# Patient Record
Sex: MALE | Race: WHITE | NOT HISPANIC OR LATINO | Employment: OTHER | URBAN - METROPOLITAN AREA
[De-identification: names, ages, dates, MRNs, and addresses within clinical notes are randomized per-mention and may not be internally consistent; named-entity substitution may affect disease eponyms.]

---

## 2017-07-05 ENCOUNTER — HOSPITAL ENCOUNTER (OUTPATIENT)
Dept: LAB | Facility: MEDICAL CENTER | Age: 72
End: 2017-07-05
Attending: PHYSICIAN ASSISTANT
Payer: MEDICARE

## 2017-07-05 ENCOUNTER — OFFICE VISIT (OUTPATIENT)
Dept: URGENT CARE | Facility: PHYSICIAN GROUP | Age: 72
End: 2017-07-05
Payer: MEDICARE

## 2017-07-05 VITALS
OXYGEN SATURATION: 97 % | DIASTOLIC BLOOD PRESSURE: 72 MMHG | RESPIRATION RATE: 16 BRPM | SYSTOLIC BLOOD PRESSURE: 124 MMHG | HEART RATE: 64 BPM | WEIGHT: 123 LBS | TEMPERATURE: 99.7 F

## 2017-07-05 DIAGNOSIS — R63.4 WEIGHT LOSS, NON-INTENTIONAL: ICD-10-CM

## 2017-07-05 DIAGNOSIS — R10.13 EPIGASTRIC PAIN: ICD-10-CM

## 2017-07-05 LAB
ALBUMIN SERPL BCP-MCNC: 4 G/DL (ref 3.2–4.9)
ALBUMIN/GLOB SERPL: 1.3 G/DL
ALP SERPL-CCNC: 54 U/L (ref 30–99)
ALT SERPL-CCNC: 19 U/L (ref 2–50)
ANION GAP SERPL CALC-SCNC: 7 MMOL/L (ref 0–11.9)
AST SERPL-CCNC: 23 U/L (ref 12–45)
BASOPHILS # BLD AUTO: 0.5 % (ref 0–1.8)
BASOPHILS # BLD: 0.03 K/UL (ref 0–0.12)
BILIRUB SERPL-MCNC: 1 MG/DL (ref 0.1–1.5)
BUN SERPL-MCNC: 16 MG/DL (ref 8–22)
CALCIUM SERPL-MCNC: 10 MG/DL (ref 8.5–10.5)
CHLORIDE SERPL-SCNC: 104 MMOL/L (ref 96–112)
CO2 SERPL-SCNC: 28 MMOL/L (ref 20–33)
CREAT SERPL-MCNC: 0.97 MG/DL (ref 0.5–1.4)
EOSINOPHIL # BLD AUTO: 0.06 K/UL (ref 0–0.51)
EOSINOPHIL NFR BLD: 1 % (ref 0–6.9)
ERYTHROCYTE [DISTWIDTH] IN BLOOD BY AUTOMATED COUNT: 46.2 FL (ref 35.9–50)
EST. AVERAGE GLUCOSE BLD GHB EST-MCNC: 105 MG/DL
FOLATE SERPL-MCNC: >23.7 NG/ML
GFR SERPL CREATININE-BSD FRML MDRD: >60 ML/MIN/1.73 M 2
GLOBULIN SER CALC-MCNC: 3.2 G/DL (ref 1.9–3.5)
GLUCOSE SERPL-MCNC: 93 MG/DL (ref 65–99)
HBA1C MFR BLD: 5.3 % (ref 0–5.6)
HCT VFR BLD AUTO: 46.5 % (ref 42–52)
HGB BLD-MCNC: 15.5 G/DL (ref 14–18)
IMM GRANULOCYTES # BLD AUTO: 0.01 K/UL (ref 0–0.11)
IMM GRANULOCYTES NFR BLD AUTO: 0.2 % (ref 0–0.9)
LYMPHOCYTES # BLD AUTO: 1.06 K/UL (ref 1–4.8)
LYMPHOCYTES NFR BLD: 18.4 % (ref 22–41)
MCH RBC QN AUTO: 31.1 PG (ref 27–33)
MCHC RBC AUTO-ENTMCNC: 33.3 G/DL (ref 33.7–35.3)
MCV RBC AUTO: 93.2 FL (ref 81.4–97.8)
MONOCYTES # BLD AUTO: 0.58 K/UL (ref 0–0.85)
MONOCYTES NFR BLD AUTO: 10.1 % (ref 0–13.4)
NEUTROPHILS # BLD AUTO: 4.03 K/UL (ref 1.82–7.42)
NEUTROPHILS NFR BLD: 69.8 % (ref 44–72)
NRBC # BLD AUTO: 0 K/UL
NRBC BLD AUTO-RTO: 0 /100 WBC
PLATELET # BLD AUTO: 238 K/UL (ref 164–446)
PMV BLD AUTO: 11.8 FL (ref 9–12.9)
POTASSIUM SERPL-SCNC: 4.5 MMOL/L (ref 3.6–5.5)
PROT SERPL-MCNC: 7.2 G/DL (ref 6–8.2)
RBC # BLD AUTO: 4.99 M/UL (ref 4.7–6.1)
SODIUM SERPL-SCNC: 139 MMOL/L (ref 135–145)
TSH SERPL DL<=0.005 MIU/L-ACNC: 1.63 UIU/ML (ref 0.3–3.7)
VIT B12 SERPL-MCNC: >1500 PG/ML (ref 211–911)
WBC # BLD AUTO: 5.8 K/UL (ref 4.8–10.8)

## 2017-07-05 PROCEDURE — 82607 VITAMIN B-12: CPT

## 2017-07-05 PROCEDURE — 85025 COMPLETE CBC W/AUTO DIFF WBC: CPT

## 2017-07-05 PROCEDURE — 82746 ASSAY OF FOLIC ACID SERUM: CPT

## 2017-07-05 PROCEDURE — 83036 HEMOGLOBIN GLYCOSYLATED A1C: CPT | Mod: GA

## 2017-07-05 PROCEDURE — 99204 OFFICE O/P NEW MOD 45 MIN: CPT | Performed by: PHYSICIAN ASSISTANT

## 2017-07-05 PROCEDURE — 36415 COLL VENOUS BLD VENIPUNCTURE: CPT

## 2017-07-05 PROCEDURE — 80053 COMPREHEN METABOLIC PANEL: CPT

## 2017-07-05 PROCEDURE — 84443 ASSAY THYROID STIM HORMONE: CPT

## 2017-07-05 ASSESSMENT — ENCOUNTER SYMPTOMS
CHILLS: 0
NAUSEA: 0
HEARTBURN: 0
FATIGUE: 1
VOMITING: 0
WEIGHT LOSS: 0
CONSTIPATION: 0
COUGH: 0
BLOOD IN STOOL: 0
DIAPHORESIS: 0
FEVER: 0
DIARRHEA: 0
DIZZINESS: 0
ABDOMINAL PAIN: 1
SHORTNESS OF BREATH: 0
PALPITATIONS: 0
WEAKNESS: 1

## 2017-07-05 NOTE — MR AVS SNAPSHOT
Moe Nickerson   2017 12:00 PM   Office Visit   MRN: [id number]    Department:  [facility]   Dept Phone:  [phone]    Description:  Male : 1945   Provider:  Lan Vega PA-C           Reason for Visit     Other pt thinks he has diabetes, as he is hungry even after eating      Allergies as of 2017     Allergen Noted Reactions    Morphine 2017   Anxiety    halucinations      You were diagnosed with     Weight loss, non-intentional   [492681]       Epigastric pain   [306426]         Vital Signs     Blood Pressure Pulse Temperature Respirations Weight Oxygen Saturation    124/72 mmHg 64 37.6 °C (99.7 °F) 16 55.792 kg (123 lb) 97%    Smoking Status                   Never Assessed           Basic Information     Date Of Birth Sex Race Ethnicity Preferred Language    1945 Male White Non- English      Your appointments     Aug 15, 2017  2:20 PM   New Patient with WANDY Steve   [facility])    [address] [place] [id number]   [phone]           Please bring Photo ID, Insurance Cards, All Medication Bottles and copies of any legal documents (such as Living Will, Power of ) If speaking a language besides English please bring an adult . Please arrive 30 minutes prior for check in and registration. You will be receiving a confirmation call a few days before your appointment from our automated call confirmation system.              Health Maintenance     Patient has no pending health maintenance at this time      Current Immunizations     No immunizations on file.      Below and/or attached are the medications your provider expects you to take. Review all of your home medications and newly ordered medications with your provider and/or pharmacist. Follow medication instructions as directed by your provider and/or pharmacist. Please keep your medication list with you and share with your  provider. Update the information when medications are discontinued, doses are changed, or new medications (including over-the-counter products) are added; and carry medication information at all times in the event of emergency situations     Allergies:  MORPHINE - Anxiety               Medications  Valid as of: July 05, 2017 -  3:57 PM    Generic Name Brand Name Tablet Size Instructions for use    Cholecalciferol   Take  by mouth.        Clopidogrel Bisulfate   Take  by mouth.        Donepezil HCl   Take  by mouth.        LORazepam   Take  by mouth.        Multiple Vitamin (Tab) THERAGRAN  Take 1 Tab by mouth every day.        Pravastatin Sodium   Take  by mouth.        .                 Medicines prescribed today were sent to:     [id number] - [place], [place] - [address]    [address] [place] [address]    Phone: [phone] Fax: [phone]    Open 24 Hours?: No      Medication refill instructions:       If your prescription bottle indicates you have medication refills left, it is not necessary to call your provider’s office. Please contact your pharmacy and they will refill your medication.    If your prescription bottle indicates you do not have any refills left, you may request refills at any time through one of the following ways: The online [employer] system (except Urgent Care), by calling your provider’s office, or by asking your pharmacy to contact your provider’s office with a refill request. Medication refills are processed only during regular business hours and may not be available until the next business day. Your provider may request additional information or to have a follow-up visit with you prior to refilling your medication.   *Please Note: Medication refills are assigned a new Rx number when refilled electronically. Your pharmacy may indicate that no refills were authorized even though a new prescription for the same medication is available at the pharmacy. Please request the  medicine by name with the pharmacy before contacting your provider for a refill.        Your To Do List     Future Labs/Procedures Complete By Expires    CBC WITH DIFFERENTIAL  As directed 7/5/2018    COMP METABOLIC PANEL  As directed 7/5/2018    HEMOGLOBIN A1C  As directed 7/5/2018         MyChart Status: Patient Declined

## 2017-07-05 NOTE — PROGRESS NOTES
Subjective:      Moe Nickerson is a 72 y.o. male who presents with Other            Other  This is a chronic problem. Episode onset: 10 months ago. The problem occurs constantly. The problem has been unchanged. Associated symptoms include abdominal pain, fatigue, urinary symptoms and weakness. Pertinent negatives include no chest pain, chills, coughing, diaphoresis, fever, nausea or vomiting. The symptoms are aggravated by eating. Treatments tried: omeprazole. The treatment provided no relief.       Review of Systems   Constitutional: Positive for malaise/fatigue and fatigue. Negative for fever, chills, weight loss and diaphoresis.   Respiratory: Negative for cough and shortness of breath.    Cardiovascular: Negative for chest pain and palpitations.   Gastrointestinal: Positive for abdominal pain. Negative for heartburn, nausea, vomiting, diarrhea, constipation, blood in stool and melena.        Frequent bowel movements.  Large and firm.   Neurological: Positive for weakness. Negative for dizziness.     All other systems reviewed and are negative.  PMH:  has no past medical history on file.  MEDS:   Current outpatient prescriptions:   •  Clopidogrel Bisulfate (PLAVIX PO), Take  by mouth., Disp: , Rfl:   •  Donepezil HCl (ARICEPT PO), Take  by mouth., Disp: , Rfl:   •  Pravastatin Sodium (PRAVACHOL PO), Take  by mouth., Disp: , Rfl:   •  LORazepam (ATIVAN PO), Take  by mouth., Disp: , Rfl:   •  multivitamin (THERAGRAN) Tab, Take 1 Tab by mouth every day., Disp: , Rfl:   •  Cholecalciferol (VITAMIN D-3 PO), Take  by mouth., Disp: , Rfl:   ALLERGIES:   Allergies   Allergen Reactions   • Morphine Anxiety     halucinations     SURGHX: History reviewed. No pertinent past surgical history.  SOCHX:    FH: Family history was reviewed, no pertinent findings to report  Medications, Allergies, and current problem list reviewed today in Epic       Objective:     /72 mmHg  Pulse 64  Temp(Src) 37.6 °C (99.7 °F)  Resp 16   Wt 55.792 kg (123 lb)  SpO2 97%     Physical Exam   Constitutional: He is oriented to person, place, and time. Vital signs are normal. He appears well-developed and well-nourished.  Non-toxic appearance. He does not have a sickly appearance. He does not appear ill. No distress.   Neck: Normal range of motion. Neck supple.   Cardiovascular: Normal rate and regular rhythm.    Pulmonary/Chest: Effort normal and breath sounds normal.   Abdominal: Soft. Bowel sounds are normal. He exhibits distension and pulsatile liver. He exhibits no shifting dullness, no abdominal bruit, no pulsatile midline mass and no mass. There is tenderness in the epigastric area. There is no rigidity, no rebound, no guarding, no CVA tenderness, no tenderness at McBurney's point and negative Bernstein's sign. No hernia. Hernia confirmed negative in the ventral area.   Neurological: He is alert and oriented to person, place, and time.   Skin: Skin is warm and dry.   Psychiatric: He has a normal mood and affect. His behavior is normal. Judgment and thought content normal.   Vitals reviewed.              Assessment/Plan:   Patient is a 72-year-old male who presents with weight loss and complaint of hunger after eating. PMH of stroke unknown date.  No family present with patient.  Patient states that this has been ongoing for 9 months. He recently moved here from [place] and has no PCP. Prior to the symptoms he had a colonoscopy and endoscopy where polyps were removed.  Patient cannot state reason for endoscopy. No cancers were found at this time. Patient weighed approximately 160 pounds 9 months ago and has fluctuated between 110 and 120 pounds today. He also has an additional complaint of frequent urination, bowel movements and dribbling. He denies blood or black stools. Vital signs normal. Patient is coherent but appears to be confused on some questions with his health history. Patient appears cachectic looking. Abdomen: Abdomen is semirigid in  the right upper and right lower quadrant, tenderness in epigastric area, nondistended. Normal bowel sounds.  No masses, or hernias. No rebound or guarding. Lungs clear to auscultation heart sounds are normal. Patient has some weakness in the lower extremity. But he appears to ambulate fine.  Blood work is warranted for his symptoms and we will refer him to a PCP today.    1. Weight loss, non-intentional    - COMP METABOLIC PANEL; Future  - HEMOGLOBIN A1C; Future  - VIT B12,  FOLIC ACID  - TSH  - CBC WITH DIFFERENTIAL; Future  - H.PYLORI STOOL ANTIGEN    2. Epigastric pain  - COMP METABOLIC PANEL; Future  - HEMOGLOBIN A1C; Future  - VIT B12,  FOLIC ACID  - TSH  - CBC WITH DIFFERENTIAL; Future  - H.PYLORI STOOL ANTIGEN    Differential diagnosis, natural history, supportive care, and indications for immediate follow-up discussed at length.   Follow-up with primary care provider within 4-5 days, emergency room precautions discussed.  Patient and/or family appears understanding of information.

## 2017-07-07 ENCOUNTER — HOSPITAL ENCOUNTER (OUTPATIENT)
Facility: MEDICAL CENTER | Age: 72
End: 2017-07-07
Attending: PHYSICIAN ASSISTANT
Payer: MEDICARE

## 2017-07-07 LAB — H PYLORI AG STL QL IA: NOT DETECTED

## 2017-07-07 PROCEDURE — 87338 HPYLORI STOOL AG IA: CPT

## 2017-07-28 ENCOUNTER — HOSPITAL ENCOUNTER (OUTPATIENT)
Facility: MEDICAL CENTER | Age: 72
End: 2017-07-31
Attending: EMERGENCY MEDICINE | Admitting: HOSPITALIST
Payer: MEDICARE

## 2017-07-28 ENCOUNTER — APPOINTMENT (OUTPATIENT)
Dept: RADIOLOGY | Facility: MEDICAL CENTER | Age: 72
End: 2017-07-28
Attending: EMERGENCY MEDICINE
Payer: MEDICARE

## 2017-07-28 ENCOUNTER — RESOLUTE PROFESSIONAL BILLING HOSPITAL PROF FEE (OUTPATIENT)
Dept: HOSPITALIST | Facility: MEDICAL CENTER | Age: 72
End: 2017-07-28
Payer: MEDICARE

## 2017-07-28 DIAGNOSIS — Z95.5 HISTORY OF CORONARY ARTERY STENT PLACEMENT: ICD-10-CM

## 2017-07-28 DIAGNOSIS — F41.9 ANXIETY: ICD-10-CM

## 2017-07-28 DIAGNOSIS — R07.9 CHEST PAIN, UNSPECIFIED TYPE: ICD-10-CM

## 2017-07-28 DIAGNOSIS — K62.5 RECTAL BLEEDING: ICD-10-CM

## 2017-07-28 PROBLEM — Z66 DNR (DO NOT RESUSCITATE): Status: ACTIVE | Noted: 2017-07-28

## 2017-07-28 LAB
ALBUMIN SERPL BCP-MCNC: 4.2 G/DL (ref 3.2–4.9)
ALBUMIN/GLOB SERPL: 1.4 G/DL
ALP SERPL-CCNC: 59 U/L (ref 30–99)
ALT SERPL-CCNC: 22 U/L (ref 2–50)
ANION GAP SERPL CALC-SCNC: 13 MMOL/L (ref 0–11.9)
APTT PPP: 26.7 SEC (ref 24.7–36)
AST SERPL-CCNC: 28 U/L (ref 12–45)
BASOPHILS # BLD AUTO: 0.3 % (ref 0–1.8)
BASOPHILS # BLD: 0.03 K/UL (ref 0–0.12)
BILIRUB SERPL-MCNC: 1.2 MG/DL (ref 0.1–1.5)
BNP SERPL-MCNC: 168 PG/ML (ref 0–100)
BUN SERPL-MCNC: 19 MG/DL (ref 8–22)
CALCIUM SERPL-MCNC: 10.3 MG/DL (ref 8.5–10.5)
CHLORIDE SERPL-SCNC: 104 MMOL/L (ref 96–112)
CO2 SERPL-SCNC: 22 MMOL/L (ref 20–33)
CREAT SERPL-MCNC: 1.01 MG/DL (ref 0.5–1.4)
EKG IMPRESSION: NORMAL
EOSINOPHIL # BLD AUTO: 0.01 K/UL (ref 0–0.51)
EOSINOPHIL NFR BLD: 0.1 % (ref 0–6.9)
ERYTHROCYTE [DISTWIDTH] IN BLOOD BY AUTOMATED COUNT: 41.8 FL (ref 35.9–50)
GFR SERPL CREATININE-BSD FRML MDRD: >60 ML/MIN/1.73 M 2
GLOBULIN SER CALC-MCNC: 3 G/DL (ref 1.9–3.5)
GLUCOSE SERPL-MCNC: 108 MG/DL (ref 65–99)
HCT VFR BLD AUTO: 46.7 % (ref 42–52)
HGB BLD-MCNC: 16.4 G/DL (ref 14–18)
IMM GRANULOCYTES # BLD AUTO: 0.06 K/UL (ref 0–0.11)
IMM GRANULOCYTES NFR BLD AUTO: 0.5 % (ref 0–0.9)
INR PPP: 1.13 (ref 0.87–1.13)
LIPASE SERPL-CCNC: 34 U/L (ref 11–82)
LYMPHOCYTES # BLD AUTO: 0.79 K/UL (ref 1–4.8)
LYMPHOCYTES NFR BLD: 6.9 % (ref 22–41)
MCH RBC QN AUTO: 32 PG (ref 27–33)
MCHC RBC AUTO-ENTMCNC: 35.1 G/DL (ref 33.7–35.3)
MCV RBC AUTO: 91 FL (ref 81.4–97.8)
MONOCYTES # BLD AUTO: 0.95 K/UL (ref 0–0.85)
MONOCYTES NFR BLD AUTO: 8.3 % (ref 0–13.4)
NEUTROPHILS # BLD AUTO: 9.54 K/UL (ref 1.82–7.42)
NEUTROPHILS NFR BLD: 83.9 % (ref 44–72)
NRBC # BLD AUTO: 0 K/UL
NRBC BLD AUTO-RTO: 0 /100 WBC
PLATELET # BLD AUTO: 242 K/UL (ref 164–446)
PMV BLD AUTO: 11.4 FL (ref 9–12.9)
POTASSIUM SERPL-SCNC: 4 MMOL/L (ref 3.6–5.5)
PROT SERPL-MCNC: 7.2 G/DL (ref 6–8.2)
PROTHROMBIN TIME: 14.9 SEC (ref 12–14.6)
RBC # BLD AUTO: 5.13 M/UL (ref 4.7–6.1)
SODIUM SERPL-SCNC: 139 MMOL/L (ref 135–145)
TROPONIN I SERPL-MCNC: 0.02 NG/ML (ref 0–0.04)
TROPONIN I SERPL-MCNC: <0.01 NG/ML (ref 0–0.04)
WBC # BLD AUTO: 11.4 K/UL (ref 4.8–10.8)

## 2017-07-28 PROCEDURE — 93010 ELECTROCARDIOGRAM REPORT: CPT | Performed by: INTERNAL MEDICINE

## 2017-07-28 PROCEDURE — 700111 HCHG RX REV CODE 636 W/ 250 OVERRIDE (IP): Performed by: HOSPITALIST

## 2017-07-28 PROCEDURE — 36415 COLL VENOUS BLD VENIPUNCTURE: CPT

## 2017-07-28 PROCEDURE — 99285 EMERGENCY DEPT VISIT HI MDM: CPT

## 2017-07-28 PROCEDURE — 99220 PR INITIAL OBSERVATION CARE,LEVL III: CPT | Performed by: HOSPITALIST

## 2017-07-28 PROCEDURE — G0378 HOSPITAL OBSERVATION PER HR: HCPCS

## 2017-07-28 PROCEDURE — 80053 COMPREHEN METABOLIC PANEL: CPT

## 2017-07-28 PROCEDURE — 93005 ELECTROCARDIOGRAM TRACING: CPT | Performed by: EMERGENCY MEDICINE

## 2017-07-28 PROCEDURE — 85730 THROMBOPLASTIN TIME PARTIAL: CPT

## 2017-07-28 PROCEDURE — 700102 HCHG RX REV CODE 250 W/ 637 OVERRIDE(OP): Performed by: NURSE PRACTITIONER

## 2017-07-28 PROCEDURE — 83690 ASSAY OF LIPASE: CPT

## 2017-07-28 PROCEDURE — 83880 ASSAY OF NATRIURETIC PEPTIDE: CPT

## 2017-07-28 PROCEDURE — 85025 COMPLETE CBC W/AUTO DIFF WBC: CPT

## 2017-07-28 PROCEDURE — 96374 THER/PROPH/DIAG INJ IV PUSH: CPT

## 2017-07-28 PROCEDURE — 93005 ELECTROCARDIOGRAM TRACING: CPT | Performed by: HOSPITALIST

## 2017-07-28 PROCEDURE — 71010 DX-CHEST-PORTABLE (1 VIEW): CPT

## 2017-07-28 PROCEDURE — A9270 NON-COVERED ITEM OR SERVICE: HCPCS | Performed by: NURSE PRACTITIONER

## 2017-07-28 PROCEDURE — 85610 PROTHROMBIN TIME: CPT

## 2017-07-28 PROCEDURE — 84484 ASSAY OF TROPONIN QUANT: CPT

## 2017-07-28 RX ORDER — FLUTICASONE PROPIONATE 50 MCG
1 SPRAY, SUSPENSION (ML) NASAL DAILY
COMMUNITY
End: 2019-06-04 | Stop reason: SDUPTHER

## 2017-07-28 RX ORDER — ZOLPIDEM TARTRATE 5 MG/1
5 TABLET ORAL NIGHTLY PRN
COMMUNITY
End: 2017-10-05

## 2017-07-28 RX ORDER — SPIRONOLACTONE 25 MG/1
25 TABLET ORAL 2 TIMES DAILY
COMMUNITY
End: 2017-08-17 | Stop reason: SDUPTHER

## 2017-07-28 RX ORDER — PRAVASTATIN SODIUM 20 MG
20 TABLET ORAL DAILY
COMMUNITY
End: 2017-08-17 | Stop reason: SDUPTHER

## 2017-07-28 RX ORDER — LOSARTAN POTASSIUM 50 MG/1
25 TABLET ORAL DAILY
COMMUNITY
End: 2017-08-17 | Stop reason: SDUPTHER

## 2017-07-28 RX ORDER — BISACODYL 10 MG
10 SUPPOSITORY, RECTAL RECTAL
Status: DISCONTINUED | OUTPATIENT
Start: 2017-07-28 | End: 2017-07-31 | Stop reason: HOSPADM

## 2017-07-28 RX ORDER — FLUOXETINE HYDROCHLORIDE 20 MG/1
20 CAPSULE ORAL DAILY
Status: ON HOLD | COMMUNITY
End: 2017-07-31

## 2017-07-28 RX ORDER — ALPRAZOLAM 0.25 MG/1
0.25 TABLET ORAL NIGHTLY PRN
Status: DISCONTINUED | OUTPATIENT
Start: 2017-07-28 | End: 2017-07-30

## 2017-07-28 RX ORDER — NITROGLYCERIN 0.4 MG/1
0.4 TABLET SUBLINGUAL
COMMUNITY
End: 2019-09-17 | Stop reason: SDUPTHER

## 2017-07-28 RX ORDER — MOMETASONE FUROATE 1 MG/G
1 CREAM TOPICAL DAILY
COMMUNITY
End: 2017-10-05

## 2017-07-28 RX ORDER — OMEPRAZOLE 20 MG/1
20 CAPSULE, DELAYED RELEASE ORAL 2 TIMES DAILY
COMMUNITY
End: 2019-06-04

## 2017-07-28 RX ORDER — POLYETHYLENE GLYCOL 3350 17 G/17G
1 POWDER, FOR SOLUTION ORAL
Status: DISCONTINUED | OUTPATIENT
Start: 2017-07-28 | End: 2017-07-31 | Stop reason: HOSPADM

## 2017-07-28 RX ORDER — CLOPIDOGREL BISULFATE 75 MG/1
75 TABLET ORAL DAILY
Status: ON HOLD | COMMUNITY
End: 2017-07-31

## 2017-07-28 RX ORDER — ONDANSETRON 4 MG/1
4 TABLET, ORALLY DISINTEGRATING ORAL EVERY 4 HOURS PRN
Status: DISCONTINUED | OUTPATIENT
Start: 2017-07-28 | End: 2017-07-31 | Stop reason: HOSPADM

## 2017-07-28 RX ORDER — LORAZEPAM 2 MG/1
2 TABLET ORAL EVERY 8 HOURS PRN
Status: ON HOLD | COMMUNITY
End: 2017-07-31

## 2017-07-28 RX ORDER — AMOXICILLIN 250 MG
2 CAPSULE ORAL 2 TIMES DAILY
Status: DISCONTINUED | OUTPATIENT
Start: 2017-07-28 | End: 2017-07-31 | Stop reason: HOSPADM

## 2017-07-28 RX ORDER — DONEPEZIL HYDROCHLORIDE 5 MG/1
5 TABLET, FILM COATED ORAL DAILY
COMMUNITY
End: 2017-08-17 | Stop reason: SDUPTHER

## 2017-07-28 RX ORDER — ONDANSETRON 2 MG/ML
4 INJECTION INTRAMUSCULAR; INTRAVENOUS EVERY 4 HOURS PRN
Status: DISCONTINUED | OUTPATIENT
Start: 2017-07-28 | End: 2017-07-31 | Stop reason: HOSPADM

## 2017-07-28 RX ADMIN — ONDANSETRON 4 MG: 2 INJECTION INTRAMUSCULAR; INTRAVENOUS at 23:14

## 2017-07-28 RX ADMIN — ALPRAZOLAM 0.25 MG: 0.25 TABLET ORAL at 23:10

## 2017-07-28 ASSESSMENT — PATIENT HEALTH QUESTIONNAIRE - PHQ9
SUM OF ALL RESPONSES TO PHQ9 QUESTIONS 1 AND 2: 0
2. FEELING DOWN, DEPRESSED, IRRITABLE, OR HOPELESS: NOT AT ALL
SUM OF ALL RESPONSES TO PHQ QUESTIONS 1-9: 0
1. LITTLE INTEREST OR PLEASURE IN DOING THINGS: NOT AT ALL

## 2017-07-28 ASSESSMENT — ENCOUNTER SYMPTOMS
ABDOMINAL PAIN: 0
HEMOPTYSIS: 0
CHILLS: 0
NAUSEA: 0
PALPITATIONS: 0
VOMITING: 0
TREMORS: 1
WEIGHT LOSS: 1
FEVER: 0

## 2017-07-28 ASSESSMENT — LIFESTYLE VARIABLES
EVER_SMOKED: NEVER
DO YOU DRINK ALCOHOL: NO

## 2017-07-28 ASSESSMENT — PAIN SCALES - GENERAL
PAINLEVEL_OUTOF10: 0
PAINLEVEL_OUTOF10: 2

## 2017-07-28 NOTE — IP AVS SNAPSHOT
" Home Care Instructions                                                                                                                  Name:Moe Nickerson  Medical Record Number:[id number]  CSN: [id number]    YOB: 1945   Age: 72 y.o.  Sex: male  HT:1.778 m (5' 10\") WT: 52.7 kg (116 lb 2.9 oz)          Admit Date: 7/28/2017     Discharge Date:   Today's Date: 7/31/2017  Attending Doctor:  Amy Guzman D.O.                  Allergies:  Morphine            Discharge Instructions       Discharge Instructions    Discharged to home by car with relative. Discharged via walking, hospital escort: Yes.  Special equipment needed: Not Applicable    Be sure to schedule a follow-up appointment with your primary care doctor or any specialists as instructed.     Discharge Plan:   Influenza Vaccine Indication: Indicated: Not available from distributor/    I understand that a diet low in cholesterol, fat, and sodium is recommended for good health. Unless I have been given specific instructions below for another diet, I accept this instruction as my diet prescription.   Other diet: Cardiac Diet    Rectal Bleeding  Rectal bleeding is when blood passes out of the anus. It is usually a sign that something is wrong. It may not be serious, but it should always be evaluated. Rectal bleeding may present as bright red blood or extremely dark stools. The color may range from dark red or maroon to black (like tar). It is important that the cause of rectal bleeding be identified so treatment can be started and the problem corrected.  CAUSES   · Hemorrhoids. These are enlarged (dilated) blood vessels or veins in the anal or rectal area.  · Fistulas. These are abnormal, burrowing channels that usually run from inside the rectum to the skin around the anus. They can bleed.  · Anal fissures. This is a tear in the tissue of the anus. Bleeding occurs with bowel movements.  · Diverticulosis. This is a condition in which pockets or " sacs project from the bowel wall. Occasionally, the sacs can bleed.  · Diverticulitis. This is an infection involving diverticulosis of the colon.  · Proctitis and colitis. These are conditions in which the rectum, colon, or both, can become inflamed and pitted (ulcerated).  · Polyps and cancer. Polyps are non-cancerous (benign) growths in the colon that may bleed. Certain types of polyps turn into cancer.  · Protrusion of the rectum. Part of the rectum can project from the anus and bleed.  · Certain medicines.  · Intestinal infections.  · Blood vessel abnormalities.  HOME CARE INSTRUCTIONS  · Eat a high-fiber diet to keep your stool soft.  · Limit activity.  · Drink enough fluids to keep your urine clear or pale yellow.  · Warm baths may be useful to soothe rectal pain.  · Follow up with your caregiver as directed.  SEEK IMMEDIATE MEDICAL CARE IF:  · You develop increased bleeding.  · You have black or dark red stools.  · You vomit blood or material that looks like coffee grounds.  · You have abdominal pain or tenderness.  · You have a fever.  · You feel weak, nauseous, or you faint.  · You have severe rectal pain or you are unable to have a bowel movement.  MAKE SURE YOU:  · Understand these instructions.  · Will watch your condition.  · Will get help right away if you are not doing well or get worse.  Document Released: 06/09/2003 Document Revised: 03/11/2013 Document Reviewed: 06/03/2012  [employer]® Patient Information ©2014 [employer].    Chest Pain, Nonspecific  It is often hard to give a specific diagnosis for the cause of chest pain. There is always a chance that your pain could be related to something serious, like a heart attack or a blood clot in the lungs. You need to follow up with your caregiver for further evaluation. More lab tests or other studies such as X-rays, electrocardiography, stress testing, or cardiac imaging may be needed to find the cause of your pain.  Most of the time, nonspecific  chest pain improves within 2 to 3 days with rest and mild pain medicine. For the next few days, avoid physical exertion or activities that bring on pain. Do not smoke. Avoid drinking alcohol. Call your caregiver for routine follow-up as advised.   SEEK IMMEDIATE MEDICAL CARE IF:  · You develop increased chest pain or pain that radiates to the arm, neck, jaw, back, or abdomen.   · You develop shortness of breath, increased coughing, or you start coughing up blood.   · You have severe back or abdominal pain, nausea, or vomiting.   · You develop severe weakness, fainting, fever, or chills.   Document Released: 12/18/2006 Document Revised: 03/11/2013 Document Reviewed: 06/06/2008  [employer] [facility]® Patient Information ©2013 [employer].    · Is patient discharged on Warfarin / Coumadin?   No     · Is patient Post Blood Transfusion?  No    Depression / Suicide Risk    As you are discharged from this [facility], it is important to learn how to keep safe from harming yourself.    Recognize the warning signs:  · Abrupt changes in personality, positive or negative- including increase in energy   · Giving away possessions  · Change in eating patterns- significant weight changes-  positive or negative  · Change in sleeping patterns- unable to sleep or sleeping all the time   · Unwillingness or inability to communicate  · Depression  · Unusual sadness, discouragement and loneliness  · Talk of wanting to die  · Neglect of personal appearance   · Rebelliousness- reckless behavior  · Withdrawal from people/activities they love  · Confusion- inability to concentrate     If you or a loved one observes any of these behaviors or has concerns about self-harm, here's what you can do:  · Talk about it- your feelings and reasons for harming yourself  · Remove any means that you might use to hurt yourself (examples: pills, rope, extension cords, firearm)  · Get professional help from the community (Mental Health, Substance Abuse,  psychological counseling)  · Do not be alone:Call your Safe Contact- someone whom you trust who will be there for you.  · Call your local CRISIS HOTLINE 473-2200 or 666-589-9052  · Call your local Children's Mobile Crisis Response Team [place] (256) 851-1650 or www.[employer]ix[employer]  · Call the toll free National Suicide Prevention Hotlines   · National Suicide Prevention Lifeline 610-502-LDKA (7724)  · [employer] Hope Line Network 800-SUICIDE (085-2581)        Your appointments     Aug 02, 2017 11:20 AM   CARE MANAGER TELEPHONE VISIT with CARE MANAGER   [facility]    [address]  [place] [address] [phone]           ***IMPORTANT**** This is a phone visit only. Do not come into the office. The Care Manager will call you at the scheduled time for Care Manager Telephone Visit.            Aug 03, 2017 11:20 AM   Established Patient with WANDY Jensen   [facility]    [address]  [place] [address] [phone]           You will be receiving a confirmation call a few days before your appointment from our automated call confirmation system.            Aug 15, 2017  2:20 PM   New Patient with WANDY Steve   [facility])    [address] [place] [id number] [phone]           Please bring Photo ID, Insurance Cards, All Medication Bottles and copies of any legal documents (such as Living Will, Power of ) If speaking a language besides English please bring an adult . Please arrive 30 minutes prior for check in and registration. You will be receiving a confirmation call a few days before your appointment from our automated call confirmation system.              Follow-up Information     1. Follow up with [facility]. Schedule an appointment as soon as possible for a visit in 1 week.    Why:  For a follow up appointment. Thank you.     Contact information    [place] [id number]  [phone]           Discharge Medication Instructions:    Below are the medications your physician expects you to take upon discharge:    Review all your home medications and newly ordered medications with your doctor and/or pharmacist. Follow medication instructions as directed by your doctor and/or pharmacist.    Please keep your medication list with you and share with your physician.               Medication List      CONTINUE taking these medications        Instructions    Morning Afternoon Evening Bedtime    donepezil 5 MG Tabs   Last time this was given:  5 mg on 7/31/2017  7:58 AM   Commonly known as:  ARICEPT   Next Dose Due:  8/1/2017        Take 5 mg by mouth every day.   Dose:  5 mg                        fluoxetine 20 MG Caps   Last time this was given:  20 mg on 7/31/2017  7:58 AM   Commonly known as:  PROZAC   Next Dose Due:  8/1/2017        Take 1 Cap by mouth every day.   Dose:  20 mg                        fluticasone 50 MCG/ACT nasal spray   Commonly known as:  FLONASE   Next Dose Due:  8/1/2017        Spray 1 Spray in nose every day.   Dose:  1 Spray                        lorazepam 2 MG tablet   Last time this was given:  1 mg on 7/31/2017  8:03 AM   Commonly known as:  ATIVAN   Next Dose Due:  TAKE AS NEEDED.        Take 1 Tab by mouth every 8 hours as needed for Anxiety.   Dose:  2 mg                        losartan 50 MG Tabs   Commonly known as:  COZAAR   Next Dose Due:  8/1/2017          Take 25 mg by mouth every day.   Dose:  25 mg                        mometasone 0.1 % Crea   Commonly known as:  ELOCON        Apply 1 g to affected area(s) every day.   Dose:  1 g                        multivitamin Tabs   Next Dose Due:  8/1/2017        Take 1 Tab by mouth every day.   Dose:  1 Tab                        nitroglycerin 0.4 MG Subl   Commonly known as:  NITROSTAT        Place 0.4 mg under tongue every 5 minutes as needed for Chest Pain.   Dose:  0.4  mg                        omeprazole 20 MG delayed-release capsule   Commonly known as:  PRILOSEC   Next Dose Due:  8/1/2017        Take 20 mg by mouth 2 times a day.   Dose:  20 mg                        pravastatin 20 MG Tabs   Commonly known as:  PRAVACHOL   Next Dose Due:  7/31/2017 in PM        Take 20 mg by mouth every day.   Dose:  20 mg                        spironolactone 25 MG Tabs   Commonly known as:  ALDACTONE   Next Dose Due:  8/1/2017 in AM        Take 25 mg by mouth 2 times a day.   Dose:  25 mg                        vitamin D 1000 UNIT Tabs   Commonly known as:  cholecalciferol   Next Dose Due:  8/1/17        Take 1,000 Units by mouth every day.   Dose:  1000 Units                        zolpidem 5 MG Tabs   Commonly known as:  AMBIEN        Take 5 mg by mouth at bedtime as needed for Sleep.   Dose:  5 mg                          STOP taking these medications     clopidogrel 75 MG Tabs   Commonly known as:  PLAVIX                    Where to Get Your Medications      Information about where to get these medications is not yet available     ! Ask your nurse or doctor about these medications    - fluoxetine 20 MG Caps  - lorazepam 2 MG tablet            Orders for after discharge     REFERRAL TO HOME HEALTH    Complete by:  As directed    Home health will create and establish a plan of care             Instructions           Diet / Nutrition:    Follow any diet instructions given to you by your doctor or the dietician, including how much salt (sodium) you are allowed each day.    If you are overweight, talk to your doctor about a weight reduction plan.    Activity:    Remain physically active following your doctor's instructions about exercise and activity.    Rest often.     Any time you become even a little tired or short of breath, SIT DOWN and rest.    Worsening Symptoms:    Report any of the following signs and symptoms to the doctor's office immediately:    *Pain of jaw, arm, or neck  *Chest  pain not relieved by medication                               *Dizziness or loss of consciousness  *Difficulty breathing even when at rest   *More tired than usual                                       *Bleeding drainage or swelling of surgical site  *Swelling of feet, ankles, legs or stomach                 *Fever (>100ºF)  *Pink or blood tinged sputum  *Weight gain (3lbs/day or 5lbs /week)           *Shock from internal defibrillator (if applicable)  *Palpitations or irregular heartbeats                *Cool and/or numb extremities    Stroke Awareness    Common Risk Factors for Stroke include:    Age  Atrial Fibrillation  Carotid Artery Stenosis  Diabetes Mellitus  Excessive alcohol consumption  High blood pressure  Overweight   Physical inactivity  Smoking    Warning signs and symptoms of a stroke include:    *Sudden numbness or weakness of the face, arm or leg (especially on one side of the body).  *Sudden confusion, trouble speaking or understanding.  *Sudden trouble seeing in one or both eyes.  *Sudden trouble walking, dizziness, loss of balance or coordination.Sudden severe headache with no known cause.    It is very important to get treatment quickly when a stroke occurs. If you experience any of the above warning signs, call 911 immediately.                   Disclaimer         Quit Smoking / Tobacco Use:    I understand the use of any tobacco products increases my chance of suffering from future heart disease or stroke and could cause other illnesses which may shorten my life. Quitting the use of tobacco products is the single most important thing I can do to improve my health. For further information on smoking / tobacco cessation call a Toll Free Quit Line at 1-515.273.3928 (*National Cancer [place]) or 1-906.437.5580 (American Lung Association) or you can access the web based program at www.lungusa.org.    [place] Tobacco Users Help Line:  (599) 240-5678       Toll Free: 1-118.466.8674  Quit Tobacco  Program [facility] (073)480-4618    Crisis Hotline:    [place] Crisis Hotline:  7-745-IABWHBW or 1-306.180.5606    [place] Crisis Hotline:    1-617.728.2737 or 053-718-2573    Discharge Survey:   Thank you for choosing [facility]. We hope we did everything we could to make your hospital stay a pleasant one. You may be receiving a phone survey and we would appreciate your time and participation in answering the questions. Your input is very valuable to us in our efforts to improve our service to our patients and their families.        My signature on this form indicates that:    1. I have reviewed and understand the above information.  2. My questions regarding this information have been answered to my satisfaction.  3. I have formulated a plan with my discharge nurse to obtain my prescribed medications for home.                  Disclaimer         __________________________________                     __________       ________                       Patient Signature                                                 Date                    Time

## 2017-07-28 NOTE — LETTER
[facility] ([facility]) - [phone]  EHR eReferral Notification Requirements    To be sent by secure email to [email or web address] or   by fax to [phone]       FIELDS ARE REQUIRED TO BE COMPLETED     Patient Name:  Moe Nickerson  MRN:  [id number]   Account Number: [id number]                YOB: 1945    Date Roomed in ED:    7/28/2017   3:21 PM  Date First Observation Order Placed: 7/28/2017   6:29 PM  Date First Inpatient Order Placed:        Date of Previous Admission (Needed For Readmission Reviews Only):     Discharge Date and Time (if applicable): No discharge date for patient encounter.     PLEASE CHECK OFF TYPE OF REVIEW & CURRENT ADMISSION STATUS FROM LISTS BELOW  Type of Review:  Admission review    Dates to be Reviewed: 7/28-7/29-7/30        Current Admission Status:  Observation-Outpatient    / Contact Number for EHR outcome/recommendation call:    Shanelle Caldwell [phone]     Attending Physician/ Contact Number (if not the same as in electronic record):  Dr. Amy Guzman [phone]     Comments:  Please review per the [place] 2 [place] rule.  Thank you      Additional Information being Emailed or Faxed:  no       Fax Handwritten Supporting Documents to EHR at [phone]      [address] [address]  [phone]  [email or web address]    Updated December 17, 2014

## 2017-07-28 NOTE — ED NOTES
Chief Complaint   Patient presents with   • Chest Pain     Pt bib ems. Pt was outside doing yardwork, became hot, had chest pain (right lower chest), called 911. Pt has hx of this happening (heat stroke). pt given 324 asa and 1 nitro tab with relief. Hx of TIA with residual neuro deficits (aphasia) that's normal for him. Hx of MI. Pt arrives alert/oriented x4. Pink/warm/wet-from dumping water on himself.

## 2017-07-28 NOTE — IP AVS SNAPSHOT
" <p align=\"LEFT\"><IMG SRC=\"//[email or web address]$/[email or web address]\" alt=\"Image\" WIDTH=\"50%\" HEIGHT=\"200\" BORDER=\"\"></p>                   Name:Moe Nickerson  Medical Record Number:[id number]  CSN: [id number]    YOB: 1945   Age: 72 y.o.  Sex: male  HT:1.778 m (5' 10\") WT: 52.7 kg (116 lb 2.9 oz)          Admit Date: 7/28/2017     Discharge Date:   Today's Date: 7/31/2017  Attending Doctor:  Amy Guzman D.O.                  Allergies:  Morphine          Your appointments     Aug 02, 2017 11:20 AM   CARE MANAGER TELEPHONE VISIT with CARE MANAGER   [address] [address] [phone]           ***IMPORTANT**** This is a phone visit only. Do not come into the office. The Care Manager will call you at the scheduled time for Care Manager Telephone Visit.            Aug 03, 2017 11:20 AM   Established Patient with WANDY Jensen   [address] [id number] [phone]           You will be receiving a confirmation call a few days before your appointment from our automated call confirmation system.            Aug 15, 2017  2:20 PM   New Patient with WANDY Steve   [facility]    [address] [facility] [id number] [phone]           Please bring Photo ID, Insurance Cards, All Medication Bottles and copies of any legal documents (such as Living Will, Power of ) If speaking a language besides English please bring an adult . Please arrive 30 minutes prior for check in and registration. You will be receiving a confirmation call a few days before your appointment from our automated call confirmation system.              Follow-up Information     1. Follow up with [facility]. Schedule an appointment as soon as possible for a visit in 1 week.    Why:  For a follow up appointment. Thank you.    Contact " information    [place] [address]  [phone]           Medication List      Take these Medications        Instructions    donepezil 5 MG Tabs   Commonly known as:  ARICEPT    Take 5 mg by mouth every day.   Dose:  5 mg       fluoxetine 20 MG Caps   Commonly known as:  PROZAC    Take 1 Cap by mouth every day.   Dose:  20 mg       fluticasone 50 MCG/ACT nasal spray   Commonly known as:  FLONASE    Spray 1 Spray in nose every day.   Dose:  1 Spray       lorazepam 2 MG tablet   Commonly known as:  ATIVAN    Take 1 Tab by mouth every 8 hours as needed for Anxiety.   Dose:  2 mg       losartan 50 MG Tabs   Commonly known as:  COZAAR    Take 25 mg by mouth every day.   Dose:  25 mg       mometasone 0.1 % Crea   Commonly known as:  ELOCON    Apply 1 g to affected area(s) every day.   Dose:  1 g       multivitamin Tabs    Take 1 Tab by mouth every day.   Dose:  1 Tab       nitroglycerin 0.4 MG Subl   Commonly known as:  NITROSTAT    Place 0.4 mg under tongue every 5 minutes as needed for Chest Pain.   Dose:  0.4 mg       omeprazole 20 MG delayed-release capsule   Commonly known as:  PRILOSEC    Take 20 mg by mouth 2 times a day.   Dose:  20 mg       pravastatin 20 MG Tabs   Commonly known as:  PRAVACHOL    Take 20 mg by mouth every day.   Dose:  20 mg       spironolactone 25 MG Tabs   Commonly known as:  ALDACTONE    Take 25 mg by mouth 2 times a day.   Dose:  25 mg       vitamin D 1000 UNIT Tabs   Commonly known as:  cholecalciferol    Take 1,000 Units by mouth every day.   Dose:  1000 Units       zolpidem 5 MG Tabs   Commonly known as:  AMBIEN    Take 5 mg by mouth at bedtime as needed for Sleep.   Dose:  5 mg

## 2017-07-28 NOTE — IP AVS SNAPSHOT
7/31/2017    Moe Nickerson  [address]  [place] [address]    Dear Moe:    [facility] wants to ensure your discharge home is safe and you or your loved ones have had all of your questions answered regarding your care after you leave the hospital.    Below is a list of resources and contact information should you have any questions regarding your hospital stay, follow-up instructions, or active medical symptoms.    Questions or Concerns Regarding… Contact   Medical Questions Related to Your Discharge  (7 days a week, 8am-5pm) Contact a Nurse Care Coordinator   [phone]   Medical Questions Not Related to Your Discharge  (24 hours a day / 7 days a week)  Contact the Nurse Health Line   [phone]    Medications or Discharge Instructions Refer to your discharge packet   or contact your [facility] Primary Care Provider   [phone]   Follow-up Appointment(s) Schedule your appointment via [employer]   or contact Scheduling [phone]   Billing Review your statement via [employer]  or contact Billing [phone]   Medical Records Review your records via [employer]   or contact Medical Records [phone]     You may receive a telephone call within two days of discharge. This call is to make certain you understand your discharge instructions and have the opportunity to have any questions answered. You can also easily access your medical information, test results and upcoming appointments via the [employer] free online health management tool. You can learn more and sign up at [email or web address]. For assistance setting up your [employer] account, please call [phone].    Once again, we want to ensure your discharge home is safe and that you have a clear understanding of any next steps in your care. If you have any questions or concerns, please do not hesitate to contact us, we are here for you. Thank you for choosing [facility] for your healthcare needs.    Sincerely,    Your [facility] Healthcare Team

## 2017-07-28 NOTE — ED PROVIDER NOTES
ED Provider Note    Scribed for Levy Waters M.D. by Neelam Mayes. 7/28/2017, 3:39 PM.    Primary care provider: Pcp Pt States None  Means of arrival: Carla  History obtained from: Patient  History limited by: None    CHIEF COMPLAINT  Chief Complaint   Patient presents with   • Chest Pain     Pt bib ems. Pt was outside doing yardwork, became hot, had chest pain (right lower chest), called 911. Pt has hx of this happening (heat stroke). pt given 324 asa and 1 nitro tab with relief. Hx of TIA with residual neuro deficits (aphasia) that's normal for him. Hx of MI. Pt arrives alert/oriented x4. Pink/warm/wet-from dumping water on himself.       HPI  Moe Nickerson is a 72 y.o. male with a history of heart attack, heatstroke, and TIA who presents to the Emergency Department via ambulance for shortness of breath and chest pain onset 1230. The patient states that he was outside doing yard work around 0800 and then went inside for a few hours when he became hot and experienced chest pain on the lower right side. He states that he progressively became unable to breathe secondary to the hot weather. In transport, he was given 324mg of Aspirin and 1 tab of Nitro with relief. He endorses some dizziness with the episode that has since resolved. Currently, the pain in his chest is slight and radiates through his right chest but is different from his previous heart attacks.He notes a history of lower GI bleeds that his primary doctor has been treating with Plavix every other day. The patient denies nausea, vomiting, fever, or hemoptysis.    REVIEW OF SYSTEMS  Review of Systems   Constitutional: Positive for weight loss. Negative for fever and chills.   Respiratory: Negative for hemoptysis.    Cardiovascular: Positive for chest pain. Negative for palpitations.   Gastrointestinal: Negative for nausea, vomiting and abdominal pain.   Neurological: Positive for tremors.   All other systems reviewed and are negative.  C    PAST  "MEDICAL HISTORY   has a past medical history of Stroke (cerebrum) (CMS-HCC); Hypertension; Heart attack (CMS-HCC); and Anxiety.   Heart attack - 8 years ago  History of colonoscopy and endoscopy with related bleeding, per patient: no history of active cancer  Unintentional weight loss    SURGICAL HISTORY  4 stent placements following heart attack  Hernia repair    SOCIAL HISTORY  Social History   Substance Use Topics   • Smoking status: Never Smoker    • Smokeless tobacco: None   • Alcohol Use: No      History   Drug Use No   Lives on son's property in [place]    FAMILY HISTORY  History reviewed. No pertinent family history.    CURRENT MEDICATIONS  Home Medications     Reviewed by Sofie Peterson R.N. (Registered Nurse) on 07/28/17 at 2159  Med List Status: Complete    Medication Last Dose Status    clopidogrel (PLAVIX) 75 MG Tab 7/27/2017 Active    donepezil (ARICEPT) 5 MG Tab 7/27/2017 Active    fluoxetine (PROZAC) 20 MG Cap 7/27/2017 Active    fluticasone (FLONASE) 50 MCG/ACT nasal spray 7/27/2017 Active    lorazepam (ATIVAN) 2 MG tablet 7/27/2017 Active    losartan (COZAAR) 50 MG Tab 7/27/2017 Active    mometasone (ELOCON) 0.1 % Cream unknown Active    multivitamin (THERAGRAN) Tab 7/27/2017 Active    nitroglycerin (NITROSTAT) 0.4 MG SL Tab unknown Active    omeprazole (PRILOSEC) 20 MG delayed-release capsule 7/27/2017 Active    pravastatin (PRAVACHOL) 20 MG Tab 7/27/2017 Active    spironolactone (ALDACTONE) 25 MG Tab 7/27/2017 Active    vitamin D (CHOLECALCIFEROL) 1000 UNIT Tab 7/27/2017 Active    zolpidem (AMBIEN) 5 MG Tab unknown Active              ALLERGIES  Allergies   Allergen Reactions   • Morphine Anxiety and Unspecified     Hallucinations       PHYSICAL EXAM  VITAL SIGNS: /78 mmHg  Pulse 55  Temp(Src) 37.3 °C (99.1 °F)  Resp 18  Ht 1.778 m (5' 10\")  Wt 56.7 kg (125 lb)  BMI 17.94 kg/m2  SpO2 100%    Constitutional: Elderly appearance, but otherwise Well developed, Well nourished, No acute " distress, Non-toxic appearance.   HENT: Normocephalic, Atraumatic, Bilateral external ears normal, oropharynx moist, No oral exudates, Nose normal. Mild tremors within mouth.   Eyes: Pupils are equal round and react to light, extraocular motions are intact, conjunctiva is normal, there are no signs of exudate.   Neck: Supple, no meningeal signs.  Lymphatic: No lymphadenopathy noted.   Cardiovascular: Regular rate and rhythm without murmurs gallops or rubs.   Thorax & Lungs: No respiratory distress. Breathing comfortably. Lungs are clear to auscultation bilaterally, there are no wheezes no rales. Chest wall is nontender. Some chest discomfort in epigastric area.   Abdomen: Soft, nontender, nondistended. Bowel sounds are present.   Skin: Warm, Dry, No erythema,   Back: No tenderness, No CVA tenderness.  Musculoskeletal: Good range of motion in all major joints. No tenderness to palpation or major deformities noted. Intact distal pulses, no clubbing, no cyanosis, no edema,   Neurologic: Alert & oriented x 4, Moving all extremities. No gross abnormalities.  Cranial nerves II-XII grossly intact, moving all 4 extremities, 5/5 strength in all 4 extremities, cerebellar functions intact, no other focal abnormalities. Tremulous atypical from baseline. Slowness of production of speech secondary to aphasia.   Psychiatric: Affect anxious, Judgment normal, Mood normal.     LABS  Results for orders placed or performed during the hospital encounter of 07/28/17   CBC with Differential   Result Value Ref Range    WBC 11.4 (H) 4.8 - 10.8 K/uL    RBC 5.13 4.70 - 6.10 M/uL    Hemoglobin 16.4 14.0 - 18.0 g/dL    Hematocrit 46.7 42.0 - 52.0 %    MCV 91.0 81.4 - 97.8 fL    MCH 32.0 27.0 - 33.0 pg    MCHC 35.1 33.7 - 35.3 g/dL    RDW 41.8 35.9 - 50.0 fL    Platelet Count 242 164 - 446 K/uL    MPV 11.4 9.0 - 12.9 fL    Neutrophils-Polys 83.90 (H) 44.00 - 72.00 %    Lymphocytes 6.90 (L) 22.00 - 41.00 %    Monocytes 8.30 0.00 - 13.40 %     Eosinophils 0.10 0.00 - 6.90 %    Basophils 0.30 0.00 - 1.80 %    Immature Granulocytes 0.50 0.00 - 0.90 %    Nucleated RBC 0.00 /100 WBC    Neutrophils (Absolute) 9.54 (H) 1.82 - 7.42 K/uL    Lymphs (Absolute) 0.79 (L) 1.00 - 4.80 K/uL    Monos (Absolute) 0.95 (H) 0.00 - 0.85 K/uL    Eos (Absolute) 0.01 0.00 - 0.51 K/uL    Baso (Absolute) 0.03 0.00 - 0.12 K/uL    Immature Granulocytes (abs) 0.06 0.00 - 0.11 K/uL    NRBC (Absolute) 0.00 K/uL   Complete Metabolic Panel (CMP)   Result Value Ref Range    Sodium 139 135 - 145 mmol/L    Potassium 4.0 3.6 - 5.5 mmol/L    Chloride 104 96 - 112 mmol/L    Co2 22 20 - 33 mmol/L    Anion Gap 13.0 (H) 0.0 - 11.9    Glucose 108 (H) 65 - 99 mg/dL    Bun 19 8 - 22 mg/dL    Creatinine 1.01 0.50 - 1.40 mg/dL    Calcium 10.3 8.5 - 10.5 mg/dL    AST(SGOT) 28 12 - 45 U/L    ALT(SGPT) 22 2 - 50 U/L    Alkaline Phosphatase 59 30 - 99 U/L    Total Bilirubin 1.2 0.1 - 1.5 mg/dL    Albumin 4.2 3.2 - 4.9 g/dL    Total Protein 7.2 6.0 - 8.2 g/dL    Globulin 3.0 1.9 - 3.5 g/dL    A-G Ratio 1.4 g/dL   Btype Natriuretic Peptide (BNP)   Result Value Ref Range    B Natriuretic Peptide 168 (H) 0 - 100 pg/mL   Prothrombin Time (PT/INR)   Result Value Ref Range    PT 14.9 (H) 12.0 - 14.6 sec    INR 1.13 0.87 - 1.13   APTT   Result Value Ref Range    APTT 26.7 24.7 - 36.0 sec   Lipase   Result Value Ref Range    Lipase 34 11 - 82 U/L   Troponin STAT   Result Value Ref Range    Troponin I <0.01 0.00 - 0.04 ng/mL   ESTIMATED GFR   Result Value Ref Range    GFR If African American >60 >60 mL/min/1.73 m 2    GFR If Non African American >60 >60 mL/min/1.73 m 2   TROPONIN   Result Value Ref Range    Troponin I 0.02 0.00 - 0.04 ng/mL   EKG (ER)   Result Value Ref Range    Report       [facility] Emergency Dept[facility]    Test Date:  2017-07-28  Pt Name:    DARBY RODRIGUEZ                Department: ER  MRN:        [id number]                      Room:       [facility]  Gender:     M                             Technician: [id number]  :        1945                   Requested By:DARYL SANDERS  Order #:    [id number]                    Reading MD: DARYL SANDERS MD    Measurements  Intervals                                Axis  Rate:       60                           P:          83  HI:         176                          QRS:        -49  QRSD:       90                           T:          116  QT:         420  QTc:        420    Interpretive Statements  SINUS RHYTHM  BIATRIAL ABNORMALITIES  LEFT ANTERIOR FASCICULAR BLOCK  LVH WITH SECONDARY REPOLARIZATION ABNORMALITY  No previous ECG available for comparison  othwerise nonspecific ekg no acute findings  Electronically Signed On 2017 16:09:21 PDT by DARYL SANDERS MD     EKG in four (4) hours   Result Value Ref Range    Report       [facility]    Test Date:  2017  Pt Name:    DARBY RODRIGUEZ                Department: 171  MRN:        [id number]                      Room:       T709  Gender:     M                            Technician: EAB  :        1945                   Requested By:SMITH MULLINS  Order #:    [id number]                    Reading MD:    Measurements  Intervals                                Axis  Rate:       61                           P:          77  HI:         180                          QRS:        -59  QRSD:       90                           T:          130  QT:         436  QTc:        440    Interpretive Statements  SINUS RHYTHM  LEFT ANTERIOR FASCICULAR BLOCK  ABNORMAL T, CONSIDER ISCHEMIA, LATERAL LEADS  Compared to ECG 2017 15:24:27  T-wave abnormality now present  Possible ischemia now present  Atrial abnormality no longer present  Left ventricular hypertrophy no longer present  Early repolarization no longer present       All labs reviewed by me.    EKG  Interpreted by me  See above.     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        The  radiologist's interpretation of all radiological studies have been reviewed by me.    COURSE & MEDICAL DECISION MAKING  Pertinent Labs & Imaging studies reviewed. (See chart for details)    3:39 PM - Patient seen and examined at bedside. Ordered Chest X-ray, CBC, CMP, BNP, PT, APTT, Lipase, Troponin, EKG to evaluate his symptoms. The differential diagnoses include but are not limited to: Acute coronary syndrome vs GERD, Anxiety, Pneumothorax.      5:27 PM I was informed by nursing staff that the patient had a bowel movement with bright red blood. She reports that the toilet was entirely pink red and the patient stated that he had a history of bowel complications, but never with this amount of blood.     5:31 PM Recheck: Patient re-evaluated at beside. Discussed patient's condition and treatment plan including admission. Patient's lab and radiology results discussed. The patient understood and is in agreement.     5:56 PM - I discussed the patient's case and the above findings with Dr. Rucker (hospitalist) who will see and admit the patient. Care is transferred at this time.    Decision Making:  Patient presents for evaluation. Clinically, the patient otherwise appears well. Biggest concern is that of acute coronary syndrome. EKG is nonspecific. Laboratory studies are as above. With a significant past medical history due for the patient should be admitted the hospital for further risk stratification. While he was here. The patient did have an episode of bright red blood per stool. Upon bedside evaluation. There is no signs of overt hemorrhage bleeding. She recommended for observation. Apparently, the patient having long-term history of intermittent bright red blood stools and has been seen by GI. At this point, we will admit the patient. Further evaluation and care. Patient is stable.    DISPOSITION:  Patient will be admitted to  in guarded condition.    FINAL IMPRESSION  1. Chest pain, unspecified type    2.  Rectal bleeding       I independently evaluated the patient and repeated the important components of the history and physical. I discussed the management with the resident. I have reviewed and agree with the pertinent clinical information above including history, exam, study findings, and recommendations.      Neelam TAVERAS (Kojo), am scribing for, and in the presence of, Levy Waters M.D..    Electronically signed by: Neelam Mayes (Kojo), 7/28/2017    ILevy M.D. personally performed the services described in this documentation, as scribed by Neelam Mayes in my presence, and it is both accurate and complete.    The note accurately reflects work and decisions made by me.  Levy Waters  7/28/2017  11:10 PM

## 2017-07-28 NOTE — IP AVS SNAPSHOT
Inish[employer] Access Code: Activation code not generated  Current [employer]ish[employer] Status: Patient Declined    Your email address is not on file at [facility].  Email Addresses are required for you to sign up for [employer], please contact [phone] to verify your personal information and to provide your email address prior to attempting to register for [employer].    Moe Nickerson  [address]  [place] [address]    [employer] Groupt  A secure, online tool to manage your health information     [facility]’s InishTech® is a secure, online tool that connects you to your personalized health information from the privacy of your home -- day or night - making it very easy for you to manage your healthcare. Once the activation process is completed, you can even access your medical information using the [employer]ish[employer] jacob, which is available for free in the Apple Jacob store or Google Play store.     To learn more about InishTech, visit [email or web address]    There are two levels of access available (as shown below):   My Chart Features  [facility] Primary Care Doctor [facility]  Specialists [facility]  Urgent  Care Non-[facility] Primary Care Doctor   Email your healthcare team securely and privately 24/7 X X X    Manage appointments: schedule your next appointment; view details of past/upcoming appointments X      Request prescription refills. X      View recent personal medical records, including lab and immunizations X X X X   View health record, including health history, allergies, medications X X X X   Read reports about your outpatient visits, procedures, consult and ER notes X X X X   See your discharge summary, which is a recap of your hospital and/or ER visit that includes your diagnosis, lab results, and care plan X X  X     How to register for [employer]:  Once your e-mail address has been verified, follow the following steps to sign up for [employer] Gr[employer].     1. Go to  [email or web address]  2. Click on the Sign Up Now box, which takes you to the New  Member Sign Up page. You will need to provide the following information:  a. Enter your [employer] Access Code exactly as it appears at the top of this page. (You will not need to use this code after you’ve completed the sign-up process. If you do not sign up before the expiration date, you must request a new code.)   b. Enter your date of birth.   c. Enter your home email address.   d. Click Submit, and follow the next screen’s instructions.  3. Create a byUst ID. This will be your [employer] login ID and cannot be changed, so think of one that is secure and easy to remember.  4. Create a [employer] password. You can change your password at any time.  5. Enter your Password Reset Question and Answer. This can be used at a later time if you forget your password.   6. Enter your e-mail address. This allows you to receive e-mail notifications when new information is available in [employer].  7. Click Sign Up. You can now view your health information.    For assistance activating your [employer] account, call [phone]

## 2017-07-29 PROBLEM — H91.90 HEARING LOSS: Status: ACTIVE | Noted: 2017-07-29

## 2017-07-29 LAB
ALBUMIN SERPL BCP-MCNC: 3.6 G/DL (ref 3.2–4.9)
BASOPHILS # BLD AUTO: 0.4 % (ref 0–1.8)
BASOPHILS # BLD: 0.04 K/UL (ref 0–0.12)
BUN SERPL-MCNC: 21 MG/DL (ref 8–22)
CALCIUM SERPL-MCNC: 9.5 MG/DL (ref 8.5–10.5)
CHLORIDE SERPL-SCNC: 107 MMOL/L (ref 96–112)
CHOLEST SERPL-MCNC: 150 MG/DL (ref 100–199)
CO2 SERPL-SCNC: 25 MMOL/L (ref 20–33)
CREAT SERPL-MCNC: 0.91 MG/DL (ref 0.5–1.4)
EKG IMPRESSION: NORMAL
EOSINOPHIL # BLD AUTO: 0.02 K/UL (ref 0–0.51)
EOSINOPHIL NFR BLD: 0.2 % (ref 0–6.9)
ERYTHROCYTE [DISTWIDTH] IN BLOOD BY AUTOMATED COUNT: 44.3 FL (ref 35.9–50)
GFR SERPL CREATININE-BSD FRML MDRD: >60 ML/MIN/1.73 M 2
GLUCOSE SERPL-MCNC: 96 MG/DL (ref 65–99)
HCT VFR BLD AUTO: 45.9 % (ref 42–52)
HDLC SERPL-MCNC: 56 MG/DL
HGB BLD-MCNC: 15.4 G/DL (ref 14–18)
IMM GRANULOCYTES # BLD AUTO: 0.04 K/UL (ref 0–0.11)
IMM GRANULOCYTES NFR BLD AUTO: 0.4 % (ref 0–0.9)
LDLC SERPL CALC-MCNC: 84 MG/DL
LYMPHOCYTES # BLD AUTO: 1.2 K/UL (ref 1–4.8)
LYMPHOCYTES NFR BLD: 11.5 % (ref 22–41)
MCH RBC QN AUTO: 31.6 PG (ref 27–33)
MCHC RBC AUTO-ENTMCNC: 33.6 G/DL (ref 33.7–35.3)
MCV RBC AUTO: 94.3 FL (ref 81.4–97.8)
MONOCYTES # BLD AUTO: 1.32 K/UL (ref 0–0.85)
MONOCYTES NFR BLD AUTO: 12.7 % (ref 0–13.4)
NEUTROPHILS # BLD AUTO: 7.79 K/UL (ref 1.82–7.42)
NEUTROPHILS NFR BLD: 74.8 % (ref 44–72)
NRBC # BLD AUTO: 0 K/UL
NRBC BLD AUTO-RTO: 0 /100 WBC
PHOSPHATE SERPL-MCNC: 3.3 MG/DL (ref 2.5–4.5)
PLATELET # BLD AUTO: 232 K/UL (ref 164–446)
PMV BLD AUTO: 11.9 FL (ref 9–12.9)
POTASSIUM SERPL-SCNC: 4.1 MMOL/L (ref 3.6–5.5)
RBC # BLD AUTO: 4.87 M/UL (ref 4.7–6.1)
SODIUM SERPL-SCNC: 139 MMOL/L (ref 135–145)
TRIGL SERPL-MCNC: 51 MG/DL (ref 0–149)
TROPONIN I SERPL-MCNC: 0.02 NG/ML (ref 0–0.04)
WBC # BLD AUTO: 10.4 K/UL (ref 4.8–10.8)

## 2017-07-29 PROCEDURE — A9270 NON-COVERED ITEM OR SERVICE: HCPCS | Performed by: INTERNAL MEDICINE

## 2017-07-29 PROCEDURE — 96375 TX/PRO/DX INJ NEW DRUG ADDON: CPT

## 2017-07-29 PROCEDURE — G0378 HOSPITAL OBSERVATION PER HR: HCPCS

## 2017-07-29 PROCEDURE — 36415 COLL VENOUS BLD VENIPUNCTURE: CPT

## 2017-07-29 PROCEDURE — 700111 HCHG RX REV CODE 636 W/ 250 OVERRIDE (IP): Performed by: NURSE PRACTITIONER

## 2017-07-29 PROCEDURE — 700102 HCHG RX REV CODE 250 W/ 637 OVERRIDE(OP): Performed by: INTERNAL MEDICINE

## 2017-07-29 PROCEDURE — 99226 PR SUBSEQUENT OBSERVATION CARE,LEVEL III: CPT | Performed by: INTERNAL MEDICINE

## 2017-07-29 PROCEDURE — 85025 COMPLETE CBC W/AUTO DIFF WBC: CPT

## 2017-07-29 PROCEDURE — 84484 ASSAY OF TROPONIN QUANT: CPT

## 2017-07-29 PROCEDURE — 80061 LIPID PANEL: CPT

## 2017-07-29 PROCEDURE — 80069 RENAL FUNCTION PANEL: CPT

## 2017-07-29 RX ORDER — ACETAMINOPHEN 325 MG/1
650 TABLET ORAL EVERY 4 HOURS PRN
Status: DISCONTINUED | OUTPATIENT
Start: 2017-07-29 | End: 2017-07-31 | Stop reason: HOSPADM

## 2017-07-29 RX ORDER — LORAZEPAM 2 MG/ML
0.5 INJECTION INTRAMUSCULAR ONCE
Status: COMPLETED | OUTPATIENT
Start: 2017-07-29 | End: 2017-07-29

## 2017-07-29 RX ADMIN — ACETAMINOPHEN 650 MG: 325 TABLET, FILM COATED ORAL at 09:45

## 2017-07-29 RX ADMIN — LORAZEPAM 0.5 MG: 2 INJECTION INTRAMUSCULAR; INTRAVENOUS at 21:06

## 2017-07-29 ASSESSMENT — ENCOUNTER SYMPTOMS
BLOOD IN STOOL: 0
DOUBLE VISION: 0
DIARRHEA: 1
CHILLS: 0
NAUSEA: 0
VOMITING: 0
WEAKNESS: 1
PALPITATIONS: 0
NAUSEA: 1
DEPRESSION: 0
MEMORY LOSS: 0
TREMORS: 1
BACK PAIN: 0
SPEECH CHANGE: 0
FLANK PAIN: 0
COUGH: 0
MYALGIAS: 1
FOCAL WEAKNESS: 0
DIARRHEA: 0
DIZZINESS: 0
CONSTIPATION: 0
HEADACHES: 0
FEVER: 0
SPUTUM PRODUCTION: 1
WHEEZING: 0
COUGH: 1
NERVOUS/ANXIOUS: 0
DIZZINESS: 1
CHILLS: 1
ABDOMINAL PAIN: 0
BLURRED VISION: 0
SHORTNESS OF BREATH: 0
HEADACHES: 1
SENSORY CHANGE: 0

## 2017-07-29 ASSESSMENT — PAIN SCALES - GENERAL
PAINLEVEL_OUTOF10: 0
PAINLEVEL_OUTOF10: 6
PAINLEVEL_OUTOF10: 0
PAINLEVEL_OUTOF10: 5
PAINLEVEL_OUTOF10: 6
PAINLEVEL_OUTOF10: 0
PAINLEVEL_OUTOF10: 6

## 2017-07-29 NOTE — PROGRESS NOTES
Report received at the bed side. No family at bedside. Pain 6/10 will medicate. o signs of distress or SOB. Call light and belongings within reach. Bed alarm in place. Bed in lowest position.

## 2017-07-29 NOTE — ED NOTES
Pt resting in [facility]. NAD noted. VSS. Will call report. Awaiting transport.

## 2017-07-29 NOTE — PROGRESS NOTES
Pt received this shift from ED - pt alert and oriented - skin warm and dry - tele monitor intact - ekg done, provider         JOLENE Bello aware of ekg results - pt in bed with call bell in reach - no distress noted

## 2017-07-29 NOTE — PROGRESS NOTES
Pt with a 6 beat run of VT on monitor - pt without complaints - no distress noted - JOLENE Bello NP notified of VT and troponin results - no new orders received - will continue to monitor

## 2017-07-29 NOTE — ED NOTES
Med rec complete per pt and a call to [facility] pharmacy on [address]  Allergies reviewed  Noo ABX in last month  Pt did not know the names of all of the medications he takes  Obtained information from pharmacy, reviewed with pt

## 2017-07-29 NOTE — PROGRESS NOTES
Pt resting in bed, even visible chest rise, no apparent signs of distress, bed alarm on, call light in place, bed in lowest locked position.

## 2017-07-29 NOTE — ED NOTES
"Pt ambulatory to bathroom with this RN. STeady gait. PT has bloody bowel movement. (toilet bowl was pink/red). Pt states \"problems with his bowels for the last year, but never red or bloody\". Dr. Amaral made aware.  "

## 2017-07-29 NOTE — ED NOTES
Report called to Janet WINTERS. Pt transported to floor via rHardwick on monitor in stable condition.

## 2017-07-29 NOTE — ED NOTES
Assumed care of pt, bedside report received from Peggy WINTERS. Pt quietly resting on gurney. Repositioned for comfort. NAD noted. VSS. Awaiting rm to be cleaned and transport. Pt aware of POC.

## 2017-07-29 NOTE — ASSESSMENT & PLAN NOTE
- Previously had four stents placed  - Was placed on Plavix every other day   - Patient does not want any additional stents  - unable to tolerate plavix or ASA d/t recent GI bleed  - to f/u with cardiology prn, as outpatient, consult prn    Arrhythmia- resolved

## 2017-07-29 NOTE — PROGRESS NOTES
[facility] Hospitalist Progress Note    Date of Service: 2017    Chief Complaint  72 y.o. male admitted 2017 with history of stroke coronary artery disease status post stenting and recent GI bleed presents with chest pain and bloody stool.    Interval Problem Update  Denies chest pain shortness of breath feels nearly back to normal  Generalized weakness  Negative nausea  No further melena, rectal bleeding    Consultants/Specialty  None    Disposition  To home in 1-2 days with clinical improvement        Review of Systems   Constitutional: Negative for fever and chills.   HENT: Positive for hearing loss. Negative for congestion.    Eyes: Negative for blurred vision and double vision.   Respiratory: Negative for cough and shortness of breath.    Cardiovascular: Negative for chest pain, palpitations and leg swelling.   Gastrointestinal: Negative for nausea, vomiting, abdominal pain, diarrhea, constipation, blood in stool and melena.   Genitourinary: Negative for dysuria and flank pain.   Musculoskeletal: Positive for myalgias. Negative for back pain and joint pain.   Neurological: Positive for tremors and weakness. Negative for dizziness, sensory change, speech change, focal weakness and headaches.   Psychiatric/Behavioral: Negative for depression and memory loss. The patient is not nervous/anxious.       Physical Exam  Laboratory/Imaging   Hemodynamics  Temp (24hrs), Av.8 °C (98.2 °F), Min:36.3 °C (97.4 °F), Max:37.3 °C (99.1 °F)   Temperature: 36.4 °C (97.6 °F)  Pulse  Av.3  Min: 51  Max: 68 Heart Rate (Monitored): (!) 52  Blood Pressure : 123/68 mmHg, NIBP: 149/68 mmHg      Respiratory      Respiration: 20, Pulse Oximetry: 96 %        RUL Breath Sounds: Clear, RML Breath Sounds: Clear, RLL Breath Sounds: Clear;Diminished, IDRIS Breath Sounds: Clear, LLL Breath Sounds: Clear;Diminished    Fluids    Intake/Output Summary (Last 24 hours) at 17 1309  Last data filed at 17 0900   Gross per 24  hour   Intake    240 ml   Output      0 ml   Net    240 ml       Nutrition  Orders Placed This Encounter   Procedures   • Diet Order     Standing Status: Standing      Number of Occurrences: 1      Standing Expiration Date:      Order Specific Question:  Diet:     Answer:  Regular [1]     Physical Exam   Constitutional: He is oriented to person, place, and time. He appears well-nourished. No distress.   Thin, elderly   HENT:   Head: Normocephalic and atraumatic.   Nose: Nose normal.   Eyes: EOM are normal. Pupils are equal, round, and reactive to light. Right eye exhibits no discharge. Left eye exhibits no discharge.   Neck: Normal range of motion. Neck supple. No JVD present. No thyromegaly present.   Cardiovascular: Normal rate and intact distal pulses.    No murmur heard.  Pulmonary/Chest: Breath sounds normal. No respiratory distress. He has no wheezes.   Abdominal: Soft. Bowel sounds are normal. He exhibits no distension. There is no tenderness.   Musculoskeletal: He exhibits no edema or tenderness.   Neurological: He is alert and oriented to person, place, and time. No cranial nerve deficit. He exhibits normal muscle tone. Coordination normal.   Skin: Skin is warm and dry. No rash noted. He is not diaphoretic. No erythema. There is pallor.   Psychiatric: He has a normal mood and affect. His behavior is normal. Judgment and thought content normal.   Nursing note and vitals reviewed.      Recent Labs      07/28/17   1537  07/29/17   0303   WBC  11.4*  10.4   RBC  5.13  4.87   HEMOGLOBIN  16.4  15.4   HEMATOCRIT  46.7  45.9   MCV  91.0  94.3   MCH  32.0  31.6   MCHC  35.1  33.6*   RDW  41.8  44.3   PLATELETCT  242  232   MPV  11.4  11.9     Recent Labs      07/28/17   1537  07/29/17   0303   SODIUM  139  139   POTASSIUM  4.0  4.1   CHLORIDE  104  107   CO2  22  25   GLUCOSE  108*  96   BUN  19  21   CREATININE  1.01  0.91   CALCIUM  10.3  9.5     Recent Labs      07/28/17   1537   APTT  26.7   INR  1.13      Recent Labs      07/28/17   1537   BNPBTYPENAT  168*     Recent Labs      07/29/17   0303   TRIGLYCERIDE  51   HDL  56   LDL  84          Assessment/Plan     * Chest pain, rule out acute myocardial infarction (present on admission)  Assessment & Plan  - Says he previously had another stress test at [facility] around a year ago  - Follows with [facility] cardiology  - Patient does not want a stress test or any other stents placed  - trops neg  - CP freee    Rectal bleeding (present on admission)  Assessment & Plan  - Previous EGD and colonoscopy reportedly back in September which nicked his bowel and constant GI bleeding but that has since resolved  -   Now resolved, h/h stable    - Possibly due to hemorrhoids, will continue to monitor for now    DNR (do not resuscitate) (present on admission)  Assessment & Plan  - DNR per patient with son present    History of coronary artery stent placement (present on admission)  Assessment & Plan  - Previously had four stents placed  - Was placed on Plavix every other day   - Patient does not want any additional stents  - unable to tolerate plavix or ASA d/t recent GI bleed  - to f/u with cardiology prn, as outpatient, consult prn    Arrhythmia, - cont cardiac monitoring    Hearing loss  Assessment & Plan  B/l      Labs reviewed, Medications reviewed and Radiology images reviewed          DVT prophylaxis - mechanical: SCDs  Ulcer prophylaxis: Not indicated    Assessed for rehab: Patient was assess for and/or received rehabilitation services during this hospitalization

## 2017-07-29 NOTE — PROGRESS NOTES
2 rn skin assessment with Omid rn - buttuck and right heel red, skin blanchable in both areas - no open areas noted

## 2017-07-29 NOTE — CARE PLAN
Problem: Safety  Goal: Will remain free from injury  Pt mobility assessed at the beginning of the shift. Fall precautions in place, non-slip socks on, bed in lowest, locked position, and call light is within reach. Pt educated to call for assistance, and verbalizes understanding.

## 2017-07-29 NOTE — ASSESSMENT & PLAN NOTE
- Says he previously had another stress test at [facility] around a year ago  - Follows with [facility] cardiology  - Patient does not want a stress test or any other stents placed  - carey neg  - CP free

## 2017-07-29 NOTE — H&P
Hospital Medicine History and Physical      Date of Service  7/28/2017    Chief Complaint  Chief Complaint   Patient presents with   • Chest Pain     Pt bib ems. Pt was outside doing yardwork, became hot, had chest pain (right lower chest), called 911. Pt has hx of this happening (heat stroke). pt given 324 asa and 1 nitro tab with relief. Hx of TIA with residual neuro deficits (aphasia) that's normal for him. Hx of MI. Pt arrives alert/oriented x4. Pink/warm/wet-from dumping water on himself.       History of Presenting Illness  Krishan is a 72 y.o. male w/h/o stroke, hypertension, heart attack status post stents who presents with bright red blood per rectum. Patient has a history of CAD status post stents in the past. After this he was placed on antiplatelet therapy. However then he developed GI bleeding in the past. He had a EGD and colonoscopy about a year ago around September he says. At that time he said they nicked his bowel and caused some additional GI bleeding. He was then changed to taking aspirin only every other day on even days.  He was doing fine on this regimen but then he started having some chest pain again reviewed his ago. This he started taking aspirin for the past three days. Today he took four aspirins. He then started having some blood in his stool.    Primary Care Physician  Pcp Pt States None    Outpatient [facility]    Code Status  DNR per patient with son present    Review of Systems  Review of Systems   Constitutional: Positive for chills. Negative for fever.   Respiratory: Positive for cough and sputum production. Negative for shortness of breath and wheezing.    Cardiovascular: Negative for chest pain.   Gastrointestinal: Positive for nausea and diarrhea. Negative for vomiting and constipation.   Genitourinary:        Urine leakage   Neurological: Positive for dizziness, tremors and headaches (earlier).     Please see HPI, all other systems were reviewed and are negative  (AMA/CMS criteria)     Past Medical History  Past Medical History   Diagnosis Date   • Stroke (cerebrum) (CMS-HCC)    • Hypertension    • Heart attack (CMS-HCC)    • Anxiety        Surgical History  History reviewed. No pertinent past surgical history.  Cardiac stent, hernia, cyst removal  Medications  No current facility-administered medications on file prior to encounter.     No current outpatient prescriptions on file prior to encounter.     Family History  History reviewed. No pertinent family history.  Mother had stroke and diabetes  Father lived until he was 100    Social History  Social History   Substance Use Topics   • Smoking status: Never Smoker    • Smokeless tobacco: None   • Alcohol Use: No       Allergies  Allergies   Allergen Reactions   • Morphine Anxiety and Unspecified     Hallucinations        Physical Exam  Laboratory   Hemodynamics  Temp (24hrs), Av.2 °C (98.9 °F), Min:37.1 °C (98.7 °F), Max:37.3 °C (99.1 °F)   Temperature: 37.1 °C (98.7 °F)  Pulse  Av.2  Min: 52  Max: 68 Heart Rate (Monitored): (!) 52  Blood Pressure : 155/87 mmHg, NIBP: 149/68 mmHg      Respiratory      Respiration: 19, Pulse Oximetry: 97 %             Physical Exam   Constitutional: He appears well-developed.   HENT:   Head: Normocephalic.   Eyes: Conjunctivae are normal.   Cardiovascular: Normal rate.    Pulmonary/Chest: No respiratory distress. He has no wheezes. He has no rales.   Abdominal: Soft. He exhibits no distension. There is tenderness (suprapubic).   Musculoskeletal: He exhibits no edema.   Skin: Skin is warm. No erythema.   Psychiatric: He has a normal mood and affect.       Recent Labs      17   1537   WBC  11.4*   RBC  5.13   HEMOGLOBIN  16.4   HEMATOCRIT  46.7   MCV  91.0   MCH  32.0   MCHC  35.1   RDW  41.8   PLATELETCT  242   MPV  11.4     Recent Labs      17   1537   SODIUM  139   POTASSIUM  4.0   CHLORIDE  104   CO2  22   GLUCOSE  108*   BUN  19   CREATININE  1.01   CALCIUM  10.3      Recent Labs      07/28/17   1537   ALTSGPT  22   ASTSGOT  28   ALKPHOSPHAT  59   TBILIRUBIN  1.2   LIPASE  34   GLUCOSE  108*     Recent Labs      07/28/17   1537   APTT  26.7   INR  1.13     Recent Labs      07/28/17   1537   BNPBTYPENAT  168*         Lab Results   Component Value Date    TROPONINI 0.02 07/28/2017       Imaging  DX-CHEST-PORTABLE (1 VIEW)   Final Result      COPD without acute cardiopulmonary.        EKG  per my independant read:  QTc: 420, HR: 60, Normal Sinus Rhythm, biphasic T waves     Assessment/Plan     I anticipate this patient is appropriate for observation status at this time.    Rectal bleeding (present on admission)  Assessment & Plan  - Previous EGD and colonoscopy reportedly back in September which nicked his bowel and constant GI bleeding but that has since resolved  - Patient does not want to be placed on a clear liquid diet for now  - Patient has not decided whether he would want a blood transfusion or not  - Possibly due to hemorrhoids, will continue to monitor for now    Chest pain, rule out acute myocardial infarction (present on admission)  Assessment & Plan  - Says he previously had another stress test at [facility][1] around a year ago  - Follows with [facility][1] cardiology  - Patient does not want a stress test or any other stents placed  - We'll trend trops a monitor on telemetry for now    DNR (do not resuscitate) (present on admission)  Assessment & Plan  - DNR per patient with son present    History of coronary artery stent placement (present on admission)  Assessment & Plan  - Previously had four stents placed  - Was placed on Plavix every other day   - Patient does not want any additional stents        Prophylaxis:  SCDs

## 2017-07-29 NOTE — ASSESSMENT & PLAN NOTE
- Previous EGD and colonoscopy reportedly back in September which nicked his bowel and constant GI bleeding but that has since resolved  - DHA?  Recurrence, minimal, h/h stable, f/u am bmp  - needs outpatient GI f/u  F/u fobt

## 2017-07-30 PROBLEM — F32.A ANXIETY AND DEPRESSION: Status: ACTIVE | Noted: 2017-07-30

## 2017-07-30 PROBLEM — F41.9 ANXIETY: Status: ACTIVE | Noted: 2017-07-30

## 2017-07-30 PROBLEM — F41.9 ANXIETY AND DEPRESSION: Status: ACTIVE | Noted: 2017-07-30

## 2017-07-30 PROCEDURE — G0378 HOSPITAL OBSERVATION PER HR: HCPCS

## 2017-07-30 PROCEDURE — A9270 NON-COVERED ITEM OR SERVICE: HCPCS | Performed by: HOSPITALIST

## 2017-07-30 PROCEDURE — 700102 HCHG RX REV CODE 250 W/ 637 OVERRIDE(OP): Performed by: INTERNAL MEDICINE

## 2017-07-30 PROCEDURE — 99226 PR SUBSEQUENT OBSERVATION CARE,LEVEL III: CPT | Performed by: INTERNAL MEDICINE

## 2017-07-30 PROCEDURE — A9270 NON-COVERED ITEM OR SERVICE: HCPCS | Performed by: INTERNAL MEDICINE

## 2017-07-30 PROCEDURE — 700102 HCHG RX REV CODE 250 W/ 637 OVERRIDE(OP): Performed by: HOSPITALIST

## 2017-07-30 RX ORDER — DONEPEZIL HYDROCHLORIDE 5 MG/1
5 TABLET, FILM COATED ORAL DAILY
Status: DISCONTINUED | OUTPATIENT
Start: 2017-07-30 | End: 2017-07-31 | Stop reason: HOSPADM

## 2017-07-30 RX ORDER — LORAZEPAM 1 MG/1
1 TABLET ORAL EVERY 8 HOURS PRN
Status: DISCONTINUED | OUTPATIENT
Start: 2017-07-30 | End: 2017-07-31 | Stop reason: HOSPADM

## 2017-07-30 RX ORDER — LORAZEPAM 1 MG/1
2 TABLET ORAL EVERY 8 HOURS PRN
Status: DISCONTINUED | OUTPATIENT
Start: 2017-07-30 | End: 2017-07-30

## 2017-07-30 RX ORDER — FLUOXETINE HYDROCHLORIDE 20 MG/1
20 CAPSULE ORAL DAILY
Status: DISCONTINUED | OUTPATIENT
Start: 2017-07-30 | End: 2017-07-31 | Stop reason: HOSPADM

## 2017-07-30 RX ORDER — LORAZEPAM 1 MG/1
2 TABLET ORAL EVERY 8 HOURS PRN
Status: DISCONTINUED | OUTPATIENT
Start: 2017-07-30 | End: 2017-07-31 | Stop reason: HOSPADM

## 2017-07-30 RX ADMIN — DONEPEZIL HYDROCHLORIDE 5 MG: 5 TABLET, FILM COATED ORAL at 18:06

## 2017-07-30 RX ADMIN — FLUOXETINE HYDROCHLORIDE 20 MG: 20 CAPSULE ORAL at 18:06

## 2017-07-30 RX ADMIN — LORAZEPAM 1 MG: 1 TABLET ORAL at 18:06

## 2017-07-30 ASSESSMENT — ENCOUNTER SYMPTOMS
NAUSEA: 0
FLANK PAIN: 0
SENSORY CHANGE: 0
TREMORS: 1
WEAKNESS: 1
ABDOMINAL PAIN: 0
BLOOD IN STOOL: 0
FOCAL WEAKNESS: 0
MEMORY LOSS: 0
COUGH: 0
PALPITATIONS: 0
SPEECH CHANGE: 0
HEADACHES: 0
NERVOUS/ANXIOUS: 0
SHORTNESS OF BREATH: 0
MYALGIAS: 1
DIAPHORESIS: 0

## 2017-07-30 ASSESSMENT — PAIN SCALES - GENERAL
PAINLEVEL_OUTOF10: 0
PAINLEVEL_OUTOF10: 0

## 2017-07-30 NOTE — DISCHARGE PLANNING
Medical Social Work    This  received report from Hospitalist RN [place].  Pt's son is concerned because he is not taking care of himself like he should be.  Pt would likely benefit from Home Health, however it is unclear whether Home Health would service that area.  Pt also does not have a PCP.  This  placed a call to the Schedulers for a PCP appointment in the next week.  The Schedulers are out of the office today and will call pt's son tomorrow to schedule an appointment.  This  staffed case with TRUNG Salinas who is unsure if Home Health will go to pt's home.  This  relayed information to ER Hospitalist.

## 2017-07-30 NOTE — PROGRESS NOTES
"Pt complaining of anxiety and not being able to sleep.  Offered PRN xanax that is in pt's MAR, but pt refused, stating it has an effect like \"coffee\" on him and makes him more agitated and awake.  Paging hospitalist for order.   "

## 2017-07-30 NOTE — CARE PLAN
Problem: Pain Management  Goal: Pain level will decrease to patient’s comfort goal  Outcome: PROGRESSING AS EXPECTED  Pt denies any presence of pain at this time.

## 2017-07-30 NOTE — PROGRESS NOTES
Given bedside report. Assumed care. Pt sitting up in bed, A/Ox4. SB on the monitor, RA. Call light within reach. Bed, in lowest, locked position. BA in place.

## 2017-07-30 NOTE — CARE PLAN
Problem: Safety  Goal: Will remain free from injury  Intervention: Provide assistance with mobility  Pt mobility assessed at beginning of shift, pt is standby assist, steady.  Fall precautions in place, non-slip socks on, bed in lowest, locked position, bed alarm on and call light is within reach.  Pt educated to call for assistance and verbalizes understanding.       Problem: Psychosocial Needs:  Goal: Level of anxiety will decrease  Intervention: Identify and develop with patient strategies to cope with anxiety triggers  Pt anxious and appears fearful, pt stated he is having trouble sleeping. Medicated pt per MAR and provided non-pharm support and comfort: repositioned, one-on-one discussion, back rub.

## 2017-07-30 NOTE — PROGRESS NOTES
Bedside report completed. Assumed pt care. Pt A&O 4, resting in bed comfortably with no signs of labored breathing on RA. Pt tele monitor in place, cardiac rhythm being monitored. Pt call light within reach, bed in low position, upper bed rails up, non skid socks in place. No complaints of pain or discomfort at this time. Patient was updated on the plan of care for the night. All fall precautions in place. Will continue to monitor.

## 2017-07-31 ENCOUNTER — HOME HEALTH ADMISSION (OUTPATIENT)
Dept: HOME HEALTH SERVICES | Facility: HOME HEALTHCARE | Age: 72
End: 2017-07-31
Payer: MEDICARE

## 2017-07-31 ENCOUNTER — PATIENT OUTREACH (OUTPATIENT)
Dept: HEALTH INFORMATION MANAGEMENT | Facility: OTHER | Age: 72
End: 2017-07-31

## 2017-07-31 VITALS
WEIGHT: 116.18 LBS | RESPIRATION RATE: 20 BRPM | SYSTOLIC BLOOD PRESSURE: 133 MMHG | HEIGHT: 70 IN | BODY MASS INDEX: 16.63 KG/M2 | TEMPERATURE: 97.4 F | DIASTOLIC BLOOD PRESSURE: 68 MMHG | HEART RATE: 58 BPM | OXYGEN SATURATION: 98 %

## 2017-07-31 LAB
BASOPHILS # BLD AUTO: 0.3 % (ref 0–1.8)
BASOPHILS # BLD: 0.02 K/UL (ref 0–0.12)
EOSINOPHIL # BLD AUTO: 0.16 K/UL (ref 0–0.51)
EOSINOPHIL NFR BLD: 2.1 % (ref 0–6.9)
ERYTHROCYTE [DISTWIDTH] IN BLOOD BY AUTOMATED COUNT: 43.9 FL (ref 35.9–50)
HCT VFR BLD AUTO: 42.1 % (ref 42–52)
HGB BLD-MCNC: 14.2 G/DL (ref 14–18)
IMM GRANULOCYTES # BLD AUTO: 0.01 K/UL (ref 0–0.11)
IMM GRANULOCYTES NFR BLD AUTO: 0.1 % (ref 0–0.9)
LYMPHOCYTES # BLD AUTO: 1.39 K/UL (ref 1–4.8)
LYMPHOCYTES NFR BLD: 18.7 % (ref 22–41)
MCH RBC QN AUTO: 31.6 PG (ref 27–33)
MCHC RBC AUTO-ENTMCNC: 33.7 G/DL (ref 33.7–35.3)
MCV RBC AUTO: 93.6 FL (ref 81.4–97.8)
MONOCYTES # BLD AUTO: 0.84 K/UL (ref 0–0.85)
MONOCYTES NFR BLD AUTO: 11.3 % (ref 0–13.4)
NEUTROPHILS # BLD AUTO: 5.03 K/UL (ref 1.82–7.42)
NEUTROPHILS NFR BLD: 67.5 % (ref 44–72)
NRBC # BLD AUTO: 0 K/UL
NRBC BLD AUTO-RTO: 0 /100 WBC
PLATELET # BLD AUTO: 198 K/UL (ref 164–446)
PMV BLD AUTO: 11.5 FL (ref 9–12.9)
RBC # BLD AUTO: 4.5 M/UL (ref 4.7–6.1)
WBC # BLD AUTO: 7.5 K/UL (ref 4.8–10.8)

## 2017-07-31 PROCEDURE — G8989 SELF CARE D/C STATUS: HCPCS | Mod: CI

## 2017-07-31 PROCEDURE — 36415 COLL VENOUS BLD VENIPUNCTURE: CPT

## 2017-07-31 PROCEDURE — 85025 COMPLETE CBC W/AUTO DIFF WBC: CPT

## 2017-07-31 PROCEDURE — A9270 NON-COVERED ITEM OR SERVICE: HCPCS | Performed by: HOSPITALIST

## 2017-07-31 PROCEDURE — 97165 OT EVAL LOW COMPLEX 30 MIN: CPT

## 2017-07-31 PROCEDURE — 700102 HCHG RX REV CODE 250 W/ 637 OVERRIDE(OP): Performed by: INTERNAL MEDICINE

## 2017-07-31 PROCEDURE — A9270 NON-COVERED ITEM OR SERVICE: HCPCS | Performed by: INTERNAL MEDICINE

## 2017-07-31 PROCEDURE — 99217 PR OBSERVATION CARE DISCHARGE: CPT | Performed by: INTERNAL MEDICINE

## 2017-07-31 PROCEDURE — G0378 HOSPITAL OBSERVATION PER HR: HCPCS

## 2017-07-31 PROCEDURE — G8988 SELF CARE GOAL STATUS: HCPCS | Mod: CI

## 2017-07-31 PROCEDURE — 700102 HCHG RX REV CODE 250 W/ 637 OVERRIDE(OP): Performed by: HOSPITALIST

## 2017-07-31 PROCEDURE — G8987 SELF CARE CURRENT STATUS: HCPCS | Mod: CI

## 2017-07-31 RX ORDER — LORAZEPAM 2 MG/1
2 TABLET ORAL EVERY 8 HOURS PRN
Qty: 30 TAB | Refills: 0 | Status: SHIPPED | OUTPATIENT
Start: 2017-07-31 | End: 2017-08-17 | Stop reason: SDUPTHER

## 2017-07-31 RX ORDER — FLUOXETINE HYDROCHLORIDE 20 MG/1
20 CAPSULE ORAL DAILY
Qty: 30 CAP | Refills: 1 | Status: SHIPPED | OUTPATIENT
Start: 2017-07-31 | End: 2019-06-04

## 2017-07-31 RX ADMIN — DONEPEZIL HYDROCHLORIDE 5 MG: 5 TABLET, FILM COATED ORAL at 07:58

## 2017-07-31 RX ADMIN — FLUOXETINE HYDROCHLORIDE 20 MG: 20 CAPSULE ORAL at 07:58

## 2017-07-31 RX ADMIN — LORAZEPAM 1 MG: 1 TABLET ORAL at 08:03

## 2017-07-31 RX ADMIN — LORAZEPAM 2 MG: 1 TABLET ORAL at 18:25

## 2017-07-31 RX ADMIN — STANDARDIZED SENNA CONCENTRATE AND DOCUSATE SODIUM 2 TABLET: 8.6; 5 TABLET, FILM COATED ORAL at 09:00

## 2017-07-31 ASSESSMENT — COGNITIVE AND FUNCTIONAL STATUS - GENERAL
HELP NEEDED FOR BATHING: A LITTLE
SUGGESTED CMS G CODE MODIFIER DAILY ACTIVITY: CI
DAILY ACTIVITIY SCORE: 23

## 2017-07-31 ASSESSMENT — PAIN SCALES - GENERAL
PAINLEVEL_OUTOF10: 0
PAINLEVEL_OUTOF10: 0

## 2017-07-31 ASSESSMENT — ACTIVITIES OF DAILY LIVING (ADL): TOILETING: INDEPENDENT

## 2017-07-31 NOTE — CARE PLAN
Problem: Safety  Goal: Will remain free from injury  Intervention: Provide assistance with mobility  Pt mobility assessed at beginning of shift, pt is standby assist, fairly steady.  Fall precautions in place, non-slip socks on, bed in lowest, locked position and call light is within reach.  Pt educated to call for assistance and verbalizes understanding.       Problem: Psychosocial Needs:  Goal: Level of anxiety will decrease  Intervention: Identify and develop with patient strategies to cope with anxiety triggers  Pt anxious, medicating per MAR PRN and providing non-pharm support: active listening, one-on-one discussion, distraction, and rest.

## 2017-07-31 NOTE — DISCHARGE PLANNING
Received choice form from CHRISTOPH Moon for HH. Referral sent to [facility] at 1550.

## 2017-07-31 NOTE — PROGRESS NOTES
Bedside report completed. Assumed pt care. Pt A&O 4, resting in bed comfortably with no signs of labored breathing on RA. Pt tele monitor in place, cardiac rhythm being monitored. Pt call light within reach, bed in low position, upper bed rails up, non skid socks in place. Pt denies any pain or other distress at this time. Patient was updated on the plan of care for the night. All fall precautions in place. Will continue to monitor.

## 2017-07-31 NOTE — DISCHARGE PLANNING
Medical Social Work    Attempted to meet with pt several times. Pt in restroom. SW will attempt to meet with pt shortly for HH choice.

## 2017-07-31 NOTE — PROGRESS NOTES
Called son Troy and left message that pt is ready to discharge and follow up with PCP appointment and home health.

## 2017-07-31 NOTE — THERAPY
"Occupational Therapy Evaluation completed.   Functional Status: Pt is supervised supine to sit and back to bed, supervised mob without AD to/from bathroom, supervised toileting, supervised standing grooming x 10 minutes at sink, supervised to don socks. Pt is at baseline level of function with no acute OT needs, no needs for AE.  Pt could benefit from life alert for times of day when he is alone.   Plan of Care: 1 x only eval. No acute OT needs.  Discharge Recommendations:  Equipment: No Equipment Needed. Post-acute therapy Currently anticipate no further skilled therapy needs once patient is discharged from the inpatient setting.    See \"Rehab Therapy-Acute\" Patient Summary Report for complete documentation.    "

## 2017-07-31 NOTE — DIETARY
"Nutrition Services: BMI - Pt seen for BMI <19 (=16.67), ht=5'10\", wt=52.7 kg. Pt is a 71 yo male with dx of rectal bleeding, chest pain, anxiety. PMHx includes stroke, HTN, heart attack, anxiety. Pt appeared thin but well nourished. Pt is currently on a Regular diet. Previous documented meals have been <50% consumed. Upon visit, pt stated his appetite is improving. Pt had consumed at least 50% of his breakfast tray upon visit. Pt did not wish to have supplements at this time. Encouraged pt to consume at least 50% of meal trays, pt verbalized understanding.     Pertinent Labs: Reviewed; WNL  Pertinent Medications: Aricept, Prozac, Pericolace  Fluids: None noted in MAR  GI: Last BM 7/29  Skin: Intact    PLAN/ RECOMMEND - Encourage PO intake. Nutrition Rep to see patient daily for meal preferences. Please document PO intake as percentage of meals consumed. RD to monitor for PO consistency.       "

## 2017-07-31 NOTE — PROGRESS NOTES
Received bedside report. Assumed care. Went over POC for the day with patient. Pt sitting up in bed. A/Ox4. Denies any presence of pain or other concerns. Awaiting PT/OT evaluations. Call light within reach. Bed in lowest, locked position.

## 2017-07-31 NOTE — DISCHARGE PLANNING
Received voicemail from [facility] PCP. Left message that PCP is ANA LUISA Steve with [facility].

## 2017-07-31 NOTE — DISCHARGE INSTRUCTIONS
Discharge Instructions    Discharged to home by car with relative. Discharged via walking, hospital escort: Yes.  Special equipment needed: Not Applicable    Be sure to schedule a follow-up appointment with your primary care doctor or any specialists as instructed.     Discharge Plan:   Influenza Vaccine Indication: Indicated: Not available from distributor/    I understand that a diet low in cholesterol, fat, and sodium is recommended for good health. Unless I have been given specific instructions below for another diet, I accept this instruction as my diet prescription.   Other diet: Cardiac Diet    Rectal Bleeding  Rectal bleeding is when blood passes out of the anus. It is usually a sign that something is wrong. It may not be serious, but it should always be evaluated. Rectal bleeding may present as bright red blood or extremely dark stools. The color may range from dark red or maroon to black (like tar). It is important that the cause of rectal bleeding be identified so treatment can be started and the problem corrected.  CAUSES   · Hemorrhoids. These are enlarged (dilated) blood vessels or veins in the anal or rectal area.  · Fistulas. These are abnormal, burrowing channels that usually run from inside the rectum to the skin around the anus. They can bleed.  · Anal fissures. This is a tear in the tissue of the anus. Bleeding occurs with bowel movements.  · Diverticulosis. This is a condition in which pockets or sacs project from the bowel wall. Occasionally, the sacs can bleed.  · Diverticulitis. This is an infection involving diverticulosis of the colon.  · Proctitis and colitis. These are conditions in which the rectum, colon, or both, can become inflamed and pitted (ulcerated).  · Polyps and cancer. Polyps are non-cancerous (benign) growths in the colon that may bleed. Certain types of polyps turn into cancer.  · Protrusion of the rectum. Part of the rectum can project from the anus and  bleed.  · Certain medicines.  · Intestinal infections.  · Blood vessel abnormalities.  HOME CARE INSTRUCTIONS  · Eat a high-fiber diet to keep your stool soft.  · Limit activity.  · Drink enough fluids to keep your urine clear or pale yellow.  · Warm baths may be useful to soothe rectal pain.  · Follow up with your caregiver as directed.  SEEK IMMEDIATE MEDICAL CARE IF:  · You develop increased bleeding.  · You have black or dark red stools.  · You vomit blood or material that looks like coffee grounds.  · You have abdominal pain or tenderness.  · You have a fever.  · You feel weak, nauseous, or you faint.  · You have severe rectal pain or you are unable to have a bowel movement.  MAKE SURE YOU:  · Understand these instructions.  · Will watch your condition.  · Will get help right away if you are not doing well or get worse.  Document Released: 06/09/2003 Document Revised: 03/11/2013 Document Reviewed: 06/03/2012  in2apps® Patient Information ©2014 [employer].    Chest Pain, Nonspecific  It is often hard to give a specific diagnosis for the cause of chest pain. There is always a chance that your pain could be related to something serious, like a heart attack or a blood clot in the lungs. You need to follow up with your caregiver for further evaluation. More lab tests or other studies such as X-rays, electrocardiography, stress testing, or cardiac imaging may be needed to find the cause of your pain.  Most of the time, nonspecific chest pain improves within 2 to 3 days with rest and mild pain medicine. For the next few days, avoid physical exertion or activities that bring on pain. Do not smoke. Avoid drinking alcohol. Call your caregiver for routine follow-up as advised.   SEEK IMMEDIATE MEDICAL CARE IF:  · You develop increased chest pain or pain that radiates to the arm, neck, jaw, back, or abdomen.   · You develop shortness of breath, increased coughing, or you start coughing up blood.   · You have severe  back or abdominal pain, nausea, or vomiting.   · You develop severe weakness, fainting, fever, or chills.   Document Released: 12/18/2006 Document Revised: 03/11/2013 Document Reviewed: 06/06/2008  ExitCare® Patient Information ©2013 [employer].    · Is patient discharged on Warfarin / Coumadin?   No     · Is patient Post Blood Transfusion?  No    Depression / Suicide Risk    As you are discharged from this [facility] facility, it is important to learn how to keep safe from harming yourself.    Recognize the warning signs:  · Abrupt changes in personality, positive or negative- including increase in energy   · Giving away possessions  · Change in eating patterns- significant weight changes-  positive or negative  · Change in sleeping patterns- unable to sleep or sleeping all the time   · Unwillingness or inability to communicate  · Depression  · Unusual sadness, discouragement and loneliness  · Talk of wanting to die  · Neglect of personal appearance   · Rebelliousness- reckless behavior  · Withdrawal from people/activities they love  · Confusion- inability to concentrate     If you or a loved one observes any of these behaviors or has concerns about self-harm, here's what you can do:  · Talk about it- your feelings and reasons for harming yourself  · Remove any means that you might use to hurt yourself (examples: pills, rope, extension cords, firearm)  · Get professional help from the community (Mental Health, Substance Abuse, psychological counseling)  · Do not be alone:Call your Safe Contact- someone whom you trust who will be there for you.  · Call your local CRISIS HOTLINE 990-3020 or 244-194-1180  · Call your local Children's Mobile Crisis Response Team [place] (373) 799-4606 or www.Otonomy  · Call the toll free National Suicide Prevention Hotlines   · National Suicide Prevention Lifeline 540-627-AGJV (5579)  · National Hope Line Network 800-SUICIDE (132-6688)

## 2017-07-31 NOTE — CARE PLAN
Problem: Bowel/Gastric:  Goal: Normal bowel function is maintained or improved  Outcome: PROGRESSING AS EXPECTED  Pt has not had BM since 7/29, pt taking senna, + BS in all four quad.

## 2017-07-31 NOTE — FACE TO FACE
Face to Face Supporting Documentation - Home Health    The encounter with this patient was in whole or in part the primary reason for home health admission.    Date of encounter:   Patient:                    MRN:                       YOB: 2019  Moe Nickerson  [id number]  1945     Home health to see patient for:  Skilled Nursing care for assessment, interventions & education, Physical Therapy evaluation and treatment and Occupational therapy evaluation and treatment    Skilled need for:  Recent Deterioration of Health Status debility, CAD    Skilled nursing interventions to include:  Comment: pt/ot    Homebound status evidenced by:  Need the aid of supportive devices such as crutches, canes, wheelchairs or walkers or Needs the assistance of another person in order to leave the home. Leaving home requires a considerable and taxing effort. There is a normal inability to leave the home.    Community Physician to provide follow up care: Pcp Pt States None     Optional Interventions? No      I certify the face to face encounter for this home health care referral meets the CMS requirements and the encounter/clinical assessment with the patient was, in whole, or in part, for the medical condition(s) listed above, which is the primary reason for home health care. Based on my clinical findings: the service(s) are medically necessary, support the need for home health care, and the homebound criteria are met.  I certify that this patient has had a face to face encounter by myself.  Amy Guzman D.O. - NPI: [id number]

## 2017-07-31 NOTE — PROGRESS NOTES
[facility]ist Progress Note    Date of Service: 2017    Chief Complaint  72 y.o. male admitted 2017 with history of stroke coronary artery disease status post stenting and recent GI bleed presents with chest pain and bloody stool.    Interval Problem Update  Very anxious  Unsteady gait per RN  Lives alone  Denies chest pain   unsafe discharge to home alone    Consultants/Specialty  None    Disposition  To home in 1-2 days with clinical improvement  Monitor for further bleed  ? Home health vs placement  Pt/ot  Transfer to [facility]        Review of Systems   Constitutional: Negative for malaise/fatigue and diaphoresis.   HENT: Positive for hearing loss.    Respiratory: Negative for cough and shortness of breath.    Cardiovascular: Negative for chest pain, palpitations and leg swelling.   Gastrointestinal: Negative for nausea, abdominal pain, blood in stool and melena.   Genitourinary: Negative for dysuria and flank pain.   Musculoskeletal: Positive for myalgias. Negative for joint pain.   Neurological: Positive for tremors and weakness. Negative for sensory change, speech change, focal weakness and headaches.   Psychiatric/Behavioral: Negative for memory loss. The patient is not nervous/anxious.       Physical Exam  Laboratory/Imaging   Hemodynamics  Temp (24hrs), Av.9 °C (98.5 °F), Min:36.7 °C (98 °F), Max:37.3 °C (99.2 °F)   Temperature: 37.2 °C (98.9 °F)  Pulse  Av.5  Min: 48  Max: 68   Blood Pressure : 138/78 mmHg      Respiratory      Respiration: 17, Pulse Oximetry: 96 %        RUL Breath Sounds: Clear, RML Breath Sounds: Clear, RLL Breath Sounds: Diminished, IDRIS Breath Sounds: Clear, LLL Breath Sounds: Diminished    Fluids    Intake/Output Summary (Last 24 hours) at 17 4280  Last data filed at 17 0725   Gross per 24 hour   Intake    360 ml   Output    450 ml   Net    -90 ml       Nutrition  Orders Placed This Encounter   Procedures   • Diet Order     Standing Status: Standing       Number of Occurrences: 1      Standing Expiration Date:      Order Specific Question:  Diet:     Answer:  Regular [1]     Physical Exam   Constitutional: He is oriented to person, place, and time. He appears well-nourished. No distress.   Thin, elderly   HENT:   Head: Normocephalic and atraumatic.   Nose: Nose normal.   Eyes: EOM are normal. Pupils are equal, round, and reactive to light.   Neck: Normal range of motion. Neck supple. No thyromegaly present.   Cardiovascular: Normal rate and intact distal pulses.    Pulmonary/Chest: Effort normal and breath sounds normal. No respiratory distress. He has no wheezes.   Abdominal: Soft. Bowel sounds are normal. He exhibits no distension.   Musculoskeletal: He exhibits no edema or tenderness.   Neurological: He is alert and oriented to person, place, and time. No cranial nerve deficit.   Skin: Skin is warm and dry. There is pallor.   Psychiatric: He has a normal mood and affect. His behavior is normal. Judgment and thought content normal.   Nursing note and vitals reviewed.      Recent Labs      07/28/17   1537  07/29/17   0303   WBC  11.4*  10.4   RBC  5.13  4.87   HEMOGLOBIN  16.4  15.4   HEMATOCRIT  46.7  45.9   MCV  91.0  94.3   MCH  32.0  31.6   MCHC  35.1  33.6*   RDW  41.8  44.3   PLATELETCT  242  232   MPV  11.4  11.9     Recent Labs      07/28/17   1537  07/29/17   0303   SODIUM  139  139   POTASSIUM  4.0  4.1   CHLORIDE  104  107   CO2  22  25   GLUCOSE  108*  96   BUN  19  21   CREATININE  1.01  0.91   CALCIUM  10.3  9.5     Recent Labs      07/28/17   1537   APTT  26.7   INR  1.13     Recent Labs      07/28/17   1537   BNPBTYPENAT  168*     Recent Labs      07/29/17   0303   TRIGLYCERIDE  51   HDL  56   LDL  84          Assessment/Plan     * Chest pain, rule out acute myocardial infarction (present on admission)  Assessment & Plan  - Says he previously had another stress test at [facility] around a year ago  - Follows with [facility]  cardiology  - Patient does not want a stress test or any other stents placed  - trops neg  - CP free    Rectal bleeding (present on admission)  Assessment & Plan  - Previous EGD and colonoscopy reportedly back in September which nicked his bowel and constant GI bleeding but that has since resolved  - DHA?  Recurrence, minimal, h/h stable, f/u am bmp  - needs outpatient GI f/u  F/u fobt      DNR (do not resuscitate) (present on admission)  Assessment & Plan  - DNR per patient with son present    History of coronary artery stent placement (present on admission)  Assessment & Plan  - Previously had four stents placed  - Was placed on Plavix every other day   - Patient does not want any additional stents  - unable to tolerate plavix or ASA d/t recent GI bleed  - to f/u with cardiology prn, as outpatient, consult prn    Arrhythmia- resolved    Hearing loss  Assessment & Plan  B/l      Anxiety and depression  Assessment & Plan  Resume Prozac  Ativan when necessary      Labs reviewed, Medications reviewed and Radiology images reviewed          DVT prophylaxis - mechanical: SCDs  Ulcer prophylaxis: Not indicated    Assessed for rehab: Patient was assess for and/or received rehabilitation services during this hospitalization

## 2017-07-31 NOTE — PROGRESS NOTES
Pt refusing bed alarm despite education.  Educated pt on safety precautions and importance of calling for assistance before attempting to ambulate on his own.  Pt agrees to do so. Pt is a standby assist, fairly steady.

## 2017-07-31 NOTE — DISCHARGE SUMMARY
CHIEF COMPLAINT ON ADMISSION  Chief Complaint   Patient presents with   • Chest Pain     Pt bib ems. Pt was outside doing yardwork, became hot, had chest pain (right lower chest), called 911. Pt has hx of this happening (heat stroke). pt given 324 asa and 1 nitro tab with relief. Hx of TIA with residual neuro deficits (aphasia) that's normal for him. Hx of MI. Pt arrives alert/oriented x4. Pink/warm/wet-from dumping water on himself.       CODE STATUS  DNR    HPI & HOSPITAL COURSE  This is a 72 y.o. male here with history of stroke, coronary artery disease status post stenting and recent GI bleed presents with chest pain and bloody stool.     Patient was admitted to rule out ACS. Patient did have troponins monitored which remained negative patient did not reveal any signs of significant arrhythmia during this admission. Patient does not want a added any additional stress testing he follows up with [facility][1] and will follow-up as an outpatient. Patient denies any further chest pain at this time.    Patient reports some bloody stool discharge.  He did have a colonoscopy and endoscopy in September in which she reported some associated complication including black bowel movement nicking which causes some additional bleeding and it is presumed that [facility] at the time patient's H&H has remained stable. Patient did have another episode of bloody discharge with bowel movement however H&H remained stable patient does not appear to have any acute bleed upon discharge we have discussed the above findings with patient who states that he would like to follow up with a different GI group we have placed a referral for patient to follow up with GI consultants for further evaluation regarding his rectal bleed. Next number at this time patient has returned to baseline physical function he does appear to get anxious rather easily he is interested in living in a group home or assisted  living our social workers have provided information to patient and son regarding further assisted living possibilities. Given his improvement of symptoms in symptoms with minimal to no assist by our staff he will be discharged to home with follow-up with possible home health.      Patient is strongly advised to follow up with GI in the next 2-3 weeks given his history of occasional rectal bleeding. Patient does carry a history of coronary artery disease however Plavix and aspirin has been stopped due to above history of rectal bleeding and GI vessel nicking. Patient will benefit with from cardiology follow-up as an outpatient.    Therefore, he is discharged in fair and stable condition with close outpatient follow-up.    SPECIFIC OUTPATIENT FOLLOW-UP  Primary care provider in one week  GI in 2-3 weeks with [facility] consultants and number has been provided  Cardiology in 2-3 weeks.    DISCHARGE PROBLEM LIST  Principal Problem:    Chest pain, rule out acute myocardial infarction POA: Yes  Active Problems:    Rectal bleeding POA: Yes    DNR (do not resuscitate) POA: Yes    History of coronary artery stent placement POA: Yes    Hearing loss POA: Unknown    Anxiety and depression POA: Unknown    Anxiety POA: Unknown  Resolved Problems:    * No resolved hospital problems. *      FOLLOW UP  Future Appointments  Date Time Provider Department Center   8/2/2017 11:20 AM CARE MANAGER [facility]   8/3/2017 11:20 AM WANDY Jensen [facility]   8/15/2017 2:20 PM WANDY Steve [place]     [facility]  [place] [id number]  [phone]    Schedule an appointment as soon as possible for a visit in 1 week  For a follow up appointment. Thank you.      MEDICATIONS ON DISCHARGE   Moe Nickerson   Home Medication Instructions LAMONT:[id number]    Printed on:07/31/17 0676   Medication Information                      donepezil (ARICEPT) 5 MG Tab  Take 5 mg by mouth every day.             fluoxetine  (PROZAC) 20 MG Cap  Take 1 Cap by mouth every day.             fluticasone (FLONASE) 50 MCG/ACT nasal spray  Spray 1 Spray in nose every day.             lorazepam (ATIVAN) 2 MG tablet  Take 1 Tab by mouth every 8 hours as needed for Anxiety.             losartan (COZAAR) 50 MG Tab  Take 25 mg by mouth every day.             mometasone (ELOCON) 0.1 % Cream  Apply 1 g to affected area(s) every day.             multivitamin (THERAGRAN) Tab  Take 1 Tab by mouth every day.             nitroglycerin (NITROSTAT) 0.4 MG SL Tab  Place 0.4 mg under tongue every 5 minutes as needed for Chest Pain.             omeprazole (PRILOSEC) 20 MG delayed-release capsule  Take 20 mg by mouth 2 times a day.             pravastatin (PRAVACHOL) 20 MG Tab  Take 20 mg by mouth every day.             spironolactone (ALDACTONE) 25 MG Tab  Take 25 mg by mouth 2 times a day.             vitamin D (CHOLECALCIFEROL) 1000 UNIT Tab  Take 1,000 Units by mouth every day.             zolpidem (AMBIEN) 5 MG Tab  Take 5 mg by mouth at bedtime as needed for Sleep.                 DIET  Orders Placed This Encounter   Procedures   • Diet Order     Standing Status: Standing      Number of Occurrences: 1      Standing Expiration Date:      Order Specific Question:  Diet:     Answer:  Regular [1]       ACTIVITY  As tolerated.  Weight bearing as tolerated      CONSULTATIONS  Cardiology in 2 weeks  GI with GI consultant in 2-3 weeks    PROCEDURES  None    LABORATORY  Lab Results   Component Value Date/Time    SODIUM 139 07/29/2017 03:03 AM    POTASSIUM 4.1 07/29/2017 03:03 AM    CHLORIDE 107 07/29/2017 03:03 AM    CO2 25 07/29/2017 03:03 AM    GLUCOSE 96 07/29/2017 03:03 AM    BUN 21 07/29/2017 03:03 AM    CREATININE 0.91 07/29/2017 03:03 AM        Lab Results   Component Value Date/Time    WBC 7.5 07/31/2017 02:01 AM    HEMOGLOBIN 14.2 07/31/2017 02:01 AM    HEMATOCRIT 42.1 07/31/2017 02:01 AM    PLATELET COUNT 198 07/31/2017 02:01 AM        Total time of the  discharge process exceeds 45 minutes

## 2017-07-31 NOTE — DISCHARGE PLANNING
Medical Social Work    Pt requested I call his son for HH choice. Son is agreeable to [facility]. Unsure if they are able to drive to pt's house, as it is [place] mins outside of [place]. SW also provided son with packet of group homes and assisted living facilities.

## 2017-08-01 NOTE — PROGRESS NOTES
Went over discharge instructions, new prescriptions, and follow up appointments with pt and son at the bedside. Both confirmed understanding and denied any further concerns. IV removed, tip intact. Prescriptions and d/c instructions given to patient. Pt escorted by myself down to son's vehicle.

## 2017-08-01 NOTE — DISCHARGE PLANNING
Follow up with [facility] patient won't be establishing with PCP until 8/15/17. Notified CHRISTOPH Moon via voicemail.

## 2017-08-02 ENCOUNTER — PATIENT OUTREACH (OUTPATIENT)
Dept: HEALTH INFORMATION MANAGEMENT | Facility: OTHER | Age: 72
End: 2017-08-02

## 2017-08-17 ENCOUNTER — OFFICE VISIT (OUTPATIENT)
Dept: MEDICAL GROUP | Facility: MEDICAL CENTER | Age: 72
End: 2017-08-17
Payer: MEDICARE

## 2017-08-17 VITALS
HEIGHT: 67 IN | TEMPERATURE: 98.7 F | BODY MASS INDEX: 19.62 KG/M2 | WEIGHT: 125 LBS | RESPIRATION RATE: 14 BRPM | OXYGEN SATURATION: 96 % | SYSTOLIC BLOOD PRESSURE: 134 MMHG | DIASTOLIC BLOOD PRESSURE: 72 MMHG | HEART RATE: 66 BPM

## 2017-08-17 DIAGNOSIS — Z86.73 HISTORY OF STROKE: ICD-10-CM

## 2017-08-17 DIAGNOSIS — J42 CHRONIC BRONCHITIS, UNSPECIFIED CHRONIC BRONCHITIS TYPE (HCC): ICD-10-CM

## 2017-08-17 DIAGNOSIS — Z09 HOSPITAL DISCHARGE FOLLOW-UP: ICD-10-CM

## 2017-08-17 DIAGNOSIS — F32.A ANXIETY AND DEPRESSION: ICD-10-CM

## 2017-08-17 DIAGNOSIS — E78.00 PURE HYPERCHOLESTEROLEMIA: ICD-10-CM

## 2017-08-17 DIAGNOSIS — F41.9 ANXIETY AND DEPRESSION: ICD-10-CM

## 2017-08-17 DIAGNOSIS — R63.4 WEIGHT LOSS: ICD-10-CM

## 2017-08-17 DIAGNOSIS — K58.9 IRRITABLE BOWEL SYNDROME, UNSPECIFIED TYPE: ICD-10-CM

## 2017-08-17 DIAGNOSIS — K62.5 RECTAL BLEEDING: ICD-10-CM

## 2017-08-17 DIAGNOSIS — Z76.89 ENCOUNTER TO ESTABLISH CARE: ICD-10-CM

## 2017-08-17 DIAGNOSIS — R47.01 APHASIA: ICD-10-CM

## 2017-08-17 DIAGNOSIS — I10 ESSENTIAL HYPERTENSION: ICD-10-CM

## 2017-08-17 PROBLEM — R07.9 CHEST PAIN, RULE OUT ACUTE MYOCARDIAL INFARCTION: Status: RESOLVED | Noted: 2017-07-28 | Resolved: 2017-08-17

## 2017-08-17 PROBLEM — J44.9 COPD (CHRONIC OBSTRUCTIVE PULMONARY DISEASE) (HCC): Status: ACTIVE | Noted: 2017-08-17

## 2017-08-17 PROCEDURE — 99215 OFFICE O/P EST HI 40 MIN: CPT | Performed by: PHYSICIAN ASSISTANT

## 2017-08-17 RX ORDER — DONEPEZIL HYDROCHLORIDE 5 MG/1
5 TABLET, FILM COATED ORAL DAILY
Qty: 90 TAB | Refills: 1 | Status: SHIPPED | OUTPATIENT
Start: 2017-08-17 | End: 2018-01-16 | Stop reason: SDUPTHER

## 2017-08-17 RX ORDER — DICYCLOMINE HYDROCHLORIDE 10 MG/1
10 CAPSULE ORAL
Qty: 90 CAP | Refills: 1 | Status: SHIPPED | OUTPATIENT
Start: 2017-08-17 | End: 2017-10-05

## 2017-08-17 RX ORDER — LOSARTAN POTASSIUM 50 MG/1
50 TABLET ORAL DAILY
Qty: 90 TAB | Refills: 1 | Status: SHIPPED | OUTPATIENT
Start: 2017-08-17 | End: 2019-06-04

## 2017-08-17 RX ORDER — SPIRONOLACTONE 25 MG/1
12.5 TABLET ORAL 2 TIMES DAILY
Qty: 90 TAB | Refills: 1 | Status: SHIPPED | OUTPATIENT
Start: 2017-08-17 | End: 2018-01-16 | Stop reason: SDUPTHER

## 2017-08-17 RX ORDER — PRAVASTATIN SODIUM 20 MG
20 TABLET ORAL DAILY
Qty: 90 TAB | Refills: 1 | Status: SHIPPED | OUTPATIENT
Start: 2017-08-17 | End: 2018-01-16 | Stop reason: SDUPTHER

## 2017-08-17 RX ORDER — LORAZEPAM 2 MG/1
2 TABLET ORAL EVERY 8 HOURS PRN
Qty: 90 TAB | Refills: 0 | Status: SHIPPED | OUTPATIENT
Start: 2017-08-17 | End: 2019-06-04

## 2017-08-17 ASSESSMENT — PATIENT HEALTH QUESTIONNAIRE - PHQ9
5. POOR APPETITE OR OVEREATING: 0 - NOT AT ALL
SUM OF ALL RESPONSES TO PHQ QUESTIONS 1-9: 7
CLINICAL INTERPRETATION OF PHQ2 SCORE: 1

## 2017-08-17 NOTE — ASSESSMENT & PLAN NOTE
He has a history of anxiety. Also depression. His PCP placed him on Ativan 2 mg every 6 hours. He states he may take it every 8 hours. Denies any significant issues such as homicidal or suicidal ideations.

## 2017-08-17 NOTE — ASSESSMENT & PLAN NOTE
This is a 72-year-old male comes in today to establish care. He had a upper and lower endoscopy last year in September. He is not sure where. According to records they nicked him on the esophagus as well as in the colon. Afterwards he developed loose stool. Associated abdominal cramping. Symptoms occur right after food 3 times a day. Assessment going on for almost a year. He was seen at our hospital the other day for bright red blood per rectum. It was thought that may be secondary to hemorrhoids. He states that he believes is because he takes a baby aspirin. He denies any current rectal bleeding. His last colonoscopy was unremarkable for any polyps. Upper endoscopy was unremarkable.

## 2017-08-17 NOTE — Clinical Note
[facility]  José Robert PA-C  [address]  [place] [address]  Fax: [phone]   Authorization for Release/Disclosure of   Protected Health Information   Name: DARBY NICKERSON : 1945 SSN: [id number]   Address: [address] [place] [address] [address]  [place] [address] Phone:    [phone] (home)    I authorize the entity listed below to release/disclose the PHI below to:   [facility]/José Robert PA-C and José Robert PA-C   Provider or Entity Name:  [facility]   Address   City, State, [facility], [place]   Phone:      Fax:        Reason for request: continuity of care   Information to be released:    [ X ] LAST COLONOSCOPY,  including any PATH REPORT and follow-up  [ X ] LAST FIT/COLOGUARD RESULT [  ] LAST DEXA  [  ] LAST MAMMOGRAM  [  ] LAST PAP  [  ] LAST LABS [  ] RETINA EXAM REPORT  [  ] IMMUNIZATION RECORDS  [  ] Release all info      [  ] Check here and initial the line next to each item to release ALL health information INCLUDING  _____ Care and treatment for drug and / or alcohol abuse  _____ HIV testing, infection status, or AIDS  _____ Genetic Testing    DATES OF SERVICE OR TIME PERIOD TO BE DISCLOSED: _____________  I understand and acknowledge that:  * This Authorization may be revoked at any time by you in writing, except if your health information has already been used or disclosed.  * Your health information that will be used or disclosed as a result of you signing this authorization could be re-disclosed by the recipient. If this occurs, your re-disclosed health information may no longer be protected by State or Federal laws.  * You may refuse to sign this Authorization. Your refusal will not affect your ability to obtain treatment.  * This Authorization becomes effective upon signing and will  on (date) __________.      If no date is indicated, this Authorization will  one (1) year from the signature date.    Name: Darby Nickerson    Signature:   Date:     2017            PLEASE FAX REQUESTED RECORDS BACK TO: [phone]

## 2017-08-17 NOTE — PROGRESS NOTES
Subjective:   Darby Nickerson is a 72 y.o. male here today for establish care, hospital follow-up and for several chronic medical conditions.    Irritable bowel disease  This is a 72-year-old male comes in today to establish care. He had a upper and lower endoscopy last year in September. He is not sure where. According to records they nicked him on the esophagus as well as in the colon. Afterwards he developed loose stool. Associated abdominal cramping. Symptoms occur right after food 3 times a day. Assessment going on for almost a year. He was seen at our hospital the other day for bright red blood per rectum. It was thought that may be secondary to hemorrhoids. He states that he believes is because he takes a baby aspirin. He denies any current rectal bleeding. His last colonoscopy was unremarkable for any polyps. Upper endoscopy was unremarkable.    Rectal bleeding  As mentioned above he had some bright red blood but that has resolved.    COPD (chronic obstructive pulmonary disease) (CMS-HCC)  Per chest x-ray he was found to have COPD.    Anxiety and depression  He has a history of anxiety. Also depression. His PCP placed him on Ativan 2 mg every 6 hours. He states he may take it every 8 hours. Denies any significant issues such as homicidal or suicidal ideations.    Essential hypertension  Has a history of hypertension. Is currently on losartan and take spironolactone. Is not on any potassium supplement. Recent CMP showed the following:    Results for DARBY NICKERSON (MRN [id number]) as of 8/17/2017 13:51   Ref. Range 7/29/2017 03:03   Sodium Latest Ref Range: 135-145 mmol/L 139   Potassium Latest Ref Range: 3.6-5.5 mmol/L 4.1   Chloride Latest Ref Range:  mmol/L 107   Co2 Latest Ref Range: 20-33 mmol/L 25   Glucose Latest Ref Range: 65-99 mg/dL 96   Bun Latest Ref Range: 8-22 mg/dL 21   Creatinine Latest Ref Range: 0.50-1.40 mg/dL 0.91   GFR If  Latest Ref Range: >60 mL/min/1.73 m 2 >60   GFR  If Non  Latest Ref Range: >60 mL/min/1.73 m 2 >60   Calcium Latest Ref Range: 8.5-10.5 mg/dL 9.5   Albumin Latest Ref Range: 3.2-4.9 g/dL 3.6   Phosphorus Latest Ref Range: 2.5-4.5 mg/dL 3.3       Pure hypercholesterolemia  He takes pravastatin daily. Recent labs were normal.    Results for DARBY RODRIGUEZ (MRN [id number]) as of 8/17/2017 13:51   Ref. Range 7/29/2017 03:03   Cholesterol,Tot Latest Ref Range: 100-199 mg/dL 150   Triglycerides Latest Ref Range: 0-149 mg/dL 51   HDL Latest Ref Range: >=40 mg/dL 56   LDL Latest Ref Range: <100 mg/dL 84       History of stroke  4 years ago he had a stroke. His neurologist placed him on Aricept. He currently suffers from aphasia.    Weight loss  4 years ago he weighed about 145. In high school he weighed 135. He is concerned about his losing weight. In July he weighed 123 and today 125. Weight is stable.         Current medicines (including changes today)  Current Outpatient Prescriptions   Medication Sig Dispense Refill   • dicyclomine (BENTYL) 10 MG Cap Take 1 Cap by mouth 3 times a day. 90 Cap 1   • losartan (COZAAR) 50 MG Tab Take 1 Tab by mouth every day. 90 Tab 1   • spironolactone (ALDACTONE) 25 MG Tab Take 0.5 Tabs by mouth 2 times a day. 90 Tab 1   • lorazepam (ATIVAN) 2 MG tablet Take 1 Tab by mouth every 8 hours as needed for Anxiety. 90 Tab 0   • pravastatin (PRAVACHOL) 20 MG Tab Take 1 Tab by mouth every day. 90 Tab 1   • donepezil (ARICEPT) 5 MG Tab Take 1 Tab by mouth every day. 90 Tab 1   • fluoxetine (PROZAC) 20 MG Cap Take 1 Cap by mouth every day. 30 Cap 1   • fluticasone (FLONASE) 50 MCG/ACT nasal spray Spray 1 Spray in nose every day.     • omeprazole (PRILOSEC) 20 MG delayed-release capsule Take 20 mg by mouth 2 times a day.     • multivitamin (THERAGRAN) Tab Take 1 Tab by mouth every day.     • vitamin D (CHOLECALCIFEROL) 1000 UNIT Tab Take 1,000 Units by mouth every day.     • nitroglycerin (NITROSTAT) 0.4 MG SL Tab Place 0.4 mg under  "tongue every 5 minutes as needed for Chest Pain.     • mometasone (ELOCON) 0.1 % Cream Apply 1 g to affected area(s) every day.     • zolpidem (AMBIEN) 5 MG Tab Take 5 mg by mouth at bedtime as needed for Sleep.       No current facility-administered medications for this visit.     He  has a past medical history of Stroke (cerebrum) (CMS-HCC); Hypertension; Heart attack (CMS-[facility]); and Anxiety.    ROS   No chest pain, no shortness of breath, no abdominal pain and all other systems were reviewed and are negative.       Objective:     Blood pressure 134/72, pulse 66, temperature 37.1 °C (98.7 °F), resp. rate 14, height 1.702 m (5' 7\"), weight 56.7 kg (125 lb), SpO2 96 %. Body mass index is 19.57 kg/(m^2).   Physical Exam:  Constitutional: Alert, no distress.  Skin: Warm, dry, good turgor, no rashes in visible areas.  Eye: Equal, round and reactive, conjunctiva clear, lids normal.  ENMT: Lips without lesions, good dentition, oropharynx clear.  Neck: Trachea midline, no masses.   Lymph: No cervical or supraclavicular lymphadenopathy  Respiratory: Unlabored respiratory effort, lungs clear to auscultation, no wheezes, no ronchi.  Cardiovascular: Normal S1, S2, no murmur, no edema.  Abdomen: Soft, non-tender, no masses.  Psych: Alert and oriented x3, normal affect and mood.        Assessment and Plan:   The following treatment plan was discussed    1. Irritable bowel syndrome, unspecified type  Chronic condition. Status post endoscopies. Provided lowest dose of Bentyl 10 mg 3 times a day. Advised to try an over-the-counter fiber supplement such as Metamucil. Referred back to GI. Advised him that if he is referred to the same GI office that did his colonoscopy and upper endoscopy back in September 2016 that he may contact me and have that changed.  - REFERRAL TO GASTROENTEROLOGY  - dicyclomine (BENTYL) 10 MG Cap; Take 1 Cap by mouth 3 times a day.  Dispense: 90 Cap; Refill: 1    2. Rectal bleeding  Acute, new onset " condition. Resolved. No further rectal bleeding.  - REFERRAL TO GASTROENTEROLOGY    3. Aphasia  Chronic condition status post stroke. Stable.    4. Hospital discharge follow-up  Stable.    5. Encounter to establish care    6. Anxiety and depression  Chronic condition. Continue fluoxetine as directed. Provided prescription for Ativan to use as needed. Discussed with concerns taking that amount daily. Referred to psychiatry for further evaluation.  - lorazepam (ATIVAN) 2 MG tablet; Take 1 Tab by mouth every 8 hours as needed for Anxiety.  Dispense: 90 Tab; Refill: 0  - REFERRAL TO PSYCHIATRY    7. Pure hypercholesterolemia  Chronic condition. Controlled. Provided pravastatin 20 mg daily.  - pravastatin (PRAVACHOL) 20 MG Tab; Take 1 Tab by mouth every day.  Dispense: 90 Tab; Refill: 1    8. Essential hypertension  Chronic condition. Blood pressure controlled today. Renewed Cozaar and Aldactone as directed. CMP recently was within normal limits.  - losartan (COZAAR) 50 MG Tab; Take 1 Tab by mouth every day.  Dispense: 90 Tab; Refill: 1  - spironolactone (ALDACTONE) 25 MG Tab; Take 0.5 Tabs by mouth 2 times a day.  Dispense: 90 Tab; Refill: 1    9. History of stroke  Stable. Renewed Aricept 5 mg daily.  - donepezil (ARICEPT) 5 MG Tab; Take 1 Tab by mouth every day.  Dispense: 90 Tab; Refill: 1    10. Weight loss  Stable. Referred to nutrition.  - REFERRAL TO NUTRITION SERVICES    11. Chronic bronchitis, unspecified chronic bronchitis type (CMS-HCC)  Per chest x-ray. Patient asymptomatic. We'll monitor and follow-up.    Total 40 minutes face-to-face time spent with patient, with greater than 50% of the total time discussing patient's issues and symptoms as listed above in assessment and plan, as well as managing coordination of care for future evaluation and treatment.      Followup: No Follow-up on file.    Please note that this dictation was created using voice recognition software. I have made every reasonable attempt  to correct obvious errors, but I expect that there are errors of grammar and possibly content that I did not discover before finalizing the note.

## 2017-08-17 NOTE — ASSESSMENT & PLAN NOTE
4 years ago he had a stroke. His neurologist placed him on Aricept. He currently suffers from aphasia.

## 2017-08-17 NOTE — ASSESSMENT & PLAN NOTE
He takes pravastatin daily. Recent labs were normal.    Results for DARBY RODRIGUEZ (MRN [id number]) as of 8/17/2017 13:51   Ref. Range 7/29/2017 03:03   Cholesterol,Tot Latest Ref Range: 100-199 mg/dL 150   Triglycerides Latest Ref Range: 0-149 mg/dL 51   HDL Latest Ref Range: >=40 mg/dL 56   LDL Latest Ref Range: <100 mg/dL 84

## 2017-08-17 NOTE — ASSESSMENT & PLAN NOTE
4 years ago he weighed about 145. In high school he weighed 135. He is concerned about his losing weight. In July he weighed 123 and today 125. Weight is stable.

## 2017-08-17 NOTE — ASSESSMENT & PLAN NOTE
Has a history of hypertension. Is currently on losartan and take spironolactone. Is not on any potassium supplement. Recent CMP showed the following:    Results for DARBY RODRIGUEZ (MRN [id number]) as of 8/17/2017 13:51   Ref. Range 7/29/2017 03:03   Sodium Latest Ref Range: 135-145 mmol/L 139   Potassium Latest Ref Range: 3.6-5.5 mmol/L 4.1   Chloride Latest Ref Range:  mmol/L 107   Co2 Latest Ref Range: 20-33 mmol/L 25   Glucose Latest Ref Range: 65-99 mg/dL 96   Bun Latest Ref Range: 8-22 mg/dL 21   Creatinine Latest Ref Range: 0.50-1.40 mg/dL 0.91   GFR If  Latest Ref Range: >60 mL/min/1.73 m 2 >60   GFR If Non  Latest Ref Range: >60 mL/min/1.73 m 2 >60   Calcium Latest Ref Range: 8.5-10.5 mg/dL 9.5   Albumin Latest Ref Range: 3.2-4.9 g/dL 3.6   Phosphorus Latest Ref Range: 2.5-4.5 mg/dL 3.3

## 2017-10-05 ENCOUNTER — OFFICE VISIT (OUTPATIENT)
Dept: MEDICAL GROUP | Facility: MEDICAL CENTER | Age: 72
End: 2017-10-05
Payer: MEDICARE

## 2017-10-05 VITALS
OXYGEN SATURATION: 97 % | SYSTOLIC BLOOD PRESSURE: 116 MMHG | DIASTOLIC BLOOD PRESSURE: 70 MMHG | HEART RATE: 62 BPM | RESPIRATION RATE: 14 BRPM | BODY MASS INDEX: 20.06 KG/M2 | TEMPERATURE: 98 F | HEIGHT: 67 IN | WEIGHT: 127.8 LBS

## 2017-10-05 DIAGNOSIS — B37.2 CANDIDAL INTERTRIGO: ICD-10-CM

## 2017-10-05 DIAGNOSIS — G62.9 NEUROPATHY: ICD-10-CM

## 2017-10-05 DIAGNOSIS — K58.9 IRRITABLE BOWEL SYNDROME, UNSPECIFIED TYPE: ICD-10-CM

## 2017-10-05 DIAGNOSIS — E78.00 PURE HYPERCHOLESTEROLEMIA: ICD-10-CM

## 2017-10-05 PROCEDURE — 99214 OFFICE O/P EST MOD 30 MIN: CPT | Performed by: PHYSICIAN ASSISTANT

## 2017-10-05 RX ORDER — CLOTRIMAZOLE 1 %
CREAM (GRAM) TOPICAL
Qty: 1 TUBE | Refills: 0 | Status: SHIPPED | OUTPATIENT
Start: 2017-10-05 | End: 2019-06-04

## 2017-10-05 RX ORDER — TRIAMCINOLONE ACETONIDE 1 MG/G
1 CREAM TOPICAL 2 TIMES DAILY
Qty: 1 TUBE | Refills: 1 | Status: SHIPPED | OUTPATIENT
Start: 2017-10-05 | End: 2019-06-04

## 2017-10-05 RX ORDER — GABAPENTIN 100 MG/1
100 CAPSULE ORAL 3 TIMES DAILY
Qty: 90 CAP | Refills: 1 | Status: SHIPPED | OUTPATIENT
Start: 2017-10-05 | End: 2019-06-04 | Stop reason: SDUPTHER

## 2017-10-05 NOTE — ASSESSMENT & PLAN NOTE
This is a 72-year-old male who returns today to have labs reviewed. Recently had labs performed by his GI doctor. He also is requesting his last results about his cholesterol. That was done in July. Cholesterol levels were well controlled. He is currently taking pravastatin 20 mg daily. Labs performed by his GI doctor were within normal limits. He has appointment to see him in one month.

## 2017-10-05 NOTE — ASSESSMENT & PLAN NOTE
Also complains of a 6 month history of numbness of his fingers and toes. Symptoms are all day. Denies any alleviating factors. He believes symptoms began when he was going through rehabilitation after his mini stroke. He is concerned about diabetes but his glucose level was 85 recently. Complains of associated pain with the numbness.

## 2017-10-05 NOTE — ASSESSMENT & PLAN NOTE
He does complain of a itchy scrotum for greater than 2 months. Was seen he states had an ED and provided nystatin cream that was not effective. He was also given a steroid cream in July. That was at [facility]. Symptoms get worse at nighttime. He complains of a watery discharge.

## 2017-10-05 NOTE — ASSESSMENT & PLAN NOTE
As mentioned above he saw his GI doctor who ordered labs that were unremarkable. Also told him to discontinue for now Bentyl.

## 2017-10-05 NOTE — PROGRESS NOTES
Subjective:   Moe Nickerson is a 72 y.o. male here today for lab follow-up plus approximate 6 month history of numbness and pain of his distal extremities.    Pure hypercholesterolemia  This is a 72-year-old male who returns today to have labs reviewed. Recently had labs performed by his GI doctor. He also is requesting his last results about his cholesterol. That was done in July. Cholesterol levels were well controlled. He is currently taking pravastatin 20 mg daily. Labs performed by his GI doctor were within normal limits. He has appointment to see him in one month.    Neuropathy (CMS-[facility])  Also complains of a 6 month history of numbness of his fingers and toes. Symptoms are all day. Denies any alleviating factors. He believes symptoms began when he was going through rehabilitation after his mini stroke. He is concerned about diabetes but his glucose level was 85 recently. Complains of associated pain with the numbness.    Irritable bowel disease  As mentioned above he saw his GI doctor who ordered labs that were unremarkable. Also told him to discontinue for now Bentyl.    Candidal intertrigo  He does complain of a itchy scrotum for greater than 2 months. Was seen he states had an ED and provided nystatin cream that was not effective. He [facility] also [facility] Symptoms get worse at nighttime. He complains of a watery discharge.       Current medicines (including changes today)  Current Outpatient Prescriptions   Medication Sig Dispense Refill   • gabapentin (NEURONTIN) 100 MG Cap Take 1 Cap by mouth 3 times a day. Take once daily in evening then after 5 days take BID for 5 days then TID. 90 Cap 1   • clotrimazole (LOTRIMIN) 1 % Cream Apply twice daily x 14 days. 1 Tube 0   • triamcinolone acetonide (KENALOG) 0.1 % Cream Apply 1 Application to affected area(s) 2 times a day. 1 Tube 1   • losartan (COZAAR) 50 MG Tab Take 1 Tab by mouth every day. 90 Tab 1   • spironolactone  "(ALDACTONE) 25 MG Tab Take 0.5 Tabs by mouth 2 times a day. 90 Tab 1   • lorazepam (ATIVAN) 2 MG tablet Take 1 Tab by mouth every 8 hours as needed for Anxiety. 90 Tab 0   • pravastatin (PRAVACHOL) 20 MG Tab Take 1 Tab by mouth every day. 90 Tab 1   • donepezil (ARICEPT) 5 MG Tab Take 1 Tab by mouth every day. 90 Tab 1   • fluoxetine (PROZAC) 20 MG Cap Take 1 Cap by mouth every day. 30 Cap 1   • fluticasone (FLONASE) 50 MCG/ACT nasal spray Spray 1 Spray in nose every day.     • omeprazole (PRILOSEC) 20 MG delayed-release capsule Take 20 mg by mouth 2 times a day.     • multivitamin (THERAGRAN) Tab Take 1 Tab by mouth every day.     • vitamin D (CHOLECALCIFEROL) 1000 UNIT Tab Take 1,000 Units by mouth every day.     • nitroglycerin (NITROSTAT) 0.4 MG SL Tab Place 0.4 mg under tongue every 5 minutes as needed for Chest Pain.       No current facility-administered medications for this visit.      He  has a past medical history of Anxiety; Heart attack; Hypertension; and Stroke (cerebrum) (CMS-HCC).    ROS   No chest pain, no shortness of breath, no abdominal pain and all other systems were reviewed and are negative.       Objective:     Blood pressure 116/70, pulse 62, temperature 36.7 °C (98 °F), resp. rate 14, height 1.702 m (5' 7\"), weight 58 kg (127 lb 12.8 oz), SpO2 97 %. Body mass index is 20.02 kg/m².   Physical Exam:  Constitutional: Alert, no distress.  Skin: Warm, dry, good turgor, no rashes in visible areas.  Eye: Equal, round and reactive, conjunctiva clear, lids normal.  ENMT: Lips without lesions, good dentition, oropharynx clear.  Neck: Trachea midline, no masses.   Lymph: No cervical or supraclavicular lymphadenopathy  Respiratory: Unlabored respiratory effort, lungs clear to auscultation, no wheezes, no ronchi.  Cardiovascular: Normal S1, S2, no murmur, no edema.  Abdomen: Soft, non-tender, no masses.  Psych: Alert and oriented x3, normal affect and mood.        Assessment and Plan:   The following " treatment plan was discussed    1. Pure hypercholesterolemia  Chronic condition. Controlled. Continue pravastatin as directed.    2. Neuropathy (CMS-HCC)  New-onset condition. Six-month history. He doesn't appear to have diabetes. Not sure symptoms are secondary to his previous stroke. Refer to neurology for further evaluation. We'll start Neurontin 100 mg 3 times a day. May titrate up to 3 times a day. Discussed with significant side effect profile. Follow-up in one month to reevaluate.  - REFERRAL TO NEUROLOGY  - gabapentin (NEURONTIN) 100 MG Cap; Take 1 Cap by mouth 3 times a day. Take once daily in evening then after 5 days take BID for 5 days then TID.  Dispense: 90 Cap; Refill: 1    3. Irritable bowel syndrome, unspecified type  Chronic condition. May continue to follow-up with the GI doctor. Although we reviewed his labs that she had doctor ordered advised to follow-up with his GI doctor to be informed of the results as well. Discontinued Bentyl for now.    4. Candidal intertrigo  New-onset condition of the scrotum. Previous nystatin use didn't change condition. Therefore provided Chlortrimazole twice daily for 14 days along with Kenalog 0.1% cream applied to 7 days twice daily. Follow-up in one month. Have referred to dermatology in case I'm unable to change the condition.  - clotrimazole (LOTRIMIN) 1 % Cream; Apply twice daily x 14 days.  Dispense: 1 Tube; Refill: 0  - triamcinolone acetonide (KENALOG) 0.1 % Cream; Apply 1 Application to affected area(s) 2 times a day.  Dispense: 1 Tube; Refill: 1  - REFERRAL TO DERMATOLOGY      Followup: Return in about 4 weeks (around 11/2/2017).    Please note that this dictation was created using voice recognition software. I have made every reasonable attempt to correct obvious errors, but I expect that there are errors of grammar and possibly content that I did not discover before finalizing the note.

## 2018-01-16 DIAGNOSIS — E78.00 PURE HYPERCHOLESTEROLEMIA: ICD-10-CM

## 2018-01-16 DIAGNOSIS — Z86.73 HISTORY OF STROKE: ICD-10-CM

## 2018-01-16 DIAGNOSIS — I10 ESSENTIAL HYPERTENSION: ICD-10-CM

## 2018-01-16 RX ORDER — DONEPEZIL HYDROCHLORIDE 5 MG/1
TABLET, FILM COATED ORAL
Qty: 90 TAB | Refills: 0 | Status: SHIPPED | OUTPATIENT
Start: 2018-01-16 | End: 2019-07-09

## 2018-01-16 RX ORDER — SPIRONOLACTONE 25 MG/1
TABLET ORAL
Qty: 90 TAB | Refills: 0 | Status: SHIPPED | OUTPATIENT
Start: 2018-01-16 | End: 2019-06-04

## 2018-01-16 RX ORDER — PRAVASTATIN SODIUM 20 MG
TABLET ORAL
Qty: 90 TAB | Refills: 0 | Status: SHIPPED | OUTPATIENT
Start: 2018-01-16 | End: 2019-07-09

## 2018-01-25 ENCOUNTER — OFFICE VISIT (OUTPATIENT)
Dept: DERMATOLOGY | Facility: IMAGING CENTER | Age: 73
End: 2018-01-25
Payer: COMMERCIAL

## 2018-01-25 VITALS — TEMPERATURE: 97.7 F | HEIGHT: 67 IN | BODY MASS INDEX: 18.99 KG/M2 | WEIGHT: 121 LBS

## 2018-01-25 DIAGNOSIS — L21.9 SEBORRHEIC DERMATITIS: ICD-10-CM

## 2018-01-25 DIAGNOSIS — L91.8 ACROCHORDON: ICD-10-CM

## 2018-01-25 DIAGNOSIS — L30.4 INTERTRIGO: ICD-10-CM

## 2018-01-25 DIAGNOSIS — R20.9 DISTURBANCE OF SKIN SENSATION: ICD-10-CM

## 2018-01-25 PROCEDURE — 11200 RMVL SKIN TAGS UP TO&INC 15: CPT | Performed by: DERMATOLOGY

## 2018-01-25 PROCEDURE — 99203 OFFICE O/P NEW LOW 30 MIN: CPT | Mod: 25 | Performed by: DERMATOLOGY

## 2018-01-25 NOTE — PROGRESS NOTES
Dermatology New Patient Visit    Chief Complaint   Patient presents with   • Rash       Subjective:     HPI:   Moe Nickerson is a 72 y.o. male presenting for    Dry scalp started 1 year ago  Red, itchy  Also itchy in the beard area  Moved in with his son at that time, now uses well water  Washing with any shampoo he has at home  Previously tried ketoconazole and ciclopirox shampoo previously, unsure if they worked    Rash on the scrotum/in groin  Has been active on and off for 25+ years  Area gets red, with white film, very itchy  Today, seems to be under control  Not currently treating, and has not for 3-4 weeks  Prior treatments: ketoconazole cream, mometasone cream, triamcinolone cream, nystatin cream, betamethasone cream, does not know what works -- everything seemed to work for a few weeks, then stops    Irritated spots around the neck  Appeared 5-10 years ago  Some spots have grown in size  Several are itchy, some cause pain with shirt collars        Past Medical History:   Diagnosis Date   • Anxiety    • Heart attack    • Hypertension    • Stroke (cerebrum) (CMS-[facility])        Current Outpatient Prescriptions on File Prior to Visit   Medication Sig Dispense Refill   • spironolactone (ALDACTONE) 25 MG Tab TAKE ONE-HALF TABLET BY MOUTH TWICE DAILY 90 Tab 0   • pravastatin (PRAVACHOL) 20 MG Tab TAKE ONE TABLET BY MOUTH EVERY DAY 90 Tab 0   • donepezil (ARICEPT) 5 MG Tab TAKE ONE TABLET BY MOUTH EVERY DAY 90 Tab 0   • gabapentin (NEURONTIN) 100 MG Cap Take 1 Cap by mouth 3 times a day. Take once daily in evening then after 5 days take BID for 5 days then TID. 90 Cap 1   • clotrimazole (LOTRIMIN) 1 % Cream Apply twice daily x 14 days. 1 Tube 0   • triamcinolone acetonide (KENALOG) 0.1 % Cream Apply 1 Application to affected area(s) 2 times a day. 1 Tube 1   • losartan (COZAAR) 50 MG Tab Take 1 Tab by mouth every day. 90 Tab 1   • lorazepam (ATIVAN) 2 MG tablet Take 1 Tab by mouth every 8 hours as needed for Anxiety.  "90 Tab 0   • fluoxetine (PROZAC) 20 MG Cap Take 1 Cap by mouth every day. 30 Cap 1   • nitroglycerin (NITROSTAT) 0.4 MG SL Tab Place 0.4 mg under tongue every 5 minutes as needed for Chest Pain.     • fluticasone (FLONASE) 50 MCG/ACT nasal spray Spray 1 Spray in nose every day.     • omeprazole (PRILOSEC) 20 MG delayed-release capsule Take 20 mg by mouth 2 times a day.     • multivitamin (THERAGRAN) Tab Take 1 Tab by mouth every day.     • vitamin D (CHOLECALCIFEROL) 1000 UNIT Tab Take 1,000 Units by mouth every day.       No current facility-administered medications on file prior to visit.        Allergies   Allergen Reactions   • Morphine Anxiety and Unspecified     Hallucinations       No family history on file.    Social History     Social History   • Marital status:      Spouse name: N/A   • Number of children: N/A   • Years of education: N/A     Occupational History   • Not on file.     Social History Main Topics   • Smoking status: Never Smoker   • Smokeless tobacco: Never Used   • Alcohol use No   • Drug use: No   • Sexual activity: No     Other Topics Concern   • Not on file     Social History Narrative   • No narrative on file       Review of Systems   Constitutional: Negative for chills and fever.   Skin: Positive for itching and rash.        Objective:     A focused cutaneous exam was completed including: scalp, hair, ears, face, eyelids, conjunctiva, lips, neck, left and right upper extremities (including hands/digits and fingernails), left and right lower extremities, external genitalia with the following pertinent findings listed below. Remaining above-listed examined areas within normal limits / negative for rashes or lesions.    Temperature 36.5 °C (97.7 °F), height 1.702 m (5' 7\"), weight 54.9 kg (121 lb).    Physical Exam   Constitutional: He is oriented to person, place, and time and well-developed, well-nourished, and in no distress.   HENT:   Head: Normocephalic and atraumatic. "       Right Ear: External ear normal.   Left Ear: External ear normal.   Nose: Nose normal.   Eyes: Conjunctivae are normal.   Neck: Normal range of motion.       Pulmonary/Chest: Effort normal.   Neurological: He is alert and oriented to person, place, and time.   Skin: Skin is warm and dry.        Psychiatric: Mood and affect normal.   Vitals reviewed.    DATA: none applicable to review    Assessment and Plan:     1. Intertrigo, likely h/o candida infections, no evidence of active flare today  - educated patient about diagnosis, management options, and expectations of treatment  - instructed to use zinc oxide 20% ointment nightly for barrier protection  - zeasorb powder to aa daily  - loprox 0.77% cream to the affected area twice daily when active, flaring    2. Seborrheic dermatitis  - educated patient about diagnosis, management options, and expectations of treatment  - instructed to start hydrocortisone 1% cream twice daily to the affected area on the face until improved  - instructed to start clobetasol 0.05% gel to the scalp nightly to BID when active  - side effects of topical steroids discussed, including skin thinning, appearance of stretch marks (striae), easy bruising, telangiectasias, and possible increased hair growth   - instructed to start head & shoulders shampoo (or other pyrithione zinc vs selenium sulfide containing shampoo) 2-3 times/week; to leave on the scalp for at least 5-10 minutes prior to rinsing.  - coconut oil for hair/beard when feeling dry    3. Acrochordons, +Disturbance of skin sensation  CRYOTHERAPY:  Discussed risks and benefits of cryotherapy. Patient verbally agreed. 2 applications of cryotherapy were applied to 8 lesions on the neck. Patient tolerated procedure well. Aftercare instructions given.    Followup: Return in about 3 months (around 4/25/2018).    Myah Unger M.D.

## 2018-01-26 RX ORDER — CLOBETASOL PROPIONATE 0.05 MG/G
1 GEL TOPICAL
Qty: 45 G | Refills: 3 | Status: SHIPPED | OUTPATIENT
Start: 2018-01-26 | End: 2019-06-04

## 2018-01-26 ASSESSMENT — ENCOUNTER SYMPTOMS
FEVER: 0
CHILLS: 0

## 2019-06-04 ENCOUNTER — OFFICE VISIT (OUTPATIENT)
Dept: MEDICAL GROUP | Facility: MEDICAL CENTER | Age: 74
End: 2019-06-04
Payer: MEDICARE

## 2019-06-04 VITALS
BODY MASS INDEX: 19.65 KG/M2 | WEIGHT: 125.22 LBS | SYSTOLIC BLOOD PRESSURE: 148 MMHG | DIASTOLIC BLOOD PRESSURE: 74 MMHG | RESPIRATION RATE: 16 BRPM | HEIGHT: 67 IN | OXYGEN SATURATION: 96 % | TEMPERATURE: 99.1 F | HEART RATE: 68 BPM

## 2019-06-04 DIAGNOSIS — Z86.73 HISTORY OF STROKE: ICD-10-CM

## 2019-06-04 DIAGNOSIS — F41.9 ANXIETY AND DEPRESSION: ICD-10-CM

## 2019-06-04 DIAGNOSIS — L21.9 SEBORRHEIC DERMATITIS OF SCALP: ICD-10-CM

## 2019-06-04 DIAGNOSIS — J31.0 CHRONIC RHINITIS: ICD-10-CM

## 2019-06-04 DIAGNOSIS — G62.9 NEUROPATHY: ICD-10-CM

## 2019-06-04 DIAGNOSIS — B37.2 CANDIDAL INTERTRIGO: ICD-10-CM

## 2019-06-04 DIAGNOSIS — F32.A ANXIETY AND DEPRESSION: ICD-10-CM

## 2019-06-04 DIAGNOSIS — I10 ESSENTIAL HYPERTENSION: ICD-10-CM

## 2019-06-04 PROBLEM — R47.01 APHASIA: Status: RESOLVED | Noted: 2017-08-17 | Resolved: 2019-06-04

## 2019-06-04 PROBLEM — K62.5 RECTAL BLEEDING: Status: RESOLVED | Noted: 2017-07-28 | Resolved: 2019-06-04

## 2019-06-04 PROCEDURE — 99214 OFFICE O/P EST MOD 30 MIN: CPT | Performed by: PHYSICIAN ASSISTANT

## 2019-06-04 RX ORDER — LISINOPRIL 10 MG/1
10 TABLET ORAL DAILY
Qty: 30 TAB | Refills: 5 | Status: SHIPPED | OUTPATIENT
Start: 2019-06-04 | End: 2019-06-04 | Stop reason: SDUPTHER

## 2019-06-04 RX ORDER — CICLOPIROX 1 G/100ML
SHAMPOO TOPICAL
Qty: 150 ML | Refills: 1 | Status: SHIPPED | OUTPATIENT
Start: 2019-06-04 | End: 2022-03-08

## 2019-06-04 RX ORDER — LISINOPRIL 10 MG/1
10 TABLET ORAL DAILY
Qty: 30 TAB | Refills: 5 | Status: SHIPPED | OUTPATIENT
Start: 2019-06-04 | End: 2019-07-09 | Stop reason: SDUPTHER

## 2019-06-04 RX ORDER — FLUTICASONE PROPIONATE 50 MCG
1 SPRAY, SUSPENSION (ML) NASAL DAILY
Qty: 16 G | Refills: 5 | Status: SHIPPED | OUTPATIENT
Start: 2019-06-04 | End: 2022-03-08

## 2019-06-04 RX ORDER — LORAZEPAM 1 MG/1
1 TABLET ORAL EVERY 8 HOURS PRN
Qty: 90 TAB | Refills: 0 | Status: SHIPPED | OUTPATIENT
Start: 2019-06-04 | End: 2019-07-18 | Stop reason: SDUPTHER

## 2019-06-04 RX ORDER — GABAPENTIN 100 MG/1
100 CAPSULE ORAL 3 TIMES DAILY
Qty: 90 CAP | Refills: 1 | Status: SHIPPED | OUTPATIENT
Start: 2019-06-04 | End: 2022-03-08

## 2019-06-04 ASSESSMENT — PATIENT HEALTH QUESTIONNAIRE - PHQ9
5. POOR APPETITE OR OVEREATING: 0 - NOT AT ALL
SUM OF ALL RESPONSES TO PHQ QUESTIONS 1-9: 10
CLINICAL INTERPRETATION OF PHQ2 SCORE: 4

## 2019-06-04 NOTE — ASSESSMENT & PLAN NOTE
Chronic history of nasal congestion and drainage and difficulty breathing through his nose.  Usually uses a nasal steroid spray which is effective.  Requesting a refill today.

## 2019-06-04 NOTE — PROGRESS NOTES
Subjective:   Moe Nickerson is a 74 y.o. male here today for itching of his scalp and groin area which is chronic, chronic history of anxiety, chronic rhinitis, neuropathy and hypertension.  Also status post stroke.    Seborrheic dermatitis of scalp  This is a 74-year-old male who is here today to reestablish care.  Had not seen him in almost 2 years.  He was living in [place].  Seen by an internist in [place].  Complains of continued itching around his beard and his scalp.  Dermatology diagnosed him with seborrheic dermatitis.  He was given some creams but they were not effective.  Also complains of continued jock itch.  He was diagnosed with Candida intertrigo.  Again more creams without any improvement.    Anxiety and depression  Chronic history.  Currently on lorazepam which he takes currently at 1 mg 3 times a day.  He has been getting prescriptions through a provider in [place].  Now is living in [place] and would like to reestablish with me.    Chronic rhinitis  Chronic history of nasal congestion and drainage and difficulty breathing through his nose.  Usually uses a nasal steroid spray which is effective.  Requesting a refill today.    Neuropathy (CMS-HCC) (HCC)  Chronic history of neuropathy.  Planes of numbness for now approximately 3 years in his distal extremities.  Symptoms began after a TIA.  I started him on Neurontin.  He continues to take it.  It is effective.  Currently on 300 mg 3 times daily.  Requesting a refill.    Essential hypertension  Chronic condition.  Recently seen at [facility] after a stroke.  We do not have any medical records.  He was placed on Cozaar.  States the medication upsets his stomach.  He states during his hospitalization he had some imaging as well as a colonoscopy.  He has not been on any other medications in the past to treat his blood pressure.       Current medicines (including changes today)  Current Outpatient Prescriptions   Medication Sig Dispense Refill   •  "ciclopirox (LOPROX) 0.77 % cream Twice daily for 2-3 weeks when active (groin) 1 Tube 0   • Ciclopirox 1 % Shampoo Apply to scalp and beard twice weekly.  Leave on for about 3 minutes then rinse. 150 mL 1   • lisinopril (PRINIVIL) 10 MG Tab Take 1 Tab by mouth every day. 30 Tab 5   • gabapentin (NEURONTIN) 100 MG Cap Take 1 Cap by mouth 3 times a day. Take once daily in evening then after 5 days take BID for 5 days then TID. 90 Cap 1   • LORazepam (ATIVAN) 1 MG Tab Take 1 Tab by mouth every 8 hours as needed for Anxiety for up to 30 days. 90 Tab 0   • fluticasone (FLONASE) 50 MCG/ACT nasal spray Spray 1 Spray in nose every day. 16 g 5   • donepezil (ARICEPT) 5 MG Tab TAKE ONE TABLET BY MOUTH EVERY DAY 90 Tab 0   • nitroglycerin (NITROSTAT) 0.4 MG SL Tab Place 0.4 mg under tongue every 5 minutes as needed for Chest Pain.     • multivitamin (THERAGRAN) Tab Take 1 Tab by mouth every day.     • vitamin D (CHOLECALCIFEROL) 1000 UNIT Tab Take 1,000 Units by mouth every day.     • pravastatin (PRAVACHOL) 20 MG Tab TAKE ONE TABLET BY MOUTH EVERY DAY (Patient not taking: Reported on 6/4/2019) 90 Tab 0     No current facility-administered medications for this visit.      He  has a past medical history of Anxiety; Heart attack (HCC); Hypertension; and Stroke (cerebrum) (HCC).    Social History and Family History were reviewed and updated.    ROS   No chest pain, no shortness of breath, no abdominal pain and all other systems were reviewed and are negative.       Objective:     /74 (BP Location: Left arm, Patient Position: Sitting, BP Cuff Size: Adult)   Pulse 68   Temp 37.3 °C (99.1 °F) (Temporal)   Resp 16   Ht 1.702 m (5' 7\")   Wt 56.8 kg (125 lb 3.5 oz)   SpO2 96%  Body mass index is 19.61 kg/m².   Physical Exam:  Constitutional: Alert, no distress.  Skin: Warm, dry, good turgor, no rashes in visible areas.  Eye: Equal, round and reactive, conjunctiva clear, lids normal.  ENMT: Lips without lesions, good " dentition, oropharynx clear.  Neck: Trachea midline, no masses.   Lymph: No cervical or supraclavicular lymphadenopathy  Respiratory: Unlabored respiratory effort, lungs clear to auscultation, no wheezes, no ronchi.  Cardiovascular: Normal S1, S2, no murmur, no edema.  Abdomen: Soft, non-tender, no masses.  Psych: Alert and oriented x3, normal affect and mood.        Assessment and Plan:   The following treatment plan was discussed    1. Seborrheic dermatitis of scalp  Chronic condition.  Advised him I see no lesions on his face.  Appears that possibly there is some psychosis presentation to his itching and feeling that he has symptoms.  Did provide him a prescription for shampoo ciclopirox which he may take as directed.  Refer him back to dermatology for evaluation.  - Ciclopirox 1 % Shampoo; Apply to scalp and beard twice weekly.  Leave on for about 3 minutes then rinse.  Dispense: 150 mL; Refill: 1  - REFERRAL TO DERMATOLOGY    2. Candidal intertrigo  Chronic condition.  Provided another prescription for Loprox.  Refer to dermatology.  - ciclopirox (LOPROX) 0.77 % cream; Twice daily for 2-3 weeks when active (groin)  Dispense: 1 Tube; Refill: 0  - REFERRAL TO DERMATOLOGY    3. History of stroke  Chronic condition with recent exacerbation.  Will obtain previous medical records from [facility].    4. Essential hypertension  Chronic condition.  Will provide him with lisinopril 10 mg.  Discussed possible side effects.  Discontinue losartan.  Will obtain medical records from the hospital.  - lisinopril (PRINIVIL) 10 MG Tab; Take 1 Tab by mouth every day.  Dispense: 30 Tab; Refill: 5    5. Neuropathy (HCC)  Chronic condition.  Stable.  Prescribed gabapentin as directed.  - gabapentin (NEURONTIN) 100 MG Cap; Take 1 Cap by mouth 3 times a day. Take once daily in evening then after 5 days take BID for 5 days then TID.  Dispense: 90 Cap; Refill: 1    6. Anxiety and depression  Chronic condition.    reviewed.  Provided him 1 month supply of Ativan.  Advised him to follow-up in 1 month.  At that point we will discuss likely referral to see psychiatry.  - LORazepam (ATIVAN) 1 MG Tab; Take 1 Tab by mouth every 8 hours as needed for Anxiety for up to 30 days.  Dispense: 90 Tab; Refill: 0    7. Chronic rhinitis  Chronic condition.  Stable.  Prescribed Flonase as directed.  - fluticasone (FLONASE) 50 MCG/ACT nasal spray; Spray 1 Spray in nose every day.  Dispense: 16 g; Refill: 5      Followup: Return in about 4 weeks (around 7/2/2019).    Please note that this dictation was created using voice recognition software. I have made every reasonable attempt to correct obvious errors, but I expect that there are errors of grammar and possibly content that I did not discover before finalizing the note.

## 2019-06-04 NOTE — ASSESSMENT & PLAN NOTE
This is a 74-year-old male who is here today to reestablish care.  Had not seen him in almost 2 years.  He was living in [place].  Seen by an internist in [place].  Complains of continued itching around his beard and his scalp.  Dermatology diagnosed him with seborrheic dermatitis.  He was given some creams but they were not effective.  Also complains of continued jock itch.  He was diagnosed with Candida intertrigo.  Again more creams without any improvement.

## 2019-06-04 NOTE — ASSESSMENT & PLAN NOTE
Chronic history.  Currently on lorazepam which he takes currently at 1 mg 3 times a day.  He has been getting prescriptions through a provider in [place].  Now is living in [place] and would like to reestablish with me.

## 2019-06-04 NOTE — ASSESSMENT & PLAN NOTE
Chronic condition.  Recently seen at [facility] after a stroke.  We do not have any medical records.  He was placed on Cozaar.  States the medication upsets his stomach.  He states during his hospitalization he had some imaging as well as a colonoscopy.  He has not been on any other medications in the past to treat his blood pressure.

## 2019-06-04 NOTE — LETTER
[facility]  José Robert P.A.-C.  [address]  [place] [address]  Fax: [phone]   Authorization for Release/Disclosure of   Protected Health Information   Name: DARBY NICKERSON : 1945 SSN: [id number]   Address: [address] [place], [place] [address]  [place] [address] Phone:    [phone] (home)    I authorize the entity listed below to release/disclose the PHI below to:   [facility]/José Robert P.A.-C. and José Robert P.A.-C.   Provider or Entity Name:     Address   City, State, Zip   Phone:      Fax:     Reason for request: continuity of care   Information to be released:    [  ] LAST COLONOSCOPY,  including any PATH REPORT and follow-up  [  ] LAST FIT/COLOGUARD RESULT [  ] LAST DEXA  [  ] LAST MAMMOGRAM  [  ] LAST PAP  [  ] LAST LABS [  ] RETINA EXAM REPORT  [  ] IMMUNIZATION RECORDS  [  ] Release all info      [  ] Check here and initial the line next to each item to release ALL health information INCLUDING  _____ Care and treatment for drug and / or alcohol abuse  _____ HIV testing, infection status, or AIDS  _____ Genetic Testing    DATES OF SERVICE OR TIME PERIOD TO BE DISCLOSED: _____________  I understand and acknowledge that:  * This Authorization may be revoked at any time by you in writing, except if your health information has already been used or disclosed.  * Your health information that will be used or disclosed as a result of you signing this authorization could be re-disclosed by the recipient. If this occurs, your re-disclosed health information may no longer be protected by State or Federal laws.  * You may refuse to sign this Authorization. Your refusal will not affect your ability to obtain treatment.  * This Authorization becomes effective upon signing and will  on (date) __________.      If no date is indicated, this Authorization will  one (1) year from the signature date.    Name: Darby Nickerson    Signature:   Date:     2019       PLEASE FAX  REQUESTED RECORDS BACK TO: [phone]

## 2019-07-09 ENCOUNTER — OFFICE VISIT (OUTPATIENT)
Dept: MEDICAL GROUP | Facility: MEDICAL CENTER | Age: 74
End: 2019-07-09
Payer: MEDICARE

## 2019-07-09 VITALS
SYSTOLIC BLOOD PRESSURE: 128 MMHG | BODY MASS INDEX: 19.58 KG/M2 | OXYGEN SATURATION: 97 % | HEART RATE: 66 BPM | WEIGHT: 124.78 LBS | DIASTOLIC BLOOD PRESSURE: 70 MMHG | RESPIRATION RATE: 14 BRPM | HEIGHT: 67 IN | TEMPERATURE: 98.2 F

## 2019-07-09 DIAGNOSIS — I10 ESSENTIAL HYPERTENSION: ICD-10-CM

## 2019-07-09 DIAGNOSIS — L21.9 SEBORRHEIC DERMATITIS OF SCALP: ICD-10-CM

## 2019-07-09 DIAGNOSIS — E78.00 PURE HYPERCHOLESTEROLEMIA: ICD-10-CM

## 2019-07-09 DIAGNOSIS — R63.4 WEIGHT LOSS: ICD-10-CM

## 2019-07-09 DIAGNOSIS — B37.2 CANDIDAL INTERTRIGO: ICD-10-CM

## 2019-07-09 DIAGNOSIS — L98.9 SKIN LESIONS, GENERALIZED: ICD-10-CM

## 2019-07-09 DIAGNOSIS — Z86.73 HISTORY OF STROKE: ICD-10-CM

## 2019-07-09 DIAGNOSIS — L91.8 SKIN TAG: ICD-10-CM

## 2019-07-09 PROBLEM — R73.09 ELEVATED GLUCOSE: Status: ACTIVE | Noted: 2019-07-09

## 2019-07-09 PROCEDURE — 99214 OFFICE O/P EST MOD 30 MIN: CPT | Performed by: PHYSICIAN ASSISTANT

## 2019-07-09 RX ORDER — LISINOPRIL 20 MG/1
20 TABLET ORAL DAILY
Qty: 30 TAB | Refills: 3 | Status: SHIPPED | OUTPATIENT
Start: 2019-07-09 | End: 2022-03-08

## 2019-07-09 RX ORDER — CLOBETASOL PROPIONATE 0.5 MG/G
1 CREAM TOPICAL 2 TIMES DAILY
Qty: 1 TUBE | Refills: 1 | Status: SHIPPED | OUTPATIENT
Start: 2019-07-09 | End: 2022-03-08

## 2019-07-09 RX ORDER — SIMVASTATIN 10 MG
10 TABLET ORAL EVERY EVENING
Qty: 30 TAB | Refills: 2 | Status: SHIPPED | OUTPATIENT
Start: 2019-07-09 | End: 2019-09-17 | Stop reason: SDUPTHER

## 2019-07-09 NOTE — ASSESSMENT & PLAN NOTE
Chronic condition.  States amlodipine causes lower extremity swelling.  Is also on lisinopril 10 mg.  Wants to stop the amlodipine.

## 2019-07-09 NOTE — PROGRESS NOTES
Subjective:   Moe Nickerson is a 74 y.o. male here today for skin conditions such as possible Candida intertrigo of his scrotal sac.  Also to discuss weight loss, hypertension and hypercholesterolemia.    Candidal intertrigo  This is a 74-year-old male who is here today to discuss a few items.  Still has concerns with a swollen scrotal sac.  States that nighttime the skin will get really thick.  He has been seen by dermatology in the past.  Steroids usually were very effective but lose their efficacy over time.  He tried to make an appointment to follow-up with dermatology but was concerned about the cost of removing some skin tags.  Today brought in all of the creams that he is used in the past.  States that usually will bathe in the morning.  Will apply a cream and then at the evening he will develop serious itching and flaking.  Creams have tended not to work now.    Skin tag  Complains of some generalized skin tags around.  They are concerning to him.  He would like them removed.    Skin lesions, generalized  Complains as well as some generalized lesions that started his pimples on his body.  They do dissipate over time.  He applies lotion daily.    Weight loss  Weight is stable today.  No further weight loss.    Essential hypertension  Chronic condition.  States amlodipine causes lower extremity swelling.  Is also on lisinopril 10 mg.  Wants to stop the amlodipine.    Pure hypercholesterolemia  Chronic condition.  I provided him pravastatin back in January.  He was given atorvastatin during his hospital visit for his stroke.  That was in April.  He is though requesting a new cholesterol-lowering medication.  In the past was on simvastatin.       Current medicines (including changes today)  Current Outpatient Prescriptions   Medication Sig Dispense Refill   • clobetasol (TEMOVATE) 0.05 % Cream Apply 1 Application to affected area(s) 2 times a day. 1 Tube 1   • simvastatin (ZOCOR) 10 MG Tab Take 1 Tab by mouth  "every evening. 30 Tab 2   • lisinopril (PRINIVIL) 20 MG Tab Take 1 Tab by mouth every day. 30 Tab 3   • ciclopirox (LOPROX) 0.77 % cream Twice daily for 2-3 weeks when active (groin) 1 Tube 0   • Ciclopirox 1 % Shampoo Apply to scalp and beard twice weekly.  Leave on for about 3 minutes then rinse. 150 mL 1   • gabapentin (NEURONTIN) 100 MG Cap Take 1 Cap by mouth 3 times a day. Take once daily in evening then after 5 days take BID for 5 days then TID. 90 Cap 1   • fluticasone (FLONASE) 50 MCG/ACT nasal spray Spray 1 Spray in nose every day. 16 g 5   • nitroglycerin (NITROSTAT) 0.4 MG SL Tab Place 0.4 mg under tongue every 5 minutes as needed for Chest Pain.     • multivitamin (THERAGRAN) Tab Take 1 Tab by mouth every day.     • vitamin D (CHOLECALCIFEROL) 1000 UNIT Tab Take 1,000 Units by mouth every day.       No current facility-administered medications for this visit.      He  has a past medical history of Anxiety; Heart attack (HCC); Hypertension; and Stroke (cerebrum) ([facility]).    Social History and Family History were reviewed and updated.    ROS   No chest pain, no shortness of breath, no abdominal pain and all other systems were reviewed and are negative.       Objective:     /70 (BP Location: Left arm, Patient Position: Sitting, BP Cuff Size: Adult)   Pulse 66   Temp 36.8 °C (98.2 °F) (Temporal)   Resp 14   Ht 1.702 m (5' 7\")   Wt 56.6 kg (124 lb 12.5 oz)   SpO2 97%  Body mass index is 19.54 kg/m².   Physical Exam:  Constitutional: Alert, no distress.  Skin: Warm, dry, good turgor, no rashes in visible areas.  Scrotal sac does appear slightly erythematous.  Eye: Equal, round and reactive, conjunctiva clear, lids normal.  ENMT: Lips without lesions, good dentition, oropharynx clear.  Neck: Trachea midline, no masses.   Lymph: No cervical or supraclavicular lymphadenopathy  Respiratory: Unlabored respiratory effort, lungs clear to auscultation, no wheezes, no ronchi.  Cardiovascular: Normal S1, S2, " no murmur, no edema.  Psych: Alert and oriented x3, normal affect and mood.        Assessment and Plan:   The following treatment plan was discussed    1. Candidal intertrigo  Chronic condition.  Does not appear to be like intertrigo.  Scrotal sac is affected though.  Provided clobetasol use as directed.  Also referred to dermatology for evaluation.  - REFERRAL TO DERMATOLOGY  - clobetasol (TEMOVATE) 0.05 % Cream; Apply 1 Application to affected area(s) 2 times a day.  Dispense: 1 Tube; Refill: 1    2. Seborrheic dermatitis of scalp  Chronic condition.  Stable.  Refer to dermatology for evaluation and treatment.  - REFERRAL TO DERMATOLOGY    3. Skin tag  New condition noted.  Refer to dermatology for evaluation and treatment.  - REFERRAL TO DERMATOLOGY    4. Skin lesions, generalized  Chronic condition.  Benign lesions noted on his arms.  Advised to use over-the-counter cream and not lotion.  - REFERRAL TO DERMATOLOGY    5. History of stroke  Stable.  Prescribed Zocor as directed.  - simvastatin (ZOCOR) 10 MG Tab; Take 1 Tab by mouth every evening.  Dispense: 30 Tab; Refill: 2  - Lipid Profile; Future    6. Essential hypertension  Chronic condition.  Controlled.  Prescribed lisinopril 20 mg daily.  Discontinued amlodipine given the edema.  - lisinopril (PRINIVIL) 20 MG Tab; Take 1 Tab by mouth every day.  Dispense: 30 Tab; Refill: 3    7. Pure hypercholesterolemia  Chronic condition.  Status unknown.  Started on the drug with simvastatin.  Discontinued other medications.  Threw out of couple bottles.  Went over medications list.  - simvastatin (ZOCOR) 10 MG Tab; Take 1 Tab by mouth every evening.  Dispense: 30 Tab; Refill: 2    8. Weight loss  Chronic condition.  Stable.  No further weight loss.  We will continue to monitor.      Followup: Return in about 6 weeks (around 8/20/2019), or if symptoms worsen or fail to improve.    Please note that this dictation was created using voice recognition software. I have made  every reasonable attempt to correct obvious errors, but I expect that there are errors of grammar and possibly content that I did not discover before finalizing the note.

## 2019-07-09 NOTE — ASSESSMENT & PLAN NOTE
This is a 74-year-old male who is here today to discuss a few items.  Still has concerns with a swollen scrotal sac.  States that nighttime the skin will get really thick.  He has been seen by dermatology in the past.  Steroids usually were very effective but lose their efficacy over time.  He tried to make an appointment to follow-up with dermatology but was concerned about the cost of removing some skin tags.  Today brought in all of the creams that he is used in the past.  States that usually will bathe in the morning.  Will apply a cream and then at the evening he will develop serious itching and flaking.  Creams have tended not to work now.

## 2019-07-09 NOTE — ASSESSMENT & PLAN NOTE
Chronic condition.  I provided him pravastatin back in January.  He was given atorvastatin during his hospital visit for his stroke.  That was in April.  He is though requesting a new cholesterol-lowering medication.  In the past was on simvastatin.

## 2019-07-09 NOTE — ASSESSMENT & PLAN NOTE
Complains as well as some generalized lesions that started his pimples on his body.  They do dissipate over time.  He applies lotion daily.

## 2019-07-09 NOTE — ASSESSMENT & PLAN NOTE
Complains of some generalized skin tags around.  They are concerning to him.  He would like them removed.

## 2019-07-18 DIAGNOSIS — F41.9 ANXIETY AND DEPRESSION: ICD-10-CM

## 2019-07-18 DIAGNOSIS — F32.A ANXIETY AND DEPRESSION: ICD-10-CM

## 2019-07-18 RX ORDER — LORAZEPAM 1 MG/1
1 TABLET ORAL EVERY 8 HOURS PRN
Qty: 90 TAB | Refills: 0 | Status: SHIPPED
Start: 2019-07-18 | End: 2019-09-17 | Stop reason: SDUPTHER

## 2019-08-13 ENCOUNTER — TELEPHONE (OUTPATIENT)
Dept: MEDICAL GROUP | Facility: MEDICAL CENTER | Age: 74
End: 2019-08-13

## 2019-08-13 ENCOUNTER — HOSPITAL ENCOUNTER (OUTPATIENT)
Dept: LAB | Facility: MEDICAL CENTER | Age: 74
End: 2019-08-13
Attending: PHYSICIAN ASSISTANT
Payer: MEDICARE

## 2019-08-13 ENCOUNTER — OFFICE VISIT (OUTPATIENT)
Dept: URGENT CARE | Facility: CLINIC | Age: 74
End: 2019-08-13
Payer: MEDICARE

## 2019-08-13 VITALS
HEART RATE: 64 BPM | HEIGHT: 67 IN | TEMPERATURE: 98.3 F | OXYGEN SATURATION: 99 % | SYSTOLIC BLOOD PRESSURE: 132 MMHG | DIASTOLIC BLOOD PRESSURE: 84 MMHG | BODY MASS INDEX: 21.19 KG/M2 | RESPIRATION RATE: 16 BRPM | WEIGHT: 135 LBS

## 2019-08-13 DIAGNOSIS — Z86.73 HISTORY OF STROKE: ICD-10-CM

## 2019-08-13 DIAGNOSIS — K59.09 OTHER CONSTIPATION: ICD-10-CM

## 2019-08-13 DIAGNOSIS — R21 RASH ON SCROTUM: ICD-10-CM

## 2019-08-13 LAB
CHOLEST SERPL-MCNC: 199 MG/DL (ref 100–199)
HDLC SERPL-MCNC: 63 MG/DL
LDLC SERPL CALC-MCNC: 116 MG/DL
TRIGL SERPL-MCNC: 99 MG/DL (ref 0–149)

## 2019-08-13 PROCEDURE — 80061 LIPID PANEL: CPT | Mod: GA

## 2019-08-13 PROCEDURE — 36415 COLL VENOUS BLD VENIPUNCTURE: CPT | Mod: GA

## 2019-08-13 PROCEDURE — 99214 OFFICE O/P EST MOD 30 MIN: CPT | Performed by: NURSE PRACTITIONER

## 2019-08-13 RX ORDER — CEPHALEXIN 500 MG/1
500 CAPSULE ORAL 2 TIMES DAILY
Qty: 14 CAP | Refills: 0 | Status: SHIPPED | OUTPATIENT
Start: 2019-08-13 | End: 2019-09-17

## 2019-08-13 ASSESSMENT — ENCOUNTER SYMPTOMS
FEVER: 0
MYALGIAS: 0
ABDOMINAL PAIN: 1
VOMITING: 0
NAUSEA: 0
CONSTIPATION: 1

## 2019-08-13 NOTE — PROGRESS NOTES
Subjective:      John Nickerson is a 74 y.o. male who presents with Rash (x6 months, rash on scrotum has gotten worse, swelling, painful , itchiness)            HPI New. 74 year old male with rash ot scrotum for 6 months. The rash is red, painful and itchy as well as flaky. Denies fever, chills or myalgia. He has used clobetasol for this is with no improvement in symptoms. Has similar issues with skin on head as well. He also reports that he has had no bowel movement for a week and has associated abdominal pain. No nausea or vomiting. History of some IBS in history.  Morphine and Amlodipine  Current Outpatient Medications on File Prior to Visit   Medication Sig Dispense Refill   • LORazepam (ATIVAN) 1 MG Tab Take 1 Tab by mouth every 8 hours as needed for Anxiety for up to 30 days. 90 Tab 0   • simvastatin (ZOCOR) 10 MG Tab Take 1 Tab by mouth every evening. 30 Tab 2   • lisinopril (PRINIVIL) 20 MG Tab Take 1 Tab by mouth every day. 30 Tab 3   • gabapentin (NEURONTIN) 100 MG Cap Take 1 Cap by mouth 3 times a day. Take once daily in evening then after 5 days take BID for 5 days then TID. 90 Cap 1   • multivitamin (THERAGRAN) Tab Take 1 Tab by mouth every day.     • clobetasol (TEMOVATE) 0.05 % Cream Apply 1 Application to affected area(s) 2 times a day. 1 Tube 1   • ciclopirox (LOPROX) 0.77 % cream Twice daily for 2-3 weeks when active (groin) 1 Tube 0   • Ciclopirox 1 % Shampoo Apply to scalp and beard twice weekly.  Leave on for about 3 minutes then rinse. 150 mL 1   • fluticasone (FLONASE) 50 MCG/ACT nasal spray Spray 1 Spray in nose every day. 16 g 5   • nitroglycerin (NITROSTAT) 0.4 MG SL Tab Place 0.4 mg under tongue every 5 minutes as needed for Chest Pain.     • vitamin D (CHOLECALCIFEROL) 1000 UNIT Tab Take 1,000 Units by mouth every day.       No current facility-administered medications on file prior to visit.      Social History     Socioeconomic History   • Marital status:      Spouse name:  "Not on file   • Number of children: Not on file   • Years of education: Not on file   • Highest education level: Not on file   Occupational History   • Not on file   Social Needs   • Financial resource strain: Not on file   • Food insecurity:     Worry: Not on file     Inability: Not on file   • Transportation needs:     Medical: Not on file     Non-medical: Not on file   Tobacco Use   • Smoking status: Never Smoker   • Smokeless tobacco: Never Used   Substance and Sexual Activity   • Alcohol use: No   • Drug use: No   • Sexual activity: Never   Lifestyle   • Physical activity:     Days per week: Not on file     Minutes per session: Not on file   • Stress: Not on file   Relationships   • Social connections:     Talks on phone: Not on file     Gets together: Not on file     Attends [employer] service: Not on file     Active member of club or organization: Not on file     Attends meetings of clubs or organizations: Not on file     Relationship status: Not on file   • Intimate partner violence:     Fear of current or ex partner: Not on file     Emotionally abused: Not on file     Physically abused: Not on file     Forced sexual activity: Not on file   Other Topics Concern   • Not on file   Social History Narrative   • Not on file     Breast Cancer-related family history is not on file.      Review of Systems   Constitutional: Negative for fever.   Gastrointestinal: Positive for abdominal pain and constipation. Negative for nausea and vomiting.   Genitourinary: Negative.    Musculoskeletal: Negative for joint pain and myalgias.   Skin: Positive for itching and rash.          Objective:     /84   Pulse 64   Temp 36.8 °C (98.3 °F) (Temporal)   Resp 16   Ht 1.702 m (5' 7\")   Wt 61.2 kg (135 lb)   SpO2 99%   BMI 21.14 kg/m²      Physical Exam   Constitutional: He appears well-developed and well-nourished.   Abdominal: Soft. Bowel sounds are normal. He exhibits no distension. There is no tenderness. There is no " guarding.   Neurological: He is alert.   Skin: Skin is warm. Rash noted. There is erythema.   Red rash to scrotal area with excoriation and flaking of skin.   Nursing note and vitals reviewed.              Assessment/Plan:     1. Rash on scrotum  nystatin/triamcinolone (MYCOLOG) 945598-5.1 UNIT/GM-% Cream    cephALEXin (KEFLEX) 500 MG Cap   2. Other constipation       Recommend OTC laxative for his constipation.  Abdominal exam is within normal limits, good bowel sounds and soft, no distension.  Trial of mycolog +keflex for his rash. Ideally if not improved, he needs follow up with derm.

## 2019-08-14 NOTE — TELEPHONE ENCOUNTER
Phone Number Called: [phone] (home)       Call outcome: left message for patient to call back regarding message below    Message: Cholesterol slightly worse than 2 years ago.  Is he taking the cholesterol medication currently?  If so José may have to increase it. Call back to let us know at [phone].    Yoan Stern, Med Ass't

## 2019-08-20 ENCOUNTER — HOSPITAL ENCOUNTER (OUTPATIENT)
Facility: MEDICAL CENTER | Age: 74
End: 2019-08-20
Attending: PHYSICIAN ASSISTANT
Payer: MEDICARE

## 2019-08-20 ENCOUNTER — OFFICE VISIT (OUTPATIENT)
Dept: MEDICAL GROUP | Facility: MEDICAL CENTER | Age: 74
End: 2019-08-20
Payer: MEDICARE

## 2019-08-20 VITALS
TEMPERATURE: 98.6 F | OXYGEN SATURATION: 97 % | SYSTOLIC BLOOD PRESSURE: 144 MMHG | RESPIRATION RATE: 16 BRPM | BODY MASS INDEX: 19.86 KG/M2 | DIASTOLIC BLOOD PRESSURE: 78 MMHG | HEIGHT: 67 IN | HEART RATE: 62 BPM | WEIGHT: 126.54 LBS

## 2019-08-20 DIAGNOSIS — Z12.5 SCREENING PSA (PROSTATE SPECIFIC ANTIGEN): ICD-10-CM

## 2019-08-20 DIAGNOSIS — F41.9 ANXIETY AND DEPRESSION: ICD-10-CM

## 2019-08-20 DIAGNOSIS — F32.A ANXIETY AND DEPRESSION: ICD-10-CM

## 2019-08-20 DIAGNOSIS — B37.2 CANDIDAL INTERTRIGO: ICD-10-CM

## 2019-08-20 DIAGNOSIS — K59.00 CONSTIPATION, UNSPECIFIED CONSTIPATION TYPE: ICD-10-CM

## 2019-08-20 DIAGNOSIS — L98.9 SKIN LESIONS, GENERALIZED: ICD-10-CM

## 2019-08-20 DIAGNOSIS — L21.9 SEBORRHEIC DERMATITIS OF SCALP: ICD-10-CM

## 2019-08-20 PROCEDURE — 99214 OFFICE O/P EST MOD 30 MIN: CPT | Performed by: PHYSICIAN ASSISTANT

## 2019-08-20 PROCEDURE — 82274 ASSAY TEST FOR BLOOD FECAL: CPT

## 2019-08-20 NOTE — ASSESSMENT & PLAN NOTE
Complains of being constipated recently.  Has a history of IBS.  Denies any rectal bleeding.  He does not want to be on laxative which she is taking over-the-counter.

## 2019-08-20 NOTE — ASSESSMENT & PLAN NOTE
Chronic condition.  Recently seen by urgent care.  Provided a nystatin triamcinolone cream which made his symptoms worse.  Given Keflex but has not started the medication.  Wanted to make sure that the medication was appropriate.  States during his visit at the urgent care he had a flare of his Candida intertrigo.  Previously I referred him to dermatology but he will not be driven down to [place].  The past he was seen at [facility] dermatology.

## 2019-08-20 NOTE — PROGRESS NOTES
Subjective:   Moe Nickerson is a 74 y.o. male here today for RTC form completion with a history of anxiety and COPD, recent onset of new constipation concern and a chronic history of Candida intertrigo.    Anxiety and depression  This is a 74-year-old male who is here today to get RTC form completed.  He has a chronic history of anxiety.  It is debilitating.  He has anxiety in crowds as well as unfamiliar settings.  He is currently on Lorazepam 1 mg 3 times a day.  Denies of taking the medication today.  Last prescription was provided over a month ago.  Denies any homicidal or suicidal ideations.    Constipation  Complains of being constipated recently.  Has a history of IBS.  Denies any rectal bleeding.  He does not want to be on laxative which she is taking over-the-counter.    Candidal intertrigo  Chronic condition.  Recently seen by urgent care.  Provided a nystatin triamcinolone cream which made his symptoms worse.  Given Keflex but has not started the medication.  Wanted to make sure that the medication was appropriate.  States during his visit at the urgent care he had a flare of his Candida intertrigo.  Previously I referred him to dermatology but he will not be driven down to [place].  The past he was seen at [facility].       Current medicines (including changes today)  Current Outpatient Medications   Medication Sig Dispense Refill   • cephALEXin (KEFLEX) 500 MG Cap Take 1 Cap by mouth 2 times a day. 14 Cap 0   • clobetasol (TEMOVATE) 0.05 % Cream Apply 1 Application to affected area(s) 2 times a day. 1 Tube 1   • simvastatin (ZOCOR) 10 MG Tab Take 1 Tab by mouth every evening. 30 Tab 2   • lisinopril (PRINIVIL) 20 MG Tab Take 1 Tab by mouth every day. 30 Tab 3   • ciclopirox (LOPROX) 0.77 % cream Twice daily for 2-3 weeks when active (groin) 1 Tube 0   • Ciclopirox 1 % Shampoo Apply to scalp and beard twice weekly.  Leave on for about 3 minutes then rinse. 150 mL 1   • gabapentin (NEURONTIN)  "100 MG Cap Take 1 Cap by mouth 3 times a day. Take once daily in evening then after 5 days take BID for 5 days then TID. 90 Cap 1   • fluticasone (FLONASE) 50 MCG/ACT nasal spray Spray 1 Spray in nose every day. 16 g 5   • nitroglycerin (NITROSTAT) 0.4 MG SL Tab Place 0.4 mg under tongue every 5 minutes as needed for Chest Pain.     • multivitamin (THERAGRAN) Tab Take 1 Tab by mouth every day.     • vitamin D (CHOLECALCIFEROL) 1000 UNIT Tab Take 1,000 Units by mouth every day.       No current facility-administered medications for this visit.      He  has a past medical history of Anxiety, Heart attack (HCC), Hypertension, and Stroke (cerebrum) (HCC).    Social History and Family History were reviewed and updated.    ROS   No chest pain, no shortness of breath, no abdominal pain and all other systems were reviewed and are negative.       Objective:     /78 (BP Location: Left arm, Patient Position: Sitting, BP Cuff Size: Adult)   Pulse 62   Temp 37 °C (98.6 °F) (Temporal)   Resp 16   Ht 1.702 m (5' 7\")   Wt 57.4 kg (126 lb 8.7 oz)   SpO2 97%  Body mass index is 19.82 kg/m².   Physical Exam:  Constitutional: Alert, no distress.  Skin: Warm, dry, good turgor, no rashes in visible areas.  Eye: Equal, round and reactive, conjunctiva clear, lids normal.  ENMT: Lips without lesions, good dentition, oropharynx clear.  Neck: Trachea midline, no masses.   Lymph: No cervical or supraclavicular lymphadenopathy  Respiratory: Unlabored respiratory effort, lungs clear to auscultation, no wheezes, no ronchi.  Cardiovascular: Normal S1, S2, no murmur, no edema.  Psych: Alert and oriented x3, normal affect and mood.        Assessment and Plan:   The following treatment plan was discussed    1. Anxiety and depression  Chronic condition.  Today his anxiety appears stable.  No medication provided today.  Advised to follow-up in 1 month for evaluation.  RTC form was completed.  Good candidate for help for transportation " purposes.    2. Candidal intertrigo  Chronic condition with recent exacerbation.  Advised to start Keflex.  I am out of answers to treat his symptoms.  Referred again to dermatology with a group in [place].  - REFERRAL TO DERMATOLOGY    3. Constipation, unspecified constipation type  Acute, new onset condition.  Ordered FIT testing.  Discussed importance to rule out colon cancer.  Does have a history of IBS.  Could be exacerbation of IBS.  - OCCULT BLOOD FECES IMMUNOASSAY; Future    4. Screening PSA (prostate specific antigen)  Ordered PSA screening.  - PROSTATE SPECIFIC AG SCREENING; Future    Patient was seen for 25 minutes face to face of which > 50% of appointment time was spent on counseling and coordination of care regarding the above.      Followup: Return in about 4 weeks (around 9/17/2019), or if symptoms worsen or fail to improve.    Please note that this dictation was created using voice recognition software. I have made every reasonable attempt to correct obvious errors, but I expect that there are errors of grammar and possibly content that I did not discover before finalizing the note.

## 2019-08-20 NOTE — ASSESSMENT & PLAN NOTE
This is a 74-year-old male who is here today to get RTC form completed.  He has a chronic history of anxiety.  It is debilitating.  He has anxiety in crowds as well as unfamiliar settings.  He is currently on Lorazepam 1 mg 3 times a day.  Denies of taking the medication today.  Last prescription was provided over a month ago.  Denies any homicidal or suicidal ideations.

## 2019-08-21 ENCOUNTER — HOSPITAL ENCOUNTER (OUTPATIENT)
Dept: LAB | Facility: MEDICAL CENTER | Age: 74
End: 2019-08-21
Attending: PHYSICIAN ASSISTANT
Payer: MEDICARE

## 2019-08-21 DIAGNOSIS — Z12.5 SCREENING PSA (PROSTATE SPECIFIC ANTIGEN): ICD-10-CM

## 2019-08-21 LAB — PSA SERPL-MCNC: 0.86 NG/ML (ref 0–4)

## 2019-08-21 PROCEDURE — 84153 ASSAY OF PSA TOTAL: CPT | Mod: GA

## 2019-08-21 PROCEDURE — 36415 COLL VENOUS BLD VENIPUNCTURE: CPT | Mod: GA

## 2019-08-22 ENCOUNTER — TELEPHONE (OUTPATIENT)
Dept: MEDICAL GROUP | Facility: MEDICAL CENTER | Age: 74
End: 2019-08-22

## 2019-08-22 NOTE — TELEPHONE ENCOUNTER
Phone Number Called: [phone] (home)       Call outcome: left message for patient to call back regarding message below    Message: Left Message That PSA normal was Normal.      Yoan Stern, Med Ass't

## 2019-08-26 ENCOUNTER — TELEPHONE (OUTPATIENT)
Dept: MEDICAL GROUP | Facility: MEDICAL CENTER | Age: 74
End: 2019-08-26

## 2019-08-26 DIAGNOSIS — K59.00 CONSTIPATION, UNSPECIFIED CONSTIPATION TYPE: ICD-10-CM

## 2019-08-26 LAB — HEMOCCULT STL QL IA: NEGATIVE

## 2019-08-26 NOTE — TELEPHONE ENCOUNTER
Phone Number Called: [phone] (home)       Call outcome: left message for patient to call back regarding message below    Message: FIT testing was negative.  No blood in his stool.     Yoan Stern, Med Ass't

## 2019-09-17 ENCOUNTER — OFFICE VISIT (OUTPATIENT)
Dept: MEDICAL GROUP | Facility: MEDICAL CENTER | Age: 74
End: 2019-09-17
Payer: MEDICARE

## 2019-09-17 VITALS
HEART RATE: 58 BPM | HEIGHT: 67 IN | RESPIRATION RATE: 16 BRPM | WEIGHT: 126.76 LBS | DIASTOLIC BLOOD PRESSURE: 76 MMHG | OXYGEN SATURATION: 96 % | SYSTOLIC BLOOD PRESSURE: 140 MMHG | TEMPERATURE: 98.6 F | BODY MASS INDEX: 19.9 KG/M2

## 2019-09-17 DIAGNOSIS — I10 ESSENTIAL HYPERTENSION: ICD-10-CM

## 2019-09-17 DIAGNOSIS — H04.203 EYE TEARING, BILATERAL: ICD-10-CM

## 2019-09-17 DIAGNOSIS — H53.9 VISION CHANGES: ICD-10-CM

## 2019-09-17 DIAGNOSIS — E78.00 PURE HYPERCHOLESTEROLEMIA: ICD-10-CM

## 2019-09-17 DIAGNOSIS — Z86.73 HISTORY OF STROKE: ICD-10-CM

## 2019-09-17 DIAGNOSIS — R60.0 LOWER EXTREMITY EDEMA: ICD-10-CM

## 2019-09-17 DIAGNOSIS — R63.4 WEIGHT LOSS: ICD-10-CM

## 2019-09-17 DIAGNOSIS — F32.A ANXIETY AND DEPRESSION: ICD-10-CM

## 2019-09-17 DIAGNOSIS — F41.9 ANXIETY AND DEPRESSION: ICD-10-CM

## 2019-09-17 DIAGNOSIS — K59.00 CONSTIPATION, UNSPECIFIED CONSTIPATION TYPE: ICD-10-CM

## 2019-09-17 DIAGNOSIS — R07.9 CHEST PAIN, UNSPECIFIED TYPE: ICD-10-CM

## 2019-09-17 PROCEDURE — 99214 OFFICE O/P EST MOD 30 MIN: CPT | Performed by: PHYSICIAN ASSISTANT

## 2019-09-17 RX ORDER — SIMVASTATIN 10 MG
10 TABLET ORAL EVERY EVENING
Qty: 90 TAB | Refills: 1 | Status: SHIPPED | OUTPATIENT
Start: 2019-09-17 | End: 2022-04-29 | Stop reason: CLARIF

## 2019-09-17 RX ORDER — LORAZEPAM 0.5 MG/1
0.5 TABLET ORAL EVERY 8 HOURS PRN
Qty: 90 TAB | Refills: 0 | Status: SHIPPED | OUTPATIENT
Start: 2019-09-17 | End: 2019-10-15 | Stop reason: SDUPTHER

## 2019-09-17 RX ORDER — NITROGLYCERIN 0.4 MG/1
0.4 TABLET SUBLINGUAL PRN
Qty: 25 TAB | Refills: 1 | Status: SHIPPED | OUTPATIENT
Start: 2019-09-17 | End: 2020-06-30

## 2019-09-17 NOTE — PROGRESS NOTES
Subjective:   Moe Nickerson is a 74 y.o. male here today for weight loss, anxiety, constipation, vision changes with hearing, lower extremity edema, hypercholesterolemia, hypertension and intermittent chest pain.    Weight loss  This is a 74-year-old male who is here today to discuss several of his medical complaints.  Last visit he had lost 10 pounds in a span of a couple months.  I brought him in today to check his weight.  Weight is stable.  No weight loss.  He is feeling good today.    Anxiety and depression  Chronic history of anxiety.  Has anxiety daily.  Several triggers including crowds.  Is been on Lorazepam for many years.  Currently on 1 mg up to 3 times daily.  States that medication is helpful.  He is willing though to cut back on the medication.    Constipation  Chronic condition of constipation.  His GI doctor provided him to medications recently.  One is for H. pylori.  The other one is cholestyramine.  He denies ever having diarrhea.    Vision changes  States his vision is changing.  Couple months ago he received new lenses but now he cannot see very well.  Denies any eye pain.  Also has both of his eyes are tearing and red.  That is also a new symptom.    Lower extremity edema  Complains of left lower extremity swelling.  Sometimes on the right side.  Denies any shortness of breath chest pain.  Does wake up at nighttime usually around 3 because of difficulty breathing.  He states he believes this is because of his carpet and other allergens at his facility.    Pure hypercholesterolemia  Chronic condition.  Currently on Zocor 10 mg daily.  Recent cholesterol profile showed his LDL above 100.    Essential hypertension  Chronic condition.  Currently on lisinopril 20 mg.  No refill needed.    Chest pain  Historically took Nitrostat for chest pain.  Has a history of a stroke.  No MI.  Requesting a refill today of nitroglycerin.       Current medicines (including changes today)  Current Outpatient  "Medications   Medication Sig Dispense Refill   • LORazepam (ATIVAN) 0.5 MG Tab Take 1 Tab by mouth every 8 hours as needed for Anxiety for up to 30 days. 90 Tab 0   • nitroglycerin (NITROSTAT) 0.4 MG SL Tab Place 1 Tab under tongue as needed for Chest Pain. 25 Tab 1   • simvastatin (ZOCOR) 10 MG Tab Take 1 Tab by mouth every evening. 90 Tab 1   • clobetasol (TEMOVATE) 0.05 % Cream Apply 1 Application to affected area(s) 2 times a day. 1 Tube 1   • lisinopril (PRINIVIL) 20 MG Tab Take 1 Tab by mouth every day. 30 Tab 3   • ciclopirox (LOPROX) 0.77 % cream Twice daily for 2-3 weeks when active (groin) 1 Tube 0   • Ciclopirox 1 % Shampoo Apply to scalp and beard twice weekly.  Leave on for about 3 minutes then rinse. 150 mL 1   • gabapentin (NEURONTIN) 100 MG Cap Take 1 Cap by mouth 3 times a day. Take once daily in evening then after 5 days take BID for 5 days then TID. 90 Cap 1   • fluticasone (FLONASE) 50 MCG/ACT nasal spray Spray 1 Spray in nose every day. 16 g 5   • multivitamin (THERAGRAN) Tab Take 1 Tab by mouth every day.     • vitamin D (CHOLECALCIFEROL) 1000 UNIT Tab Take 1,000 Units by mouth every day.       No current facility-administered medications for this visit.      He  has a past medical history of Anxiety, Heart attack (HCC), Hypertension, and Stroke (cerebrum) ([facility]).    Social History and Family History were reviewed and updated.    ROS   No chest pain, no shortness of breath, no abdominal pain and all other systems were reviewed and are negative.       Objective:     /76 (BP Location: Left arm, Patient Position: Sitting, BP Cuff Size: Adult)   Pulse (!) 58   Temp 37 °C (98.6 °F) (Temporal)   Resp 16   Ht 1.702 m (5' 7\")   Wt 57.5 kg (126 lb 12.2 oz)   SpO2 96%  Body mass index is 19.85 kg/m².   Physical Exam:  Constitutional: Alert, no distress.  Skin: Warm, dry, good turgor, no rashes in visible areas.  Eye: Equal, round and reactive, conjunctiva clear, lids normal.  ENMT: Lips " without lesions, good dentition, oropharynx clear.  Neck: Trachea midline, no masses.   Lymph: No cervical or supraclavicular lymphadenopathy  Respiratory: Unlabored respiratory effort, lungs clear to auscultation, no wheezes, no ronchi.  Cardiovascular: Normal S1, S2, no murmur, minimal edema bilaterally.  Psych: Alert and oriented x3, normal affect and mood.        Assessment and Plan:   The following treatment plan was discussed    1. Weight loss  Acute condition.  Resolved.  Weight is stable today but will evaluate in 1 month.    2. Vision changes  Acute, new onset condition.  Refer to ophthalmology for evaluation.    - REFERRAL TO OPHTHALMOLOGY    3. Eye tearing, bilateral  Acute, new onset condition.  Unknown etiology.  Referred to ophthalmology.  - REFERRAL TO OPHTHALMOLOGY    4. Anxiety and depression  Chronic condition.   reviewed.  Renewed lorazepam 0.5 mg 1 tablet up to 3 times a day.  Prescribed 90 tablets.  Suggested medication should be effective.  Would like to see him wean off of the medication.  - LORazepam (ATIVAN) 0.5 MG Tab; Take 1 Tab by mouth every 8 hours as needed for Anxiety for up to 30 days.  Dispense: 90 Tab; Refill: 0    5. Chest pain, unspecified type  Chronic condition.  No recent flare.  Renewed nitroglycerin as directed.  Current medication does not  to the end of the year.  - nitroglycerin (NITROSTAT) 0.4 MG SL Tab; Place 1 Tab under tongue as needed for Chest Pain.  Dispense: 25 Tab; Refill: 1    6. Pure hypercholesterolemia  Chronic condition.  Stable.  Continue Zocor.  Renewed medication.  - simvastatin (ZOCOR) 10 MG Tab; Take 1 Tab by mouth every evening.  Dispense: 90 Tab; Refill: 1    7. History of stroke  Status post stroke.  Renewed Zocor as directed.  - simvastatin (ZOCOR) 10 MG Tab; Take 1 Tab by mouth every evening.  Dispense: 90 Tab; Refill: 1    8. Lower extremity edema  Acute, new onset condition.  Minimal edema noted.  Ordered CMP.  Potentially given his  shortness of breath at night may order echocardiogram in the future.  Follow-up in 1 month.  - Comp Metabolic Panel; Future    9. Constipation, unspecified constipation type  Chronic condition.  Advised cholestyramine is for diarrhea not constipation.  Do not take medication.  Follow with GI as as needed.    10. Essential hypertension  Chronic condition.  Stable.  Continue lisinopril.  Ordered CMP.  - Comp Metabolic Panel; Future      Followup: Return in about 3 months (around 12/17/2019).    Please note that this dictation was created using voice recognition software. I have made every reasonable attempt to correct obvious errors, but I expect that there are errors of grammar and possibly content that I did not discover before finalizing the note.

## 2019-09-17 NOTE — ASSESSMENT & PLAN NOTE
Historically took Nitrostat for chest pain.  Has a history of a stroke.  No MI.  Requesting a refill today of nitroglycerin.

## 2019-09-17 NOTE — ASSESSMENT & PLAN NOTE
Chronic condition of constipation.  His GI doctor provided him to medications recently.  One is for H. pylori.  The other one is cholestyramine.  He denies ever having diarrhea.

## 2019-09-17 NOTE — ASSESSMENT & PLAN NOTE
States his vision is changing.  Couple months ago he received new lenses but now he cannot see very well.  Denies any eye pain.  Also has both of his eyes are tearing and red.  That is also a new symptom.

## 2019-09-17 NOTE — ASSESSMENT & PLAN NOTE
Chronic condition.  Currently on Zocor 10 mg daily.  Recent cholesterol profile showed his LDL above 100.

## 2019-09-17 NOTE — ASSESSMENT & PLAN NOTE
Chronic history of anxiety.  Has anxiety daily.  Several triggers including crowds.  Is been on Lorazepam for many years.  Currently on 1 mg up to 3 times daily.  States that medication is helpful.  He is willing though to cut back on the medication.

## 2019-09-17 NOTE — ASSESSMENT & PLAN NOTE
Complains of left lower extremity swelling.  Sometimes on the right side.  Denies any shortness of breath chest pain.  Does wake up at nighttime usually around 3 because of difficulty breathing.  He states he believes this is because of his carpet and other allergens at his facility.

## 2019-09-17 NOTE — ASSESSMENT & PLAN NOTE
This is a 74-year-old male who is here today to discuss several of his medical complaints.  Last visit he had lost 10 pounds in a span of a couple months.  I brought him in today to check his weight.  Weight is stable.  No weight loss.  He is feeling good today.

## 2019-09-24 ENCOUNTER — APPOINTMENT (RX ONLY)
Dept: URBAN - METROPOLITAN AREA CLINIC 4 | Facility: CLINIC | Age: 74
Setting detail: DERMATOLOGY
End: 2019-09-24

## 2019-09-24 DIAGNOSIS — L72.8 OTHER FOLLICULAR CYSTS OF THE SKIN AND SUBCUTANEOUS TISSUE: ICD-10-CM

## 2019-09-24 DIAGNOSIS — D18.0 HEMANGIOMA: ICD-10-CM

## 2019-09-24 DIAGNOSIS — L30.9 DERMATITIS, UNSPECIFIED: ICD-10-CM

## 2019-09-24 DIAGNOSIS — L57.8 OTHER SKIN CHANGES DUE TO CHRONIC EXPOSURE TO NONIONIZING RADIATION: ICD-10-CM

## 2019-09-24 DIAGNOSIS — L82.1 OTHER SEBORRHEIC KERATOSIS: ICD-10-CM

## 2019-09-24 DIAGNOSIS — D22 MELANOCYTIC NEVI: ICD-10-CM

## 2019-09-24 DIAGNOSIS — L81.4 OTHER MELANIN HYPERPIGMENTATION: ICD-10-CM

## 2019-09-24 PROBLEM — D22.5 MELANOCYTIC NEVI OF TRUNK: Status: ACTIVE | Noted: 2019-09-24

## 2019-09-24 PROBLEM — D18.01 HEMANGIOMA OF SKIN AND SUBCUTANEOUS TISSUE: Status: ACTIVE | Noted: 2019-09-24

## 2019-09-24 PROCEDURE — ? COUNSELING

## 2019-09-24 PROCEDURE — ? ADDITIONAL NOTES

## 2019-09-24 PROCEDURE — 99203 OFFICE O/P NEW LOW 30 MIN: CPT

## 2019-09-24 ASSESSMENT — LOCATION DETAILED DESCRIPTION DERM
LOCATION DETAILED: RIGHT PROXIMAL DORSAL FOREARM
LOCATION DETAILED: LEFT MEDIAL SUPERIOR CHEST
LOCATION DETAILED: LEFT SUPERIOR MEDIAL UPPER BACK
LOCATION DETAILED: SUPRAPUBIC SKIN
LOCATION DETAILED: RIGHT INFERIOR CENTRAL MALAR CHEEK
LOCATION DETAILED: LEFT RADIAL DORSAL HAND
LOCATION DETAILED: RIGHT SUPERIOR MEDIAL UPPER BACK
LOCATION DETAILED: RIGHT RADIAL DORSAL HAND
LOCATION DETAILED: RIGHT ANTERIOR SCROTUM
LOCATION DETAILED: LEFT SUPERIOR UPPER BACK
LOCATION DETAILED: LEFT ANTERIOR SCROTUM
LOCATION DETAILED: LEFT DISTAL DORSAL FOREARM
LOCATION DETAILED: RIGHT MID-UPPER BACK
LOCATION DETAILED: LEFT INFERIOR CENTRAL MALAR CHEEK

## 2019-09-24 ASSESSMENT — LOCATION ZONE DERM
LOCATION ZONE: FACE
LOCATION ZONE: TRUNK
LOCATION ZONE: HAND
LOCATION ZONE: ARM
LOCATION ZONE: GENITALIA

## 2019-09-24 ASSESSMENT — LOCATION SIMPLE DESCRIPTION DERM
LOCATION SIMPLE: RIGHT FOREARM
LOCATION SIMPLE: LEFT UPPER BACK
LOCATION SIMPLE: CHEST
LOCATION SIMPLE: LEFT CHEEK
LOCATION SIMPLE: LEFT HAND
LOCATION SIMPLE: RIGHT UPPER BACK
LOCATION SIMPLE: GROIN
LOCATION SIMPLE: RIGHT CHEEK
LOCATION SIMPLE: LEFT FOREARM
LOCATION SIMPLE: SCROTUM
LOCATION SIMPLE: RIGHT HAND

## 2019-09-24 NOTE — PROCEDURE: ADDITIONAL NOTES
Detail Level: Simple
Additional Notes: Patient was instructed to take Benadryl at night \\nUse Nystatin and triamcinolone for scrotum. \\nUse clobetasol for arms

## 2019-09-27 LAB
ALBUMIN SERPL-MCNC: 4.5 G/DL (ref 3.5–4.8)
ALBUMIN/GLOB SERPL: 2.8 {RATIO} (ref 1.2–2.2)
ALP SERPL-CCNC: 67 IU/L (ref 39–117)
ALT SERPL-CCNC: 22 IU/L (ref 0–44)
AST SERPL-CCNC: 25 IU/L (ref 0–40)
BILIRUB SERPL-MCNC: 0.9 MG/DL (ref 0–1.2)
BUN SERPL-MCNC: 17 MG/DL (ref 8–27)
BUN/CREAT SERPL: 15 (ref 10–24)
CALCIUM SERPL-MCNC: 10 MG/DL (ref 8.6–10.2)
CHLORIDE SERPL-SCNC: 105 MMOL/L (ref 96–106)
CO2 SERPL-SCNC: 23 MMOL/L (ref 20–29)
CREAT SERPL-MCNC: 1.1 MG/DL (ref 0.76–1.27)
GLOBULIN SER CALC-MCNC: 1.6 G/DL (ref 1.5–4.5)
GLUCOSE SERPL-MCNC: 92 MG/DL (ref 65–99)
POTASSIUM SERPL-SCNC: 4.7 MMOL/L (ref 3.5–5.2)
PROT SERPL-MCNC: 6.1 G/DL (ref 6–8.5)
SODIUM SERPL-SCNC: 144 MMOL/L (ref 134–144)

## 2019-10-15 ENCOUNTER — OFFICE VISIT (OUTPATIENT)
Dept: MEDICAL GROUP | Facility: MEDICAL CENTER | Age: 74
End: 2019-10-15
Payer: MEDICARE

## 2019-10-15 VITALS
HEIGHT: 67 IN | HEART RATE: 63 BPM | DIASTOLIC BLOOD PRESSURE: 86 MMHG | SYSTOLIC BLOOD PRESSURE: 144 MMHG | WEIGHT: 122.58 LBS | RESPIRATION RATE: 16 BRPM | BODY MASS INDEX: 19.24 KG/M2 | OXYGEN SATURATION: 97 % | TEMPERATURE: 97.8 F

## 2019-10-15 DIAGNOSIS — F32.A ANXIETY AND DEPRESSION: ICD-10-CM

## 2019-10-15 DIAGNOSIS — R63.4 WEIGHT LOSS: ICD-10-CM

## 2019-10-15 DIAGNOSIS — H53.9 VISION CHANGES: ICD-10-CM

## 2019-10-15 DIAGNOSIS — R13.12 OROPHARYNGEAL DYSPHAGIA: ICD-10-CM

## 2019-10-15 DIAGNOSIS — K58.1 IRRITABLE BOWEL SYNDROME WITH CONSTIPATION: ICD-10-CM

## 2019-10-15 DIAGNOSIS — R26.89 DECREASED MOBILITY: ICD-10-CM

## 2019-10-15 DIAGNOSIS — F41.9 ANXIETY AND DEPRESSION: ICD-10-CM

## 2019-10-15 PROBLEM — R07.9 CHEST PAIN: Status: RESOLVED | Noted: 2019-09-17 | Resolved: 2019-10-15

## 2019-10-15 PROCEDURE — 99214 OFFICE O/P EST MOD 30 MIN: CPT | Performed by: PHYSICIAN ASSISTANT

## 2019-10-15 RX ORDER — LORAZEPAM 0.5 MG/1
0.5 TABLET ORAL EVERY 8 HOURS PRN
Qty: 90 TAB | Refills: 0 | Status: SHIPPED | OUTPATIENT
Start: 2019-10-15 | End: 2019-11-14

## 2019-10-15 NOTE — ASSESSMENT & PLAN NOTE
Complains of difficulty swallowing his pills.  Denies any difficulty swallowing food or liquids.  States in the past he has been seen by speech.

## 2019-10-15 NOTE — PROGRESS NOTES
Subjective:   Moe Nickerson is a 74 y.o. male here today for anxiety and depression, weight loss, difficulty swallowing, possible IBS with constipation and visual changes.    Anxiety and depression  This is a 74-year-old male comes in today to discuss a few concerns.  Last visit I decreased his lorazepam 0.5 mg.  He was at 1 mg.  For years he was on 2 mg.  Today though he appears visually frustrated.  He is in need of a new refill of his medication.  He also would like to see me every 2 weeks now.    Weight loss  In 1 month he has lost 4 more pounds.  He was stable for a few months before losing the 4 pounds.    Oropharyngeal dysphagia  Complains of difficulty swallowing his pills.  Denies any difficulty swallowing food or liquids.  States in the past he has been seen by speech.    Irritable bowel disease  Chronic condition of constipation.  Also abdominal discomfort.  Has been seen by 2 GI doctors.  One at [facility] and the other at [facility].  We do not have any records of the visit at [facility].  It appears that [facility]A physician did a colonoscopy but then wanted to repeat it and do a barium swallow.  Patient states he had a barium swallow.  Is also had several upper endoscopies without any findings.    Vision changes  Last month he mention bilateral vision changes.  Complains of continued blurred vision.  I referred him to ophthalmology but he does not have an appointment until next month.  That was his first complaint today.    Decreased mobility  He also complains of generalized weakness and loss of muscle mass.  He is not sure if the reasoning.  Unfortunately he does not exercise routinely.       Current medicines (including changes today)  Current Outpatient Medications   Medication Sig Dispense Refill   • LORazepam (ATIVAN) 0.5 MG Tab Take 1 Tab by mouth every 8 hours as needed for Anxiety for up to 30 days. 90 Tab 0   • nitroglycerin (NITROSTAT) 0.4 MG SL Tab Place 1  "Tab under tongue as needed for Chest Pain. 25 Tab 1   • simvastatin (ZOCOR) 10 MG Tab Take 1 Tab by mouth every evening. 90 Tab 1   • clobetasol (TEMOVATE) 0.05 % Cream Apply 1 Application to affected area(s) 2 times a day. 1 Tube 1   • lisinopril (PRINIVIL) 20 MG Tab Take 1 Tab by mouth every day. 30 Tab 3   • ciclopirox (LOPROX) 0.77 % cream Twice daily for 2-3 weeks when active (groin) 1 Tube 0   • Ciclopirox 1 % Shampoo Apply to scalp and beard twice weekly.  Leave on for about 3 minutes then rinse. 150 mL 1   • gabapentin (NEURONTIN) 100 MG Cap Take 1 Cap by mouth 3 times a day. Take once daily in evening then after 5 days take BID for 5 days then TID. 90 Cap 1   • fluticasone (FLONASE) 50 MCG/ACT nasal spray Spray 1 Spray in nose every day. 16 g 5   • multivitamin (THERAGRAN) Tab Take 1 Tab by mouth every day.     • vitamin D (CHOLECALCIFEROL) 1000 UNIT Tab Take 1,000 Units by mouth every day.       No current facility-administered medications for this visit.      He  has a past medical history of Anxiety, Heart attack (HCC), Hypertension, and Stroke (cerebrum) (HCC).    Social History and Family History were reviewed and updated.    ROS   No chest pain, no shortness of breath and all other systems were reviewed and are negative.       Objective:     /86 (BP Location: Left arm, Patient Position: Sitting, BP Cuff Size: Adult)   Pulse 63   Temp 36.6 °C (97.8 °F) (Temporal)   Resp 16   Ht 1.702 m (5' 7\")   Wt 55.6 kg (122 lb 9.2 oz)   SpO2 97%  Body mass index is 19.2 kg/m².   Physical Exam:  Constitutional: Alert, no distress.  Skin: Warm, dry, good turgor, no rashes in visible areas.  Eye: Equal, round and reactive, conjunctiva clear, lids normal.  ENMT: Lips without lesions, good dentition, oropharynx clear.  Neck: Trachea midline, no masses.   Lymph: No cervical or supraclavicular lymphadenopathy  Respiratory: Unlabored respiratory effort, lungs appear clear, no wheezes.  Cardiovascular: Regular " rate and rhythm.  Psych: Alert and oriented x3, normal affect and mood.        Assessment and Plan:   The following treatment plan was discussed    1. Anxiety and depression  Chronic condition.  Stable.  Renewed Ativan as directed.   reviewed.  Follow-up in 2 weeks for reevaluation.  Anxiety appears to be mostly tied into his medical conditions.  - LORazepam (ATIVAN) 0.5 MG Tab; Take 1 Tab by mouth every 8 hours as needed for Anxiety for up to 30 days.  Dispense: 90 Tab; Refill: 0    2. Vision changes  Acute, new onset condition.  Unknown etiology.  Follow-up with ophthalmology next month.  Reviewed recent medical records performed at an [facility] that showed no findings of CT and MRI scans of the brain.    3. Irritable bowel syndrome with constipation  Chronic condition.  Unknown etiology.  Offered referral back to [facility] but he is reluctant to follow-up with the same physician.  Did review his medical records from [facility].    4. Weight loss  Chronic condition.  Has lost 4 pounds since his last visit.  Weight was stable.  Referred to PT for strength training.  - REFERRAL TO PHYSICAL THERAPY Reason for Therapy: Eval/Treat/Report    5. Oropharyngeal dysphagia  Chronic condition.  Symptoms not troublesome with foods or liquids.  Just tablets.  Advised to make sure that the tablet is consuming significant amount of fluids.  We will continue to evaluate.    6. Decreased mobility  New condition noted.  Referred to PT for evaluation and treatment.  - REFERRAL TO PHYSICAL THERAPY Reason for Therapy: Eval/Treat/Report      Followup: Return in about 2 weeks (around 10/29/2019), or if symptoms worsen or fail to improve.    Please note that this dictation was created using voice recognition software. I have made every reasonable attempt to correct obvious errors, but I expect that there are errors of grammar and possibly content that I did not discover before finalizing the note.

## 2019-10-15 NOTE — ASSESSMENT & PLAN NOTE
Last month he mention bilateral vision changes.  Complains of continued blurred vision.  I referred him to ophthalmology but he does not have an appointment until next month.  That was his first complaint today.

## 2019-10-15 NOTE — ASSESSMENT & PLAN NOTE
This is a 74-year-old male comes in today to discuss a few concerns.  Last visit I decreased his lorazepam 0.5 mg.  He was at 1 mg.  For years he was on 2 mg.  Today though he appears visually frustrated.  He is in need of a new refill of his medication.  He also would like to see me every 2 weeks now.

## 2019-10-15 NOTE — ASSESSMENT & PLAN NOTE
He also complains of generalized weakness and loss of muscle mass.  He is not sure if the reasoning.  Unfortunately he does not exercise routinely.

## 2019-10-15 NOTE — ASSESSMENT & PLAN NOTE
Chronic condition of constipation.  Also abdominal discomfort.  Has been seen by 2 GI doctors.  One at [facility] and the other at [facility].  We do not have any records of the visit at [facility].  It appears that DHEA physician did a colonoscopy but then wanted to repeat it and do a barium swallow.  Patient states he had a barium swallow.  Is also had several upper endoscopies without any findings.

## 2019-10-29 ENCOUNTER — APPOINTMENT (OUTPATIENT)
Dept: MEDICAL GROUP | Facility: MEDICAL CENTER | Age: 74
End: 2019-10-29
Payer: MEDICARE

## 2019-10-29 ENCOUNTER — APPOINTMENT (RX ONLY)
Dept: URBAN - METROPOLITAN AREA CLINIC 4 | Facility: CLINIC | Age: 74
Setting detail: DERMATOLOGY
End: 2019-10-29

## 2019-10-29 DIAGNOSIS — L57.8 OTHER SKIN CHANGES DUE TO CHRONIC EXPOSURE TO NONIONIZING RADIATION: ICD-10-CM

## 2019-10-29 DIAGNOSIS — L30.9 DERMATITIS, UNSPECIFIED: ICD-10-CM

## 2019-10-29 PROCEDURE — ? INTRAMUSCULAR KENALOG

## 2019-10-29 PROCEDURE — 99212 OFFICE O/P EST SF 10 MIN: CPT | Mod: 25

## 2019-10-29 PROCEDURE — ? ADDITIONAL NOTES

## 2019-10-29 PROCEDURE — ? COUNSELING

## 2019-10-29 PROCEDURE — 96372 THER/PROPH/DIAG INJ SC/IM: CPT

## 2019-10-29 PROCEDURE — ? PRESCRIPTION

## 2019-10-29 RX ORDER — TRIAMCINOLONE ACETONIDE 1 MG/G
OINTMENT TOPICAL
Qty: 1 | Refills: 3 | Status: ERX | COMMUNITY
Start: 2019-10-29

## 2019-10-29 RX ADMIN — TRIAMCINOLONE ACETONIDE 1: 1 OINTMENT TOPICAL at 00:00

## 2019-10-29 ASSESSMENT — LOCATION DETAILED DESCRIPTION DERM
LOCATION DETAILED: RIGHT BUTTOCK
LOCATION DETAILED: LEFT CHIN
LOCATION DETAILED: RIGHT CENTRAL MALAR CHEEK
LOCATION DETAILED: LEFT FOREHEAD
LOCATION DETAILED: LEFT MEDIAL MALAR CHEEK
LOCATION DETAILED: RIGHT ANTERIOR SCROTUM
LOCATION DETAILED: LEFT ANTERIOR SCROTUM

## 2019-10-29 ASSESSMENT — LOCATION ZONE DERM
LOCATION ZONE: GENITALIA
LOCATION ZONE: FACE
LOCATION ZONE: TRUNK

## 2019-10-29 ASSESSMENT — LOCATION SIMPLE DESCRIPTION DERM
LOCATION SIMPLE: CHIN
LOCATION SIMPLE: SCROTUM
LOCATION SIMPLE: RIGHT CHEEK
LOCATION SIMPLE: RIGHT BUTTOCK
LOCATION SIMPLE: LEFT FOREHEAD
LOCATION SIMPLE: LEFT CHEEK

## 2019-10-29 NOTE — PROCEDURE: ADDITIONAL NOTES
Additional Notes: Discuss with patient patch testing with Dr. Oseguera \\nPatient to follow up in January.
Detail Level: Simple

## 2019-10-29 NOTE — PROCEDURE: INTRAMUSCULAR KENALOG
Concentration (Mg/Ml) Of Additional Medication: 2.5
Administered By (Optional): Lorna
Consent: The risks of atrophy were reviewed with the patient.
Lot # (Optional): [id number]
Concentration (Mg/Ml): 40.0
Total Volume (Ccs): 1.5
Kenalog Preparation: kenalog
Add Option For Additional Mediation: No
Detail Level: Detailed
Expiration Date (Optional): March 2021

## 2019-11-14 ENCOUNTER — HOSPITAL ENCOUNTER (INPATIENT)
Dept: HOSPITAL 8 - ED | Age: 74
LOS: 5 days | Discharge: HOME HEALTH | DRG: 69 | End: 2019-11-19
Attending: HOSPITALIST | Admitting: INTERNAL MEDICINE
Payer: MEDICARE

## 2019-11-14 VITALS — HEIGHT: 69 IN | WEIGHT: 117.51 LBS | BODY MASS INDEX: 17.4 KG/M2

## 2019-11-14 VITALS — SYSTOLIC BLOOD PRESSURE: 172 MMHG | DIASTOLIC BLOOD PRESSURE: 85 MMHG

## 2019-11-14 VITALS — DIASTOLIC BLOOD PRESSURE: 72 MMHG | SYSTOLIC BLOOD PRESSURE: 147 MMHG

## 2019-11-14 VITALS — SYSTOLIC BLOOD PRESSURE: 131 MMHG | DIASTOLIC BLOOD PRESSURE: 82 MMHG

## 2019-11-14 VITALS — DIASTOLIC BLOOD PRESSURE: 74 MMHG | SYSTOLIC BLOOD PRESSURE: 145 MMHG

## 2019-11-14 VITALS — SYSTOLIC BLOOD PRESSURE: 155 MMHG | DIASTOLIC BLOOD PRESSURE: 80 MMHG

## 2019-11-14 VITALS — DIASTOLIC BLOOD PRESSURE: 75 MMHG | SYSTOLIC BLOOD PRESSURE: 148 MMHG

## 2019-11-14 VITALS — DIASTOLIC BLOOD PRESSURE: 70 MMHG | SYSTOLIC BLOOD PRESSURE: 152 MMHG

## 2019-11-14 VITALS — SYSTOLIC BLOOD PRESSURE: 150 MMHG | DIASTOLIC BLOOD PRESSURE: 66 MMHG

## 2019-11-14 VITALS — SYSTOLIC BLOOD PRESSURE: 114 MMHG | DIASTOLIC BLOOD PRESSURE: 55 MMHG

## 2019-11-14 VITALS — DIASTOLIC BLOOD PRESSURE: 81 MMHG | SYSTOLIC BLOOD PRESSURE: 167 MMHG

## 2019-11-14 DIAGNOSIS — E86.0: ICD-10-CM

## 2019-11-14 DIAGNOSIS — I25.5: ICD-10-CM

## 2019-11-14 DIAGNOSIS — I51.3: ICD-10-CM

## 2019-11-14 DIAGNOSIS — Z79.02: ICD-10-CM

## 2019-11-14 DIAGNOSIS — K59.00: ICD-10-CM

## 2019-11-14 DIAGNOSIS — I50.23: ICD-10-CM

## 2019-11-14 DIAGNOSIS — G45.9: Primary | ICD-10-CM

## 2019-11-14 DIAGNOSIS — F41.9: ICD-10-CM

## 2019-11-14 DIAGNOSIS — Z79.899: ICD-10-CM

## 2019-11-14 DIAGNOSIS — Z95.5: ICD-10-CM

## 2019-11-14 DIAGNOSIS — I25.10: ICD-10-CM

## 2019-11-14 DIAGNOSIS — I95.9: ICD-10-CM

## 2019-11-14 DIAGNOSIS — Z88.5: ICD-10-CM

## 2019-11-14 DIAGNOSIS — D75.89: ICD-10-CM

## 2019-11-14 DIAGNOSIS — I11.0: ICD-10-CM

## 2019-11-14 DIAGNOSIS — Z53.20: ICD-10-CM

## 2019-11-14 LAB
ALBUMIN SERPL-MCNC: 3.8 G/DL (ref 3.4–5)
ANION GAP SERPL CALC-SCNC: 7 MMOL/L (ref 5–15)
BASOPHILS # BLD AUTO: 0.01 X10^3/UL (ref 0–0.1)
BASOPHILS NFR BLD AUTO: 0 % (ref 0–1)
CALCIUM SERPL-MCNC: 9 MG/DL (ref 8.5–10.1)
CHLORIDE SERPL-SCNC: 111 MMOL/L (ref 98–107)
CREAT SERPL-MCNC: 1.12 MG/DL (ref 0.7–1.3)
CULTURE INDICATED?: NO
EOSINOPHIL # BLD AUTO: 0.12 X10^3/UL (ref 0–0.4)
EOSINOPHIL NFR BLD AUTO: 2 % (ref 1–7)
ERYTHROCYTE [DISTWIDTH] IN BLOOD BY AUTOMATED COUNT: 13.6 % (ref 9.4–14.8)
LYMPHOCYTES # BLD AUTO: 1.26 X10^3/UL (ref 1–3.4)
LYMPHOCYTES NFR BLD AUTO: 20 % (ref 22–44)
MCH RBC QN AUTO: 33.3 PG (ref 27.5–34.5)
MCHC RBC AUTO-ENTMCNC: 33.1 G/DL (ref 33.2–36.2)
MCV RBC AUTO: 100.6 FL (ref 81–97)
MD: NO
MICROSCOPIC: (no result)
MONOCYTES # BLD AUTO: 0.64 X10^3/UL (ref 0.2–0.8)
MONOCYTES NFR BLD AUTO: 10 % (ref 2–9)
NEUTROPHILS # BLD AUTO: 4.27 X10^3/UL (ref 1.8–6.8)
NEUTROPHILS NFR BLD AUTO: 68 % (ref 42–75)
PLATELET # BLD AUTO: 190 X10^3/UL (ref 130–400)
PMV BLD AUTO: 10 FL (ref 7.4–10.4)
RBC # BLD AUTO: 4.81 X10^6/UL (ref 4.38–5.82)
TROPONIN I SERPL-MCNC: < 0.015 NG/ML (ref 0–0.04)

## 2019-11-14 PROCEDURE — 82962 GLUCOSE BLOOD TEST: CPT

## 2019-11-14 PROCEDURE — 93306 TTE W/DOPPLER COMPLETE: CPT

## 2019-11-14 PROCEDURE — 93005 ELECTROCARDIOGRAM TRACING: CPT

## 2019-11-14 PROCEDURE — 85025 COMPLETE CBC W/AUTO DIFF WBC: CPT

## 2019-11-14 PROCEDURE — 96372 THER/PROPH/DIAG INJ SC/IM: CPT

## 2019-11-14 PROCEDURE — 84100 ASSAY OF PHOSPHORUS: CPT

## 2019-11-14 PROCEDURE — 80061 LIPID PANEL: CPT

## 2019-11-14 PROCEDURE — 93880 EXTRACRANIAL BILAT STUDY: CPT

## 2019-11-14 PROCEDURE — 80048 BASIC METABOLIC PNL TOTAL CA: CPT

## 2019-11-14 PROCEDURE — 82607 VITAMIN B-12: CPT

## 2019-11-14 PROCEDURE — 84443 ASSAY THYROID STIM HORMONE: CPT

## 2019-11-14 PROCEDURE — 70450 CT HEAD/BRAIN W/O DYE: CPT

## 2019-11-14 PROCEDURE — 80053 COMPREHEN METABOLIC PANEL: CPT

## 2019-11-14 PROCEDURE — 82040 ASSAY OF SERUM ALBUMIN: CPT

## 2019-11-14 PROCEDURE — 83735 ASSAY OF MAGNESIUM: CPT

## 2019-11-14 PROCEDURE — 84484 ASSAY OF TROPONIN QUANT: CPT

## 2019-11-14 PROCEDURE — 74018 RADEX ABDOMEN 1 VIEW: CPT

## 2019-11-14 PROCEDURE — 81003 URINALYSIS AUTO W/O SCOPE: CPT

## 2019-11-14 PROCEDURE — 36415 COLL VENOUS BLD VENIPUNCTURE: CPT

## 2019-11-14 PROCEDURE — 70551 MRI BRAIN STEM W/O DYE: CPT

## 2019-11-14 PROCEDURE — 85520 HEPARIN ASSAY: CPT

## 2019-11-14 RX ADMIN — ATORVASTATIN CALCIUM SCH MG: 40 TABLET, FILM COATED ORAL at 20:36

## 2019-11-14 RX ADMIN — ASPIRIN 81 MG SCH MG: 81 TABLET ORAL at 12:40

## 2019-11-14 RX ADMIN — ENOXAPARIN SODIUM SCH MG: 40 INJECTION SUBCUTANEOUS at 12:40

## 2019-11-14 RX ADMIN — LACTULOSE SCH GM: 10 SOLUTION ORAL at 20:36

## 2019-11-14 RX ADMIN — SODIUM CHLORIDE SCH MLS/HR: 0.9 INJECTION, SOLUTION INTRAVENOUS at 12:41

## 2019-11-14 RX ADMIN — ENOXAPARIN SODIUM SCH MG: 40 INJECTION SUBCUTANEOUS at 11:30

## 2019-11-14 NOTE — NUR
PATIENT BROUGHT BACK FROM TRIAGE WITH CHIEF COMPLAINT OF- WORRIED HE HAD A 
STROKE SEVERAL DAYS AGO- REPORTS DIZZINESS, DIFFICULTY FINDING WORDS, SLURRED 
SPEECH, WEAKNESS. DROVE SELF HERE TODAY. DENIES THESE SYMPTOMS NOW. STATES HE 
HAS HX OF STROKE. ALSO WORRIED HE HAS A BACKED UP COLON. PATIENT AMBULATED FROM 
[facility]

## 2019-11-15 VITALS — SYSTOLIC BLOOD PRESSURE: 137 MMHG | DIASTOLIC BLOOD PRESSURE: 73 MMHG

## 2019-11-15 VITALS — SYSTOLIC BLOOD PRESSURE: 181 MMHG | DIASTOLIC BLOOD PRESSURE: 86 MMHG

## 2019-11-15 VITALS — DIASTOLIC BLOOD PRESSURE: 93 MMHG | SYSTOLIC BLOOD PRESSURE: 161 MMHG

## 2019-11-15 VITALS — SYSTOLIC BLOOD PRESSURE: 163 MMHG | DIASTOLIC BLOOD PRESSURE: 84 MMHG

## 2019-11-15 VITALS — DIASTOLIC BLOOD PRESSURE: 77 MMHG | SYSTOLIC BLOOD PRESSURE: 152 MMHG

## 2019-11-15 VITALS — SYSTOLIC BLOOD PRESSURE: 143 MMHG | DIASTOLIC BLOOD PRESSURE: 65 MMHG

## 2019-11-15 LAB
ALBUMIN SERPL-MCNC: 3.3 G/DL (ref 3.4–5)
ALP SERPL-CCNC: 52 U/L (ref 45–117)
ALT SERPL-CCNC: 24 U/L (ref 12–78)
ANION GAP SERPL CALC-SCNC: 8 MMOL/L (ref 5–15)
BASOPHILS # BLD AUTO: 0.02 X10^3/UL (ref 0–0.1)
BASOPHILS NFR BLD AUTO: 0 % (ref 0–1)
BILIRUB SERPL-MCNC: 1.5 MG/DL (ref 0.2–1)
CALCIUM SERPL-MCNC: 8.7 MG/DL (ref 8.5–10.1)
CHLORIDE SERPL-SCNC: 111 MMOL/L (ref 98–107)
CHOL/HDL RATIO: 3.1
CREAT SERPL-MCNC: 0.94 MG/DL (ref 0.7–1.3)
EOSINOPHIL # BLD AUTO: 0.1 X10^3/UL (ref 0–0.4)
EOSINOPHIL NFR BLD AUTO: 2 % (ref 1–7)
ERYTHROCYTE [DISTWIDTH] IN BLOOD BY AUTOMATED COUNT: 13.7 % (ref 9.4–14.8)
HDL CHOL %: 33 % (ref 26–37)
HDL CHOLESTEROL (DIRECT): 57 MG/DL (ref 40–60)
LDL CHOLESTEROL,CALCULATED: 105 MG/DL (ref 54–169)
LDLC/HDLC SERPL: 1.8 {RATIO} (ref 0.5–3)
LYMPHOCYTES # BLD AUTO: 1.05 X10^3/UL (ref 1–3.4)
LYMPHOCYTES NFR BLD AUTO: 16 % (ref 22–44)
MCH RBC QN AUTO: 33 PG (ref 27.5–34.5)
MCHC RBC AUTO-ENTMCNC: 32.8 G/DL (ref 33.2–36.2)
MCV RBC AUTO: 100.6 FL (ref 81–97)
MD: NO
MONOCYTES # BLD AUTO: 0.85 X10^3/UL (ref 0.2–0.8)
MONOCYTES NFR BLD AUTO: 13 % (ref 2–9)
NEUTROPHILS # BLD AUTO: 4.57 X10^3/UL (ref 1.8–6.8)
NEUTROPHILS NFR BLD AUTO: 69 % (ref 42–75)
PLATELET # BLD AUTO: 199 X10^3/UL (ref 130–400)
PMV BLD AUTO: 10.6 FL (ref 7.4–10.4)
PROT SERPL-MCNC: 6.1 G/DL (ref 6.4–8.2)
RBC # BLD AUTO: 4.72 X10^6/UL (ref 4.38–5.82)
TRIGL SERPL-MCNC: 64 MG/DL (ref 50–200)
TROPONIN I SERPL-MCNC: < 0.015 NG/ML (ref 0–0.04)
TROPONIN I SERPL-MCNC: < 0.015 NG/ML (ref 0–0.04)
VLDLC SERPL CALC-MCNC: 13 MG/DL (ref 0–25)

## 2019-11-15 RX ADMIN — SODIUM CHLORIDE SCH MLS/HR: 0.9 INJECTION, SOLUTION INTRAVENOUS at 14:10

## 2019-11-15 RX ADMIN — LACTULOSE SCH GM: 10 SOLUTION ORAL at 12:01

## 2019-11-15 RX ADMIN — DIBASIC SODIUM PHOSPHATE, MONOBASIC POTASSIUM PHOSPHATE AND MONOBASIC SODIUM PHOSPHATE SCH MG: 852; 155; 130 TABLET ORAL at 17:37

## 2019-11-15 RX ADMIN — ATORVASTATIN CALCIUM SCH MG: 40 TABLET, FILM COATED ORAL at 21:10

## 2019-11-15 RX ADMIN — SODIUM CHLORIDE SCH MLS/HR: 0.9 INJECTION, SOLUTION INTRAVENOUS at 00:38

## 2019-11-15 RX ADMIN — DIBASIC SODIUM PHOSPHATE, MONOBASIC POTASSIUM PHOSPHATE AND MONOBASIC SODIUM PHOSPHATE SCH MG: 852; 155; 130 TABLET ORAL at 21:10

## 2019-11-15 RX ADMIN — AMLODIPINE BESYLATE SCH MG: 5 TABLET ORAL at 21:10

## 2019-11-15 RX ADMIN — ENOXAPARIN SODIUM SCH MG: 40 INJECTION SUBCUTANEOUS at 12:00

## 2019-11-15 RX ADMIN — AMLODIPINE BESYLATE SCH MG: 5 TABLET ORAL at 16:19

## 2019-11-15 RX ADMIN — LACTULOSE SCH GM: 10 SOLUTION ORAL at 21:10

## 2019-11-15 RX ADMIN — DIBASIC SODIUM PHOSPHATE, MONOBASIC POTASSIUM PHOSPHATE AND MONOBASIC SODIUM PHOSPHATE SCH MG: 852; 155; 130 TABLET ORAL at 11:59

## 2019-11-15 RX ADMIN — ASPIRIN 81 MG SCH MG: 81 TABLET ORAL at 08:51

## 2019-11-15 RX ADMIN — CLOPIDOGREL SCH MG: 75 TABLET, FILM COATED ORAL at 08:51

## 2019-11-16 VITALS — DIASTOLIC BLOOD PRESSURE: 74 MMHG | SYSTOLIC BLOOD PRESSURE: 112 MMHG

## 2019-11-16 VITALS — SYSTOLIC BLOOD PRESSURE: 135 MMHG | DIASTOLIC BLOOD PRESSURE: 76 MMHG

## 2019-11-16 VITALS — SYSTOLIC BLOOD PRESSURE: 125 MMHG | DIASTOLIC BLOOD PRESSURE: 79 MMHG

## 2019-11-16 VITALS — DIASTOLIC BLOOD PRESSURE: 74 MMHG | SYSTOLIC BLOOD PRESSURE: 141 MMHG

## 2019-11-16 VITALS — SYSTOLIC BLOOD PRESSURE: 143 MMHG | DIASTOLIC BLOOD PRESSURE: 75 MMHG

## 2019-11-16 VITALS — DIASTOLIC BLOOD PRESSURE: 73 MMHG | SYSTOLIC BLOOD PRESSURE: 151 MMHG

## 2019-11-16 LAB
ALBUMIN SERPL-MCNC: 3.3 G/DL (ref 3.4–5)
ALP SERPL-CCNC: 55 U/L (ref 45–117)
ALT SERPL-CCNC: 24 U/L (ref 12–78)
ANION GAP SERPL CALC-SCNC: 6 MMOL/L (ref 5–15)
BASOPHILS # BLD AUTO: 0.02 X10^3/UL (ref 0–0.1)
BASOPHILS NFR BLD AUTO: 0 % (ref 0–1)
BILIRUB SERPL-MCNC: 1.6 MG/DL (ref 0.2–1)
CALCIUM SERPL-MCNC: 8.7 MG/DL (ref 8.5–10.1)
CHLORIDE SERPL-SCNC: 108 MMOL/L (ref 98–107)
CREAT SERPL-MCNC: 0.92 MG/DL (ref 0.7–1.3)
EOSINOPHIL # BLD AUTO: 0.07 X10^3/UL (ref 0–0.4)
EOSINOPHIL NFR BLD AUTO: 1 % (ref 1–7)
ERYTHROCYTE [DISTWIDTH] IN BLOOD BY AUTOMATED COUNT: 13.4 % (ref 9.4–14.8)
LYMPHOCYTES # BLD AUTO: 0.87 X10^3/UL (ref 1–3.4)
LYMPHOCYTES NFR BLD AUTO: 12 % (ref 22–44)
MCH RBC QN AUTO: 33.2 PG (ref 27.5–34.5)
MCHC RBC AUTO-ENTMCNC: 33.3 G/DL (ref 33.2–36.2)
MCV RBC AUTO: 99.8 FL (ref 81–97)
MD: NO
MONOCYTES # BLD AUTO: 0.76 X10^3/UL (ref 0.2–0.8)
MONOCYTES NFR BLD AUTO: 11 % (ref 2–9)
NEUTROPHILS # BLD AUTO: 5.29 X10^3/UL (ref 1.8–6.8)
NEUTROPHILS NFR BLD AUTO: 76 % (ref 42–75)
PLATELET # BLD AUTO: 209 X10^3/UL (ref 130–400)
PMV BLD AUTO: 10.6 FL (ref 7.4–10.4)
PROT SERPL-MCNC: 6.2 G/DL (ref 6.4–8.2)
RBC # BLD AUTO: 4.83 X10^6/UL (ref 4.38–5.82)
TROPONIN I SERPL-MCNC: < 0.015 NG/ML (ref 0–0.04)

## 2019-11-16 RX ADMIN — ATORVASTATIN CALCIUM SCH MG: 40 TABLET, FILM COATED ORAL at 20:03

## 2019-11-16 RX ADMIN — LACTULOSE SCH GM: 10 SOLUTION ORAL at 20:03

## 2019-11-16 RX ADMIN — LACTULOSE SCH GM: 10 SOLUTION ORAL at 10:04

## 2019-11-16 RX ADMIN — AMLODIPINE BESYLATE SCH MG: 5 TABLET ORAL at 10:05

## 2019-11-16 RX ADMIN — LACTULOSE SCH GM: 10 SOLUTION ORAL at 09:00

## 2019-11-16 RX ADMIN — CLOPIDOGREL SCH MG: 75 TABLET, FILM COATED ORAL at 10:05

## 2019-11-16 RX ADMIN — APIXABAN SCH MG: 5 TABLET, FILM COATED ORAL at 21:06

## 2019-11-16 RX ADMIN — CARVEDILOL SCH MG: 3.12 TABLET, FILM COATED ORAL at 17:09

## 2019-11-16 RX ADMIN — ASPIRIN 81 MG SCH MG: 81 TABLET ORAL at 10:05

## 2019-11-17 VITALS — DIASTOLIC BLOOD PRESSURE: 73 MMHG | SYSTOLIC BLOOD PRESSURE: 120 MMHG

## 2019-11-17 VITALS — SYSTOLIC BLOOD PRESSURE: 91 MMHG | DIASTOLIC BLOOD PRESSURE: 63 MMHG

## 2019-11-17 VITALS — SYSTOLIC BLOOD PRESSURE: 111 MMHG | DIASTOLIC BLOOD PRESSURE: 70 MMHG

## 2019-11-17 VITALS — SYSTOLIC BLOOD PRESSURE: 142 MMHG | DIASTOLIC BLOOD PRESSURE: 79 MMHG

## 2019-11-17 VITALS — SYSTOLIC BLOOD PRESSURE: 138 MMHG | DIASTOLIC BLOOD PRESSURE: 82 MMHG

## 2019-11-17 VITALS — DIASTOLIC BLOOD PRESSURE: 68 MMHG | SYSTOLIC BLOOD PRESSURE: 118 MMHG

## 2019-11-17 RX ADMIN — CARVEDILOL SCH MG: 3.12 TABLET, FILM COATED ORAL at 17:26

## 2019-11-17 RX ADMIN — CLOPIDOGREL SCH MG: 75 TABLET, FILM COATED ORAL at 10:54

## 2019-11-17 RX ADMIN — APIXABAN SCH MG: 5 TABLET, FILM COATED ORAL at 10:53

## 2019-11-17 RX ADMIN — APIXABAN SCH MG: 5 TABLET, FILM COATED ORAL at 20:33

## 2019-11-17 RX ADMIN — CARVEDILOL SCH MG: 3.12 TABLET, FILM COATED ORAL at 10:55

## 2019-11-17 RX ADMIN — ATORVASTATIN CALCIUM SCH MG: 40 TABLET, FILM COATED ORAL at 20:33

## 2019-11-17 RX ADMIN — LACTULOSE SCH GM: 10 SOLUTION ORAL at 20:33

## 2019-11-17 RX ADMIN — LACTULOSE SCH GM: 10 SOLUTION ORAL at 10:56

## 2019-11-18 VITALS — SYSTOLIC BLOOD PRESSURE: 121 MMHG | DIASTOLIC BLOOD PRESSURE: 74 MMHG

## 2019-11-18 VITALS — DIASTOLIC BLOOD PRESSURE: 65 MMHG | SYSTOLIC BLOOD PRESSURE: 105 MMHG

## 2019-11-18 VITALS — SYSTOLIC BLOOD PRESSURE: 96 MMHG | DIASTOLIC BLOOD PRESSURE: 58 MMHG

## 2019-11-18 VITALS — SYSTOLIC BLOOD PRESSURE: 97 MMHG | DIASTOLIC BLOOD PRESSURE: 61 MMHG

## 2019-11-18 VITALS — SYSTOLIC BLOOD PRESSURE: 114 MMHG | DIASTOLIC BLOOD PRESSURE: 68 MMHG

## 2019-11-18 RX ADMIN — CARVEDILOL SCH MG: 3.12 TABLET, FILM COATED ORAL at 05:25

## 2019-11-18 RX ADMIN — APIXABAN SCH MG: 5 TABLET, FILM COATED ORAL at 19:26

## 2019-11-18 RX ADMIN — LACTULOSE SCH GM: 10 SOLUTION ORAL at 09:19

## 2019-11-18 RX ADMIN — CLOPIDOGREL SCH MG: 75 TABLET, FILM COATED ORAL at 09:16

## 2019-11-18 RX ADMIN — ATORVASTATIN CALCIUM SCH MG: 40 TABLET, FILM COATED ORAL at 19:26

## 2019-11-18 RX ADMIN — CARVEDILOL SCH MG: 3.12 TABLET, FILM COATED ORAL at 19:05

## 2019-11-18 RX ADMIN — LACTULOSE SCH GM: 10 SOLUTION ORAL at 09:00

## 2019-11-18 RX ADMIN — LACTULOSE SCH GM: 10 SOLUTION ORAL at 19:26

## 2019-11-18 RX ADMIN — APIXABAN SCH MG: 5 TABLET, FILM COATED ORAL at 09:16

## 2019-11-19 VITALS — DIASTOLIC BLOOD PRESSURE: 70 MMHG | SYSTOLIC BLOOD PRESSURE: 118 MMHG

## 2019-11-19 VITALS — SYSTOLIC BLOOD PRESSURE: 107 MMHG | DIASTOLIC BLOOD PRESSURE: 67 MMHG

## 2019-11-19 VITALS — DIASTOLIC BLOOD PRESSURE: 67 MMHG | SYSTOLIC BLOOD PRESSURE: 112 MMHG

## 2019-11-19 RX ADMIN — CLOPIDOGREL SCH MG: 75 TABLET, FILM COATED ORAL at 10:31

## 2019-11-19 RX ADMIN — CARVEDILOL SCH MG: 3.12 TABLET, FILM COATED ORAL at 05:16

## 2019-11-19 RX ADMIN — LACTULOSE SCH GM: 10 SOLUTION ORAL at 09:00

## 2019-11-19 RX ADMIN — APIXABAN SCH MG: 5 TABLET, FILM COATED ORAL at 10:31

## 2020-01-23 ENCOUNTER — APPOINTMENT (RX ONLY)
Dept: URBAN - METROPOLITAN AREA CLINIC 4 | Facility: CLINIC | Age: 75
Setting detail: DERMATOLOGY
End: 2020-01-23

## 2020-01-23 DIAGNOSIS — L81.4 OTHER MELANIN HYPERPIGMENTATION: ICD-10-CM

## 2020-01-23 DIAGNOSIS — L30.9 DERMATITIS, UNSPECIFIED: ICD-10-CM | Status: INADEQUATELY CONTROLLED

## 2020-01-23 DIAGNOSIS — L82.1 OTHER SEBORRHEIC KERATOSIS: ICD-10-CM

## 2020-01-23 DIAGNOSIS — L72.8 OTHER FOLLICULAR CYSTS OF THE SKIN AND SUBCUTANEOUS TISSUE: ICD-10-CM | Status: STABLE

## 2020-01-23 DIAGNOSIS — D22 MELANOCYTIC NEVI: ICD-10-CM

## 2020-01-23 DIAGNOSIS — L57.8 OTHER SKIN CHANGES DUE TO CHRONIC EXPOSURE TO NONIONIZING RADIATION: ICD-10-CM

## 2020-01-23 DIAGNOSIS — D18.0 HEMANGIOMA: ICD-10-CM

## 2020-01-23 PROBLEM — D48.5 NEOPLASM OF UNCERTAIN BEHAVIOR OF SKIN: Status: ACTIVE | Noted: 2020-01-23

## 2020-01-23 PROBLEM — D22.5 MELANOCYTIC NEVI OF TRUNK: Status: ACTIVE | Noted: 2020-01-23

## 2020-01-23 PROBLEM — D18.01 HEMANGIOMA OF SKIN AND SUBCUTANEOUS TISSUE: Status: ACTIVE | Noted: 2020-01-23

## 2020-01-23 PROCEDURE — 99214 OFFICE O/P EST MOD 30 MIN: CPT | Mod: 25

## 2020-01-23 PROCEDURE — ? ADDITIONAL NOTES

## 2020-01-23 PROCEDURE — 11102 TANGNTL BX SKIN SINGLE LES: CPT

## 2020-01-23 PROCEDURE — ? COUNSELING

## 2020-01-23 PROCEDURE — ? PRESCRIPTION

## 2020-01-23 PROCEDURE — ? BIOPSY BY SHAVE METHOD

## 2020-01-23 RX ORDER — CLOBETASOL PROPIONATE 0.5 MG/ML
SOLUTION TOPICAL
Qty: 1 | Refills: 3 | Status: ERX | COMMUNITY
Start: 2020-01-23

## 2020-01-23 RX ADMIN — CLOBETASOL PROPIONATE 1: 0.5 SOLUTION TOPICAL at 00:00

## 2020-01-23 ASSESSMENT — LOCATION DETAILED DESCRIPTION DERM
LOCATION DETAILED: GENITALIA
LOCATION DETAILED: RIGHT INFERIOR CENTRAL MALAR CHEEK
LOCATION DETAILED: LEFT SUPERIOR MEDIAL UPPER BACK
LOCATION DETAILED: RIGHT SUPERIOR MEDIAL UPPER BACK
LOCATION DETAILED: LEFT INFERIOR LATERAL NECK
LOCATION DETAILED: LEFT DISTAL DORSAL FOREARM
LOCATION DETAILED: RIGHT RADIAL DORSAL HAND
LOCATION DETAILED: RIGHT SUPERIOR LATERAL UPPER BACK
LOCATION DETAILED: SUPRAPUBIC SKIN
LOCATION DETAILED: LEFT ANTERIOR SCROTUM
LOCATION DETAILED: LEFT SUPERIOR UPPER BACK
LOCATION DETAILED: LEFT RADIAL DORSAL HAND
LOCATION DETAILED: LEFT MEDIAL FRONTAL SCALP
LOCATION DETAILED: RIGHT PROXIMAL DORSAL FOREARM
LOCATION DETAILED: RIGHT MID-UPPER BACK
LOCATION DETAILED: LEFT MEDIAL SUPERIOR CHEST
LOCATION DETAILED: LEFT DISTAL POSTERIOR UPPER ARM
LOCATION DETAILED: RIGHT ANTERIOR SCROTUM
LOCATION DETAILED: RIGHT DISTAL POSTERIOR UPPER ARM
LOCATION DETAILED: LEFT INFERIOR CENTRAL MALAR CHEEK
LOCATION DETAILED: LEFT SUPERIOR LATERAL BUCCAL CHEEK

## 2020-01-23 ASSESSMENT — LOCATION SIMPLE DESCRIPTION DERM
LOCATION SIMPLE: SCROTUM
LOCATION SIMPLE: LEFT FOREARM
LOCATION SIMPLE: LEFT ANTERIOR NECK
LOCATION SIMPLE: CHEST
LOCATION SIMPLE: RIGHT CHEEK
LOCATION SIMPLE: LEFT UPPER BACK
LOCATION SIMPLE: RIGHT UPPER BACK
LOCATION SIMPLE: LEFT UPPER ARM
LOCATION SIMPLE: GENITALIA
LOCATION SIMPLE: RIGHT HAND
LOCATION SIMPLE: LEFT SCALP
LOCATION SIMPLE: LEFT HAND
LOCATION SIMPLE: GROIN
LOCATION SIMPLE: RIGHT UPPER ARM
LOCATION SIMPLE: RIGHT FOREARM
LOCATION SIMPLE: LEFT CHEEK

## 2020-01-23 ASSESSMENT — LOCATION ZONE DERM
LOCATION ZONE: GENITALIA
LOCATION ZONE: TRUNK
LOCATION ZONE: SCALP
LOCATION ZONE: ARM
LOCATION ZONE: NECK
LOCATION ZONE: FACE
LOCATION ZONE: HAND

## 2020-01-23 NOTE — PROCEDURE: ADDITIONAL NOTES
Detail Level: Simple
Additional Notes: May continue with TAC ointment twice a day as needed \\nUse Clobetasol Solution to scalp and beard once daily as needed\\nRefer patient for patch testing, will call patient with an appointment once auth has been approved.

## 2020-01-23 NOTE — PROCEDURE: MIPS QUALITY
Quality 402: Tobacco Use And Help With Quitting Among Adolescents: Patient screened for tobacco and never smoked
Quality 111:Pneumonia Vaccination Status For Older Adults: Pneumococcal Vaccination Previously Received
Quality 226: Preventive Care And Screening: Tobacco Use: Screening And Cessation Intervention: Patient screened for tobacco use and is an ex/non-smoker
Detail Level: Detailed
Quality 130: Documentation Of Current Medications In The Medical Record: Current Medications Documented

## 2020-01-23 NOTE — PROCEDURE: BIOPSY BY SHAVE METHOD
Render Path Notes In Note?: No
Hemostasis: Drysol
Silver Nitrate Text: The wound bed was treated with silver nitrate after the biopsy was performed.
Was A Bandage Applied: Yes
Curettage Text: The wound bed was treated with curettage after the biopsy was performed.
Biopsy Method: Dermablade
Detail Level: Detailed
Lab: [id number]
Billing Type: Third-Party Bill
Post-Care Instructions: I reviewed with the patient in detail post-care instructions. Patient is to keep the biopsy site dry overnight, and then apply bacitracin twice daily until healed. Patient may apply hydrogen peroxide soaks to remove any crusting.
Lab Facility: 
Additional Anesthesia Volume In Cc (Will Not Render If 0): 0
Anesthesia Type: 1% lidocaine with epinephrine
Size Of Lesion In Cm: 0.4
Cryotherapy Text: The wound bed was treated with cryotherapy after the biopsy was performed.
Wound Care: Bacitracin
Depth Of Biopsy: dermis
Electrodesiccation Text: The wound bed was treated with electrodesiccation after the biopsy was performed.
Notification Instructions: Patient will be notified of biopsy results. However, patient instructed to call the office if not contacted within 2 weeks.
Consent: Written consent was obtained and risks were reviewed including but not limited to scarring, infection, bleeding, scabbing, incomplete removal, nerve damage and allergy to anesthesia.
Biopsy Type: H and E
Anesthesia Volume In Cc: 0.5
Type Of Destruction Used: Electrodesiccation
Electrodesiccation And Curettage Text: The wound bed was treated with electrodesiccation and curettage after the biopsy was performed.
Dressing: bandage

## 2020-01-28 ENCOUNTER — RX ONLY (OUTPATIENT)
Age: 75
Setting detail: RX ONLY
End: 2020-01-28

## 2020-01-28 RX ORDER — CLOBETASOL PROPIONATE 0.5 MG/ML
SOLUTION TOPICAL
Qty: 1 | Refills: 3 | Status: ERX

## 2020-06-15 ENCOUNTER — APPOINTMENT (RX ONLY)
Dept: URBAN - METROPOLITAN AREA CLINIC 4 | Facility: CLINIC | Age: 75
Setting detail: DERMATOLOGY
End: 2020-06-15

## 2020-06-15 DIAGNOSIS — L57.8 OTHER SKIN CHANGES DUE TO CHRONIC EXPOSURE TO NONIONIZING RADIATION: ICD-10-CM

## 2020-06-15 DIAGNOSIS — L82.0 INFLAMED SEBORRHEIC KERATOSIS: ICD-10-CM

## 2020-06-15 DIAGNOSIS — L30.9 DERMATITIS, UNSPECIFIED: ICD-10-CM

## 2020-06-15 PROCEDURE — ? LIQUID NITROGEN

## 2020-06-15 PROCEDURE — ? COUNSELING

## 2020-06-15 PROCEDURE — 99213 OFFICE O/P EST LOW 20 MIN: CPT | Mod: 25

## 2020-06-15 PROCEDURE — 17110 DESTRUCTION B9 LES UP TO 14: CPT

## 2020-06-15 PROCEDURE — ? ADDITIONAL NOTES

## 2020-06-15 ASSESSMENT — LOCATION DETAILED DESCRIPTION DERM
LOCATION DETAILED: RIGHT LATERAL FOREHEAD
LOCATION DETAILED: LEFT CENTRAL EYEBROW
LOCATION DETAILED: RIGHT CHIN
LOCATION DETAILED: LEFT ULNAR DORSAL HAND
LOCATION DETAILED: RIGHT INFERIOR CENTRAL MALAR CHEEK
LOCATION DETAILED: RIGHT ULNAR DORSAL HAND
LOCATION DETAILED: LEFT LATERAL EYEBROW
LOCATION DETAILED: LEFT MEDIAL FOREHEAD
LOCATION DETAILED: RIGHT FOREHEAD
LOCATION DETAILED: LEFT RADIAL DORSAL HAND
LOCATION DETAILED: LEFT INFERIOR FOREHEAD
LOCATION DETAILED: RIGHT MEDIAL MALAR CHEEK
LOCATION DETAILED: RIGHT RADIAL DORSAL HAND
LOCATION DETAILED: LEFT CENTRAL MALAR CHEEK

## 2020-06-15 ASSESSMENT — LOCATION SIMPLE DESCRIPTION DERM
LOCATION SIMPLE: RIGHT HAND
LOCATION SIMPLE: LEFT HAND
LOCATION SIMPLE: LEFT CHEEK
LOCATION SIMPLE: CHIN
LOCATION SIMPLE: RIGHT FOREHEAD
LOCATION SIMPLE: RIGHT CHEEK
LOCATION SIMPLE: LEFT FOREHEAD
LOCATION SIMPLE: LEFT EYEBROW

## 2020-06-15 ASSESSMENT — LOCATION ZONE DERM
LOCATION ZONE: HAND
LOCATION ZONE: FACE

## 2020-06-15 NOTE — PROCEDURE: LIQUID NITROGEN
Detail Level: Detailed
Consent: The patient's consent was obtained including but not limited to risks of crusting, scabbing, blistering, scarring, darker or lighter pigmentary change, recurrence, incomplete removal and infection.
Aperture Size (Optional): C
Medical Necessity Information: It is in your best interest to select a reason for this procedure from the list below. All of these items fulfill various CMS LCD requirements except the new and changing color options.
Number Of Freeze-Thaw Cycles: 1 freeze-thaw cycle
Render Post-Care Instructions In Note?: no
Post-Care Instructions: I reviewed with the patient in detail post-care instructions. Patient is to wear sunprotection, and avoid picking at any of the treated lesions. Pt may apply Vaseline to crusted or scabbing areas.
Medical Necessity Clause: This procedure was medically necessary because the lesions that were treated were:
Duration Of Freeze Thaw-Cycle (Seconds): 3

## 2020-06-30 DIAGNOSIS — R07.9 CHEST PAIN, UNSPECIFIED TYPE: ICD-10-CM

## 2020-06-30 RX ORDER — NITROGLYCERIN 0.4 MG/1
TABLET SUBLINGUAL
Qty: 25 TAB | Refills: 1 | Status: SHIPPED | OUTPATIENT
Start: 2020-06-30 | End: 2022-03-08

## 2020-06-30 NOTE — TELEPHONE ENCOUNTER
Received request via: Pharmacy    Was the patient seen in the last year in this department? Yes    Does the patient have an active prescription (recently filled or refills available) for medication(s) requested? No       Requested Prescriptions     Pending Prescriptions Disp Refills   • nitroglycerin (NITROSTAT) 0.4 MG SL Tab [Pharmacy Med Name: NITROGLYCERN 0.4 MG SUB JUANITA] 25 Tab 0     Sig: PLACE ONE TABLET UNDER TONGUE EVERY 5 MINUTES AS NEEDED

## 2020-11-12 ENCOUNTER — OFFICE VISIT (OUTPATIENT)
Dept: URGENT CARE | Facility: CLINIC | Age: 75
End: 2020-11-12
Payer: MEDICARE

## 2020-11-12 VITALS
HEART RATE: 58 BPM | OXYGEN SATURATION: 98 % | DIASTOLIC BLOOD PRESSURE: 78 MMHG | TEMPERATURE: 97.6 F | SYSTOLIC BLOOD PRESSURE: 146 MMHG | WEIGHT: 120 LBS | HEIGHT: 69 IN | RESPIRATION RATE: 18 BRPM | BODY MASS INDEX: 17.77 KG/M2

## 2020-11-12 DIAGNOSIS — D49.2 ABNORMAL SKIN GROWTH: ICD-10-CM

## 2020-11-12 PROBLEM — R13.10 SWALLOWING PROBLEM: Status: ACTIVE | Noted: 2020-02-28

## 2020-11-12 PROBLEM — I50.9 CONGESTIVE HEART FAILURE (HCC): Status: ACTIVE | Noted: 2020-03-13

## 2020-11-12 PROBLEM — N40.0 BENIGN PROSTATIC HYPERPLASIA: Status: ACTIVE | Noted: 2020-02-28

## 2020-11-12 PROBLEM — K40.90 INGUINAL HERNIA, LEFT: Status: ACTIVE | Noted: 2020-02-28

## 2020-11-12 PROBLEM — I63.9 CEREBROVASCULAR ACCIDENT (CVA) (HCC): Status: ACTIVE | Noted: 2019-11-28

## 2020-11-12 PROBLEM — L30.9 ECZEMA: Status: ACTIVE | Noted: 2020-02-28

## 2020-11-12 PROBLEM — H26.9 CATARACT, BILATERAL: Status: ACTIVE | Noted: 2020-02-28

## 2020-11-12 PROBLEM — M54.50 PAIN OF LUMBAR SPINE: Status: ACTIVE | Noted: 2020-03-13

## 2020-11-12 PROBLEM — H16.139 ACTINIC KERATITIS: Status: ACTIVE | Noted: 2020-02-28

## 2020-11-12 PROBLEM — I25.10 ARTERIOSCLEROSIS OF CORONARY ARTERY: Status: ACTIVE | Noted: 2020-03-13

## 2020-11-12 PROCEDURE — 99213 OFFICE O/P EST LOW 20 MIN: CPT | Performed by: PHYSICIAN ASSISTANT

## 2020-11-12 ASSESSMENT — ENCOUNTER SYMPTOMS
NAUSEA: 0
BLURRED VISION: 0
PALPITATIONS: 0
CHILLS: 0
TINGLING: 0
SORE THROAT: 0
SHORTNESS OF BREATH: 0
SENSORY CHANGE: 0
FEVER: 0
VOMITING: 0

## 2020-11-12 NOTE — PROGRESS NOTES
Subjective:      John Nickerson is a 75 y.o. male who presents with Cyst ([id number], getting painful, right inner thigh)    HPI:  This is a new problem. Moe Nickerson is a 75 y.o. male who presents today for a possible on his inner right thigh. Patient states that approximately 1 month ago he had what appeared to be a pimple on his right inner thigh.  Patient states that it was really painful at the time.  He attempted to pop it but nothing came out.  He says that since that time has been getting larger and becoming more painful.  He has not noticed any discharge.  No surrounding redness.  He has not had any fever/chills.  He states that he saw a student doctor at [facility] who told him it might be cancerous.  He has not followed up with his PCP or dermatology.        Review of Systems   Constitutional: Negative for chills and fever.   HENT: Negative for sore throat.    Eyes: Negative for blurred vision.   Respiratory: Negative for shortness of breath.    Cardiovascular: Negative for chest pain and palpitations.   Gastrointestinal: Negative for nausea and vomiting.   Musculoskeletal: Negative for joint pain.   Skin:        Skin growth on right thigh   Neurological: Negative for tingling and sensory change.       PMH:  has a past medical history of Anxiety, Heart attack (HCC), Hypertension, and Stroke (cerebrum) ([facility]).  MEDS:   Current Outpatient Medications:   •  omeprazole (PRILOSEC) 20 MG delayed-release capsule, , Disp: , Rfl:   •  LORazepam (ATIVAN) 0.5 MG Tab, LORAZEPAM 0.5 MG TABS, Disp: , Rfl:   •  vitamin D (CHOLECALCIFEROL) 1000 UNIT Tab, Take 1,000 Units by mouth every day., Disp: , Rfl:   •  hydrocortisone (ANUSOL-HC) 25 MG Suppos, , Disp: , Rfl:   •  ofloxacin (OCUFLOX) 0.3 % Solution, , Disp: , Rfl:   •  pantoprazole (PROTONIX) 20 MG tablet, , Disp: , Rfl:   •  clobetasol (TEMOVATE) 0.05 % external solution, , Disp: , Rfl:   •  sertraline (ZOLOFT) 25 MG tablet, ZOLOFT 25 MG TABS, Disp: , Rfl:   •   "nitroglycerin (NITROSTAT) 0.4 MG SL Tab, PLACE ONE TABLET UNDER TONGUE EVERY 5 MINUTES AS NEEDED (Patient not taking: Reported on 11/12/2020), Disp: 25 Tab, Rfl: 1  •  simvastatin (ZOCOR) 10 MG Tab, Take 1 Tab by mouth every evening. (Patient not taking: Reported on 11/12/2020), Disp: 90 Tab, Rfl: 1  •  clobetasol (TEMOVATE) 0.05 % Cream, Apply 1 Application to affected area(s) 2 times a day. (Patient not taking: Reported on 11/12/2020), Disp: 1 Tube, Rfl: 1  •  lisinopril (PRINIVIL) 20 MG Tab, Take 1 Tab by mouth every day. (Patient not taking: Reported on 11/12/2020), Disp: 30 Tab, Rfl: 3  •  ciclopirox (LOPROX) 0.77 % cream, Twice daily for 2-3 weeks when active (groin) (Patient not taking: Reported on 11/12/2020), Disp: 1 Tube, Rfl: 0  •  Ciclopirox 1 % Shampoo, Apply to scalp and beard twice weekly.  Leave on for about 3 minutes then rinse. (Patient not taking: Reported on 11/12/2020), Disp: 150 mL, Rfl: 1  •  gabapentin (NEURONTIN) 100 MG Cap, Take 1 Cap by mouth 3 times a day. Take once daily in evening then after 5 days take BID for 5 days then TID. (Patient not taking: Reported on 11/12/2020), Disp: 90 Cap, Rfl: 1  •  fluticasone (FLONASE) 50 MCG/ACT nasal spray, Spray 1 Spray in nose every day. (Patient not taking: Reported on 11/12/2020), Disp: 16 g, Rfl: 5  •  multivitamin (THERAGRAN) Tab, Take 1 Tab by mouth every day., Disp: , Rfl:   ALLERGIES:   Allergies   Allergen Reactions   • Morphine Anxiety and Unspecified     Hallucinations   • Amlodipine      Edema     SURGHX: History reviewed. No pertinent surgical history.  SOCHX:  reports that he has never smoked. He has never used smokeless tobacco. He reports that he does not drink alcohol or use drugs.  FH: Family history was reviewed, no pertinent findings to report     Objective:     /78   Pulse (!) 58   Temp 36.4 °C (97.6 °F) (Temporal)   Resp 18   Ht 1.753 m (5' 9\")   Wt 54.4 kg (120 lb)   SpO2 98%   BMI 17.72 kg/m²      Physical " Exam  Constitutional:       Appearance: He is well-developed.   HENT:      Head: Normocephalic and atraumatic.      Right Ear: External ear normal.      Left Ear: External ear normal.   Eyes:      Conjunctiva/sclera: Conjunctivae normal.      Pupils: Pupils are equal, round, and reactive to light.   Cardiovascular:      Rate and Rhythm: Normal rate and regular rhythm.      Heart sounds: Normal heart sounds. No murmur.   Pulmonary:      Effort: Pulmonary effort is normal.      Breath sounds: Normal breath sounds. No wheezing.   Skin:     General: Skin is warm and dry.      Capillary Refill: Capillary refill takes less than 2 seconds.             Comments: Anterior/medial aspect of right thigh exhibits a hyperkeratotic lesion approximately 1 cm in diameter.  It is tender to palpation.  There is no surrounding erythema or induration.  No drainage.   Neurological:      Mental Status: He is alert and oriented to person, place, and time.   Psychiatric:         Behavior: Behavior normal.         Judgment: Judgment normal.            Assessment/Plan:     1. Abnormal skin growth  - REFERRAL TO DERMATOLOGY  *No evidence of cyst or abscess on exam.  Patient will need referral to dermatology for further evaluation and possible biopsy.  In the interim, we are going to try to set him up for an appointment with his PCP for next week.  If something changes and it gets significantly worse while waiting for his appointments he can always return to the urgent care for reevaluation.            Differential Diagnosis, natural history, and supportive care discussed. Return to the Urgent Care or follow up with your PCP if symptoms fail to resolve, or for any new or worsening symptoms. Emergency room precautions discussed. Patient and/or family appears understanding of information.

## 2021-01-11 DIAGNOSIS — Z23 NEED FOR VACCINATION: ICD-10-CM

## 2022-01-28 NOTE — ASSESSMENT & PLAN NOTE
Chronic history of neuropathy.  Planes of numbness for now approximately 3 years in his distal extremities.  Symptoms began after a TIA.  I started him on Neurontin.  He continues to take it.  It is effective.  Currently on 300 mg 3 times daily.  Requesting a refill.   Carac Pregnancy And Lactation Text: This medication is Pregnancy Category X and contraindicated in pregnancy and in women who may become pregnant. It is unknown if this medication is excreted in breast milk.

## 2022-02-16 NOTE — PROCEDURE: COUNSELING
Additional information request has been recieved from [employer], form has been completed and faxed back along with supporting office notes documentation to Fax [phone]. Fax confirmation recieved, Urgent 24-48 hour response box marked. Awaiting coverage determination for insurance.   
Detail Level: Detailed
Detail Level: Zone

## 2022-03-08 ENCOUNTER — APPOINTMENT (OUTPATIENT)
Dept: RADIOLOGY | Facility: IMAGING CENTER | Age: 77
End: 2022-03-08
Attending: FAMILY MEDICINE
Payer: MEDICARE

## 2022-03-08 ENCOUNTER — OFFICE VISIT (OUTPATIENT)
Dept: URGENT CARE | Facility: CLINIC | Age: 77
End: 2022-03-08
Payer: MEDICARE

## 2022-03-08 VITALS
HEIGHT: 69 IN | RESPIRATION RATE: 20 BRPM | HEART RATE: 85 BPM | WEIGHT: 120 LBS | SYSTOLIC BLOOD PRESSURE: 158 MMHG | TEMPERATURE: 98.6 F | OXYGEN SATURATION: 97 % | DIASTOLIC BLOOD PRESSURE: 90 MMHG | BODY MASS INDEX: 17.77 KG/M2

## 2022-03-08 DIAGNOSIS — R06.02 SHORTNESS OF BREATH: ICD-10-CM

## 2022-03-08 DIAGNOSIS — R10.9 CHRONIC ABDOMINAL PAIN: ICD-10-CM

## 2022-03-08 DIAGNOSIS — K21.00 GASTROESOPHAGEAL REFLUX DISEASE WITH ESOPHAGITIS WITHOUT HEMORRHAGE: ICD-10-CM

## 2022-03-08 DIAGNOSIS — G89.29 CHRONIC ABDOMINAL PAIN: ICD-10-CM

## 2022-03-08 DIAGNOSIS — Z86.73 HISTORY OF STROKE: ICD-10-CM

## 2022-03-08 DIAGNOSIS — R07.9 CHEST PAIN, UNSPECIFIED TYPE: ICD-10-CM

## 2022-03-08 DIAGNOSIS — R25.2 SPASM: ICD-10-CM

## 2022-03-08 PROBLEM — F32.A DEPRESSION: Status: ACTIVE | Noted: 2020-07-30

## 2022-03-08 PROBLEM — N50.819 PAIN IN TESTICLE: Status: ACTIVE | Noted: 2021-02-11

## 2022-03-08 PROCEDURE — 71046 X-RAY EXAM CHEST 2 VIEWS: CPT | Mod: TC | Performed by: NURSE PRACTITIONER

## 2022-03-08 PROCEDURE — 99214 OFFICE O/P EST MOD 30 MIN: CPT | Performed by: FAMILY MEDICINE

## 2022-03-08 RX ORDER — DONEPEZIL HYDROCHLORIDE 5 MG/1
5 TABLET, FILM COATED ORAL DAILY
Qty: 30 TABLET | Refills: 1 | Status: SHIPPED
Start: 2022-03-08 | End: 2022-03-18

## 2022-03-08 RX ORDER — OMEPRAZOLE 20 MG/1
20 CAPSULE, DELAYED RELEASE ORAL DAILY
Qty: 30 CAPSULE | Refills: 1 | Status: SHIPPED | OUTPATIENT
Start: 2022-03-08 | End: 2022-03-18 | Stop reason: SDUPTHER

## 2022-03-08 RX ORDER — NITROGLYCERIN 0.4 MG/1
TABLET SUBLINGUAL
Qty: 25 TABLET | Refills: 1 | Status: SHIPPED | OUTPATIENT
Start: 2022-03-08 | End: 2022-03-18 | Stop reason: SDUPTHER

## 2022-03-08 RX ORDER — MOMETASONE FUROATE 1 MG/G
CREAM TOPICAL
COMMUNITY
End: 2022-03-18

## 2022-03-08 RX ORDER — BACLOFEN 10 MG/1
10 TABLET ORAL 2 TIMES DAILY
Qty: 60 TABLET | Refills: 0 | Status: SHIPPED | OUTPATIENT
Start: 2022-03-08 | End: 2022-04-07

## 2022-03-08 RX ORDER — NITROGLYCERIN 0.4 MG/1
TABLET SUBLINGUAL
Qty: 25 TABLET | Refills: 1 | Status: CANCELLED | OUTPATIENT
Start: 2022-03-08

## 2022-03-08 ASSESSMENT — ENCOUNTER SYMPTOMS
MYALGIAS: 0
NAUSEA: 0
EYE REDNESS: 0
VOMITING: 0
WEIGHT LOSS: 0
EYE DISCHARGE: 0

## 2022-03-08 NOTE — PROGRESS NOTES
"Subjective     John Nickerson is a 76 y.o. male who presents with Shortness of Breath (Congestion, x1 month)            1 month SOB, chest congestion. No cough. No wheeze. +PMH asthma as teenage. Denies PMH COPD. NO cough. No wheeze. Intermittent leg swelling. No orthopnea. No fever. No CP. No palpitations. No other aggravating or alleviating factors.    John has had years of generalized abdominal pain.  Most recent GI note in epic is from 2017 and notes an ongoing work-up for this.  He says he has since been seen by a different gastroenterologist and diagnosed with ulcerative colitis.  He does not recall who that physician was and would like a new referral.  No blood in stool but he notes there is mucus in stool.    Requesting refills for multiple stable problems. He notes that he cannot get a ride to see his primary care in [facility] and would like a new referral for somebody more central.          Review of Systems   Constitutional: Negative for malaise/fatigue and weight loss.   Eyes: Negative for discharge and redness.   Gastrointestinal: Negative for nausea and vomiting.   Musculoskeletal: Negative for joint pain and myalgias.   Skin: Negative for itching and rash.              Objective     /90   Pulse 85   Temp 37 °C (98.6 °F) (Temporal)   Resp 20   Ht 1.753 m (5' 9\")   Wt 54.4 kg (120 lb)   SpO2 97%   BMI 17.72 kg/m²      Physical Exam  Constitutional:       General: He is not in acute distress.     Appearance: He is well-developed.      Comments: Thin     HENT:      Head: Normocephalic and atraumatic.      Nose: Nose normal. No congestion.      Mouth/Throat:      Mouth: Mucous membranes are moist.      Pharynx: No posterior oropharyngeal erythema.   Eyes:      Conjunctiva/sclera: Conjunctivae normal.   Cardiovascular:      Rate and Rhythm: Normal rate and regular rhythm.      Heart sounds: Murmur ( systolic heard best left lower sternal border) heard.   Pulmonary:      Effort: Pulmonary " effort is normal.      Breath sounds: Normal breath sounds. No wheezing.   Abdominal:      Palpations: Abdomen is soft. There is no mass.      Tenderness: There is abdominal tenderness (mild diffuse). There is no guarding.      Comments: Hyperactive bowel sounds.     Musculoskeletal:      Right lower leg: No edema.      Left lower leg: No edema.   Skin:     General: Skin is warm and dry.      Findings: No rash.   Neurological:      Mental Status: He is alert and oriented to person, place, and time.                             Assessment & Plan         EKG: Sinus rhythm 64. Biatrial enlargement and LVH by voltage criteria. No significant change from 7/2017    Gastrointestinal consult scanned into media on 9/1/2017 reviewed.    CXR: no acute cardiopulmonary process per radiology    1. Shortness of breath  EKG    DX-CHEST-2 VIEWS    Referral to establish with [facility] PCP   2. Chronic abdominal pain  Referral to Gastroenterology    Referral to establish with [facility] PCP   3. Chest pain, unspecified type  nitroglycerin (NITROSTAT) 0.4 MG SL Tab   4. History of stroke  donepezil (ARICEPT) 5 MG Tab   5. Gastroesophageal reflux disease with esophagitis without hemorrhage  omeprazole (PRILOSEC) 20 MG delayed-release capsule   6. Spasm  baclofen (LIORESAL) 10 MG Tab     Differential diagnosis, natural history, supportive care, and indications for immediate follow-up discussed at length.

## 2022-03-18 ENCOUNTER — OFFICE VISIT (OUTPATIENT)
Dept: MEDICAL GROUP | Facility: MEDICAL CENTER | Age: 77
End: 2022-03-18
Payer: MEDICARE

## 2022-03-18 VITALS
WEIGHT: 122 LBS | OXYGEN SATURATION: 97 % | HEIGHT: 69 IN | TEMPERATURE: 99 F | DIASTOLIC BLOOD PRESSURE: 80 MMHG | HEART RATE: 71 BPM | BODY MASS INDEX: 18.07 KG/M2 | SYSTOLIC BLOOD PRESSURE: 164 MMHG

## 2022-03-18 DIAGNOSIS — K21.00 GASTROESOPHAGEAL REFLUX DISEASE WITH ESOPHAGITIS WITHOUT HEMORRHAGE: ICD-10-CM

## 2022-03-18 DIAGNOSIS — K64.9 HEMORRHOIDS, UNSPECIFIED HEMORRHOID TYPE: ICD-10-CM

## 2022-03-18 DIAGNOSIS — E78.00 PURE HYPERCHOLESTEROLEMIA: ICD-10-CM

## 2022-03-18 DIAGNOSIS — F41.1 GAD (GENERALIZED ANXIETY DISORDER): ICD-10-CM

## 2022-03-18 DIAGNOSIS — I50.9 CONGESTIVE HEART FAILURE, UNSPECIFIED HF CHRONICITY, UNSPECIFIED HEART FAILURE TYPE (HCC): ICD-10-CM

## 2022-03-18 DIAGNOSIS — Z86.73 HISTORY OF STROKE: ICD-10-CM

## 2022-03-18 DIAGNOSIS — I10 PRIMARY HYPERTENSION: ICD-10-CM

## 2022-03-18 DIAGNOSIS — R07.9 CHEST PAIN, UNSPECIFIED TYPE: ICD-10-CM

## 2022-03-18 PROBLEM — H16.139 ACTINIC KERATITIS: Status: RESOLVED | Noted: 2020-02-28 | Resolved: 2022-03-18

## 2022-03-18 PROBLEM — R63.4 WEIGHT LOSS: Status: RESOLVED | Noted: 2017-08-17 | Resolved: 2022-03-18

## 2022-03-18 PROBLEM — L91.8 SKIN TAG: Status: RESOLVED | Noted: 2019-07-09 | Resolved: 2022-03-18

## 2022-03-18 PROBLEM — J44.9 COPD (CHRONIC OBSTRUCTIVE PULMONARY DISEASE) (HCC): Status: RESOLVED | Noted: 2017-08-17 | Resolved: 2022-03-18

## 2022-03-18 PROBLEM — N50.819 PAIN IN TESTICLE: Status: RESOLVED | Noted: 2021-02-11 | Resolved: 2022-03-18

## 2022-03-18 PROBLEM — L98.9 SKIN LESIONS, GENERALIZED: Status: RESOLVED | Noted: 2019-07-09 | Resolved: 2022-03-18

## 2022-03-18 PROBLEM — K21.9 GASTROESOPHAGEAL REFLUX DISEASE WITHOUT ESOPHAGITIS: Status: ACTIVE | Noted: 2022-03-18

## 2022-03-18 PROBLEM — H04.203 EYE TEARING, BILATERAL: Status: RESOLVED | Noted: 2019-09-17 | Resolved: 2022-03-18

## 2022-03-18 PROBLEM — R60.0 EDEMA OF LOWER EXTREMITY: Status: RESOLVED | Noted: 2019-09-17 | Resolved: 2022-03-18

## 2022-03-18 PROCEDURE — 99214 OFFICE O/P EST MOD 30 MIN: CPT | Performed by: PHYSICIAN ASSISTANT

## 2022-03-18 RX ORDER — OMEPRAZOLE 20 MG/1
20 CAPSULE, DELAYED RELEASE ORAL DAILY
Qty: 90 CAPSULE | Refills: 1 | Status: SHIPPED | OUTPATIENT
Start: 2022-03-18 | End: 2022-05-29 | Stop reason: SDUPTHER

## 2022-03-18 RX ORDER — SIMVASTATIN 10 MG
10 TABLET ORAL NIGHTLY
Qty: 90 TABLET | Refills: 1 | Status: SHIPPED
Start: 2022-03-18 | End: 2022-04-29 | Stop reason: CLARIF

## 2022-03-18 RX ORDER — CLOPIDOGREL BISULFATE 75 MG/1
75 TABLET ORAL DAILY
COMMUNITY
End: 2022-04-29 | Stop reason: CLARIF

## 2022-03-18 RX ORDER — FLUOXETINE HYDROCHLORIDE 20 MG/1
20 CAPSULE ORAL DAILY
Qty: 90 CAPSULE | Refills: 1 | Status: SHIPPED | OUTPATIENT
Start: 2022-03-18 | End: 2022-05-29 | Stop reason: SDUPTHER

## 2022-03-18 RX ORDER — NITROGLYCERIN 0.4 MG/1
TABLET SUBLINGUAL
Qty: 25 TABLET | Refills: 1 | Status: SHIPPED | OUTPATIENT
Start: 2022-03-18 | End: 2022-05-29 | Stop reason: SDUPTHER

## 2022-03-18 RX ORDER — LISINOPRIL 20 MG/1
20 TABLET ORAL DAILY
Qty: 90 TABLET | Refills: 1 | Status: SHIPPED | OUTPATIENT
Start: 2022-03-18 | End: 2022-05-29 | Stop reason: SDUPTHER

## 2022-03-18 NOTE — ASSESSMENT & PLAN NOTE
Has been diagnosed the past with CHF.  Is currently not on medications nor is there any documentation suggested of that.  Denies any significant swelling of his lower extremities.  Does have it from time to time.  Does have shortness of breath but in the past it was thought that this was secondary to the mucus in his throat.

## 2022-03-18 NOTE — ASSESSMENT & PLAN NOTE
He was seen recently at urgent care and provided omeprazole.  In the past he was on pantoprazole.  Was complaints at time of abdominal pain.  He states the medication is effective but at other times is not.  He does have a chronic issue with mucus in his throat.  Has complained in the past of difficulty swallowing.  He complains of weight loss but his weight is the exact same weight as when I last saw him in 2019.

## 2022-03-18 NOTE — ASSESSMENT & PLAN NOTE
He would also like a referral to GI.  Has a chronic history of hemorrhoids.  States he has issues with his hemorrhoids.  They are enlarged and bleed often.

## 2022-03-18 NOTE — ASSESSMENT & PLAN NOTE
Chronic condition.  Has been on simvastatin for many years.  Is currently not taking the medication.  He states when he picks up medications he likes to know exactly why he has been prescribed them.  If he does not know why then he will not take the drug.  Today he brought in about 20 empty vials of medications he has been on in the past.

## 2022-03-18 NOTE — ASSESSMENT & PLAN NOTE
This is a pleasant 76-year-old male here today to follow-up on his health.  I have not seen him in almost 3 years.  Unsure as to where he was seen in the last few years.  He is happy to be back.  Has been taking fluoxetine for many years but has not been on it recently.  Does have a history of both anxiety and depression.  He would like to get back on it.

## 2022-03-18 NOTE — ASSESSMENT & PLAN NOTE
Chronic condition.  History of strokes and TIAs.  In the past was on losartan and lisinopril.  Also has been on other BP meds.  He would like to establish care with a cardiologist.

## 2022-03-18 NOTE — PROGRESS NOTES
Subjective:   Moe Nickerson is a 76 y.o. male here today for anxiety and depression, chronic hemorrhoids, hypertension with a history of CHF, hypercholesterolemia and GERD.    GLENYS (generalized anxiety disorder)  This is a pleasant 76-year-old male here today to follow-up on his health.  I have not seen him in almost 3 years.  Unsure as to where he was seen in the last few years.  He is happy to be back.  Has been taking fluoxetine for many years but has not been on it recently.  Does have a history of both anxiety and depression.  He would like to get back on it.    Hemorrhoid  He would also like a referral to GI.  Has a chronic history of hemorrhoids.  States he has issues with his hemorrhoids.  They are enlarged and bleed often.    Hypertension  Chronic condition.  History of strokes and TIAs.  In the past was on losartan and lisinopril.  Also has been on other BP meds.  He would like to establish care with a cardiologist.    Pure hypercholesterolemia  Chronic condition.  Has been on simvastatin for many years.  Is currently not taking the medication.  He states when he picks up medications he likes to know exactly why he has been prescribed them.  If he does not know why then he will not take the drug.  Today he brought in about 20 empty vials of medications he has been on in the past.    Gastroesophageal reflux disease without esophagitis  He was seen recently at urgent care and provided omeprazole.  In the past he was on pantoprazole.  Was complaints at time of abdominal pain.  He states the medication is effective but at other times is not.  He does have a chronic issue with mucus in his throat.  Has complained in the past of difficulty swallowing.  He complains of weight loss but his weight is the exact same weight as when I last saw him in 2019.    Congestive heart failure (HCC)  Has been diagnosed the past with CHF.  Is currently not on medications nor is there any documentation suggested of that.   "Denies any significant swelling of his lower extremities.  Does have it from time to time.  Does have shortness of breath but in the past it was thought that this was secondary to the mucus in his throat.       Current medicines (including changes today)  Current Outpatient Medications   Medication Sig Dispense Refill   • clopidogrel (PLAVIX) 75 MG Tab Take 75 mg by mouth every day.     • FLUoxetine (PROZAC) 20 MG Cap Take 1 Capsule by mouth every day. 90 Capsule 1   • nitroglycerin (NITROSTAT) 0.4 MG SL Tab PLACE ONE TABLET UNDER TONGUE EVERY 5 MINUTES AS NEEDED maximum 3 tablets in 15 minutes 25 Tablet 1   • lisinopril (PRINIVIL) 20 MG Tab Take 1 Tablet by mouth every day. 90 Tablet 1   • simvastatin (ZOCOR) 10 MG Tab Take 1 Tablet by mouth every evening. 90 Tablet 1   • omeprazole (PRILOSEC) 20 MG delayed-release capsule Take 1 Capsule by mouth every day. 1/2 hour prior to same meal. 90 Capsule 1   • baclofen (LIORESAL) 10 MG Tab Take 1 Tablet by mouth 2 times a day for 30 days. 60 Tablet 0   • simvastatin (ZOCOR) 10 MG Tab Take 1 Tab by mouth every evening. (Patient not taking: Reported on 11/12/2020) 90 Tab 1   • multivitamin (THERAGRAN) Tab Take 1 Tab by mouth every day.     • vitamin D (CHOLECALCIFEROL) 1000 UNIT Tab Take 1,000 Units by mouth every day.       No current facility-administered medications for this visit.     He  has a past medical history of Anxiety, Heart attack (HCC), Hypertension, and Stroke (cerebrum) (HCC).    Social History and Family History were reviewed and updated.    ROS   No chest pain, no shortness of breath, no abdominal pain and all other systems were reviewed and are negative.       Objective:     BP (!) 164/80 (BP Location: Right arm, Patient Position: Sitting, BP Cuff Size: Adult)   Pulse 71   Temp 37.2 °C (99 °F) (Temporal)   Ht 1.753 m (5' 9\")   Wt 55.3 kg (122 lb)   SpO2 97%  Body mass index is 18.02 kg/m².   Physical Exam:  Constitutional: Alert, no distress.  Skin: " Warm, dry, good turgor, no rashes in visible areas.  Eye: Equal, round and reactive, conjunctiva clear, lids normal.  ENMT: Lips without lesions, good dentition, oropharynx clear.  Neck: Trachea midline, no masses.   Lymph: No cervical or supraclavicular lymphadenopathy  Respiratory: Unlabored respiratory effort, lungs appear clear, no wheezes.  Cardiovascular: Regular rate and rhythm.  Psych: Alert and oriented x3, normal affect and mood.        Assessment and Plan:   The following treatment plan was discussed    1. GLENYS (generalized anxiety disorder)  Chronic condition.  Stable.  Will renew Prozac at 20 mg.  I will see him in 6 weeks.  Provided the printout of his medications list and wrote down the diagnosis next to the medications.  - FLUoxetine (PROZAC) 20 MG Cap; Take 1 Capsule by mouth every day.  Dispense: 90 Capsule; Refill: 1    2. Congestive heart failure, unspecified HF chronicity, unspecified heart failure type (HCC)  Chronic condition.  Stable.  Refer to cardiology.  No echocardiogram noted in the system.  - REFERRAL TO CARDIOLOGY    3. Primary hypertension  Chronic condition.  Uncontrolled.  Will restart lisinopril at 20 mg.  Follow-up in 6 weeks to recheck BP.  Referred to cardiology.  - lisinopril (PRINIVIL) 20 MG Tab; Take 1 Tablet by mouth every day.  Dispense: 90 Tablet; Refill: 1    4. Gastroesophageal reflux disease with esophagitis without hemorrhage  Chronic condition.  Stable.  Continue omeprazole as directed.  Take half hour prior to a meal.  Per patient's request referred to GI.  - omeprazole (PRILOSEC) 20 MG delayed-release capsule; Take 1 Capsule by mouth every day. 1/2 hour prior to same meal.  Dispense: 90 Capsule; Refill: 1  - Referral to Gastroenterology    5. Hemorrhoids, unspecified hemorrhoid type  Chronic condition.  Refer to GI for management.  - Referral to Gastroenterology    6. Pure hypercholesterolemia  Chronic condition.  Status unknown.  Renewed simvastatin at 10 mg.  In 6  weeks we will order labs.  - simvastatin (ZOCOR) 10 MG Tab; Take 1 Tablet by mouth every evening.  Dispense: 90 Tablet; Refill: 1    7. Chest pain, unspecified type  Chronic condition.  Do not see the indication of nitroglycerin but patient has been on in the past.  Does have a history of CAD.  Renew nitroglycerin with refills.  - nitroglycerin (NITROSTAT) 0.4 MG SL Tab; PLACE ONE TABLET UNDER TONGUE EVERY 5 MINUTES AS NEEDED maximum 3 tablets in 15 minutes  Dispense: 25 Tablet; Refill: 1    Followup: Return in 6 weeks (on 4/29/2022), or if symptoms worsen or fail to improve.    Please note that this dictation was created using voice recognition software. I have made every reasonable attempt to correct obvious errors, but I expect that there are errors of grammar and possibly content that I did not discover before finalizing the note.

## 2022-04-19 ENCOUNTER — TELEPHONE (OUTPATIENT)
Dept: MEDICAL GROUP | Facility: MEDICAL CENTER | Age: 77
End: 2022-04-19

## 2022-04-19 NOTE — TELEPHONE ENCOUNTER
1. Name: Moe Nickerson      Call Back Number: [phone] (home)         How would the patient prefer to be contacted with a response: Phone call OK to leave a detailed message    Kelly Jimenez called in [facility] saying she was calling on Behalf of lucas Lopez. She said she needs someone to call her at [phone]. For updates in regards to referrals.    Thank you

## 2022-04-29 ENCOUNTER — OFFICE VISIT (OUTPATIENT)
Dept: CARDIOLOGY | Facility: MEDICAL CENTER | Age: 77
End: 2022-04-29
Attending: PHYSICIAN ASSISTANT
Payer: MEDICARE

## 2022-04-29 VITALS
RESPIRATION RATE: 18 BRPM | OXYGEN SATURATION: 99 % | WEIGHT: 121 LBS | HEIGHT: 69 IN | HEART RATE: 66 BPM | BODY MASS INDEX: 17.92 KG/M2 | SYSTOLIC BLOOD PRESSURE: 150 MMHG | DIASTOLIC BLOOD PRESSURE: 98 MMHG

## 2022-04-29 DIAGNOSIS — E78.00 PURE HYPERCHOLESTEROLEMIA: ICD-10-CM

## 2022-04-29 DIAGNOSIS — Z95.5 HISTORY OF CORONARY ARTERY STENT PLACEMENT: ICD-10-CM

## 2022-04-29 DIAGNOSIS — R06.02 SOB (SHORTNESS OF BREATH): ICD-10-CM

## 2022-04-29 DIAGNOSIS — I10 PRIMARY HYPERTENSION: ICD-10-CM

## 2022-04-29 DIAGNOSIS — I25.10 ARTERIOSCLEROSIS OF CORONARY ARTERY: ICD-10-CM

## 2022-04-29 DIAGNOSIS — Z66 DO NOT RESUSCITATE STATUS: ICD-10-CM

## 2022-04-29 PROCEDURE — 99205 OFFICE O/P NEW HI 60 MIN: CPT | Performed by: INTERNAL MEDICINE

## 2022-04-29 RX ORDER — LORAZEPAM 1 MG/1
1 TABLET ORAL EVERY 4 HOURS PRN
COMMUNITY
End: 2022-10-18

## 2022-04-29 RX ORDER — DONEPEZIL HYDROCHLORIDE 5 MG/1
5 TABLET, FILM COATED ORAL NIGHTLY
COMMUNITY
End: 2022-10-18

## 2022-04-29 RX ORDER — METOPROLOL SUCCINATE 25 MG/1
12.5 TABLET, EXTENDED RELEASE ORAL DAILY
Qty: 30 TABLET | Refills: 11 | Status: SHIPPED | OUTPATIENT
Start: 2022-04-29 | End: 2022-10-18 | Stop reason: SDUPTHER

## 2022-04-29 RX ORDER — CLOPIDOGREL BISULFATE 75 MG/1
75 TABLET ORAL DAILY
Qty: 30 TABLET | Refills: 11 | Status: SHIPPED | OUTPATIENT
Start: 2022-04-29 | End: 2022-10-18 | Stop reason: SDUPTHER

## 2022-04-29 RX ORDER — GABAPENTIN 100 MG/1
100 CAPSULE ORAL 3 TIMES DAILY
Qty: 90 CAPSULE | Refills: 11 | Status: SHIPPED | OUTPATIENT
Start: 2022-04-29 | End: 2022-10-18 | Stop reason: SDUPTHER

## 2022-04-29 RX ORDER — SPIRONOLACTONE 25 MG/1
12.5 TABLET ORAL DAILY
Qty: 30 TABLET | Refills: 11 | Status: SHIPPED | OUTPATIENT
Start: 2022-04-29 | End: 2022-10-18 | Stop reason: SDUPTHER

## 2022-04-29 RX ORDER — GABAPENTIN 100 MG/1
100 CAPSULE ORAL 3 TIMES DAILY
COMMUNITY
End: 2022-04-29 | Stop reason: SDUPTHER

## 2022-04-29 ASSESSMENT — MINNESOTA LIVING WITH HEART FAILURE QUESTIONNAIRE (MLHF)
TOTAL_SCORE: 75
DIFFICULTY GOING AWAY FROM HOME: 5
FEELING LIKE A BURDEN TO FAMILY AND FRIENDS: 2
MAKING YOU FEEL DEPRESSED: 5
GIVING YOU SIDE EFFECTS FROM TREATMENTS: 4
MAKING YOU SHORT OF BREATH: 5
LOSS OF SELF CONTROL IN YOUR LIFE: 5
WORKING AROUND THE HOUSE OR YARD DIFFICULT: 5
DIFFICULTY WITH RECREATIONAL PASTIMES, SPORTS, HOBBIES: 4
DIFFICULTY SOCIALIZING WITH FAMILY OR FRIENDS: 4
DIFFICULTY SLEEPING WELL AT NIGHT: 4
MAKING YOU STAY IN A HOSPITAL: 0
MAKING YOU WORRY: 5
HAVING TO SIT OR LIE DOWN DURING THE DAY: 5
EATING LESS FOODS YOU LIKE: 0
SWELLING IN ANKLES OR LEGS: 2
DIFFICULTY WORKING TO EARN A LIVING: 0
DIFFICULTY WITH SEXUAL ACTIVITIES: 0
DIFFICULTY TO CONCENTRATE OR REMEMBERING THINGS: 5
WALKING ABOUT OR CLIMBING STAIRS DIFFICULT: 5
TIRED, FATIGUED OR LOW ON ENERGY: 5
COSTING YOU MONEY FOR MEDICAL CARE: 5

## 2022-04-29 ASSESSMENT — ENCOUNTER SYMPTOMS
CONSTITUTIONAL NEGATIVE: 1
COUGH: 0
SHORTNESS OF BREATH: 1
GASTROINTESTINAL NEGATIVE: 1
SPUTUM PRODUCTION: 0
ORTHOPNEA: 0
HEMOPTYSIS: 0
BRUISES/BLEEDS EASILY: 0
CARDIOVASCULAR NEGATIVE: 1
SORE THROAT: 0
DIZZINESS: 0
WHEEZING: 0
MUSCULOSKELETAL NEGATIVE: 1
CLAUDICATION: 0
CHILLS: 0
PND: 0
STRIDOR: 0
WEAKNESS: 0
NEUROLOGICAL NEGATIVE: 1
EYES NEGATIVE: 1
FEVER: 0
LOSS OF CONSCIOUSNESS: 0
PALPITATIONS: 0

## 2022-04-29 NOTE — PROGRESS NOTES
Chief Complaint   Patient presents with   • Hypertension   • Congestive Heart Failure       Subjective     John Nickerson is a 77 y.o. male who presents today as a new consultation for heart failure.  He is a 77-year-old male who had a stroke a few years ago and now has word finding difficulties.  He had his hospitalizations that his stent worked on at [facility].  The results of which including his most recent echocardiogram are not available today.  He reports some shortness of breath.  He is not taking aspirin or Plavix due to a GI bleed in the past.  He does take lisinopril but did not take it recently.  We have no echocardiogram in our system.  We have no baseline blood work.    Past Medical History:   Diagnosis Date   • Anxiety    • Heart attack (HCC)    • Hypertension    • Stroke (cerebrum) (HCC)      History reviewed. No pertinent surgical history.  History reviewed. No pertinent family history.  Social History     Socioeconomic History   • Marital status:      Spouse name: Not on file   • Number of children: Not on file   • Years of education: Not on file   • Highest education level: Not on file   Occupational History   • Not on file   Tobacco Use   • Smoking status: Never Smoker   • Smokeless tobacco: Never Used   Vaping Use   • Vaping Use: Never used   Substance and Sexual Activity   • Alcohol use: No   • Drug use: No   • Sexual activity: Not Currently     Comment: Single   Other Topics Concern   • Not on file   Social History Narrative   • Not on file     Social Determinants of Health     Financial Resource Strain: Not on file   Food Insecurity: Not on file   Transportation Needs: Not on file   Physical Activity: Not on file   Stress: Not on file   Social Connections: Not on file   Intimate Partner Violence: Not on file   Housing Stability: Not on file     Allergies   Allergen Reactions   • Morphine Anxiety and Unspecified     Hallucinations   • Amlodipine      Edema   • Cortisone       Outpatient Encounter Medications as of 4/29/2022   Medication Sig Dispense Refill   • LORazepam (ATIVAN) 1 MG Tab Take 1 mg by mouth every four hours as needed for Anxiety.     • donepezil (ARICEPT) 5 MG Tab Take 5 mg by mouth every evening.     • clopidogrel (PLAVIX) 75 MG Tab Take 1 Tablet by mouth every day. 30 Tablet 11   • spironolactone (ALDACTONE) 25 MG Tab Take 0.5 Tablets by mouth every day. 30 Tablet 11   • metoprolol SR (TOPROL XL) 25 MG TABLET SR 24 HR Take 0.5 Tablets by mouth every day. 30 Tablet 11   • gabapentin (NEURONTIN) 100 MG Cap Take 1 Capsule by mouth 3 times a day. 90 Capsule 11   • FLUoxetine (PROZAC) 20 MG Cap Take 1 Capsule by mouth every day. 90 Capsule 1   • nitroglycerin (NITROSTAT) 0.4 MG SL Tab PLACE ONE TABLET UNDER TONGUE EVERY 5 MINUTES AS NEEDED maximum 3 tablets in 15 minutes 25 Tablet 1   • lisinopril (PRINIVIL) 20 MG Tab Take 1 Tablet by mouth every day. 90 Tablet 1   • omeprazole (PRILOSEC) 20 MG delayed-release capsule Take 1 Capsule by mouth every day. 1/2 hour prior to same meal. 90 Capsule 1   • multivitamin (THERAGRAN) Tab Take 1 Tab by mouth every day.     • vitamin D (CHOLECALCIFEROL) 1000 UNIT Tab Take 1,000 Units by mouth every day.     • [DISCONTINUED] gabapentin (NEURONTIN) 100 MG Cap Take 100 mg by mouth 3 times a day.     • [DISCONTINUED] clopidogrel (PLAVIX) 75 MG Tab Take 75 mg by mouth every day. (Patient not taking: Reported on 4/29/2022)     • [DISCONTINUED] simvastatin (ZOCOR) 10 MG Tab Take 1 Tablet by mouth every evening. (Patient not taking: Reported on 4/29/2022) 90 Tablet 1   • [DISCONTINUED] simvastatin (ZOCOR) 10 MG Tab Take 1 Tab by mouth every evening. (Patient not taking: No sig reported) 90 Tab 1     No facility-administered encounter medications on file as of 4/29/2022.     Review of Systems   Constitutional: Negative.  Negative for chills, fever and malaise/fatigue.   HENT: Negative.  Negative for sore throat.    Eyes: Negative.   "  Respiratory: Positive for shortness of breath. Negative for cough, hemoptysis, sputum production, wheezing and stridor.    Cardiovascular: Negative.  Negative for chest pain, palpitations, orthopnea, claudication, leg swelling and PND.   Gastrointestinal: Negative.    Genitourinary: Negative.    Musculoskeletal: Negative.    Skin: Negative.    Neurological: Negative.  Negative for dizziness, loss of consciousness and weakness.   Endo/Heme/Allergies: Negative.  Does not bruise/bleed easily.   All other systems reviewed and are negative.             Objective     /98 (BP Location: Left arm, Patient Position: Sitting, BP Cuff Size: Adult)   Pulse 66   Resp 18   Ht 1.753 m (5' 9\")   Wt 54.9 kg (121 lb)   SpO2 99%   BMI 17.87 kg/m²     Physical Exam  Vitals and nursing note reviewed.   Constitutional:       General: He is not in acute distress.     Appearance: He is well-developed. He is not diaphoretic.   HENT:      Head: Normocephalic and atraumatic.      Right Ear: External ear normal.      Left Ear: External ear normal.      Nose: Nose normal.      Mouth/Throat:      Pharynx: No oropharyngeal exudate.   Eyes:      General: No scleral icterus.        Right eye: No discharge.         Left eye: No discharge.      Conjunctiva/sclera: Conjunctivae normal.      Pupils: Pupils are equal, round, and reactive to light.   Neck:      Vascular: No JVD.   Cardiovascular:      Rate and Rhythm: Normal rate and regular rhythm.      Heart sounds: No murmur heard.    No friction rub. No gallop.   Pulmonary:      Effort: Pulmonary effort is normal. No respiratory distress.      Breath sounds: No stridor. No wheezing or rales.   Chest:      Chest wall: No tenderness.   Abdominal:      General: There is no distension.      Palpations: Abdomen is soft.      Tenderness: There is no guarding.   Musculoskeletal:         General: No tenderness or deformity. Normal range of motion.      Cervical back: Neck supple.   Skin:     " General: Skin is warm and dry.      Coloration: Skin is not pale.      Findings: No erythema or rash.   Neurological:      Mental Status: He is alert.      Cranial Nerves: No cranial nerve deficit.      Motor: No abnormal muscle tone.      Coordination: Coordination normal.      Deep Tendon Reflexes: Reflexes are normal and symmetric. Reflexes normal.   Psychiatric:         Behavior: Behavior normal.         Thought Content: Thought content normal.         Judgment: Judgment normal.                Assessment & Plan     1. DNR (do not resuscitate)     2. History of coronary artery stent placement  clopidogrel (PLAVIX) 75 MG Tab    spironolactone (ALDACTONE) 25 MG Tab    metoprolol SR (TOPROL XL) 25 MG TABLET SR 24 HR    CBC W/ DIFF W/O PLATELETS    Comp Metabolic Panel    Lipid Profile   3. Pure hypercholesterolemia  EC-ECHOCARDIOGRAM COMPLETE W/O CONT    clopidogrel (PLAVIX) 75 MG Tab    spironolactone (ALDACTONE) 25 MG Tab    metoprolol SR (TOPROL XL) 25 MG TABLET SR 24 HR    gabapentin (NEURONTIN) 100 MG Cap    CBC W/ DIFF W/O PLATELETS    Comp Metabolic Panel    Lipid Profile    proBrain Natriuretic Peptide, NT   4. Primary hypertension  EC-ECHOCARDIOGRAM COMPLETE W/O CONT    clopidogrel (PLAVIX) 75 MG Tab    spironolactone (ALDACTONE) 25 MG Tab    metoprolol SR (TOPROL XL) 25 MG TABLET SR 24 HR    gabapentin (NEURONTIN) 100 MG Cap    CBC W/ DIFF W/O PLATELETS    Comp Metabolic Panel    Lipid Profile    proBrain Natriuretic Peptide, NT   5. Arteriosclerosis of coronary artery  EC-ECHOCARDIOGRAM COMPLETE W/O CONT    spironolactone (ALDACTONE) 25 MG Tab    metoprolol SR (TOPROL XL) 25 MG TABLET SR 24 HR    gabapentin (NEURONTIN) 100 MG Cap    CBC W/ DIFF W/O PLATELETS    Comp Metabolic Panel    Lipid Profile    proBrain Natriuretic Peptide, NT   6. SOB (shortness of breath)  proBrain Natriuretic Peptide, NT       Medical Decision Making: Today's Assessment/Status/Plan:        77-year-old male with new onset heart  failure likely from ischemic cardiomyopathy.  I will start him on a low-dose of metoprolol 12-1/2 and spironolactone of 12-1/2.  We will keep him on lisinopril.  Given his reported history of GI bleed we will start him on Plavix.  I will get a stat echocardiogram.  We need baseline blood work.  I will see him back in 2 days.

## 2022-05-03 ENCOUNTER — HOSPITAL ENCOUNTER (OUTPATIENT)
Dept: LAB | Facility: MEDICAL CENTER | Age: 77
End: 2022-05-03
Attending: INTERNAL MEDICINE
Payer: MEDICARE

## 2022-05-03 DIAGNOSIS — R06.02 SOB (SHORTNESS OF BREATH): ICD-10-CM

## 2022-05-03 DIAGNOSIS — I25.10 ARTERIOSCLEROSIS OF CORONARY ARTERY: ICD-10-CM

## 2022-05-03 DIAGNOSIS — I10 PRIMARY HYPERTENSION: ICD-10-CM

## 2022-05-03 DIAGNOSIS — E78.00 PURE HYPERCHOLESTEROLEMIA: ICD-10-CM

## 2022-05-03 DIAGNOSIS — Z95.5 HISTORY OF CORONARY ARTERY STENT PLACEMENT: ICD-10-CM

## 2022-05-03 LAB
ALBUMIN SERPL BCP-MCNC: 4.6 G/DL (ref 3.2–4.9)
ALBUMIN/GLOB SERPL: 1.7 G/DL
ALP SERPL-CCNC: 87 U/L (ref 30–99)
ALT SERPL-CCNC: 23 U/L (ref 2–50)
ANION GAP SERPL CALC-SCNC: 13 MMOL/L (ref 7–16)
AST SERPL-CCNC: 27 U/L (ref 12–45)
BASOPHILS # BLD AUTO: 0.7 % (ref 0–1.8)
BASOPHILS # BLD: 0.04 K/UL (ref 0–0.12)
BILIRUB SERPL-MCNC: 1.1 MG/DL (ref 0.1–1.5)
BUN SERPL-MCNC: 21 MG/DL (ref 8–22)
CALCIUM SERPL-MCNC: 10.1 MG/DL (ref 8.5–10.5)
CHLORIDE SERPL-SCNC: 102 MMOL/L (ref 96–112)
CHOLEST SERPL-MCNC: 183 MG/DL (ref 100–199)
CO2 SERPL-SCNC: 24 MMOL/L (ref 20–33)
CREAT SERPL-MCNC: 0.86 MG/DL (ref 0.5–1.4)
EOSINOPHIL # BLD AUTO: 0.1 K/UL (ref 0–0.51)
EOSINOPHIL NFR BLD: 1.6 % (ref 0–6.9)
ERYTHROCYTE [DISTWIDTH] IN BLOOD BY AUTOMATED COUNT: 43.6 FL (ref 35.9–50)
GFR SERPLBLD CREATININE-BSD FMLA CKD-EPI: 88 ML/MIN/1.73 M 2
GLOBULIN SER CALC-MCNC: 2.7 G/DL (ref 1.9–3.5)
GLUCOSE SERPL-MCNC: 104 MG/DL (ref 65–99)
HCT VFR BLD AUTO: 50.3 % (ref 42–52)
HDLC SERPL-MCNC: 58 MG/DL
HGB BLD-MCNC: 16.7 G/DL (ref 14–18)
IMM GRANULOCYTES # BLD AUTO: 0.01 K/UL (ref 0–0.11)
IMM GRANULOCYTES NFR BLD AUTO: 0.2 % (ref 0–0.9)
LDLC SERPL CALC-MCNC: 107 MG/DL
LYMPHOCYTES # BLD AUTO: 1.38 K/UL (ref 1–4.8)
LYMPHOCYTES NFR BLD: 22.7 % (ref 22–41)
MCH RBC QN AUTO: 32.2 PG (ref 27–33)
MCHC RBC AUTO-ENTMCNC: 33.2 G/DL (ref 33.7–35.3)
MCV RBC AUTO: 96.9 FL (ref 81.4–97.8)
MONOCYTES # BLD AUTO: 0.64 K/UL (ref 0–0.85)
MONOCYTES NFR BLD AUTO: 10.5 % (ref 0–13.4)
NEUTROPHILS # BLD AUTO: 3.92 K/UL (ref 1.82–7.42)
NEUTROPHILS NFR BLD: 64.3 % (ref 44–72)
NRBC # BLD AUTO: 0 K/UL
NRBC BLD-RTO: 0 /100 WBC
NT-PROBNP SERPL IA-MCNC: 1084 PG/ML (ref 0–125)
POTASSIUM SERPL-SCNC: 4.5 MMOL/L (ref 3.6–5.5)
PROT SERPL-MCNC: 7.3 G/DL (ref 6–8.2)
RBC # BLD AUTO: 5.19 M/UL (ref 4.7–6.1)
SODIUM SERPL-SCNC: 139 MMOL/L (ref 135–145)
TRIGL SERPL-MCNC: 91 MG/DL (ref 0–149)
WBC # BLD AUTO: 6.1 K/UL (ref 4.8–10.8)

## 2022-05-03 PROCEDURE — 80053 COMPREHEN METABOLIC PANEL: CPT

## 2022-05-03 PROCEDURE — 80061 LIPID PANEL: CPT

## 2022-05-03 PROCEDURE — 85041 AUTOMATED RBC COUNT: CPT

## 2022-05-03 PROCEDURE — 36415 COLL VENOUS BLD VENIPUNCTURE: CPT

## 2022-05-03 PROCEDURE — 85014 HEMATOCRIT: CPT

## 2022-05-03 PROCEDURE — 85048 AUTOMATED LEUKOCYTE COUNT: CPT

## 2022-05-03 PROCEDURE — 85018 HEMOGLOBIN: CPT

## 2022-05-03 PROCEDURE — 83880 ASSAY OF NATRIURETIC PEPTIDE: CPT

## 2022-05-06 ENCOUNTER — APPOINTMENT (OUTPATIENT)
Dept: CARDIOLOGY | Facility: MEDICAL CENTER | Age: 77
End: 2022-05-06
Payer: MEDICARE

## 2022-05-17 ENCOUNTER — TELEPHONE (OUTPATIENT)
Dept: MEDICAL GROUP | Facility: MEDICAL CENTER | Age: 77
End: 2022-05-17

## 2022-05-17 NOTE — TELEPHONE ENCOUNTER
Phone Number Called: [phone] (home)       Call outcome: Left detailed message for patient. Informed to call back with any additional questions.    Message: LVM to re tatiana missed appt THR

## 2022-05-29 DIAGNOSIS — F41.1 GAD (GENERALIZED ANXIETY DISORDER): ICD-10-CM

## 2022-05-29 DIAGNOSIS — K21.00 GASTROESOPHAGEAL REFLUX DISEASE WITH ESOPHAGITIS WITHOUT HEMORRHAGE: ICD-10-CM

## 2022-05-29 DIAGNOSIS — R07.9 CHEST PAIN, UNSPECIFIED TYPE: ICD-10-CM

## 2022-05-29 DIAGNOSIS — I10 PRIMARY HYPERTENSION: ICD-10-CM

## 2022-05-31 RX ORDER — LISINOPRIL 20 MG/1
20 TABLET ORAL DAILY
Qty: 90 TABLET | Refills: 0 | Status: SHIPPED
Start: 2022-05-31 | End: 2022-08-01

## 2022-05-31 RX ORDER — NITROGLYCERIN 0.4 MG/1
TABLET SUBLINGUAL
Qty: 25 TABLET | Refills: 0 | Status: SHIPPED | OUTPATIENT
Start: 2022-05-31 | End: 2022-10-10

## 2022-05-31 RX ORDER — OMEPRAZOLE 20 MG/1
20 CAPSULE, DELAYED RELEASE ORAL DAILY
Qty: 90 CAPSULE | Refills: 0 | Status: SHIPPED | OUTPATIENT
Start: 2022-05-31 | End: 2022-07-05

## 2022-05-31 RX ORDER — FLUOXETINE HYDROCHLORIDE 20 MG/1
20 CAPSULE ORAL DAILY
Qty: 90 CAPSULE | Refills: 0 | Status: SHIPPED | OUTPATIENT
Start: 2022-05-31 | End: 2022-10-18

## 2022-05-31 NOTE — TELEPHONE ENCOUNTER
Phone Number Called: [phone]    Call outcome: Left detailed message for patient. Informed to call back with any additional questions.    Message: tried to contact pt in regards to schedule an appt for further medication refills.

## 2022-06-02 ENCOUNTER — TELEPHONE (OUTPATIENT)
Dept: CARDIOLOGY | Facility: MEDICAL CENTER | Age: 77
End: 2022-06-02
Payer: MEDICARE

## 2022-06-02 NOTE — TELEPHONE ENCOUNTER
Called patient to move appointment location from [facility] [address] [place] [address] to [place] [address].  LVM for patient to CB to confirm location.

## 2022-07-05 DIAGNOSIS — K21.00 GASTROESOPHAGEAL REFLUX DISEASE WITH ESOPHAGITIS WITHOUT HEMORRHAGE: ICD-10-CM

## 2022-07-05 RX ORDER — OMEPRAZOLE 20 MG/1
CAPSULE, DELAYED RELEASE ORAL
Qty: 90 CAPSULE | Refills: 0 | Status: SHIPPED | OUTPATIENT
Start: 2022-07-05 | End: 2022-10-18

## 2022-08-01 ENCOUNTER — APPOINTMENT (OUTPATIENT)
Dept: RADIOLOGY | Facility: MEDICAL CENTER | Age: 77
End: 2022-08-01
Attending: EMERGENCY MEDICINE
Payer: MEDICARE

## 2022-08-01 ENCOUNTER — HOSPITAL ENCOUNTER (EMERGENCY)
Facility: MEDICAL CENTER | Age: 77
End: 2022-08-01
Attending: EMERGENCY MEDICINE
Payer: MEDICARE

## 2022-08-01 ENCOUNTER — APPOINTMENT (OUTPATIENT)
Dept: RADIOLOGY | Facility: MEDICAL CENTER | Age: 77
End: 2022-08-01
Payer: MEDICARE

## 2022-08-01 ENCOUNTER — HOSPITAL ENCOUNTER (OUTPATIENT)
Dept: CARDIOLOGY | Facility: MEDICAL CENTER | Age: 77
End: 2022-08-01
Attending: INTERNAL MEDICINE
Payer: MEDICARE

## 2022-08-01 VITALS
SYSTOLIC BLOOD PRESSURE: 143 MMHG | BODY MASS INDEX: 17.77 KG/M2 | OXYGEN SATURATION: 97 % | DIASTOLIC BLOOD PRESSURE: 66 MMHG | WEIGHT: 120 LBS | HEIGHT: 69 IN | TEMPERATURE: 98 F | HEART RATE: 60 BPM | RESPIRATION RATE: 20 BRPM

## 2022-08-01 DIAGNOSIS — S06.9X0A MILD TRAUMATIC BRAIN INJURY, WITHOUT LOSS OF CONSCIOUSNESS, INITIAL ENCOUNTER (HCC): ICD-10-CM

## 2022-08-01 DIAGNOSIS — I25.10 ARTERIOSCLEROSIS OF CORONARY ARTERY: ICD-10-CM
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
## 2022-08-01 DIAGNOSIS — I10 PRIMARY HYPERTENSION: ICD-10-CM

## 2022-08-01 DIAGNOSIS — I50.9 CHRONIC CONGESTIVE HEART FAILURE, UNSPECIFIED HEART FAILURE TYPE (HCC): ICD-10-CM

## 2022-08-01 DIAGNOSIS — E78.00 PURE HYPERCHOLESTEROLEMIA: ICD-10-CM

## 2022-08-01 LAB
ALBUMIN SERPL BCP-MCNC: 4.9 G/DL (ref 3.2–4.9)
ALBUMIN/GLOB SERPL: 1.9 G/DL
ALP SERPL-CCNC: 95 U/L (ref 30–99)
ALT SERPL-CCNC: 19 U/L (ref 2–50)
ANION GAP SERPL CALC-SCNC: 12 MMOL/L (ref 7–16)
AST SERPL-CCNC: 25 U/L (ref 12–45)
BASOPHILS # BLD AUTO: 0.5 % (ref 0–1.8)
BASOPHILS # BLD: 0.03 K/UL (ref 0–0.12)
BILIRUB SERPL-MCNC: 0.8 MG/DL (ref 0.1–1.5)
BUN SERPL-MCNC: 16 MG/DL (ref 8–22)
CALCIUM SERPL-MCNC: 10.1 MG/DL (ref 8.5–10.5)
CHLORIDE SERPL-SCNC: 106 MMOL/L (ref 96–112)
CO2 SERPL-SCNC: 27 MMOL/L (ref 20–33)
CREAT SERPL-MCNC: 0.88 MG/DL (ref 0.5–1.4)
EKG IMPRESSION: NORMAL
EOSINOPHIL # BLD AUTO: 0.22 K/UL (ref 0–0.51)
EOSINOPHIL NFR BLD: 3.6 % (ref 0–6.9)
ERYTHROCYTE [DISTWIDTH] IN BLOOD BY AUTOMATED COUNT: 45.6 FL (ref 35.9–50)
GFR SERPLBLD CREATININE-BSD FMLA CKD-EPI: 87 ML/MIN/1.73 M 2
GLOBULIN SER CALC-MCNC: 2.6 G/DL (ref 1.9–3.5)
GLUCOSE SERPL-MCNC: 109 MG/DL (ref 65–99)
HCT VFR BLD AUTO: 48.5 % (ref 42–52)
HGB BLD-MCNC: 16.3 G/DL (ref 14–18)
IMM GRANULOCYTES # BLD AUTO: 0.01 K/UL (ref 0–0.11)
IMM GRANULOCYTES NFR BLD AUTO: 0.2 % (ref 0–0.9)
LYMPHOCYTES # BLD AUTO: 1.1 K/UL (ref 1–4.8)
LYMPHOCYTES NFR BLD: 17.9 % (ref 22–41)
MCH RBC QN AUTO: 32.3 PG (ref 27–33)
MCHC RBC AUTO-ENTMCNC: 33.6 G/DL (ref 33.7–35.3)
MCV RBC AUTO: 96.2 FL (ref 81.4–97.8)
MONOCYTES # BLD AUTO: 0.61 K/UL (ref 0–0.85)
MONOCYTES NFR BLD AUTO: 10 % (ref 0–13.4)
NEUTROPHILS # BLD AUTO: 4.16 K/UL (ref 1.82–7.42)
NEUTROPHILS NFR BLD: 67.8 % (ref 44–72)
NRBC # BLD AUTO: 0 K/UL
NRBC BLD-RTO: 0 /100 WBC
NT-PROBNP SERPL IA-MCNC: 1487 PG/ML (ref 0–125)
PLATELET # BLD AUTO: 172 K/UL (ref 164–446)
PMV BLD AUTO: 12.7 FL (ref 9–12.9)
POTASSIUM SERPL-SCNC: 4.2 MMOL/L (ref 3.6–5.5)
PROT SERPL-MCNC: 7.5 G/DL (ref 6–8.2)
RBC # BLD AUTO: 5.04 M/UL (ref 4.7–6.1)
SODIUM SERPL-SCNC: 145 MMOL/L (ref 135–145)
TROPONIN T SERPL-MCNC: 12 NG/L (ref 6–19)
WBC # BLD AUTO: 6.1 K/UL (ref 4.8–10.8)

## 2022-08-01 PROCEDURE — 700111 HCHG RX REV CODE 636 W/ 250 OVERRIDE (IP): Performed by: EMERGENCY MEDICINE

## 2022-08-01 PROCEDURE — 84484 ASSAY OF TROPONIN QUANT: CPT

## 2022-08-01 PROCEDURE — 80053 COMPREHEN METABOLIC PANEL: CPT

## 2022-08-01 PROCEDURE — 71045 X-RAY EXAM CHEST 1 VIEW: CPT

## 2022-08-01 PROCEDURE — 93005 ELECTROCARDIOGRAM TRACING: CPT | Performed by: EMERGENCY MEDICINE

## 2022-08-01 PROCEDURE — 99285 EMERGENCY DEPT VISIT HI MDM: CPT

## 2022-08-01 PROCEDURE — 93005 ELECTROCARDIOGRAM TRACING: CPT

## 2022-08-01 PROCEDURE — 93306 TTE W/DOPPLER COMPLETE: CPT

## 2022-08-01 PROCEDURE — 70450 CT HEAD/BRAIN W/O DYE: CPT | Mod: ME

## 2022-08-01 PROCEDURE — 36415 COLL VENOUS BLD VENIPUNCTURE: CPT

## 2022-08-01 PROCEDURE — 83880 ASSAY OF NATRIURETIC PEPTIDE: CPT

## 2022-08-01 PROCEDURE — 700117 HCHG RX CONTRAST REV CODE 255: Performed by: INTERNAL MEDICINE

## 2022-08-01 PROCEDURE — 96374 THER/PROPH/DIAG INJ IV PUSH: CPT | Mod: XU

## 2022-08-01 PROCEDURE — 85025 COMPLETE CBC W/AUTO DIFF WBC: CPT

## 2022-08-01 RX ORDER — HYDRALAZINE HYDROCHLORIDE 20 MG/ML
20 INJECTION INTRAMUSCULAR; INTRAVENOUS EVERY 6 HOURS PRN
Status: DISCONTINUED | OUTPATIENT
Start: 2022-08-01 | End: 2022-08-02 | Stop reason: HOSPADM

## 2022-08-01 RX ORDER — LISINOPRIL 40 MG/1
40 TABLET ORAL DAILY
Qty: 30 TABLET | Refills: 0 | Status: SHIPPED | OUTPATIENT
Start: 2022-08-01 | End: 2022-10-18

## 2022-08-01 RX ORDER — NITROGLYCERIN 0.4 MG/1
0.8 TABLET SUBLINGUAL
Status: DISCONTINUED | OUTPATIENT
Start: 2022-08-01 | End: 2022-08-02 | Stop reason: HOSPADM

## 2022-08-01 RX ORDER — LABETALOL HYDROCHLORIDE 5 MG/ML
20 INJECTION, SOLUTION INTRAVENOUS
Status: DISCONTINUED | OUTPATIENT
Start: 2022-08-01 | End: 2022-08-02 | Stop reason: HOSPADM

## 2022-08-01 RX ADMIN — HYDRALAZINE HYDROCHLORIDE 20 MG: 20 INJECTION INTRAMUSCULAR; INTRAVENOUS at 18:47

## 2022-08-01 RX ADMIN — HUMAN ALBUMIN MICROSPHERES AND PERFLUTREN 3 ML: 10; .22 INJECTION, SOLUTION INTRAVENOUS at 15:45

## 2022-08-01 ASSESSMENT — LIFESTYLE VARIABLES
HAVE YOU EVER FELT YOU SHOULD CUT DOWN ON YOUR DRINKING: NO
TOTAL SCORE: 0
HAVE PEOPLE ANNOYED YOU BY CRITICIZING YOUR DRINKING: NO
EVER HAD A DRINK FIRST THING IN THE MORNING TO STEADY YOUR NERVES TO GET RID OF A HANGOVER: NO
CONSUMPTION TOTAL: NEGATIVE
TOTAL SCORE: 0
TOTAL SCORE: 0
AVERAGE NUMBER OF DAYS PER WEEK YOU HAVE A DRINK CONTAINING ALCOHOL: 0
EVER FELT BAD OR GUILTY ABOUT YOUR DRINKING: NO
ON A TYPICAL DAY WHEN YOU DRINK ALCOHOL HOW MANY DRINKS DO YOU HAVE: 0
HOW MANY TIMES IN THE PAST YEAR HAVE YOU HAD 5 OR MORE DRINKS IN A DAY: 0
DO YOU DRINK ALCOHOL: NO

## 2022-08-01 NOTE — ED TRIAGE NOTES
Pt sent here for ECHO following exam. Staff was concerned after the exam and brought him to the ED. PT has hx of CHF and has chronic LE edema. He report he has been feeling in worse SOB or fatigue. He does have LE swelling that he report is slightly worse than normal.

## 2022-08-02 LAB
LV EJECT FRACT  99904: 25
LV EJECT FRACT MOD 2C 99903: 21.34
LV EJECT FRACT MOD 4C 99902: 18.31
LV EJECT FRACT MOD BP 99901: 20.35

## 2022-08-02 PROCEDURE — 93306 TTE W/DOPPLER COMPLETE: CPT | Mod: 26 | Performed by: INTERNAL MEDICINE

## 2022-08-02 NOTE — DISCHARGE INSTRUCTIONS
Please double your lisinopril to 40 mg a day.  You may take 2 tablets of your current medicine until you run out then start the 40 mg tablets.  Call cardiology for follow-up.  Return to the ER for persistent or worsening shortness of breath, worsening swelling or ill appearance.

## 2022-08-02 NOTE — ED NOTES
PT states I think I am hungry and that's why I am dizzy. ERP aware. ERP approved to give PT a meal. PT sitting up in bed eating.

## 2022-08-02 NOTE — ED PROVIDER NOTES
ED Provider Note    CHIEF COMPLAINT  Chief Complaint   Patient presents with   • Sent by MD   • CHF (Chronic)       HPI  Moe Nickerson is a 77 y.o. male who presents sent in by his physician for dependent edema for the last couple of weeks and some dyspnea today.  Other than this he has not had dyspnea on exertion orthopnea.  He may have a history of heart failure.  He says he is here for an echocardiogram.  Denies fever, cough or chest pain.  No history of DVT or PE.  No abdominal pain but he does have right-sided abdominal pain often at night.    Patient also fell when he was getting on a folding chair that collapsed 3 to 4 days ago and struck his head on concrete without loss of consciousness.  He has bruising to the right side of his head and a headache.    REVIEW OF SYSTEMS  Pertinent positives include: Swelling, dyspnea, possible CHF.  Pertinent negatives include: EVAR, cough, chest pain,, calf pain.  10+ systems reviewed and negative.      PAST MEDICAL HISTORY  Past Medical History:   Diagnosis Date   • Anxiety    • Congestive heart failure (HCC)    • Heart attack (HCC)    • Hypertension    • Stroke (cerebrum) (HCC)        SOCIAL HISTORY  Social History     Tobacco Use   • Smoking status: Never Smoker   • Smokeless tobacco: Never Used   Vaping Use   • Vaping Use: Never used   Substance Use Topics   • Alcohol use: No   • Drug use: No     Social History     Substance and Sexual Activity   Drug Use No       CURRENT MEDICATIONS  No current facility-administered medications for this encounter.     Current Outpatient Medications   Medication Sig Dispense Refill   • omeprazole (PRILOSEC) 20 MG delayed-release capsule TAKE 1 CAPSULE BY MOUTH 1/2 HOUR PRIOR TO SAME MEAL ONCE DAILY 90 Capsule 0   • FLUoxetine (PROZAC) 20 MG Cap Take 1 Capsule by mouth every day. 90 Capsule 0   • nitroglycerin (NITROSTAT) 0.4 MG SL Tab PLACE ONE TABLET UNDER TONGUE EVERY 5 MINUTES AS NEEDED maximum 3 tablets in 15 minutes 25  "Tablet 0   • lisinopril (PRINIVIL) 20 MG Tab Take 1 Tablet by mouth every day. 90 Tablet 0   • LORazepam (ATIVAN) 1 MG Tab Take 1 mg by mouth every four hours as needed for Anxiety.     • donepezil (ARICEPT) 5 MG Tab Take 5 mg by mouth every evening.     • clopidogrel (PLAVIX) 75 MG Tab Take 1 Tablet by mouth every day. 30 Tablet 11   • spironolactone (ALDACTONE) 25 MG Tab Take 0.5 Tablets by mouth every day. 30 Tablet 11   • metoprolol SR (TOPROL XL) 25 MG TABLET SR 24 HR Take 0.5 Tablets by mouth every day. 30 Tablet 11   • gabapentin (NEURONTIN) 100 MG Cap Take 1 Capsule by mouth 3 times a day. 90 Capsule 11   • multivitamin (THERAGRAN) Tab Take 1 Tab by mouth every day.     • vitamin D (CHOLECALCIFEROL) 1000 UNIT Tab Take 1,000 Units by mouth every day.         ALLERGIES  Allergies   Allergen Reactions   • Morphine Anxiety and Unspecified     Hallucinations   • Amlodipine      Edema   • Cortisone        PHYSICAL EXAM  VITAL SIGNS: BP (!) 204/95   Pulse (!) 59   Temp 36.4 °C (97.6 °F) (Temporal)   Resp 16   Ht 1.753 m (5' 9\")   Wt 54.4 kg (120 lb)   SpO2 99%   BMI 17.72 kg/m²   Reviewed and per tensive, bradycardic, afebrile, no hypoxia room air  Constitutional: Well developed, Well nourished, pale and thin.  HENT: Normocephalic, right frontal and right lateral periorbital ecchymosis, bilateral external ears normal, Wearing mask.   Eyes: PERRLA, conjunctiva pink, no scleral icterus.   Cardiovascular: Regular S1-S2 without murmur, rub, gallop.  1+ bilateral dependent edema without calf tenderness.  No JVD or bruit.  Respiratory: No rales, rhonchi, wheeze or cough.  Gastrointestinal: Soft, nontender, nondistended, no organomegaly.  Skin: No erythema, no rash.   Genitourinary:  No costovertebral angle tenderness.   Neurologic: Alert & oriented x 3, cranial nerves 2-12 intact by passive exam.  No focal deficit noted.  Psychiatric: Affect normal, Judgment normal, Mood normal.     DIFFERENTIAL DIAGNOSIS:  CHF, " dependent edema, doubt DVT, doubt asthma, doubt COPD, pulmonary hypertension, doubt myocardial ischemia.    EKG  EKG Interpretation 5:52 PM    Interpreted by me.  Indication: Dyspnea    Rhythm: normal sinus   Rate: Bradycardic at 55  Axis: normal  Ectopy: none  Conduction: normal  ST/T Waves: no acute change  Q Waves: none  R Wave progression: Poor septal R wave progression  High voltage QRS complexes    Clinical Impression: Has bradycardia with LVH    RADIOLOGY/PROCEDURES  CT-HEAD W/O   Final Result         1.  No acute intracranial abnormality is identified, there are nonspecific white matter changes, commonly associated with small vessel ischemic disease.  Associated mild cerebral atrophy is noted.   2.  Atherosclerosis.         DX-CHEST-PORTABLE (1 VIEW)   Final Result      1.  Hyperinflation consistent with COPD   2.  No pneumonia or pneumothorax.      Echocardiogram results obtained from Dr. Elise, cardiology, who said his EF was 30%.  This appears to all be chronic.  She felt we could increase his blood pressure medicine lisinopril to 40 mg and if needed titrate beyond that with hydralazine for better afterload reduction.  She believes he can be followed up in clinic since he is not dyspneic fluid overloaded and does not have significant edema at this time.    LABORATORY:  Results for orders placed or performed during the hospital encounter of 08/01/22   CBC with Differential   Result Value Ref Range    WBC 6.1 4.8 - 10.8 K/uL    RBC 5.04 4.70 - 6.10 M/uL    Hemoglobin 16.3 14.0 - 18.0 g/dL    Hematocrit 48.5 42.0 - 52.0 %    MCV 96.2 81.4 - 97.8 fL    MCH 32.3 27.0 - 33.0 pg    MCHC 33.6 (L) 33.7 - 35.3 g/dL    RDW 45.6 35.9 - 50.0 fL    Platelet Count 172 164 - 446 K/uL    MPV 12.7 9.0 - 12.9 fL    Neutrophils-Polys 67.80 44.00 - 72.00 %    Lymphocytes 17.90 (L) 22.00 - 41.00 %    Monocytes 10.00 0.00 - 13.40 %    Eosinophils 3.60 0.00 - 6.90 %    Basophils 0.50 0.00 - 1.80 %    Immature Granulocytes 0.20  0.00 - 0.90 %    Nucleated RBC 0.00 /100 WBC    Neutrophils (Absolute) 4.16 1.82 - 7.42 K/uL    Lymphs (Absolute) 1.10 1.00 - 4.80 K/uL    Monos (Absolute) 0.61 0.00 - 0.85 K/uL    Eos (Absolute) 0.22 0.00 - 0.51 K/uL    Baso (Absolute) 0.03 0.00 - 0.12 K/uL    Immature Granulocytes (abs) 0.01 0.00 - 0.11 K/uL    NRBC (Absolute) 0.00 K/uL   Complete Metabolic Panel (CMP)   Result Value Ref Range    Sodium 145 135 - 145 mmol/L    Potassium 4.2 3.6 - 5.5 mmol/L    Chloride 106 96 - 112 mmol/L    Co2 27 20 - 33 mmol/L    Anion Gap 12.0 7.0 - 16.0    Glucose 109 (H) 65 - 99 mg/dL    Bun 16 8 - 22 mg/dL    Creatinine 0.88 0.50 - 1.40 mg/dL    Calcium 10.1 8.5 - 10.5 mg/dL    AST(SGOT) 25 12 - 45 U/L    ALT(SGPT) 19 2 - 50 U/L    Alkaline Phosphatase 95 30 - 99 U/L    Total Bilirubin 0.8 0.1 - 1.5 mg/dL    Albumin 4.9 3.2 - 4.9 g/dL    Total Protein 7.5 6.0 - 8.2 g/dL    Globulin 2.6 1.9 - 3.5 g/dL    A-G Ratio 1.9 g/dL   Troponin   Result Value Ref Range    Troponin T 12 6 - 19 ng/L       INTERVENTIONS:  Medications   labetalol (NORMODYNE/TRANDATE) injection 20 mg (has no administration in time range)   nitroglycerin (NITROSTAT) tablet 0.8 mg (has no administration in time range)   hydrALAZINE (APRESOLINE) injection 20 mg (20 mg Intravenous Given 8/1/22 1847)     Response: Improvement in blood pressure to 150s to 70s.    COURSE & MEDICAL DECISION MAKING  Patient with known heart failure presents with complaints of dyspnea today and 2 weeks of edema but he has very little edema on exam and no evidence of fluid overload and no hypoxia on assessment.  He recent head injury and mild traumatic brain injury without evidence of skull fracture or intracranial hemorrhage on CT imaging.  He has poorly controlled hypertension and would benefit from afterload reduction.    PLAN:  New Prescriptions    LISINOPRIL (PRINIVIL) 40 MG TABLET    Take 1 Tablet by mouth every day.     CHF handout given  Return for worsening shortness of  breath or significant swelling    [facility]  [address], [address]  [place] [id number] [phone]  Schedule an appointment as soon as possible for a visit         CONDITION: Stable, improved.    FINAL IMPRESSION  1. Chronic congestive heart failure, unspecified heart failure type (HCC)    2. Primary hypertension    3. Mild traumatic brain injury, without loss of consciousness, initial encounter ([facility])    4. Fall, initial encounter          Electronically signed by: Jamar Santoyo M.D., 8/1/2022 5:50 PM

## 2022-08-02 NOTE — ED NOTES
Spoke to patients son and he is NOT able to assist pt to get home today as he is not off work until 0130. RN will contact a [employer] cab for patient.

## 2022-08-02 NOTE — DISCHARGE PLANNING
RN requesting assistance with transportation for Pt. Discharge.   Pt arrived via EMS and family unable to provide assistance in getting pt to home.   SW assisted with cab voucher [phone]

## 2022-08-02 NOTE — ED NOTES
Pt reported dizziness when getting dressed. Dizziness did not resolve after few minutes of sitting. Dr. Santoyo aware. Order for ortho static bp readings and attempt to ambulated.

## 2022-08-02 NOTE — ED NOTES
Pt to red 3 by RN, agree with triage RN note, SOB x 2 weeks, BLE edema X 1 month, VSS on RA with HTN pt states he does not have rx for BP, sinus faye, GCS 15, NAD, up for ERP to see

## 2022-08-02 NOTE — ED NOTES
Pt resting on gurney. No acute distress. VSS. Pt states understanding of POC. No complaints/requests at this time. Call bell within reach.

## 2022-08-02 NOTE — PROGRESS NOTES
I was called to discuss this patients care with Dr. Santoyo. Patient had an echo done today as an ouptatient.  Wondering about a read on echo.  Pt currently in emergency room.  BP elevated.    Patient saw Dr. Butler 4/29/2022 with new onset heart failure felt likely ischemic cardiomyopathy.  The patient per history has a prior coronary artery stent.  No further information is available in regards to that in EPIC.  No prior echo I can find in [facility].  Preliminary report on the echo suggests severely decreased left ventricular systolic function with estimated left ventricular ejection fraction of 25 to 30%.  There is akinetic anteroseptal, septal and apical walls.  No clear apical thrombus.  No severe valvular stenosis or regurgitation.  His pulses high 40s to low 50s, sinus bradycardia.  Blood pressure is quite elevated 211/102.  BNP is not significantly higher than prior when checked but elevated at 1487.  Per Dr. Kwan he does not appear fluid overloaded and no complaints.  Therefore recommended he try hydralazine to get blood pressure down.  Cannot really increase Toprol succinate.  At some point can increase lisinopril or transition over to Entresto for decreased heart function.  If the patient's blood pressure does come down, and he feels fine, can follow-up with Dr. Butler outpatient for further work-up of likely ischemic cardiomyopathy.    At this time it was deemed no formal in person cardiology consultation was necessary, however if this changes due to changes in patient condition or abnormal test results, please re-consult cardiology.    Electronically Signed by:    Alisha Elise MD    8/1/2022    6:41 PM

## 2022-08-30 ENCOUNTER — HOSPITAL ENCOUNTER (EMERGENCY)
Facility: MEDICAL CENTER | Age: 77
End: 2022-08-30
Attending: EMERGENCY MEDICINE
Payer: MEDICARE

## 2022-08-30 ENCOUNTER — APPOINTMENT (OUTPATIENT)
Dept: RADIOLOGY | Facility: MEDICAL CENTER | Age: 77
End: 2022-08-30
Attending: EMERGENCY MEDICINE
Payer: MEDICARE

## 2022-08-30 VITALS
HEIGHT: 72 IN | OXYGEN SATURATION: 96 % | RESPIRATION RATE: 16 BRPM | BODY MASS INDEX: 16.25 KG/M2 | TEMPERATURE: 96.8 F | DIASTOLIC BLOOD PRESSURE: 74 MMHG | SYSTOLIC BLOOD PRESSURE: 155 MMHG | HEART RATE: 73 BPM | WEIGHT: 120 LBS

## 2022-08-30 DIAGNOSIS — S16.1XXA NECK STRAIN, INITIAL ENCOUNTER: ICD-10-CM

## 2022-08-30 DIAGNOSIS — S09.90XA CLOSED HEAD INJURY, INITIAL ENCOUNTER: Primary | ICD-10-CM

## 2022-08-30 DIAGNOSIS — S01.81XA LACERATION OF BROW WITHOUT COMPLICATION, INITIAL ENCOUNTER: ICD-10-CM

## 2022-08-30 PROCEDURE — 99284 EMERGENCY DEPT VISIT MOD MDM: CPT | Mod: 25

## 2022-08-30 PROCEDURE — 72125 CT NECK SPINE W/O DYE: CPT

## 2022-08-30 PROCEDURE — 70450 CT HEAD/BRAIN W/O DYE: CPT

## 2022-08-30 ASSESSMENT — ENCOUNTER SYMPTOMS
NECK PAIN: 0
LOSS OF CONSCIOUSNESS: 0
BACK PAIN: 0
FALLS: 1

## 2022-08-30 ASSESSMENT — FIBROSIS 4 INDEX: FIB4 SCORE: 2.63

## 2022-08-30 NOTE — ED PROVIDER NOTES
ED Provider Note     8/30/2022  12:37 AM    Means of Arrival: EMS  History obtained by: patient  Limitations: None  PCP: José Robert P.A.-C.      CHIEF COMPLAINT  Chief Complaint   Patient presents with    T-5000 GLF         [facility] [place]  Moe Nickerson is a 77 y.o. male who presents following a head injury. Code TBI was activated due to his age. Calvin Rosa was at his assisted living attempting to put his shoes on when he fell face first onto the tile ground. He hit his head resulting in a laceration on left eyebrow. He denies any loss of consciousness, neck pain, or back pain. Right now he just has a mild amount of pain were he hit his head. No alleviating or exacerbating factors were reported. He does not take any blood thinners or aspirin. He has no other medical complaints at this time.     REVIEW OF SYSTEMS  Review of Systems   Constitutional:         Positive for pain on left brow   Musculoskeletal:  Positive for falls. Negative for back pain and neck pain.   Skin:         Positive for laceration on left brow   Neurological:  Negative for loss of consciousness.   All other systems reviewed and are negative.  See HPI for further details.    PAST MEDICAL HISTORY   has a past medical history of Anxiety, Congestive heart failure (HCC), Heart attack (HCC), Hypertension, and Stroke (cerebrum) ([facility]).    FAMILY HISTORY  No family history reported.     SOCIAL HISTORY  Social History     Tobacco Use    Smoking status: Never    Smokeless tobacco: Never   Vaping Use    Vaping Use: Never used   Substance and Sexual Activity    Alcohol use: No    Drug use: No    Sexual activity: Not Currently     Comment: Single       SURGICAL HISTORY  patient denies any surgical history    CURRENT MEDICATIONS  Home Medications       Reviewed by Neelam Major R.N. (Registered Nurse) on 08/30/22 at 0119  Med List Status: Not Addressed     Medication Last Dose Status   clopidogrel (PLAVIX) 75 MG Tab  Active   donepezil (ARICEPT) 5 MG  Tab  Active   FLUoxetine (PROZAC) 20 MG Cap  Active   gabapentin (NEURONTIN) 100 MG Cap  Active   lisinopril (PRINIVIL) 40 MG tablet  Active   LORazepam (ATIVAN) 1 MG Tab  Active   metoprolol SR (TOPROL XL) 25 MG TABLET SR 24 HR  Active   multivitamin (THERAGRAN) Tab  Active   nitroglycerin (NITROSTAT) 0.4 MG SL Tab  Active   omeprazole (PRILOSEC) 20 MG delayed-release capsule  Active   spironolactone (ALDACTONE) 25 MG Tab  Active   vitamin D (CHOLECALCIFEROL) 1000 UNIT Tab  Active                    ALLERGIES  Allergies   Allergen Reactions    Morphine Anxiety and Unspecified     Hallucinations    Amlodipine      Edema    Cortisone        PHYSICAL EXAM  VITAL SIGNS: BP (!) 158/72   Pulse 67   SpO2 95%     Pulse ox interpretation: I interpret this pulse ox as normal.  Constitutional: Alert in no apparent distress.  HENT: Less than 1 cm superficial laceration on left brow, contusion at left zygomatic process without significant edema or crepitus. Bilateral external ears normal, Nose normal.   Eyes: Pupils are equal and reactive. Conjunctiva normal, Non-icteric. Normal extraocular movements  Neck: Midline cspine tenderness with normal range of motion. Supple, No stridor.   Cardiovascular: Regular rate and rhythm, no murmurs. Symmetric distal pulses. No cyanosis of extremities. No peripheral edema of extremities.  Thorax & Lungs: Normal breath sounds, No respiratory distress, No wheezing, No chest tenderness.   Abdomen: Soft, No tenderness, No masses, No pulsatile masses. No peritoneal signs.  Skin: Warm, Dry, No erythema, No rash.   Back: No midline bony tenderness, No CVA tenderness.   Musculoskeletal: Good range of motion in all major joints. Full range of motion of hips, No leg shortening, No tenderness to palpation or major deformities noted.   Neurologic: Alert and oriented to person, place, time, and situation. No facial droop, speech is cleared but provides short answers, moving all four extremities.    Psychiatric: Affect normal, Judgment normal, Mood normal.   Physical Exam      DIAGNOSTIC STUDIES / PROCEDURES    Laceration Repair Procedure Note    Indication: Laceration    Procedure: The patient was placed in the appropriate position. The laceration was closed with a steri strip by nursing. There were no additional lacerations requiring repair.     Total repaired wound length: less than 1 cm.     Other Items: None    The patient tolerated the procedure well.    Complications: None      RADIOLOGY  Pertinent Labs & Imaging studies reviewed. (See chart for details)  CT-HEAD W/O   Final Result         1.  No acute intracranial abnormality is identified, there are nonspecific white matter changes, commonly associated with small vessel ischemic disease.  Associated mild cerebral atrophy is noted.   2.  Bilateral ethmoid sinusitis changes         CT-CSPINE WITHOUT PLUS RECONS   Final Result         1.  Multilevel degenerative changes of the cervical spine limit diagnostic sensitivity of this examination, otherwise no acute traumatic bony injury of the cervical spine is apparent.   2.  Atherosclerosis        COURSE & MEDICAL DECISION MAKING  Pertinent Labs & Imaging studies reviewed. (See chart for details)    12:37 AM This is an emergent evaluation of a  77 y.o. male who presents following a head injury. Code TBI called by triage for AGE >65, takes plavix.  The differential diagnosis includes but is not limited to intracranial bleeding, concussion, fracture. CT- Head and CT-Cspine ordered immediately on arrival and taken directly to scanner. A steri strip will be placed on the patient's laceration after wound irrigated.    1:12 AM CT scans are negative. Anticipate discharge after laceration repair.     1:16 AM Patient was reevaluated at bedside. Secondary exam unremarkable for additional injuries. Discussed radiology results with the patient. A steri strip was placed on the patient's laceration. Discussed plans for  discharge at this time. Patient verbalizes understanding and agreement to this plan of care.      The patient will return for worsening symptoms and is stable at the time of discharge. The patient verbalizes understanding. Guidance was provided on appropriate use of medications including driving under the influence, overdose, and side effects.     DISPOSITION:  Patient will be discharged home in stable condition.    FOLLOW UP:  José Robert P.A.-C.  [address]  [address]  [place] [id number] [phone]    Schedule an appointment as soon as possible for a visit in 1 week  re-evaluation, minor concerns    [facility], Emergency Dept  [address] [id number] [phone]    If symptoms worsen, any other serious concerns please come back to the ER      FINAL IMPRESSION    ICD-10-CM   1. Closed head injury, initial encounter  S09.90XA   2. Neck strain, initial encounter  S16.1XXA   3. Laceration of brow without complication, initial encounter  S01.81XA        This dictation was created using voice recognition software. The accuracy of the dictation is limited to the abilities of the software. I expect there may be some errors of grammar and possibly content. The nursing notes were reviewed and certain aspects of this information were incorporated into this note.     IDeja (Kojo), am scribing for, and in the presence of, RAUL Williamson II.    Electronically signed by: Deja Feliz (Kojo), 8/30/2022    Dustin TAVERAS II, M* personally performed the services described in this documentation, as scribed by Deja Feliz in my presence, and it is both accurate and complete.    The note accurately reflects work and decisions made by me.  Dustin Woodard II, M.D.  8/30/2022  3:47 AM

## 2022-08-30 NOTE — ED NOTES
Pt discharged home with a taxi cab voucher. Pt is A/O x 4, WC out into taxi. Patient in possession of all belongings. Pt provided discharge education and information pertaining to medications and follow up appointments.Discussed signs and symptoms to return to the ER, patient verbalized understanding. Pt received copy of discharge instructions and verbalized understanding. Patient has his house keys.      BP (!) 155/74   Pulse 73   Temp 36 °C (96.8 °F)   Resp 16   Ht 1.829 m (6')   Wt 54.4 kg (120 lb)   SpO2 96%   BMI 16.27 kg/m²

## 2022-08-30 NOTE — ED TRIAGE NOTES
Chief Complaint   Patient presents with    T-5000 GLF     BIB EMS after mechanical GLF with head strike, -LOC, -thinners. Small laceration above left eye. Pt is A&x4, GCS 15 upon arrival.

## 2022-08-30 NOTE — DISCHARGE PLANNING
Medical Social Work    MSW received a voalte message from bedside RN that pt is in need of a ride home.  Pt's home address verified by RN: [address], [place].  Due to pt's age and time of night pt was provided with a cab voucher (# [phone]) for pt to return home safely.

## 2022-10-07 DIAGNOSIS — R07.9 CHEST PAIN, UNSPECIFIED TYPE: ICD-10-CM

## 2022-10-07 RX ORDER — NITROGLYCERIN 0.4 MG/1
TABLET SUBLINGUAL
Qty: 25 TABLET | Refills: 0 | Status: CANCELLED | OUTPATIENT
Start: 2022-10-07

## 2022-10-10 RX ORDER — NITROGLYCERIN 0.4 MG/1
TABLET SUBLINGUAL
Qty: 25 TABLET | Refills: 0 | Status: SHIPPED | OUTPATIENT
Start: 2022-10-10 | End: 2022-10-26

## 2022-10-18 ENCOUNTER — OFFICE VISIT (OUTPATIENT)
Dept: MEDICAL GROUP | Facility: MEDICAL CENTER | Age: 77
End: 2022-10-18
Payer: MEDICARE

## 2022-10-18 VITALS
HEIGHT: 72 IN | SYSTOLIC BLOOD PRESSURE: 160 MMHG | OXYGEN SATURATION: 98 % | DIASTOLIC BLOOD PRESSURE: 80 MMHG | WEIGHT: 119.71 LBS | HEART RATE: 69 BPM | BODY MASS INDEX: 16.21 KG/M2 | TEMPERATURE: 98.6 F

## 2022-10-18 DIAGNOSIS — Z95.5 HISTORY OF CORONARY ARTERY STENT PLACEMENT: ICD-10-CM

## 2022-10-18 DIAGNOSIS — I25.10 ARTERIOSCLEROSIS OF CORONARY ARTERY: ICD-10-CM

## 2022-10-18 DIAGNOSIS — E78.00 PURE HYPERCHOLESTEROLEMIA: ICD-10-CM

## 2022-10-18 DIAGNOSIS — Z86.73 HISTORY OF STROKE: ICD-10-CM

## 2022-10-18 DIAGNOSIS — K21.00 GASTROESOPHAGEAL REFLUX DISEASE WITH ESOPHAGITIS WITHOUT HEMORRHAGE: ICD-10-CM

## 2022-10-18 DIAGNOSIS — F41.1 GAD (GENERALIZED ANXIETY DISORDER): ICD-10-CM

## 2022-10-18 DIAGNOSIS — I10 PRIMARY HYPERTENSION: ICD-10-CM

## 2022-10-18 PROCEDURE — 99214 OFFICE O/P EST MOD 30 MIN: CPT | Performed by: PHYSICIAN ASSISTANT

## 2022-10-18 RX ORDER — CLOPIDOGREL BISULFATE 75 MG/1
75 TABLET ORAL DAILY
Qty: 90 TABLET | Refills: 1 | Status: SHIPPED | OUTPATIENT
Start: 2022-10-18 | End: 2023-03-28 | Stop reason: SDUPTHER

## 2022-10-18 RX ORDER — METOPROLOL SUCCINATE 25 MG/1
12.5 TABLET, EXTENDED RELEASE ORAL DAILY
Qty: 90 TABLET | Refills: 1 | Status: SHIPPED | OUTPATIENT
Start: 2022-10-18 | End: 2023-01-16

## 2022-10-18 RX ORDER — GABAPENTIN 100 MG/1
100 CAPSULE ORAL 3 TIMES DAILY
Qty: 270 CAPSULE | Refills: 1 | Status: SHIPPED | OUTPATIENT
Start: 2022-10-18 | End: 2023-01-16

## 2022-10-18 RX ORDER — SPIRONOLACTONE 25 MG/1
12.5 TABLET ORAL DAILY
Qty: 90 TABLET | Refills: 1 | Status: SHIPPED | OUTPATIENT
Start: 2022-10-18 | End: 2023-01-16

## 2022-10-18 RX ORDER — SIMVASTATIN 10 MG
10 TABLET ORAL EVERY EVENING
Qty: 90 TABLET | Refills: 1 | Status: SHIPPED | OUTPATIENT
Start: 2022-10-18 | End: 2023-03-28 | Stop reason: SDUPTHER

## 2022-10-18 RX ORDER — OMEPRAZOLE 20 MG/1
20 CAPSULE, DELAYED RELEASE ORAL DAILY
Qty: 90 CAPSULE | Refills: 3 | Status: SHIPPED | OUTPATIENT
Start: 2022-10-18 | End: 2023-03-28 | Stop reason: SDUPTHER

## 2022-10-18 RX ORDER — LISINOPRIL 40 MG/1
40 TABLET ORAL DAILY
Qty: 90 TABLET | Refills: 1 | Status: SHIPPED | OUTPATIENT
Start: 2022-10-18 | End: 2023-02-14 | Stop reason: SDUPTHER

## 2022-10-18 RX ORDER — SERTRALINE HYDROCHLORIDE 25 MG/1
25 TABLET, FILM COATED ORAL DAILY
Qty: 90 TABLET | Refills: 1 | Status: SHIPPED | OUTPATIENT
Start: 2022-10-18 | End: 2022-11-15

## 2022-10-18 ASSESSMENT — FIBROSIS 4 INDEX: FIB4 SCORE: 2.63

## 2022-10-18 NOTE — ASSESSMENT & PLAN NOTE
Chronic condition.  Seen twice at the ER.  It appears that his blood pressure medication was adjusted.  He is currently taking lisinopril 40 mg, spironolactone at 25 mg and metoprolol at 25 mg but half a tablet daily.  Hydrochlorothiazide was added at 12.5 mg.

## 2022-10-18 NOTE — PROGRESS NOTES
Subjective:   Moe Nickerson is a 77 y.o. male here today for anxiety, hypertension and history of CAD.    GLENYS (generalized anxiety disorder)  This is a pleasant 77-year-old male here today to follow-up on his health.  He states his anxiety is bad.  In the past took benzodiazepines which were helpful.  He is currently on fluoxetine.  States he does not like fluoxetine.  It causes side effects.  Would like to try something else.    Hypertension  Chronic condition.  Seen twice at the ER.  It appears that his blood pressure medication was adjusted.  He is currently taking lisinopril 40 mg, spironolactone at 25 mg and metoprolol at 25 mg but half a tablet daily.  Hydrochlorothiazide was added at 12.5 mg.    Pure hypercholesterolemia  Chronic condition.  He is currently taking simvastatin 10 mg.       Current medicines (including changes today)  Current Outpatient Medications   Medication Sig Dispense Refill    sertraline (ZOLOFT) 25 MG tablet Take 1 Tablet by mouth every day. First 5 days take 1/2 tablet. 90 Tablet 1    spironolactone (ALDACTONE) 25 MG Tab Take 0.5 Tablets by mouth every day for 90 days. 90 Tablet 1    metoprolol SR (TOPROL XL) 25 MG TABLET SR 24 HR Take 0.5 Tablets by mouth every day for 90 days. 90 Tablet 1    gabapentin (NEURONTIN) 100 MG Cap Take 1 Capsule by mouth 3 times a day for 90 days. 270 Capsule 1    omeprazole (PRILOSEC) 20 MG delayed-release capsule Take 1 Capsule by mouth every day. Take 1/2 hour prior to same meal. 90 Capsule 3    lisinopril (PRINIVIL) 40 MG tablet Take 1 Tablet by mouth every day. 90 Tablet 1    clopidogrel (PLAVIX) 75 MG Tab Take 1 Tablet by mouth every day. 90 Tablet 1    simvastatin (ZOCOR) 10 MG Tab Take 1 Tablet by mouth every evening. 90 Tablet 1    nitroglycerin (NITROSTAT) 0.4 MG SL Tab PLACE ONE TABLET UNDER TONGUE EVERY 5 MINUTES AS NEEDED MAXIMUM 3 TABLETS IN 15 MINUTES 25 Tablet 0    multivitamin (THERAGRAN) Tab Take 1 Tab by mouth every day.       vitamin D (CHOLECALCIFEROL) 1000 UNIT Tab Take 1,000 Units by mouth every day.       No current facility-administered medications for this visit.     He  has a past medical history of Anxiety, Congestive heart failure (HCC), Heart attack (HCC), Hypertension, and Stroke (cerebrum) (HCC).    Social History and Family History were reviewed and updated.    ROS   No chest pain, no shortness of breath, no abdominal pain and all other systems were reviewed and are negative.       Objective:     BP (!) 160/80 (BP Location: Left arm, Patient Position: Sitting, BP Cuff Size: Adult)   Pulse 69   Temp 37 °C (98.6 °F) (Temporal)   Ht 1.829 m (6')   Wt 54.3 kg (119 lb 11.4 oz)   SpO2 98%  Body mass index is 16.24 kg/m².   Physical Exam:  Constitutional: Alert, no distress.  Skin: Warm, dry, good turgor, no rashes in visible areas.  Eye: Equal, round and reactive, conjunctiva clear, lids normal.  ENMT: Lips without lesions, good dentition, oropharynx clear.  Neck: Trachea midline, no masses.   Lymph: No cervical or supraclavicular lymphadenopathy  Respiratory: Unlabored respiratory effort, lungs appear clear.  Psych: Alert and oriented x3, normal affect and mood.        Assessment and Plan:   The following treatment plan was discussed    1. GLENYS (generalized anxiety disorder)  Chronic condition.  Unable to tolerate fluoxetine.  Provided sertraline as directed.  We will start with a low dose.  Discussed possible side effects.  - sertraline (ZOLOFT) 25 MG tablet; Take 1 Tablet by mouth every day. First 5 days take 1/2 tablet.  Dispense: 90 Tablet; Refill: 1    2. Primary hypertension  Chronic condition.  Blood pressure elevated today.  We will continue him on the same regiment of hypertensive drugs except not renew hydrochlorothiazide.  We will see him in 1 month for follow-up to check his blood pressure.  He did not take lisinopril this morning.  - spironolactone (ALDACTONE) 25 MG Tab; Take 0.5 Tablets by mouth every day for 90  days.  Dispense: 90 Tablet; Refill: 1  - metoprolol SR (TOPROL XL) 25 MG TABLET SR 24 HR; Take 0.5 Tablets by mouth every day for 90 days.  Dispense: 90 Tablet; Refill: 1  - gabapentin (NEURONTIN) 100 MG Cap; Take 1 Capsule by mouth 3 times a day for 90 days.  Dispense: 270 Capsule; Refill: 1  - REFERRAL TO CARDIOLOGY  - lisinopril (PRINIVIL) 40 MG tablet; Take 1 Tablet by mouth every day.  Dispense: 90 Tablet; Refill: 1  - clopidogrel (PLAVIX) 75 MG Tab; Take 1 Tablet by mouth every day.  Dispense: 90 Tablet; Refill: 1    3. History of coronary artery stent placement  Chronic condition.  Referred again to cardiology.  He needs an appointment.  We will continue Plavix as directed.  - spironolactone (ALDACTONE) 25 MG Tab; Take 0.5 Tablets by mouth every day for 90 days.  Dispense: 90 Tablet; Refill: 1  - metoprolol SR (TOPROL XL) 25 MG TABLET SR 24 HR; Take 0.5 Tablets by mouth every day for 90 days.  Dispense: 90 Tablet; Refill: 1  - clopidogrel (PLAVIX) 75 MG Tab; Take 1 Tablet by mouth every day.  Dispense: 90 Tablet; Refill: 1    4. Pure hypercholesterolemia  Chronic condition.  Renewed simvastatin as directed.  Will defer labs until next office visit.  - spironolactone (ALDACTONE) 25 MG Tab; Take 0.5 Tablets by mouth every day for 90 days.  Dispense: 90 Tablet; Refill: 1  - metoprolol SR (TOPROL XL) 25 MG TABLET SR 24 HR; Take 0.5 Tablets by mouth every day for 90 days.  Dispense: 90 Tablet; Refill: 1  - gabapentin (NEURONTIN) 100 MG Cap; Take 1 Capsule by mouth 3 times a day for 90 days.  Dispense: 270 Capsule; Refill: 1  - clopidogrel (PLAVIX) 75 MG Tab; Take 1 Tablet by mouth every day.  Dispense: 90 Tablet; Refill: 1  - simvastatin (ZOCOR) 10 MG Tab; Take 1 Tablet by mouth every evening.  Dispense: 90 Tablet; Refill: 1    5. Gastroesophageal reflux disease with esophagitis without hemorrhage  Chronic condition.  Stable.  Renewed omeprazole as directed.  - omeprazole (PRILOSEC) 20 MG delayed-release capsule;  Take 1 Capsule by mouth every day. Take 1/2 hour prior to same meal.  Dispense: 90 Capsule; Refill: 3        Followup: Return in about 4 weeks (around 11/15/2022), or if symptoms worsen or fail to improve.    Please note that this dictation was created using voice recognition software. I have made every reasonable attempt to correct obvious errors, but I expect that there are errors of grammar and possibly content that I did not discover before finalizing the note.

## 2022-10-18 NOTE — ASSESSMENT & PLAN NOTE
This is a pleasant 77-year-old male here today to follow-up on his health.  He states his anxiety is bad.  In the past took benzodiazepines which were helpful.  He is currently on fluoxetine.  States he does not like fluoxetine.  It causes side effects.  Would like to try something else.

## 2022-10-26 DIAGNOSIS — R07.9 CHEST PAIN, UNSPECIFIED TYPE: ICD-10-CM

## 2022-10-26 RX ORDER — NITROGLYCERIN 0.4 MG/1
TABLET SUBLINGUAL
Qty: 25 TABLET | Refills: 0 | Status: SHIPPED | OUTPATIENT
Start: 2022-10-26 | End: 2022-11-03

## 2022-10-27 ENCOUNTER — TELEPHONE (OUTPATIENT)
Dept: HEALTH INFORMATION MANAGEMENT | Facility: OTHER | Age: 77
End: 2022-10-27
Payer: MEDICARE

## 2022-11-03 DIAGNOSIS — R07.9 CHEST PAIN, UNSPECIFIED TYPE: ICD-10-CM

## 2022-11-03 RX ORDER — NITROGLYCERIN 0.4 MG/1
TABLET SUBLINGUAL
Qty: 25 TABLET | Refills: 0 | Status: SHIPPED | OUTPATIENT
Start: 2022-11-03 | End: 2022-11-15 | Stop reason: SDUPTHER

## 2022-11-04 ENCOUNTER — PATIENT MESSAGE (OUTPATIENT)
Dept: HEALTH INFORMATION MANAGEMENT | Facility: OTHER | Age: 77
End: 2022-11-04

## 2022-11-07 ENCOUNTER — PATIENT OUTREACH (OUTPATIENT)
Dept: HEALTH INFORMATION MANAGEMENT | Facility: OTHER | Age: 77
End: 2022-11-07
Payer: MEDICARE

## 2022-11-07 ENCOUNTER — PATIENT MESSAGE (OUTPATIENT)
Dept: HEALTH INFORMATION MANAGEMENT | Facility: OTHER | Age: 77
End: 2022-11-07

## 2022-11-08 ENCOUNTER — PATIENT OUTREACH (OUTPATIENT)
Dept: HEALTH INFORMATION MANAGEMENT | Facility: OTHER | Age: 77
End: 2022-11-08
Payer: MEDICARE

## 2022-11-15 ENCOUNTER — OFFICE VISIT (OUTPATIENT)
Dept: MEDICAL GROUP | Facility: MEDICAL CENTER | Age: 77
End: 2022-11-15
Payer: MEDICARE

## 2022-11-15 VITALS
DIASTOLIC BLOOD PRESSURE: 80 MMHG | OXYGEN SATURATION: 99 % | WEIGHT: 119.71 LBS | HEART RATE: 59 BPM | SYSTOLIC BLOOD PRESSURE: 178 MMHG | BODY MASS INDEX: 16.21 KG/M2 | TEMPERATURE: 98.2 F | HEIGHT: 72 IN

## 2022-11-15 DIAGNOSIS — I50.9 CONGESTIVE HEART FAILURE, UNSPECIFIED HF CHRONICITY, UNSPECIFIED HEART FAILURE TYPE (HCC): ICD-10-CM

## 2022-11-15 DIAGNOSIS — R06.02 SOB (SHORTNESS OF BREATH): ICD-10-CM

## 2022-11-15 DIAGNOSIS — R07.9 CHEST PAIN, UNSPECIFIED TYPE: ICD-10-CM

## 2022-11-15 DIAGNOSIS — F41.1 GAD (GENERALIZED ANXIETY DISORDER): ICD-10-CM

## 2022-11-15 DIAGNOSIS — L30.9 ECZEMA, UNSPECIFIED TYPE: ICD-10-CM

## 2022-11-15 DIAGNOSIS — M54.50 PAIN OF LUMBAR SPINE: ICD-10-CM

## 2022-11-15 PROCEDURE — 99214 OFFICE O/P EST MOD 30 MIN: CPT | Performed by: PHYSICIAN ASSISTANT

## 2022-11-15 RX ORDER — NITROGLYCERIN 0.4 MG/1
TABLET SUBLINGUAL
Qty: 25 TABLET | Refills: 0 | Status: SHIPPED | OUTPATIENT
Start: 2022-11-15 | End: 2023-03-28 | Stop reason: SDUPTHER

## 2022-11-15 RX ORDER — BACLOFEN 10 MG/1
10 TABLET ORAL DAILY
Qty: 90 TABLET | Refills: 1 | Status: SHIPPED | OUTPATIENT
Start: 2022-11-15 | End: 2023-02-13

## 2022-11-15 RX ORDER — TRIAMCINOLONE ACETONIDE 1 MG/G
1 CREAM TOPICAL 3 TIMES DAILY
Qty: 80 G | Refills: 1 | Status: SHIPPED | OUTPATIENT
Start: 2022-11-15 | End: 2023-04-11 | Stop reason: SDUPTHER

## 2022-11-15 RX ORDER — CITALOPRAM HYDROBROMIDE 10 MG/1
10 TABLET ORAL DAILY
Qty: 30 TABLET | Refills: 2 | Status: SHIPPED | OUTPATIENT
Start: 2022-11-15 | End: 2022-12-13 | Stop reason: SDUPTHER

## 2022-11-15 ASSESSMENT — FIBROSIS 4 INDEX: FIB4 SCORE: 2.63

## 2022-11-15 NOTE — ASSESSMENT & PLAN NOTE
This is a pleasant 77-year-old male here today to discuss his health.  Unable to tolerate sertraline.  He discontinued the medication after couple days.  Was causing him to feel like a zombie.  Would like to try another medication.  Since he reestablished with me he has not had any panic attacks.

## 2022-11-15 NOTE — ASSESSMENT & PLAN NOTE
Chronic condition.  Has not yet made an appointment to follow-up with cardiology.  Did get a text from the cardiology department.  He has a chronic history of shortness of breath.  In the past it was thought to be secondary to COPD.  He is requesting a RTC right ear form to be completed.  I completed the last form back in 2019.  He is requesting a renewal of nitroglycerin.  States that it was not approved.

## 2022-11-15 NOTE — ASSESSMENT & PLAN NOTE
Complains of some dry lesions on his skin.  On the left wrist as well as the lower left leg.  Uses an over-the-counter lotion.  Diagnosed with eczema in the past.

## 2022-11-15 NOTE — PROGRESS NOTES
Subjective:   Moe Nickerson is a 77 y.o. male here today for anxiety, CHF and other chronic medical conditions.    GLENYS (generalized anxiety disorder)  This is a pleasant 77-year-old male here today to discuss his health.  Unable to tolerate sertraline.  He discontinued the medication after couple days.  Was causing him to feel like a zombie.  Would like to try another medication.  Since he reestablished with me he has not had any panic attacks.    Congestive heart failure (HCC)  Chronic condition.  Has not yet made an appointment to follow-up with cardiology.  Did get a text from the cardiology department.  He has a chronic history of shortness of breath.  In the past it was thought to be secondary to COPD.  He is requesting a RTC right ear form to be completed.  I completed the last form back in 2019.  He is requesting a renewal of nitroglycerin.  States that it was not approved.    Pain of lumbar spine  Chronic condition.  Has had chronic pain since the 1980s after an accident.  Daily will have lower bar spasms.  Has been provided baclofen in the past.  Medication is taken daily with good results.  Would like a renewal.    Eczema  Complains of some dry lesions on his skin.  On the left wrist as well as the lower left leg.  Uses an over-the-counter lotion.  Diagnosed with eczema in the past.       Current medicines (including changes today)  Current Outpatient Medications   Medication Sig Dispense Refill    citalopram (CELEXA) 10 MG tablet Take 1 Tablet by mouth every day. First 5 days take 1/2 tablet. 30 Tablet 2    baclofen (LIORESAL) 10 MG Tab Take 1 Tablet by mouth every day for 90 days. 90 Tablet 1    nitroglycerin (NITROSTAT) 0.4 MG SL Tab PLACE ONE TABLET UNDER TONGUE EVERY 5 MINUTES AS NEEDED MAXIMUM 3 TABLETS IN 15 MINUTES 25 Tablet 0    triamcinolone acetonide (KENALOG) 0.1 % Cream Apply 1 Application topically 3 times a day. Avoid face. 80 g 1    spironolactone (ALDACTONE) 25 MG Tab Take 0.5  Tablets by mouth every day for 90 days. 90 Tablet 1    metoprolol SR (TOPROL XL) 25 MG TABLET SR 24 HR Take 0.5 Tablets by mouth every day for 90 days. 90 Tablet 1    gabapentin (NEURONTIN) 100 MG Cap Take 1 Capsule by mouth 3 times a day for 90 days. 270 Capsule 1    omeprazole (PRILOSEC) 20 MG delayed-release capsule Take 1 Capsule by mouth every day. Take 1/2 hour prior to same meal. 90 Capsule 3    lisinopril (PRINIVIL) 40 MG tablet Take 1 Tablet by mouth every day. 90 Tablet 1    clopidogrel (PLAVIX) 75 MG Tab Take 1 Tablet by mouth every day. 90 Tablet 1    simvastatin (ZOCOR) 10 MG Tab Take 1 Tablet by mouth every evening. 90 Tablet 1    multivitamin (THERAGRAN) Tab Take 1 Tab by mouth every day.      vitamin D (CHOLECALCIFEROL) 1000 UNIT Tab Take 1,000 Units by mouth every day.       No current facility-administered medications for this visit.     He  has a past medical history of Anxiety, Congestive heart failure (HCC), Heart attack (HCC), Hypertension, and Stroke (cerebrum) (HCC).    Social History and Family History were reviewed and updated.    ROS   No chest pain, no shortness of breath, no abdominal pain and all other systems were reviewed and are negative.       Objective:     BP (!) 178/80 (BP Location: Left arm, Patient Position: Sitting, BP Cuff Size: Adult)   Pulse (!) 59   Temp 36.8 °C (98.2 °F) (Temporal)   Ht 1.829 m (6')   Wt 54.3 kg (119 lb 11.4 oz)   SpO2 99%  Body mass index is 16.24 kg/m².   Physical Exam:  Constitutional: Alert, no distress.  Skin: Warm, dry, good turgor, no rashes in visible areas.  Does have a couple irregular circular lesions on the upper and lower extremities..  Eye: Equal, round and reactive, conjunctiva clear, lids normal.  ENMT: Lips without lesions, good dentition, oropharynx clear.  Neck: Trachea midline, no masses.   Lymph: No cervical or supraclavicular lymphadenopathy  Respiratory: Unlabored respiratory effort, lungs appear clear.  Psych: Alert and  oriented x3, normal affect and mood.        Assessment and Plan:   The following treatment plan was discussed    1. GLENYS (generalized anxiety disorder)  Chronic condition.  Stable today.  Did provide Celexa as directed.  Discussed side effects.  Start with half a tablet for the first 5 days.  I will see him in 1 month.  - citalopram (CELEXA) 10 MG tablet; Take 1 Tablet by mouth every day. First 5 days take 1/2 tablet.  Dispense: 30 Tablet; Refill: 2    2. Pain of lumbar spine  Chronic condition.  Stable.  Renewed baclofen as directed.  - baclofen (LIORESAL) 10 MG Tab; Take 1 Tablet by mouth every day for 90 days.  Dispense: 90 Tablet; Refill: 1    3. Chest pain, unspecified type  Chronic condition secondary to CAD.  He must follow-up with cardiology.  Did try to renew nitroglycerin to take as directed.  He understands likely may need to be prescribed by cardiology in order to be approved.  Did send over another prescription to his pharmacy.    - nitroglycerin (NITROSTAT) 0.4 MG SL Tab; PLACE ONE TABLET UNDER TONGUE EVERY 5 MINUTES AS NEEDED MAXIMUM 3 TABLETS IN 15 MINUTES  Dispense: 25 Tablet; Refill: 0    4. Eczema, unspecified type  Chronic condition with recent exacerbation.  Advised to use an over-the-counter cream such asVanicream.  Provided triamcinolone 0.1% as directed.  - triamcinolone acetonide (KENALOG) 0.1 % Cream; Apply 1 Application topically 3 times a day. Avoid face.  Dispense: 80 g; Refill: 1    5. SOB (shortness of breath)  Chronic condition.  In the past thought to be secondary to COPD.  Today symptoms appear stable.  Did complete the RTC form for better access to transportation.    6. Congestive heart failure, unspecified HF chronicity, unspecified heart failure type (HCC)  Chronic condition.  Stable.  Reviewed his medications.  He is also taking all the medications as directed.  He brought in his medications.  Follow-up and make an appointment with cardiology.         Followup: Return in about 4  weeks (around 12/13/2022), or if symptoms worsen or fail to improve.    Please note that this dictation was created using voice recognition software. I have made every reasonable attempt to correct obvious errors, but I expect that there are errors of grammar and possibly content that I did not discover before finalizing the note.

## 2022-11-15 NOTE — ASSESSMENT & PLAN NOTE
Chronic condition.  Has had chronic pain since the 1980s after an accident.  Daily will have lower bar spasms.  Has been provided baclofen in the past.  Medication is taken daily with good results.  Would like a renewal.

## 2022-12-08 ENCOUNTER — APPOINTMENT (RX ONLY)
Dept: URBAN - METROPOLITAN AREA CLINIC 4 | Facility: CLINIC | Age: 77
Setting detail: DERMATOLOGY
End: 2022-12-08

## 2022-12-08 ENCOUNTER — RX ONLY (OUTPATIENT)
Age: 77
Setting detail: RX ONLY
End: 2022-12-08

## 2022-12-08 DIAGNOSIS — R21 RASH AND OTHER NONSPECIFIC SKIN ERUPTION: ICD-10-CM

## 2022-12-08 DIAGNOSIS — L72.0 EPIDERMAL CYST: ICD-10-CM

## 2022-12-08 PROCEDURE — 99213 OFFICE O/P EST LOW 20 MIN: CPT

## 2022-12-08 PROCEDURE — ? ADDITIONAL NOTES

## 2022-12-08 PROCEDURE — ? COUNSELING

## 2022-12-08 ASSESSMENT — LOCATION DETAILED DESCRIPTION DERM
LOCATION DETAILED: LEFT INFERIOR MEDIAL FOREHEAD
LOCATION DETAILED: RIGHT ANTERIOR SCROTUM

## 2022-12-08 ASSESSMENT — LOCATION ZONE DERM
LOCATION ZONE: FACE
LOCATION ZONE: GENITALIA

## 2022-12-08 ASSESSMENT — LOCATION SIMPLE DESCRIPTION DERM
LOCATION SIMPLE: LEFT FOREHEAD
LOCATION SIMPLE: SCROTUM

## 2022-12-08 NOTE — PROCEDURE: ADDITIONAL NOTES
Detail Level: Simple
Additional Notes: Patient was given samples of Zoryve (30 grams). Patient instructed to use it once at bedtime. Once better stop. Repeat as needed.
Render Risk Assessment In Note?: no

## 2022-12-13 ENCOUNTER — OFFICE VISIT (OUTPATIENT)
Dept: MEDICAL GROUP | Facility: MEDICAL CENTER | Age: 77
End: 2022-12-13
Payer: MEDICARE

## 2022-12-13 VITALS
OXYGEN SATURATION: 97 % | TEMPERATURE: 98.8 F | DIASTOLIC BLOOD PRESSURE: 70 MMHG | HEART RATE: 68 BPM | SYSTOLIC BLOOD PRESSURE: 130 MMHG | WEIGHT: 119.93 LBS | BODY MASS INDEX: 16.24 KG/M2 | HEIGHT: 72 IN

## 2022-12-13 DIAGNOSIS — L20.9 ATOPIC DERMATITIS, UNSPECIFIED TYPE: ICD-10-CM

## 2022-12-13 DIAGNOSIS — L21.9 SEBORRHEIC DERMATITIS OF SCALP: ICD-10-CM

## 2022-12-13 DIAGNOSIS — F41.1 GAD (GENERALIZED ANXIETY DISORDER): ICD-10-CM

## 2022-12-13 PROCEDURE — 99214 OFFICE O/P EST MOD 30 MIN: CPT | Performed by: PHYSICIAN ASSISTANT

## 2022-12-13 RX ORDER — CLOBETASOL PROPIONATE 0.46 MG/ML
50 SOLUTION TOPICAL 2 TIMES DAILY
Qty: 50 ML | Refills: 11 | Status: SHIPPED | OUTPATIENT
Start: 2022-12-13 | End: 2023-03-28 | Stop reason: SDUPTHER

## 2022-12-13 RX ORDER — CITALOPRAM HYDROBROMIDE 10 MG/1
5 TABLET ORAL DAILY
Qty: 45 TABLET | Refills: 3 | Status: SHIPPED | OUTPATIENT
Start: 2022-12-13 | End: 2023-03-13

## 2022-12-13 RX ORDER — CICLOPIROX 1 G/100ML
1 SHAMPOO TOPICAL
Qty: 120 ML | Refills: 11 | Status: SHIPPED | OUTPATIENT
Start: 2022-12-13 | End: 2023-01-12

## 2022-12-13 RX ORDER — KETOCONAZOLE 20 MG/ML
120 SHAMPOO TOPICAL
Qty: 120 ML | Refills: 11 | Status: SHIPPED | OUTPATIENT
Start: 2022-12-13 | End: 2023-03-28 | Stop reason: SDUPTHER

## 2022-12-13 ASSESSMENT — FIBROSIS 4 INDEX: FIB4 SCORE: 2.63

## 2022-12-13 NOTE — ASSESSMENT & PLAN NOTE
Chronic condition.  During our last appointment he was willing to take a SSRI for his anxiety.  I started him on Celexa 5 mg.  I asked him to increase the dose to 10 but he did not.  He states that he feels much better.  Would like to continue the 5 mg dosing.  Denies any significant side effects.

## 2022-12-13 NOTE — PROGRESS NOTES
Subjective:   Moe Nickerson is a 77 y.o. male here today for seborrheic and atopic dermatitis and anxiety.    Seborrheic dermatitis of scalp  This is a pleasant 77-year-old male here today to follow-up on his health.  Requesting renewals of medications that he has been provided in the past by his dermatologist.  Has a history of seborrhea dermatitis as well as atopic dermatitis.  At times will use ketoconazole and ciclopirox shampoos.  He does not use in the simultaneously.  Ketoconazole will be used for the beard.  Also requesting a refill of clobetasol.  Uses that for eczema.  All in all his skin is stable.  Does not feel like he needs to return to see his dermatologist.  Does complain of a lesion on his scrotal sac that he would like me to evaluate.    GLENYS (generalized anxiety disorder)  Chronic condition.  During our last appointment he was willing to take a SSRI for his anxiety.  I started him on Celexa 5 mg.  I asked him to increase the dose to 10 but he did not.  He states that he feels much better.  Would like to continue the 5 mg dosing.  Denies any significant side effects.       Current medicines (including changes today)  Current Outpatient Medications   Medication Sig Dispense Refill    Ciclopirox 1 % Shampoo Apply 1 Application topically every 3 days for 30 days. 120 mL 11    ketoconazole (NIZORAL) 2 % shampoo Apply 120 mL topically 1 time a day as needed for Itching. 120 mL 11    clobetasol (TEMOVATE) 0.05 % external solution Apply 50 mL topically 2 times a day. 50 mL 11    citalopram (CELEXA) 10 MG tablet Take 0.5 Tablets by mouth every day for 90 days. First 5 days take 1/2 tablet. 45 Tablet 3    baclofen (LIORESAL) 10 MG Tab Take 1 Tablet by mouth every day for 90 days. 90 Tablet 1    nitroglycerin (NITROSTAT) 0.4 MG SL Tab PLACE ONE TABLET UNDER TONGUE EVERY 5 MINUTES AS NEEDED MAXIMUM 3 TABLETS IN 15 MINUTES 25 Tablet 0    triamcinolone acetonide (KENALOG) 0.1 % Cream Apply 1 Application  topically 3 times a day. Avoid face. 80 g 1    spironolactone (ALDACTONE) 25 MG Tab Take 0.5 Tablets by mouth every day for 90 days. 90 Tablet 1    metoprolol SR (TOPROL XL) 25 MG TABLET SR 24 HR Take 0.5 Tablets by mouth every day for 90 days. 90 Tablet 1    gabapentin (NEURONTIN) 100 MG Cap Take 1 Capsule by mouth 3 times a day for 90 days. 270 Capsule 1    omeprazole (PRILOSEC) 20 MG delayed-release capsule Take 1 Capsule by mouth every day. Take 1/2 hour prior to same meal. 90 Capsule 3    lisinopril (PRINIVIL) 40 MG tablet Take 1 Tablet by mouth every day. 90 Tablet 1    clopidogrel (PLAVIX) 75 MG Tab Take 1 Tablet by mouth every day. 90 Tablet 1    simvastatin (ZOCOR) 10 MG Tab Take 1 Tablet by mouth every evening. 90 Tablet 1    multivitamin (THERAGRAN) Tab Take 1 Tab by mouth every day.      vitamin D (CHOLECALCIFEROL) 1000 UNIT Tab Take 1,000 Units by mouth every day.       No current facility-administered medications for this visit.     He  has a past medical history of Anxiety, Congestive heart failure (HCC), Heart attack (HCC), Hypertension, and Stroke (cerebrum) (HCC).    Social History and Family History were reviewed and updated.    ROS   No chest pain, no shortness of breath, no abdominal pain and all other systems were reviewed and are negative.       Objective:     /70 (BP Location: Left arm, Patient Position: Sitting, BP Cuff Size: Adult)   Pulse 68   Temp 37.1 °C (98.8 °F) (Temporal)   Ht 1.829 m (6')   Wt 54.4 kg (119 lb 14.9 oz)   SpO2 97%  Body mass index is 16.27 kg/m².   Physical Exam:  Constitutional: Alert, no distress.  Skin: Warm, dry, good turgor, no rashes in visible areas.  Eye: Equal, round and reactive, conjunctiva clear, lids normal.  ENMT: Lips without lesions, good dentition, oropharynx clear.  Neck: Trachea midline, no masses.   Lymph: No cervical or supraclavicular lymphadenopathy  Respiratory: Unlabored respiratory effort, lungs appear clear.  Scrotal sac: Was able  to excise with my fingers a comedone.  Resolved the scrotal lesion.  Psych: Alert and oriented x3, normal affect and mood.        Assessment and Plan:   The following treatment plan was discussed    1. Seborrheic dermatitis of scalp  Chronic condition.  Stable.  Renewed both shampoos.  Also clobetasol.  - Ciclopirox 1 % Shampoo; Apply 1 Application topically every 3 days for 30 days.  Dispense: 120 mL; Refill: 11  - ketoconazole (NIZORAL) 2 % shampoo; Apply 120 mL topically 1 time a day as needed for Itching.  Dispense: 120 mL; Refill: 11  - clobetasol (TEMOVATE) 0.05 % external solution; Apply 50 mL topically 2 times a day.  Dispense: 50 mL; Refill: 11    2. Atopic dermatitis, unspecified type  Chronic condition.  Stable.  Renew clobetasol topical solution as directed.  - clobetasol (TEMOVATE) 0.05 % external solution; Apply 50 mL topically 2 times a day.  Dispense: 50 mL; Refill: 11    3. GLENYS (generalized anxiety disorder)  Chronic condition.  Stable.  We will continue at 5 mg of Celexa.  Cut 10 in half.  Sent an updated prescription to the pharmacy.  - citalopram (CELEXA) 10 MG tablet; Take 0.5 Tablets by mouth every day for 90 days. First 5 days take 1/2 tablet.  Dispense: 45 Tablet; Refill: 3         Followup: Return in about 2 months (around 2/13/2023), or if symptoms worsen or fail to improve.    Please note that this dictation was created using voice recognition software. I have made every reasonable attempt to correct obvious errors, but I expect that there are errors of grammar and possibly content that I did not discover before finalizing the note.

## 2022-12-13 NOTE — ASSESSMENT & PLAN NOTE
This is a pleasant 77-year-old male here today to follow-up on his health.  Requesting renewals of medications that he has been provided in the past by his dermatologist.  Has a history of seborrhea dermatitis as well as atopic dermatitis.  At times will use ketoconazole and ciclopirox shampoos.  He does not use in the simultaneously.  Ketoconazole will be used for the beard.  Also requesting a refill of clobetasol.  Uses that for eczema.  All in all his skin is stable.  Does not feel like he needs to return to see his dermatologist.  Does complain of a lesion on his scrotal sac that he would like me to evaluate.

## 2023-01-20 ENCOUNTER — APPOINTMENT (RX ONLY)
Dept: URBAN - METROPOLITAN AREA CLINIC 4 | Facility: CLINIC | Age: 78
Setting detail: DERMATOLOGY
End: 2023-01-20

## 2023-01-20 DIAGNOSIS — L57.8 OTHER SKIN CHANGES DUE TO CHRONIC EXPOSURE TO NONIONIZING RADIATION: ICD-10-CM

## 2023-01-20 DIAGNOSIS — D18.0 HEMANGIOMA: ICD-10-CM

## 2023-01-20 DIAGNOSIS — D22 MELANOCYTIC NEVI: ICD-10-CM

## 2023-01-20 DIAGNOSIS — L82.1 OTHER SEBORRHEIC KERATOSIS: ICD-10-CM

## 2023-01-20 DIAGNOSIS — L81.4 OTHER MELANIN HYPERPIGMENTATION: ICD-10-CM

## 2023-01-20 DIAGNOSIS — R21 RASH AND OTHER NONSPECIFIC SKIN ERUPTION: ICD-10-CM

## 2023-01-20 PROBLEM — D18.01 HEMANGIOMA OF SKIN AND SUBCUTANEOUS TISSUE: Status: ACTIVE | Noted: 2023-01-20

## 2023-01-20 PROBLEM — D22.5 MELANOCYTIC NEVI OF TRUNK: Status: ACTIVE | Noted: 2023-01-20

## 2023-01-20 PROCEDURE — 99213 OFFICE O/P EST LOW 20 MIN: CPT

## 2023-01-20 PROCEDURE — ? PRESCRIPTION

## 2023-01-20 PROCEDURE — ? COUNSELING

## 2023-01-20 PROCEDURE — ? ADDITIONAL NOTES

## 2023-01-20 RX ORDER — TRIAMCINOLONE ACETONIDE 1 MG/G
1 CREAM TOPICAL BID
Qty: 453.6 | Refills: 3 | Status: ERX | COMMUNITY
Start: 2023-01-20

## 2023-01-20 RX ADMIN — TRIAMCINOLONE ACETONIDE 1: 1 CREAM TOPICAL at 00:00

## 2023-01-20 ASSESSMENT — LOCATION SIMPLE DESCRIPTION DERM
LOCATION SIMPLE: LEFT UPPER BACK
LOCATION SIMPLE: CHEST
LOCATION SIMPLE: LEFT HAND
LOCATION SIMPLE: RIGHT HAND
LOCATION SIMPLE: LEFT FOREARM
LOCATION SIMPLE: LEFT CHEEK
LOCATION SIMPLE: RIGHT UPPER BACK
LOCATION SIMPLE: RIGHT FOREARM
LOCATION SIMPLE: LEFT FOREHEAD
LOCATION SIMPLE: RIGHT CHEEK

## 2023-01-20 ASSESSMENT — LOCATION DETAILED DESCRIPTION DERM
LOCATION DETAILED: LEFT SUPERIOR MEDIAL UPPER BACK
LOCATION DETAILED: LEFT DISTAL DORSAL FOREARM
LOCATION DETAILED: LEFT RADIAL DORSAL HAND
LOCATION DETAILED: RIGHT MID-UPPER BACK
LOCATION DETAILED: RIGHT PROXIMAL DORSAL FOREARM
LOCATION DETAILED: LEFT INFERIOR CENTRAL MALAR CHEEK
LOCATION DETAILED: LEFT MEDIAL SUPERIOR CHEST
LOCATION DETAILED: RIGHT RADIAL DORSAL HAND
LOCATION DETAILED: RIGHT SUPERIOR MEDIAL UPPER BACK
LOCATION DETAILED: LEFT INFERIOR MEDIAL FOREHEAD
LOCATION DETAILED: RIGHT INFERIOR CENTRAL MALAR CHEEK
LOCATION DETAILED: LEFT SUPERIOR UPPER BACK

## 2023-01-20 ASSESSMENT — LOCATION ZONE DERM
LOCATION ZONE: ARM
LOCATION ZONE: HAND
LOCATION ZONE: FACE
LOCATION ZONE: FACE
LOCATION ZONE: TRUNK

## 2023-01-20 NOTE — PROCEDURE: ADDITIONAL NOTES
Detail Level: Simple
Additional Notes: Patient instructed to take Benadryl at night
Render Risk Assessment In Note?: no

## 2023-02-14 ENCOUNTER — OFFICE VISIT (OUTPATIENT)
Dept: MEDICAL GROUP | Facility: MEDICAL CENTER | Age: 78
End: 2023-02-14
Payer: MEDICARE

## 2023-02-14 VITALS
SYSTOLIC BLOOD PRESSURE: 132 MMHG | TEMPERATURE: 98 F | HEIGHT: 72 IN | OXYGEN SATURATION: 99 % | DIASTOLIC BLOOD PRESSURE: 80 MMHG | WEIGHT: 119.93 LBS | HEART RATE: 62 BPM | BODY MASS INDEX: 16.24 KG/M2

## 2023-02-14 DIAGNOSIS — F41.1 GAD (GENERALIZED ANXIETY DISORDER): ICD-10-CM

## 2023-02-14 DIAGNOSIS — Z91.81 RISK FOR FALLS: ICD-10-CM

## 2023-02-14 DIAGNOSIS — I10 PRIMARY HYPERTENSION: ICD-10-CM

## 2023-02-14 DIAGNOSIS — F32.A DEPRESSION, UNSPECIFIED DEPRESSION TYPE: ICD-10-CM

## 2023-02-14 DIAGNOSIS — I50.9 CONGESTIVE HEART FAILURE, UNSPECIFIED HF CHRONICITY, UNSPECIFIED HEART FAILURE TYPE (HCC): ICD-10-CM

## 2023-02-14 DIAGNOSIS — I25.10 ARTERIOSCLEROSIS OF CORONARY ARTERY: ICD-10-CM

## 2023-02-14 DIAGNOSIS — L30.9 ECZEMA, UNSPECIFIED TYPE: ICD-10-CM

## 2023-02-14 PROCEDURE — 99214 OFFICE O/P EST MOD 30 MIN: CPT | Performed by: PHYSICIAN ASSISTANT

## 2023-02-14 RX ORDER — BUPROPION HYDROCHLORIDE 75 MG/1
75 TABLET ORAL 2 TIMES DAILY
Qty: 60 TABLET | Refills: 3 | Status: SHIPPED | OUTPATIENT
Start: 2023-02-14 | End: 2023-03-28 | Stop reason: SDUPTHER

## 2023-02-14 RX ORDER — LISINOPRIL 40 MG/1
40 TABLET ORAL DAILY
Qty: 90 TABLET | Refills: 1 | Status: SHIPPED | OUTPATIENT
Start: 2023-02-14 | End: 2023-03-28 | Stop reason: SDUPTHER

## 2023-02-14 ASSESSMENT — FIBROSIS 4 INDEX: FIB4 SCORE: 2.63

## 2023-02-14 NOTE — ASSESSMENT & PLAN NOTE
This is a pleasant 77-year-old male here today to follow-up on his health.  He was doing well on Celexa but now believes that the medication is causing abdominal discomfort.  He was doing well on 5 mg.  He would like to try another medication.  We have tried Zoloft in the past.  He would like to control both his anxiety and his depression.  Anxiety is worse and depression.  He has a history of being diagnosed with CHF.  Recent echocardiogram was excellent.  He is currently not following cardiology.  He is on lisinopril and simvastatin.  He would like to discuss his medication list.  Does have a chronic history of eczema and it was seen recently by his dermatologist who provided him an ointment for his eczema which occurs on his face where his beard is.  He states that it is difficult to apply the emollient onto the beard.  He fell twice last year but nothing this year.  States that he believes that his head is not right but cannot come up with the words to describe what is going on.  In the past he was placed on Aricept but that was for dementia.  We have concluded in the past that he does not have dementia.  He is thinking about restarting Aricept.

## 2023-02-14 NOTE — PROGRESS NOTES
Subjective:   Moe Nickerson is a 77 y.o. male here today for anxiety, eczema and history of CHF.    GLENYS (generalized anxiety disorder)  This is a pleasant 77-year-old male here today to follow-up on his health.  He was doing well on Celexa but now believes that the medication is causing abdominal discomfort.  He was doing well on 5 mg.  He would like to try another medication.  We have tried Zoloft in the past.  He would like to control both his anxiety and his depression.  Anxiety is worse and depression.  He has a history of being diagnosed with CHF.  Recent echocardiogram was excellent.  He is currently not following cardiology.  He is on lisinopril and simvastatin.  He would like to discuss his medication list.  Does have a chronic history of eczema and it was seen recently by his dermatologist who provided him an ointment for his eczema which occurs on his face where his beard is.  He states that it is difficult to apply the emollient onto the beard.  He fell twice last year but nothing this year.  States that he believes that his head is not right but cannot come up with the words to describe what is going on.  In the past he was placed on Aricept but that was for dementia.  We have concluded in the past that he does not have dementia.  He is thinking about restarting Aricept.       Current medicines (including changes today)  Current Outpatient Medications   Medication Sig Dispense Refill    buPROPion (WELLBUTRIN) 75 MG Tab Take 1 Tablet by mouth 2 times a day. 60 Tablet 3    lisinopril (PRINIVIL) 40 MG tablet Take 1 Tablet by mouth every day. 90 Tablet 1    ketoconazole (NIZORAL) 2 % shampoo Apply 120 mL topically 1 time a day as needed for Itching. 120 mL 11    clobetasol (TEMOVATE) 0.05 % external solution Apply 50 mL topically 2 times a day. 50 mL 11    citalopram (CELEXA) 10 MG tablet Take 0.5 Tablets by mouth every day for 90 days. First 5 days take 1/2 tablet. 45 Tablet 3    nitroglycerin  (NITROSTAT) 0.4 MG SL Tab PLACE ONE TABLET UNDER TONGUE EVERY 5 MINUTES AS NEEDED MAXIMUM 3 TABLETS IN 15 MINUTES 25 Tablet 0    triamcinolone acetonide (KENALOG) 0.1 % Cream Apply 1 Application topically 3 times a day. Avoid face. 80 g 1    omeprazole (PRILOSEC) 20 MG delayed-release capsule Take 1 Capsule by mouth every day. Take 1/2 hour prior to same meal. 90 Capsule 3    clopidogrel (PLAVIX) 75 MG Tab Take 1 Tablet by mouth every day. 90 Tablet 1    simvastatin (ZOCOR) 10 MG Tab Take 1 Tablet by mouth every evening. 90 Tablet 1    multivitamin (THERAGRAN) Tab Take 1 Tab by mouth every day.      vitamin D (CHOLECALCIFEROL) 1000 UNIT Tab Take 1,000 Units by mouth every day.       No current facility-administered medications for this visit.     He  has a past medical history of Anxiety, Congestive heart failure (HCC), Heart attack (HCC), Hypertension, and Stroke (cerebrum) (HCC).    Social History and Family History were reviewed and updated.    ROS   No chest pain, no shortness of breath, no abdominal pain and all other systems were reviewed and are negative.       Objective:     /80 (BP Location: Left arm, Patient Position: Sitting, BP Cuff Size: Adult)   Pulse 62   Temp 36.7 °C (98 °F) (Temporal)   Ht 1.829 m (6')   Wt 54.4 kg (119 lb 14.9 oz)   SpO2 99%  Body mass index is 16.27 kg/m².   Physical Exam:  Constitutional: Alert, no distress.  Skin: Warm, dry, good turgor, no rashes in visible areas.  Eye: Equal, round and reactive, conjunctiva clear, lids normal.  ENMT: Lips without lesions, good dentition, oropharynx clear.  Neck: Trachea midline, no masses.   Lymph: No cervical or supraclavicular lymphadenopathy  Respiratory: Unlabored respiratory effort, lungs appear clear.  Psych: Alert and oriented x3, normal affect and mood.        Assessment and Plan:   The following treatment plan was discussed    1. GLENYS (generalized anxiety disorder)  Chronic condition.  Today he appears stable.  We will  discontinue Celexa and start him on Wellbutrin.  He realizes Wellbutrin is not fully an antianxiety medication but may help.  He likes the idea of starting Wellbutrin because his sister took it.  We will start with a low-dose at 75 mg daily.  After 3 to 5 days increase to 75 mg twice daily.  Hopefully he will do well on the medication.  - buPROPion (WELLBUTRIN) 75 MG Tab; Take 1 Tablet by mouth 2 times a day.  Dispense: 60 Tablet; Refill: 3    2. Depression, unspecified depression type  Chronic condition.  Stable.  Will start Wellbutrin.  - buPROPion (WELLBUTRIN) 75 MG Tab; Take 1 Tablet by mouth 2 times a day.  Dispense: 60 Tablet; Refill: 3    3. Eczema, unspecified type  Chronic condition.  Stable.  Continue to follow with dermatology.    4. Risk for falls  No recent falls.  Do not want to complicate possible side effects by restarting Aricept so we will discuss the possibility when he follows up in 2 months.  Today his mental status appears stable.  - Patient identified as fall risk.  Appropriate orders and counseling given.    5. Congestive heart failure, unspecified HF chronicity, unspecified heart failure type (HCC)  Chronic condition.  Discussed contacting cardiology to reestablish with a specialist.  Also referred given the issues that he has with communication.  Reviewed medication list.  Updated lisinopril.  Continue simvastatin.  - REFERRAL TO CARDIOLOGY      Followup: Return in about 4 weeks (around 3/14/2023), or if symptoms worsen or fail to improve.    Please note that this dictation was created using voice recognition software. I have made every reasonable attempt to correct obvious errors, but I expect that there are errors of grammar and possibly content that I did not discover before finalizing the note.

## 2023-02-16 ENCOUNTER — TELEPHONE (OUTPATIENT)
Dept: HEALTH INFORMATION MANAGEMENT | Facility: OTHER | Age: 78
End: 2023-02-16

## 2023-03-29 DIAGNOSIS — Z12.83 SKIN CANCER SCREENING: ICD-10-CM

## 2023-04-11 ENCOUNTER — OFFICE VISIT (OUTPATIENT)
Dept: MEDICAL GROUP | Facility: MEDICAL CENTER | Age: 78
End: 2023-04-11
Payer: MEDICARE

## 2023-04-11 VITALS
TEMPERATURE: 98.9 F | HEIGHT: 72 IN | DIASTOLIC BLOOD PRESSURE: 70 MMHG | SYSTOLIC BLOOD PRESSURE: 130 MMHG | WEIGHT: 122.14 LBS | BODY MASS INDEX: 16.54 KG/M2 | OXYGEN SATURATION: 98 % | HEART RATE: 67 BPM

## 2023-04-11 DIAGNOSIS — L30.9 ECZEMA, UNSPECIFIED TYPE: ICD-10-CM

## 2023-04-11 DIAGNOSIS — I50.9 CONGESTIVE HEART FAILURE, UNSPECIFIED HF CHRONICITY, UNSPECIFIED HEART FAILURE TYPE (HCC): ICD-10-CM

## 2023-04-11 PROBLEM — Z86.73 HISTORY OF STROKE: Status: RESOLVED | Noted: 2017-08-17 | Resolved: 2023-04-11

## 2023-04-11 PROBLEM — R06.02 SOB (SHORTNESS OF BREATH): Status: RESOLVED | Noted: 2022-04-29 | Resolved: 2023-04-11

## 2023-04-11 PROBLEM — Z86.73 HISTORY OF CARDIOEMBOLIC CEREBROVASCULAR ACCIDENT (CVA): Status: ACTIVE | Noted: 2019-11-28

## 2023-04-11 PROCEDURE — 99214 OFFICE O/P EST MOD 30 MIN: CPT | Performed by: PHYSICIAN ASSISTANT

## 2023-04-11 RX ORDER — TRIAMCINOLONE ACETONIDE 1 MG/G
1 CREAM TOPICAL 3 TIMES DAILY
Qty: 80 G | Refills: 1 | Status: SHIPPED | OUTPATIENT
Start: 2023-04-11 | End: 2023-08-28

## 2023-04-11 ASSESSMENT — FIBROSIS 4 INDEX: FIB4 SCORE: 2.67

## 2023-04-11 ASSESSMENT — PATIENT HEALTH QUESTIONNAIRE - PHQ9: CLINICAL INTERPRETATION OF PHQ2 SCORE: 0

## 2023-04-11 NOTE — PROGRESS NOTES
Subjective:   Moe Nickerson is a 78 y.o. male here today for eczema and history of CHF.    Eczema  This is a pleasant 78-year-old male who I recently referred to [facility] for history of skin cancer screening as well as eczema.  Eczema recently flared.  He has been seen by dermatology in the past but was provided a pay solution which was unusable.  Like a renewal of triamcinolone.  Has had recent flare of eczema of his scalp and neck area.  Was told in the past he had precancerous lesion on his left arm.    Congestive heart failure (HCC)  I referred him to cardiology in February.  He has not contacted cardiology to reestablish care.  He states he is taking his medications as directed.  There is a couple other cardiac meds which are not listed which she is taking.  Denies any shortness of breath.  Last ejection fraction noted to be at 25%.  This was performed last year in August 2022.    He is also requesting labs.       Current medicines (including changes today)  Current Outpatient Medications   Medication Sig Dispense Refill    triamcinolone acetonide (KENALOG) 0.1 % Cream Apply 1 Application. topically 3 times a day. Avoid face. 80 g 1    omeprazole (PRILOSEC) 20 MG delayed-release capsule Take 1 Capsule by mouth every day. Take 1/2 hour prior to same meal. 90 Capsule 2    clopidogrel (PLAVIX) 75 MG Tab Take 1 Tablet by mouth every day. 90 Tablet 2    simvastatin (ZOCOR) 10 MG Tab Take 1 Tablet by mouth every evening. 90 Tablet 2    nitroglycerin (NITROSTAT) 0.4 MG SL Tab PLACE ONE TABLET UNDER TONGUE EVERY 5 MINUTES AS NEEDED MAXIMUM 3 TABLETS IN 15 MINUTES 25 Tablet 0    ketoconazole (NIZORAL) 2 % shampoo Apply 120 mL topically 1 time a day as needed for Itching. 120 mL 11    buPROPion (WELLBUTRIN) 75 MG Tab Take 1 Tablet by mouth 2 times a day. 60 Tablet 3    lisinopril (PRINIVIL) 40 MG tablet Take 1 Tablet by mouth every day. 90 Tablet 2    multivitamin (THERAGRAN) Tab Take 1 Tab by mouth every  day.      vitamin D (CHOLECALCIFEROL) 1000 UNIT Tab Take 1,000 Units by mouth every day.       No current facility-administered medications for this visit.     He  has a past medical history of Anxiety, Congestive heart failure (HCC), Heart attack (HCC), Hypertension, and Stroke (cerebrum) (HCC).    Social History and Family History were reviewed and updated.    ROS   No chest pain, no shortness of breath, no abdominal pain and all other systems were reviewed and are negative.       Objective:     /70 (BP Location: Left arm, Patient Position: Sitting, BP Cuff Size: Adult)   Pulse 67   Temp 37.2 °C (98.9 °F) (Temporal)   Ht 1.829 m (6')   Wt 55.4 kg (122 lb 2.2 oz)   SpO2 98%  Body mass index is 16.56 kg/m².   Physical Exam:  Constitutional: Alert, no distress.  Skin: Warm, dry, good turgor, no rashes in visible areas.  Patchy area on the back of his neck on the left side.  Eye: Equal, round and reactive, conjunctiva clear, lids normal.  ENMT: Lips without lesions, good dentition, oropharynx clear.  Neck: Trachea midline, no masses.   Lymph: No cervical or supraclavicular lymphadenopathy  Respiratory: Unlabored respiratory effort, lungs appear clear.  Psych: Alert and oriented x3, normal affect and mood.        Assessment and Plan:   The following treatment plan was discussed    1. Eczema, unspecified type  Chronic condition.  Uncontrolled today.  Renew triamcinolone 0.1% cream.  Follow-up with dermatology.  Printed out information to contact dermatology.  - triamcinolone acetonide (KENALOG) 0.1 % Cream; Apply 1 Application. topically 3 times a day. Avoid face.  Dispense: 80 g; Refill: 1    2. Congestive heart failure, unspecified HF chronicity, unspecified heart failure type (HCC)  Chronic condition.  Stable.  Asymptomatic today.  Printed out information regarding contacting cardiology.  Ordered CBC, CMP and BNP.  He will need a repeat echo.  Advised as well during next appointment to bring in his  medications.  Spironolactone appears to be missing from his medication list.  - CBC WITH DIFFERENTIAL; Future  - Comp Metabolic Panel; Future  - proBrain Natriuretic Peptide, NT; Future         Followup: Return in about 4 weeks (around 5/9/2023), or if symptoms worsen or fail to improve.    Please note that this dictation was created using voice recognition software. I have made every reasonable attempt to correct obvious errors, but I expect that there are errors of grammar and possibly content that I did not discover before finalizing the note.

## 2023-04-11 NOTE — ASSESSMENT & PLAN NOTE
This is a pleasant 78-year-old male who I recently referred to [facility] for history of skin cancer screening as well as eczema.  Eczema recently flared.  He has been seen by dermatology in the past but was provided a pay solution which was unusable.  Like a renewal of triamcinolone.  Has had recent flare of eczema of his scalp and neck area.  Was told in the past he had precancerous lesion on his left arm.

## 2023-04-11 NOTE — ASSESSMENT & PLAN NOTE
I referred him to cardiology in February.  He has not contacted cardiology to reestablish care.  He states he is taking his medications as directed.  There is a couple other cardiac meds which are not listed which she is taking.  Denies any shortness of breath.  Last ejection fraction noted to be at 25%.  This was performed last year in August 2022.    He is also requesting labs.

## 2023-04-27 ENCOUNTER — TELEPHONE (OUTPATIENT)
Dept: HEALTH INFORMATION MANAGEMENT | Facility: OTHER | Age: 78
End: 2023-04-27

## 2023-05-09 ENCOUNTER — APPOINTMENT (OUTPATIENT)
Dept: MEDICAL GROUP | Facility: MEDICAL CENTER | Age: 78
End: 2023-05-09
Payer: MEDICARE

## 2023-06-20 ENCOUNTER — APPOINTMENT (RX ONLY)
Dept: URBAN - METROPOLITAN AREA CLINIC 4 | Facility: CLINIC | Age: 78
Setting detail: DERMATOLOGY
End: 2023-06-20

## 2023-06-20 DIAGNOSIS — L82.1 OTHER SEBORRHEIC KERATOSIS: ICD-10-CM

## 2023-06-20 DIAGNOSIS — L40.0 PSORIASIS VULGARIS: ICD-10-CM

## 2023-06-20 PROBLEM — L30.9 DERMATITIS, UNSPECIFIED: Status: ACTIVE | Noted: 2023-06-20

## 2023-06-20 PROCEDURE — ? PRESCRIPTION

## 2023-06-20 PROCEDURE — 99213 OFFICE O/P EST LOW 20 MIN: CPT

## 2023-06-20 PROCEDURE — ? COUNSELING

## 2023-06-20 RX ORDER — FLUOCINONIDE 0.5 MG/ML
1 SOLUTION TOPICAL BID
Qty: 60 | Refills: 2 | Status: ERX | COMMUNITY
Start: 2023-06-20

## 2023-06-20 RX ADMIN — FLUOCINONIDE 1: 0.5 SOLUTION TOPICAL at 00:00

## 2023-06-20 ASSESSMENT — LOCATION ZONE DERM
LOCATION ZONE: GENITALIA
LOCATION ZONE: TRUNK
LOCATION ZONE: SCALP
LOCATION ZONE: FACE
LOCATION ZONE: ARM

## 2023-06-20 ASSESSMENT — LOCATION DETAILED DESCRIPTION DERM
LOCATION DETAILED: LEFT SUPERIOR LATERAL BUCCAL CHEEK
LOCATION DETAILED: LEFT MEDIAL EYEBROW
LOCATION DETAILED: RIGHT SUPERIOR LATERAL BUCCAL CHEEK
LOCATION DETAILED: LEFT VENTRAL DISTAL FOREARM
LOCATION DETAILED: RIGHT MEDIAL EYEBROW
LOCATION DETAILED: SUPERIOR LUMBAR SPINE
LOCATION DETAILED: LEFT MEDIAL FRONTAL SCALP
LOCATION DETAILED: GENITALIA

## 2023-06-20 ASSESSMENT — LOCATION SIMPLE DESCRIPTION DERM
LOCATION SIMPLE: GENITALIA
LOCATION SIMPLE: RIGHT EYEBROW
LOCATION SIMPLE: LOWER BACK
LOCATION SIMPLE: RIGHT CHEEK
LOCATION SIMPLE: LEFT SCALP
LOCATION SIMPLE: LEFT EYEBROW
LOCATION SIMPLE: LEFT FOREARM
LOCATION SIMPLE: LEFT CHEEK

## 2023-07-25 DIAGNOSIS — R07.9 CHEST PAIN, UNSPECIFIED TYPE: ICD-10-CM

## 2023-07-25 RX ORDER — NITROGLYCERIN 0.4 MG/1
TABLET SUBLINGUAL
Qty: 25 TABLET | Refills: 0 | Status: ON HOLD | OUTPATIENT
Start: 2023-07-25 | End: 2023-09-12

## 2023-08-28 ENCOUNTER — APPOINTMENT (OUTPATIENT)
Dept: RADIOLOGY | Facility: MEDICAL CENTER | Age: 78
DRG: 522 | End: 2023-08-28
Attending: EMERGENCY MEDICINE
Payer: MEDICARE

## 2023-08-28 ENCOUNTER — HOSPITAL ENCOUNTER (INPATIENT)
Facility: MEDICAL CENTER | Age: 78
LOS: 2 days | DRG: 522 | End: 2023-08-30
Attending: EMERGENCY MEDICINE | Admitting: STUDENT IN AN ORGANIZED HEALTH CARE EDUCATION/TRAINING PROGRAM
Payer: MEDICARE

## 2023-08-28 ENCOUNTER — APPOINTMENT (OUTPATIENT)
Dept: RADIOLOGY | Facility: MEDICAL CENTER | Age: 78
DRG: 522 | End: 2023-08-28
Attending: ORTHOPAEDIC SURGERY
Payer: MEDICARE

## 2023-08-28 ENCOUNTER — ANESTHESIA EVENT (OUTPATIENT)
Dept: SURGERY | Facility: MEDICAL CENTER | Age: 78
DRG: 522 | End: 2023-08-28
Payer: MEDICARE

## 2023-08-28 ENCOUNTER — APPOINTMENT (OUTPATIENT)
Dept: CARDIOLOGY | Facility: MEDICAL CENTER | Age: 78
DRG: 522 | End: 2023-08-28
Attending: GENERAL PRACTICE
Payer: MEDICARE

## 2023-08-28 ENCOUNTER — ANESTHESIA (OUTPATIENT)
Dept: SURGERY | Facility: MEDICAL CENTER | Age: 78
DRG: 522 | End: 2023-08-28
Payer: MEDICARE

## 2023-08-28 DIAGNOSIS — Z86.73 HISTORY OF STROKE: ICD-10-CM

## 2023-08-28 DIAGNOSIS — S72.001A CLOSED FRACTURE OF RIGHT HIP, INITIAL ENCOUNTER (HCC): ICD-10-CM

## 2023-08-28 DIAGNOSIS — I51.3 MURAL THROMBUS OF CARDIAC APEX: ICD-10-CM
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
## 2023-08-28 PROBLEM — I44.7 LBBB (LEFT BUNDLE BRANCH BLOCK): Status: ACTIVE | Noted: 2023-08-28

## 2023-08-28 PROBLEM — Z71.89 ACP (ADVANCE CARE PLANNING): Status: ACTIVE | Noted: 2023-08-28

## 2023-08-28 PROBLEM — S72.009A FEMORAL NECK FRACTURE (HCC): Status: ACTIVE | Noted: 2023-08-28

## 2023-08-28 PROBLEM — M84.353A FEMORAL NECK STRESS FRACTURE, INITIAL ENCOUNTER: Status: RESOLVED | Noted: 2023-08-28 | Resolved: 2023-08-28

## 2023-08-28 PROBLEM — I50.20 HEART FAILURE WITH REDUCED EJECTION FRACTION (HCC): Status: ACTIVE | Noted: 2023-08-28

## 2023-08-28 PROBLEM — E78.5 HYPERLIPIDEMIA: Status: ACTIVE | Noted: 2023-08-28

## 2023-08-28 PROBLEM — M84.353A FEMORAL NECK STRESS FRACTURE, INITIAL ENCOUNTER: Status: ACTIVE | Noted: 2023-08-28

## 2023-08-28 LAB
ALBUMIN SERPL BCP-MCNC: 4 G/DL (ref 3.2–4.9)
ALBUMIN/GLOB SERPL: 1.8 G/DL
ALP SERPL-CCNC: 82 U/L (ref 30–99)
ALT SERPL-CCNC: 33 U/L (ref 2–50)
ANION GAP SERPL CALC-SCNC: 11 MMOL/L (ref 7–16)
AST SERPL-CCNC: 33 U/L (ref 12–45)
BASOPHILS # BLD AUTO: 0.2 % (ref 0–1.8)
BASOPHILS # BLD: 0.01 K/UL (ref 0–0.12)
BILIRUB SERPL-MCNC: 0.7 MG/DL (ref 0.1–1.5)
BUN SERPL-MCNC: 24 MG/DL (ref 8–22)
CALCIUM ALBUM COR SERPL-MCNC: 9.3 MG/DL (ref 8.5–10.5)
CALCIUM SERPL-MCNC: 9.3 MG/DL (ref 8.5–10.5)
CHLORIDE SERPL-SCNC: 105 MMOL/L (ref 96–112)
CO2 SERPL-SCNC: 25 MMOL/L (ref 20–33)
CREAT SERPL-MCNC: 1.05 MG/DL (ref 0.5–1.4)
EKG IMPRESSION: NORMAL
EOSINOPHIL # BLD AUTO: 0.12 K/UL (ref 0–0.51)
EOSINOPHIL NFR BLD: 2.6 % (ref 0–6.9)
ERYTHROCYTE [DISTWIDTH] IN BLOOD BY AUTOMATED COUNT: 44.8 FL (ref 35.9–50)
GFR SERPLBLD CREATININE-BSD FMLA CKD-EPI: 72 ML/MIN/1.73 M 2
GLOBULIN SER CALC-MCNC: 2.2 G/DL (ref 1.9–3.5)
GLUCOSE SERPL-MCNC: 119 MG/DL (ref 65–99)
HCT VFR BLD AUTO: 44.1 % (ref 42–52)
HGB BLD-MCNC: 15 G/DL (ref 14–18)
IMM GRANULOCYTES # BLD AUTO: 0.03 K/UL (ref 0–0.11)
IMM GRANULOCYTES NFR BLD AUTO: 0.6 % (ref 0–0.9)
LV EJECT FRACT  99904: 35
LV EJECT FRACT MOD 2C 99903: 50
LV EJECT FRACT MOD 4C 99902: 40.78
LV EJECT FRACT MOD BP 99901: 45.21
LYMPHOCYTES # BLD AUTO: 1.16 K/UL (ref 1–4.8)
LYMPHOCYTES NFR BLD: 25.1 % (ref 22–41)
MCH RBC QN AUTO: 32.4 PG (ref 27–33)
MCHC RBC AUTO-ENTMCNC: 34 G/DL (ref 32.3–36.5)
MCV RBC AUTO: 95.2 FL (ref 81.4–97.8)
MONOCYTES # BLD AUTO: 0.35 K/UL (ref 0–0.85)
MONOCYTES NFR BLD AUTO: 7.6 % (ref 0–13.4)
NEUTROPHILS # BLD AUTO: 2.96 K/UL (ref 1.82–7.42)
NEUTROPHILS NFR BLD: 63.9 % (ref 44–72)
NRBC # BLD AUTO: 0 K/UL
NRBC BLD-RTO: 0 /100 WBC (ref 0–0.2)
PLATELET # BLD AUTO: 171 K/UL (ref 164–446)
PMV BLD AUTO: 11.6 FL (ref 9–12.9)
POTASSIUM SERPL-SCNC: 4.3 MMOL/L (ref 3.6–5.5)
PROT SERPL-MCNC: 6.2 G/DL (ref 6–8.2)
RBC # BLD AUTO: 4.63 M/UL (ref 4.7–6.1)
SODIUM SERPL-SCNC: 141 MMOL/L (ref 135–145)
TROPONIN T SERPL-MCNC: 20 NG/L (ref 6–19)
WBC # BLD AUTO: 4.6 K/UL (ref 4.8–10.8)

## 2023-08-28 PROCEDURE — 99223 1ST HOSP IP/OBS HIGH 75: CPT | Mod: AI,25 | Performed by: STUDENT IN AN ORGANIZED HEALTH CARE EDUCATION/TRAINING PROGRAM

## 2023-08-28 PROCEDURE — 700111 HCHG RX REV CODE 636 W/ 250 OVERRIDE (IP): Performed by: GENERAL PRACTICE

## 2023-08-28 PROCEDURE — 3E0T3BZ INTRODUCTION OF ANESTHETIC AGENT INTO PERIPHERAL NERVES AND PLEXI, PERCUTANEOUS APPROACH: ICD-10-PCS | Performed by: EMERGENCY MEDICINE

## 2023-08-28 PROCEDURE — 93306 TTE W/DOPPLER COMPLETE: CPT

## 2023-08-28 PROCEDURE — 700111 HCHG RX REV CODE 636 W/ 250 OVERRIDE (IP): Performed by: EMERGENCY MEDICINE

## 2023-08-28 PROCEDURE — 84484 ASSAY OF TROPONIN QUANT: CPT

## 2023-08-28 PROCEDURE — 700111 HCHG RX REV CODE 636 W/ 250 OVERRIDE (IP): Mod: JZ | Performed by: ANESTHESIOLOGY

## 2023-08-28 PROCEDURE — 160002 HCHG RECOVERY MINUTES (STAT): Performed by: ORTHOPAEDIC SURGERY

## 2023-08-28 PROCEDURE — C1713 ANCHOR/SCREW BN/BN,TIS/BN: HCPCS | Performed by: ORTHOPAEDIC SURGERY

## 2023-08-28 PROCEDURE — 99285 EMERGENCY DEPT VISIT HI MDM: CPT

## 2023-08-28 PROCEDURE — 96374 THER/PROPH/DIAG INJ IV PUSH: CPT

## 2023-08-28 PROCEDURE — 700111 HCHG RX REV CODE 636 W/ 250 OVERRIDE (IP): Performed by: ANESTHESIOLOGY

## 2023-08-28 PROCEDURE — 99497 ADVNCD CARE PLAN 30 MIN: CPT | Performed by: STUDENT IN AN ORGANIZED HEALTH CARE EDUCATION/TRAINING PROGRAM

## 2023-08-28 PROCEDURE — 700101 HCHG RX REV CODE 250: Performed by: ANESTHESIOLOGY

## 2023-08-28 PROCEDURE — 64447 NJX AA&/STRD FEMORAL NRV IMG: CPT

## 2023-08-28 PROCEDURE — 700105 HCHG RX REV CODE 258: Performed by: STUDENT IN AN ORGANIZED HEALTH CARE EDUCATION/TRAINING PROGRAM

## 2023-08-28 PROCEDURE — 72128 CT CHEST SPINE W/O DYE: CPT

## 2023-08-28 PROCEDURE — 93005 ELECTROCARDIOGRAM TRACING: CPT | Performed by: EMERGENCY MEDICINE

## 2023-08-28 PROCEDURE — 700102 HCHG RX REV CODE 250 W/ 637 OVERRIDE(OP): Performed by: ANESTHESIOLOGY

## 2023-08-28 PROCEDURE — C1776 JOINT DEVICE (IMPLANTABLE): HCPCS | Performed by: ORTHOPAEDIC SURGERY

## 2023-08-28 PROCEDURE — 71045 X-RAY EXAM CHEST 1 VIEW: CPT

## 2023-08-28 PROCEDURE — 72170 X-RAY EXAM OF PELVIS: CPT

## 2023-08-28 PROCEDURE — 73552 X-RAY EXAM OF FEMUR 2/>: CPT | Mod: RT

## 2023-08-28 PROCEDURE — 160035 HCHG PACU - 1ST 60 MINS PHASE I: Performed by: ORTHOPAEDIC SURGERY

## 2023-08-28 PROCEDURE — 80053 COMPREHEN METABOLIC PANEL: CPT

## 2023-08-28 PROCEDURE — A9270 NON-COVERED ITEM OR SERVICE: HCPCS | Performed by: ANESTHESIOLOGY

## 2023-08-28 PROCEDURE — 0SR90JZ REPLACEMENT OF RIGHT HIP JOINT WITH SYNTHETIC SUBSTITUTE, OPEN APPROACH: ICD-10-PCS | Performed by: ORTHOPAEDIC SURGERY

## 2023-08-28 PROCEDURE — 3E0T3BZ INTRODUCTION OF ANESTHETIC AGENT INTO PERIPHERAL NERVES AND PLEXI, PERCUTANEOUS APPROACH: ICD-10-PCS | Performed by: ANESTHESIOLOGY

## 2023-08-28 PROCEDURE — 72131 CT LUMBAR SPINE W/O DYE: CPT

## 2023-08-28 PROCEDURE — 502000 HCHG MISC OR IMPLANTS RC 0278: Performed by: ORTHOPAEDIC SURGERY

## 2023-08-28 PROCEDURE — 64447 NJX AA&/STRD FEMORAL NRV IMG: CPT | Performed by: ORTHOPAEDIC SURGERY

## 2023-08-28 PROCEDURE — A9270 NON-COVERED ITEM OR SERVICE: HCPCS | Performed by: GENERAL PRACTICE

## 2023-08-28 PROCEDURE — 700117 HCHG RX CONTRAST REV CODE 255: Performed by: GENERAL PRACTICE

## 2023-08-28 PROCEDURE — 770001 HCHG ROOM/CARE - MED/SURG/GYN PRIV*

## 2023-08-28 PROCEDURE — 72125 CT NECK SPINE W/O DYE: CPT

## 2023-08-28 PROCEDURE — 160048 HCHG OR STATISTICAL LEVEL 1-5: Performed by: ORTHOPAEDIC SURGERY

## 2023-08-28 PROCEDURE — 160042 HCHG SURGERY MINUTES - EA ADDL 1 MIN LEVEL 5: Performed by: ORTHOPAEDIC SURGERY

## 2023-08-28 PROCEDURE — 700102 HCHG RX REV CODE 250 W/ 637 OVERRIDE(OP): Performed by: GENERAL PRACTICE

## 2023-08-28 PROCEDURE — 85025 COMPLETE CBC W/AUTO DIFF WBC: CPT

## 2023-08-28 PROCEDURE — 700111 HCHG RX REV CODE 636 W/ 250 OVERRIDE (IP): Performed by: ORTHOPAEDIC SURGERY

## 2023-08-28 PROCEDURE — 36415 COLL VENOUS BLD VENIPUNCTURE: CPT

## 2023-08-28 PROCEDURE — 700111 HCHG RX REV CODE 636 W/ 250 OVERRIDE (IP): Performed by: STUDENT IN AN ORGANIZED HEALTH CARE EDUCATION/TRAINING PROGRAM

## 2023-08-28 PROCEDURE — 70450 CT HEAD/BRAIN W/O DYE: CPT

## 2023-08-28 PROCEDURE — 160031 HCHG SURGERY MINUTES - 1ST 30 MINS LEVEL 5: Performed by: ORTHOPAEDIC SURGERY

## 2023-08-28 PROCEDURE — 700105 HCHG RX REV CODE 258: Performed by: ANESTHESIOLOGY

## 2023-08-28 PROCEDURE — 93306 TTE W/DOPPLER COMPLETE: CPT | Mod: 26 | Performed by: INTERNAL MEDICINE

## 2023-08-28 PROCEDURE — 160009 HCHG ANES TIME/MIN: Performed by: ORTHOPAEDIC SURGERY

## 2023-08-28 PROCEDURE — 700105 HCHG RX REV CODE 258: Performed by: ORTHOPAEDIC SURGERY

## 2023-08-28 DEVICE — HIP DALL MILES SET 2.0 V BEAD: Type: IMPLANTABLE DEVICE | Site: HIP | Status: FUNCTIONAL

## 2023-08-28 DEVICE — IMPLANTABLE DEVICE: Type: IMPLANTABLE DEVICE | Site: HIP | Status: FUNCTIONAL

## 2023-08-28 RX ORDER — ROCURONIUM BROMIDE 10 MG/ML
INJECTION, SOLUTION INTRAVENOUS PRN
Status: DISCONTINUED | OUTPATIENT
Start: 2023-08-28 | End: 2023-08-28 | Stop reason: SURG

## 2023-08-28 RX ORDER — KETOROLAC TROMETHAMINE 30 MG/ML
15 INJECTION, SOLUTION INTRAMUSCULAR; INTRAVENOUS EVERY 6 HOURS PRN
Status: DISCONTINUED | OUTPATIENT
Start: 2023-08-28 | End: 2023-08-28

## 2023-08-28 RX ORDER — BUPIVACAINE HYDROCHLORIDE 2.5 MG/ML
INJECTION, SOLUTION EPIDURAL; INFILTRATION; INTRACAUDAL PRN
Status: DISCONTINUED | OUTPATIENT
Start: 2023-08-28 | End: 2023-08-28 | Stop reason: SURG

## 2023-08-28 RX ORDER — HYDROMORPHONE HYDROCHLORIDE 1 MG/ML
0.1 INJECTION, SOLUTION INTRAMUSCULAR; INTRAVENOUS; SUBCUTANEOUS
Status: DISCONTINUED | OUTPATIENT
Start: 2023-08-28 | End: 2023-08-28 | Stop reason: HOSPADM

## 2023-08-28 RX ORDER — OXYCODONE HCL 5 MG/5 ML
5 SOLUTION, ORAL ORAL
Status: COMPLETED | OUTPATIENT
Start: 2023-08-28 | End: 2023-08-28

## 2023-08-28 RX ORDER — AMOXICILLIN 250 MG
2 CAPSULE ORAL 2 TIMES DAILY
Status: DISCONTINUED | OUTPATIENT
Start: 2023-08-28 | End: 2023-08-30 | Stop reason: HOSPADM

## 2023-08-28 RX ORDER — DIPHENHYDRAMINE HYDROCHLORIDE 50 MG/ML
12.5 INJECTION INTRAMUSCULAR; INTRAVENOUS
Status: DISCONTINUED | OUTPATIENT
Start: 2023-08-28 | End: 2023-08-28 | Stop reason: HOSPADM

## 2023-08-28 RX ORDER — SIMVASTATIN 20 MG
10 TABLET ORAL EVERY EVENING
Status: DISCONTINUED | OUTPATIENT
Start: 2023-08-28 | End: 2023-08-30 | Stop reason: HOSPADM

## 2023-08-28 RX ORDER — OXYCODONE HYDROCHLORIDE 5 MG/1
2.5 TABLET ORAL
Status: DISCONTINUED | OUTPATIENT
Start: 2023-08-28 | End: 2023-08-30 | Stop reason: HOSPADM

## 2023-08-28 RX ORDER — HALOPERIDOL 5 MG/ML
1 INJECTION INTRAMUSCULAR
Status: DISCONTINUED | OUTPATIENT
Start: 2023-08-28 | End: 2023-08-28 | Stop reason: HOSPADM

## 2023-08-28 RX ORDER — ENOXAPARIN SODIUM 100 MG/ML
30 INJECTION SUBCUTANEOUS DAILY
Status: DISCONTINUED | OUTPATIENT
Start: 2023-08-28 | End: 2023-08-29

## 2023-08-28 RX ORDER — HYDROMORPHONE HYDROCHLORIDE 1 MG/ML
0.5 INJECTION, SOLUTION INTRAMUSCULAR; INTRAVENOUS; SUBCUTANEOUS ONCE
Status: COMPLETED | OUTPATIENT
Start: 2023-08-28 | End: 2023-08-28

## 2023-08-28 RX ORDER — ONDANSETRON 2 MG/ML
4 INJECTION INTRAMUSCULAR; INTRAVENOUS EVERY 4 HOURS PRN
Status: DISCONTINUED | OUTPATIENT
Start: 2023-08-28 | End: 2023-08-30 | Stop reason: HOSPADM

## 2023-08-28 RX ORDER — TRANEXAMIC ACID 100 MG/ML
INJECTION, SOLUTION INTRAVENOUS PRN
Status: DISCONTINUED | OUTPATIENT
Start: 2023-08-28 | End: 2023-08-28 | Stop reason: SURG

## 2023-08-28 RX ORDER — OXYCODONE HCL 5 MG/5 ML
10 SOLUTION, ORAL ORAL
Status: COMPLETED | OUTPATIENT
Start: 2023-08-28 | End: 2023-08-28

## 2023-08-28 RX ORDER — ACETAMINOPHEN 325 MG/1
325-650 TABLET ORAL EVERY 4 HOURS PRN
Status: ON HOLD | COMMUNITY
End: 2023-08-30

## 2023-08-28 RX ORDER — DEXAMETHASONE SODIUM PHOSPHATE 4 MG/ML
INJECTION, SOLUTION INTRA-ARTICULAR; INTRALESIONAL; INTRAMUSCULAR; INTRAVENOUS; SOFT TISSUE PRN
Status: DISCONTINUED | OUTPATIENT
Start: 2023-08-28 | End: 2023-08-28 | Stop reason: SURG

## 2023-08-28 RX ORDER — HYDROMORPHONE HYDROCHLORIDE 1 MG/ML
0.4 INJECTION, SOLUTION INTRAMUSCULAR; INTRAVENOUS; SUBCUTANEOUS
Status: DISCONTINUED | OUTPATIENT
Start: 2023-08-28 | End: 2023-08-28 | Stop reason: HOSPADM

## 2023-08-28 RX ORDER — GLYCOPYRROLATE 0.2 MG/ML
INJECTION INTRAMUSCULAR; INTRAVENOUS PRN
Status: DISCONTINUED | OUTPATIENT
Start: 2023-08-28 | End: 2023-08-28 | Stop reason: SURG

## 2023-08-28 RX ORDER — ROPIVACAINE HYDROCHLORIDE 2 MG/ML
20 INJECTION, SOLUTION EPIDURAL; INFILTRATION; PERINEURAL ONCE
Status: COMPLETED | OUTPATIENT
Start: 2023-08-28 | End: 2023-08-28

## 2023-08-28 RX ORDER — MORPHINE SULFATE 4 MG/ML
2 INJECTION INTRAVENOUS EVERY 4 HOURS PRN
Status: DISCONTINUED | OUTPATIENT
Start: 2023-08-28 | End: 2023-08-28

## 2023-08-28 RX ORDER — LIDOCAINE HYDROCHLORIDE 20 MG/ML
INJECTION, SOLUTION EPIDURAL; INFILTRATION; INTRACAUDAL; PERINEURAL PRN
Status: DISCONTINUED | OUTPATIENT
Start: 2023-08-28 | End: 2023-08-28 | Stop reason: SURG

## 2023-08-28 RX ORDER — HYDROMORPHONE HYDROCHLORIDE 1 MG/ML
0.25 INJECTION, SOLUTION INTRAMUSCULAR; INTRAVENOUS; SUBCUTANEOUS
Status: DISCONTINUED | OUTPATIENT
Start: 2023-08-28 | End: 2023-08-30 | Stop reason: HOSPADM

## 2023-08-28 RX ORDER — ACETAMINOPHEN 500 MG
1000 TABLET ORAL ONCE
Status: DISCONTINUED | OUTPATIENT
Start: 2023-08-28 | End: 2023-08-28 | Stop reason: HOSPADM

## 2023-08-28 RX ORDER — POLYETHYLENE GLYCOL 3350 17 G/17G
1 POWDER, FOR SOLUTION ORAL
Status: DISCONTINUED | OUTPATIENT
Start: 2023-08-28 | End: 2023-08-30 | Stop reason: HOSPADM

## 2023-08-28 RX ORDER — HYDRALAZINE HYDROCHLORIDE 20 MG/ML
10 INJECTION INTRAMUSCULAR; INTRAVENOUS EVERY 4 HOURS PRN
Status: DISCONTINUED | OUTPATIENT
Start: 2023-08-28 | End: 2023-08-30 | Stop reason: HOSPADM

## 2023-08-28 RX ORDER — SODIUM CHLORIDE, SODIUM LACTATE, POTASSIUM CHLORIDE, CALCIUM CHLORIDE 600; 310; 30; 20 MG/100ML; MG/100ML; MG/100ML; MG/100ML
INJECTION, SOLUTION INTRAVENOUS
Status: DISCONTINUED | OUTPATIENT
Start: 2023-08-28 | End: 2023-08-28 | Stop reason: SURG

## 2023-08-28 RX ORDER — SODIUM CHLORIDE 9 MG/ML
INJECTION, SOLUTION INTRAVENOUS CONTINUOUS
Status: DISCONTINUED | OUTPATIENT
Start: 2023-08-28 | End: 2023-08-28

## 2023-08-28 RX ORDER — SODIUM CHLORIDE, SODIUM LACTATE, POTASSIUM CHLORIDE, CALCIUM CHLORIDE 600; 310; 30; 20 MG/100ML; MG/100ML; MG/100ML; MG/100ML
INJECTION, SOLUTION INTRAVENOUS CONTINUOUS
Status: DISCONTINUED | OUTPATIENT
Start: 2023-08-28 | End: 2023-08-28 | Stop reason: HOSPADM

## 2023-08-28 RX ORDER — LISINOPRIL 40 MG/1
20 TABLET ORAL DAILY
Status: ON HOLD | COMMUNITY
End: 2023-09-12

## 2023-08-28 RX ORDER — METOPROLOL TARTRATE 1 MG/ML
1 INJECTION, SOLUTION INTRAVENOUS
Status: DISCONTINUED | OUTPATIENT
Start: 2023-08-28 | End: 2023-08-28 | Stop reason: HOSPADM

## 2023-08-28 RX ORDER — CEFAZOLIN SODIUM 1 G/3ML
INJECTION, POWDER, FOR SOLUTION INTRAMUSCULAR; INTRAVENOUS PRN
Status: DISCONTINUED | OUTPATIENT
Start: 2023-08-28 | End: 2023-08-28 | Stop reason: SURG

## 2023-08-28 RX ORDER — ACETAMINOPHEN 325 MG/1
650 TABLET ORAL EVERY 6 HOURS PRN
Status: DISCONTINUED | OUTPATIENT
Start: 2023-08-28 | End: 2023-08-28

## 2023-08-28 RX ORDER — ROPIVACAINE HYDROCHLORIDE 2 MG/ML
40 INJECTION, SOLUTION EPIDURAL; INFILTRATION; PERINEURAL ONCE
Status: COMPLETED | OUTPATIENT
Start: 2023-08-28 | End: 2023-08-28

## 2023-08-28 RX ORDER — EPHEDRINE SULFATE 50 MG/ML
5 INJECTION, SOLUTION INTRAVENOUS
Status: DISCONTINUED | OUTPATIENT
Start: 2023-08-28 | End: 2023-08-28 | Stop reason: HOSPADM

## 2023-08-28 RX ORDER — EPHEDRINE SULFATE 50 MG/ML
INJECTION, SOLUTION INTRAVENOUS PRN
Status: DISCONTINUED | OUTPATIENT
Start: 2023-08-28 | End: 2023-08-28 | Stop reason: SURG

## 2023-08-28 RX ORDER — HYDRALAZINE HYDROCHLORIDE 20 MG/ML
5 INJECTION INTRAMUSCULAR; INTRAVENOUS
Status: DISCONTINUED | OUTPATIENT
Start: 2023-08-28 | End: 2023-08-28 | Stop reason: HOSPADM

## 2023-08-28 RX ORDER — ONDANSETRON 4 MG/1
4 TABLET, ORALLY DISINTEGRATING ORAL EVERY 4 HOURS PRN
Status: DISCONTINUED | OUTPATIENT
Start: 2023-08-28 | End: 2023-08-30 | Stop reason: HOSPADM

## 2023-08-28 RX ORDER — ONDANSETRON 2 MG/ML
INJECTION INTRAMUSCULAR; INTRAVENOUS PRN
Status: DISCONTINUED | OUTPATIENT
Start: 2023-08-28 | End: 2023-08-28 | Stop reason: SURG

## 2023-08-28 RX ORDER — OXYCODONE HYDROCHLORIDE 5 MG/1
5 TABLET ORAL
Status: DISCONTINUED | OUTPATIENT
Start: 2023-08-28 | End: 2023-08-30 | Stop reason: HOSPADM

## 2023-08-28 RX ORDER — ONDANSETRON 2 MG/ML
4 INJECTION INTRAMUSCULAR; INTRAVENOUS
Status: DISCONTINUED | OUTPATIENT
Start: 2023-08-28 | End: 2023-08-28 | Stop reason: HOSPADM

## 2023-08-28 RX ORDER — ACETAMINOPHEN 500 MG
1000 TABLET ORAL EVERY 6 HOURS PRN
Status: DISCONTINUED | OUTPATIENT
Start: 2023-09-02 | End: 2023-08-30 | Stop reason: HOSPADM

## 2023-08-28 RX ORDER — BISACODYL 10 MG
10 SUPPOSITORY, RECTAL RECTAL
Status: DISCONTINUED | OUTPATIENT
Start: 2023-08-28 | End: 2023-08-30 | Stop reason: HOSPADM

## 2023-08-28 RX ORDER — LISINOPRIL 20 MG/1
20 TABLET ORAL DAILY
Status: DISCONTINUED | OUTPATIENT
Start: 2023-08-28 | End: 2023-08-30 | Stop reason: HOSPADM

## 2023-08-28 RX ORDER — CLOPIDOGREL BISULFATE 75 MG/1
75 TABLET ORAL DAILY
Status: DISCONTINUED | OUTPATIENT
Start: 2023-08-29 | End: 2023-08-30 | Stop reason: HOSPADM

## 2023-08-28 RX ORDER — ACETAMINOPHEN 500 MG
1000 TABLET ORAL EVERY 6 HOURS
Status: DISCONTINUED | OUTPATIENT
Start: 2023-08-28 | End: 2023-08-30 | Stop reason: HOSPADM

## 2023-08-28 RX ORDER — VANCOMYCIN HYDROCHLORIDE 1 G/20ML
INJECTION, POWDER, LYOPHILIZED, FOR SOLUTION INTRAVENOUS
Status: COMPLETED | OUTPATIENT
Start: 2023-08-28 | End: 2023-08-28

## 2023-08-28 RX ORDER — HYDROMORPHONE HYDROCHLORIDE 1 MG/ML
0.2 INJECTION, SOLUTION INTRAMUSCULAR; INTRAVENOUS; SUBCUTANEOUS
Status: DISCONTINUED | OUTPATIENT
Start: 2023-08-28 | End: 2023-08-28 | Stop reason: HOSPADM

## 2023-08-28 RX ORDER — LABETALOL HYDROCHLORIDE 5 MG/ML
5 INJECTION, SOLUTION INTRAVENOUS
Status: DISCONTINUED | OUTPATIENT
Start: 2023-08-28 | End: 2023-08-28 | Stop reason: HOSPADM

## 2023-08-28 RX ADMIN — SUGAMMADEX 200 MG: 100 INJECTION, SOLUTION INTRAVENOUS at 16:29

## 2023-08-28 RX ADMIN — ONDANSETRON 4 MG: 2 INJECTION INTRAMUSCULAR; INTRAVENOUS at 16:19

## 2023-08-28 RX ADMIN — OXYCODONE HYDROCHLORIDE 2.5 MG: 5 TABLET ORAL at 08:20

## 2023-08-28 RX ADMIN — FENTANYL CITRATE 50 MCG: 50 INJECTION, SOLUTION INTRAMUSCULAR; INTRAVENOUS at 15:29

## 2023-08-28 RX ADMIN — ROCURONIUM BROMIDE 20 MG: 50 INJECTION, SOLUTION INTRAVENOUS at 15:57

## 2023-08-28 RX ADMIN — FENTANYL CITRATE 50 MCG: 50 INJECTION, SOLUTION INTRAMUSCULAR; INTRAVENOUS at 14:48

## 2023-08-28 RX ADMIN — LIDOCAINE HYDROCHLORIDE 80 MG: 20 INJECTION, SOLUTION EPIDURAL; INFILTRATION; INTRACAUDAL at 14:48

## 2023-08-28 RX ADMIN — ONDANSETRON 4 MG: 4 TABLET, ORALLY DISINTEGRATING ORAL at 11:40

## 2023-08-28 RX ADMIN — EPHEDRINE SULFATE 10 MG: 50 INJECTION, SOLUTION INTRAVENOUS at 15:08

## 2023-08-28 RX ADMIN — OXYCODONE HYDROCHLORIDE 5 MG: 5 SOLUTION ORAL at 16:55

## 2023-08-28 RX ADMIN — ROPIVACAINE HYDROCHLORIDE 40 ML: 2 INJECTION, SOLUTION EPIDURAL; INFILTRATION at 03:08

## 2023-08-28 RX ADMIN — EPHEDRINE SULFATE 10 MG: 50 INJECTION, SOLUTION INTRAVENOUS at 14:53

## 2023-08-28 RX ADMIN — CEFAZOLIN 2 G: 2 INJECTION, POWDER, FOR SOLUTION INTRAMUSCULAR; INTRAVENOUS at 22:59

## 2023-08-28 RX ADMIN — ROCURONIUM BROMIDE 50 MG: 50 INJECTION, SOLUTION INTRAVENOUS at 14:48

## 2023-08-28 RX ADMIN — ACETAMINOPHEN 1000 MG: 500 TABLET, FILM COATED ORAL at 20:17

## 2023-08-28 RX ADMIN — FENTANYL CITRATE 50 MCG: 50 INJECTION, SOLUTION INTRAMUSCULAR; INTRAVENOUS at 15:58

## 2023-08-28 RX ADMIN — HUMAN ALBUMIN MICROSPHERES AND PERFLUTREN 3 ML: 10; .22 INJECTION, SOLUTION INTRAVENOUS at 10:40

## 2023-08-28 RX ADMIN — EPHEDRINE SULFATE 10 MG: 50 INJECTION, SOLUTION INTRAVENOUS at 16:20

## 2023-08-28 RX ADMIN — HYDROMORPHONE HYDROCHLORIDE 0.5 MG: 1 INJECTION, SOLUTION INTRAMUSCULAR; INTRAVENOUS; SUBCUTANEOUS at 03:07

## 2023-08-28 RX ADMIN — SODIUM CHLORIDE, POTASSIUM CHLORIDE, SODIUM LACTATE AND CALCIUM CHLORIDE: 600; 310; 30; 20 INJECTION, SOLUTION INTRAVENOUS at 14:44

## 2023-08-28 RX ADMIN — CEFAZOLIN 2 G: 1 INJECTION, POWDER, FOR SOLUTION INTRAMUSCULAR; INTRAVENOUS at 14:48

## 2023-08-28 RX ADMIN — FENTANYL CITRATE 25 MCG: 50 INJECTION, SOLUTION INTRAMUSCULAR; INTRAVENOUS at 16:55

## 2023-08-28 RX ADMIN — ACETAMINOPHEN 1000 MG: 500 TABLET, FILM COATED ORAL at 08:20

## 2023-08-28 RX ADMIN — SODIUM CHLORIDE: 9 INJECTION, SOLUTION INTRAVENOUS at 05:37

## 2023-08-28 RX ADMIN — FENTANYL CITRATE 25 MCG: 50 INJECTION, SOLUTION INTRAMUSCULAR; INTRAVENOUS at 17:14

## 2023-08-28 RX ADMIN — DEXAMETHASONE SODIUM PHOSPHATE 2 MG: 4 INJECTION INTRA-ARTICULAR; INTRALESIONAL; INTRAMUSCULAR; INTRAVENOUS; SOFT TISSUE at 14:57

## 2023-08-28 RX ADMIN — PROPOFOL 100 MG: 10 INJECTION, EMULSION INTRAVENOUS at 14:48

## 2023-08-28 RX ADMIN — ROPIVACAINE HYDROCHLORIDE 20 ML: 2 INJECTION, SOLUTION EPIDURAL; INFILTRATION at 04:15

## 2023-08-28 RX ADMIN — TRANEXAMIC ACID 1000 MG: 100 INJECTION, SOLUTION INTRAVENOUS at 15:08

## 2023-08-28 RX ADMIN — BUPIVACAINE HYDROCHLORIDE 30 ML: 2.5 INJECTION, SOLUTION EPIDURAL; INFILTRATION; INTRACAUDAL at 14:57

## 2023-08-28 RX ADMIN — EPHEDRINE SULFATE 10 MG: 50 INJECTION, SOLUTION INTRAVENOUS at 14:58

## 2023-08-28 RX ADMIN — GLYCOPYRROLATE 0.2 MG: 0.2 INJECTION INTRAMUSCULAR; INTRAVENOUS at 15:35

## 2023-08-28 ASSESSMENT — PAIN DESCRIPTION - PAIN TYPE
TYPE: ACUTE PAIN

## 2023-08-28 ASSESSMENT — ENCOUNTER SYMPTOMS
NEUROLOGICAL NEGATIVE: 1
CARDIOVASCULAR NEGATIVE: 1
GASTROINTESTINAL NEGATIVE: 1
PSYCHIATRIC NEGATIVE: 1
EYES NEGATIVE: 1

## 2023-08-28 ASSESSMENT — PATIENT HEALTH QUESTIONNAIRE - PHQ9
1. LITTLE INTEREST OR PLEASURE IN DOING THINGS: NOT AT ALL
SUM OF ALL RESPONSES TO PHQ9 QUESTIONS 1 AND 2: 0
2. FEELING DOWN, DEPRESSED, IRRITABLE, OR HOPELESS: NOT AT ALL

## 2023-08-28 ASSESSMENT — COGNITIVE AND FUNCTIONAL STATUS - GENERAL
DAILY ACTIVITIY SCORE: 24
MOBILITY SCORE: 24
SUGGESTED CMS G CODE MODIFIER DAILY ACTIVITY: CH
SUGGESTED CMS G CODE MODIFIER MOBILITY: CH

## 2023-08-28 ASSESSMENT — FIBROSIS 4 INDEX: FIB4 SCORE: 2.69

## 2023-08-28 ASSESSMENT — PAIN SCALES - GENERAL: PAIN_LEVEL: 0

## 2023-08-28 ASSESSMENT — LIFESTYLE VARIABLES
HAVE YOU EVER FELT YOU SHOULD CUT DOWN ON YOUR DRINKING: NO
ALCOHOL_USE: NO

## 2023-08-28 NOTE — ED NOTES
Med Rec complete per patient  No oral antibiotics in the last 30 days  Allergies reviewed  Preferred [facility]: [facility]

## 2023-08-28 NOTE — OR SURGEON
Immediate Post OP Note    PreOp Diagnosis: Right displaced femoral neck fracture      PostOp Diagnosis: same      Procedure(s):  ARTHROPLASTY, HIP, TOTAL - Wound Class: Clean    Surgeon(s):  Manan Mei M.D.    Anesthesiologist/Type of Anesthesia:  Anesthesiologist: Kamran Bravo M.D./General    Surgical Staff:  Circulator: Liv Overton R.N.  Limb Javier: Nicole Yee  Scrub Person: Pita Archuleta  First Assist: Stephanie Borja P.A.-C.    Specimens removed if any:  * No specimens in log *    Estimated Blood Loss: 200cc    Findings: see dictation    Complications: none known    Plan:  --readmit postop  --WBAT RLE  --posterior hip precautions  --ancef/vanc x 2 doses postop  --PT/OT for mobilization ASAP  --okay to start lovenox or equivalent tomorrow am, continue SCDs  --fu 2 weeks postop        8/28/2023 4:19 PM Manan Mei M.D.

## 2023-08-28 NOTE — ASSESSMENT & PLAN NOTE
Patient shoes got caught today while ambulating, causing him to slip and fall.  Head strike noted without loss of consciousness.  Noted immediate right hip pain after.  X-ray showing right femoral neck fracture.   Orthopedic surgery consulted  Status post ORIF with total hip arthroplasty  Supportive care with pain control, monitor for respiratory depression from morphine administration  PT OT postoperatively  IPR referral placed

## 2023-08-28 NOTE — PROGRESS NOTES
4 Eyes Skin Assessment Completed by VIANEY Woods and VIANEY Woods.    Head WDL  Ears WDL  Nose WDL  Mouth WDL  Neck WDL  Breast/Chest Bruising  Shoulder Blades WDL  Spine WDL  (R) Arm/Elbow/Hand Redness, Blanching, Bruising, and Abrasion, skin tear right elbow   (L) Arm/Elbow/Hand Redness, Blanching, and Bruising  Abdomen WDL  Groin Redness and Blanching  Scrotum/Coccyx/Buttocks Redness and Blanching, sacrum/ coccyx slow to arabella   (R) Leg Redness, Blanching, and Bruising  (L) Leg Redness, Blanching, and Bruising  (R) Heel/Foot/Toe Redness and Blanching  (L) Heel/Foot/Toe Redness and Blanching          Devices In Places Blood Pressure Cuff, Pulse Ox, and Nasal Cannula      Interventions In Place NC W/Ear Foams, Waffle Overlay, TAP System, Pillows, Barrier Cream, ZFlo Pillow, Dri-Jason Pads, and Pressure Redistribution Mattress    Possible Skin Injury No    Pictures Uploaded Into Epic N/A  Wound Consult Placed N/A  RN Wound Prevention Protocol Ordered No

## 2023-08-28 NOTE — HOSPITAL COURSE
This is an 78-year-old male with past medical history of chronic systolic heart failure LVEF 25%, hypertension, CAD with prior PCI, dyslipidemia,  history of CVA who presented to the ED 8/28/2023 after sustaining mechanical ground-level fall with immediate right hip pain noted.  He presented to the ED for further evaluation.  On admission he was found to have elevated blood pressure.  Labs consistent with prerenal acidemia.    Head CT negative, no fractures identified on C/T/L-spine CT.  X-ray significant for impacted right femoral neck fracture.  Orthopedic surgery was consulted for surgical intervention.

## 2023-08-28 NOTE — ED PROVIDER NOTES
ER Provider Note    Scribed for Edi Roa M.d. by Reema Leno. 8/28/2023  1:02 AM    Primary Care Provider: José Robert P.A.-C.    CHIEF COMPLAINT  Chief Complaint   Patient presents with    Fall    Hip Pain     EXTERNAL RECORDS REVIEWED  Inpatient Notes Patient was last seen on 8/30/2022 for a ground level fall. At this time the patient did strike the left side of his head, but did not have LOC. He did have a laceration repair procedure, but all CT scans were negative.    HPI/ROS  LIMITATION TO HISTORY   Select: : None  OUTSIDE HISTORIAN(S):  EMS provides all information for the HPI    Moe Nickerson is a 78 y.o. male, with a history of MI and stroke, who presents to the ED via EMS to charge desk as a TBI rule out following a ground level fall. The patient does take Plavix daily. He remembers the entire event, but does not recall if he hit his head or not. At this time he complains mostly of associated right hip, knee, and forearm pain. He does also complain of back pain throughout. He does have slight aphasia from a prior stroke, which he thinks is getting worse from baseline. En route, his vitals were stable, but has an elevated blood pressure of 175/87. He did not endure any chest pain or sensations of lightheadedness prior to the fall. He states that he is currently having trouble moving his legs.     PAST MEDICAL HISTORY  Past Medical History:   Diagnosis Date    Anxiety     Congestive heart failure (HCC)     Heart attack (HCC)     Hypertension     Stroke (cerebrum) (HCC)        SURGICAL HISTORY  None noted    FAMILY HISTORY  None noted    SOCIAL HISTORY   reports that he has never smoked. He has never used smokeless tobacco. He reports that he does not drink alcohol and does not use drugs.    CURRENT MEDICATIONS  Previous Medications    BUPROPION (WELLBUTRIN) 75 MG TAB    TAKE 1 TABLET BY MOUTH TWICE DAILY    CLOPIDOGREL (PLAVIX) 75 MG TAB    Take 1 Tablet by mouth every day.     KETOCONAZOLE (NIZORAL) 2 % SHAMPOO    Apply 120 mL topically 1 time a day as needed for Itching.    LISINOPRIL (PRINIVIL) 40 MG TABLET    Take 1 Tablet by mouth every day.    MULTIVITAMIN (THERAGRAN) TAB    Take 1 Tab by mouth every day.    NITROGLYCERIN (NITROSTAT) 0.4 MG SL TAB    DISSOLVE 1 TABLET UNDER THE TONGUE EVERY 5 MINUTES AS NEEDED MAX 3 TABLETS IN 15 MINUTES    OMEPRAZOLE (PRILOSEC) 20 MG DELAYED-RELEASE CAPSULE    Take 1 Capsule by mouth every day. Take 1/2 hour prior to same meal.    SIMVASTATIN (ZOCOR) 10 MG TAB    Take 1 Tablet by mouth every evening.    TRIAMCINOLONE ACETONIDE (KENALOG) 0.1 % CREAM    Apply 1 Application. topically 3 times a day. Avoid face.    VITAMIN D (CHOLECALCIFEROL) 1000 UNIT TAB    Take 1,000 Units by mouth every day.       ALLERGIES  Amlodipine, Cortisone, and Morphine    PHYSICAL EXAM  /88   Pulse 73   Temp 36.3 °C (97.4 °F) (Temporal)   Resp 17   SpO2 97%   Gen: Alert, oriented  HENT: No racoon eyes, septal hematoma, facial instability  Eye: EOMI, no chemosis, PERRL  Neck: trachea midline, no tenderness  Resp: no respiratory distress,  no chest wall tenderness or crepitus  CV: No JVD, RRR.  + peripheral pulses  Abd: soft, non-distended, non-tender. No ecchymosis  Back: Diffuse tenderness throughout the thoracic and lumbar spine.  No deformities  Ext: Shortening of right leg.  Pain with passive rotation of right hip.  Tenderness palpation right hip.  Distal CSM intact.  Abrasions right forearm and elbow.  No deformities.  No upper extremity pain with active range of motion against resistance  Psych: normal mood  Neuro: speech with mild aphasia, moves all extremities. GCS 15      DIAGNOSTIC STUDIES    Labs:   Labs Reviewed   CBC WITH DIFFERENTIAL - Abnormal; Notable for the following components:       Result Value    WBC 4.6 (*)     RBC 4.63 (*)     All other components within normal limits   COMP METABOLIC PANEL - Abnormal; Notable for the following components:     Glucose 119 (*)     Bun 24 (*)     All other components within normal limits   TROPONIN - Abnormal; Notable for the following components:    Troponin T 20 (*)     All other components within normal limits    Narrative:     Biotin intake of greater than 5 mg per day may interfere with  troponin levels, causing false low values.   ESTIMATED GFR     EKG:   I have independently interpreted this EKG  Results for orders placed or performed during the hospital encounter of 23   EKG (Now)   Result Value Ref Range    Report       [facility] Emergency Dept.    Test Date:  2023  Pt Name:    DARBY RODRIGUEZ                Department: ER  MRN:        [id number]                      Room:        14  Gender:     Male                         Technician: [id number]  :        [id number]                   Requested By:SO ROA  Order #:    [id number]                    Reading MD: So Roa    Measurements  Intervals                                Axis  Rate:       68                           P:          80  ND:         186                          QRS:        -83  QRSD:       145                          T:          87  QT:         444  QTc:        473    Interpretive Statements  Sinus rhythm  NAIN, consider biatrial enlargement  Left bundle branch block  Compared to ECG 2022 16:04:57  Left bundle-branch block now present  Sinus bradycardia no longer present  Left ventricular hypertrophy no longer present  Early repolarization no longer present  Electronically Signed On 2023 02:30:35  PDT by So Roa            Radiology:   The attending emergency physician has independently interpreted the diagnostic imaging associated with this visit and am waiting the final reading from the radiologist.   Preliminary interpretation is a follows: CT head: No intracranial bleed  Radiologist interpretation:   DX-CHEST-PORTABLE (1 VIEW)   Final Result         1.  No acute cardiopulmonary disease.       DX-PELVIS-1 OR 2 VIEWS   Final Result         1.  Impacted right subcapital femoral neck fracture   2.  Atherosclerosis      DX-FEMUR-2+ RIGHT   Final Result         1.  Subcapital right femoral neck fracture.   2.  Atherosclerosis subcapital right femoral neck fracture      CT-LSPINE W/O PLUS RECONS   Final Result         1.  No acute traumatic bony injury of the lumbar spine.   2.  Bilateral neural foraminal stenosis at L4/L5 and L5/S1   3.  Atherosclerosis      CT-TSPINE W/O PLUS RECONS   Final Result         1.  No acute traumatic bony injury of the thoracic spine.   2.  Atherosclerosis and atherosclerotic coronary artery disease      CT-CSPINE WITHOUT PLUS RECONS   Final Result         1.  Multilevel degenerative changes of the cervical spine limit diagnostic sensitivity of this examination, otherwise no acute traumatic bony injury of the cervical spine is apparent.   2.  Atherosclerosis      CT-HEAD W/O   Final Result         1.  No acute intracranial abnormality is identified, there are nonspecific white matter changes, commonly associated with small vessel ischemic disease.  Associated mild cerebral atrophy is noted.   2.  Bilateral frontal and ethmoid sinusitis changes   3.  Atherosclerosis.             COURSE & MEDICAL DECISION MAKING     Point of Care Ultrasound    Femoral Nerve Block.   Indication: Hip pain  Informed consent was obtained including risks, benefits, and alternatives. The patient was placed in an appropriate position.  Right LE was marked prior to procedure. Pt was prepped with chlorhexadin. Using sterile technique under US guidance, the femoral nerve located. 40 ml of ropivacaine 0.2% injected after aspiration with appropriate hydrodissection of the femoral nerve. Pre and post motor and sensory exams fully intact bilaterally.  Time and nerve block status marked post procedure. The patient tolerated the procedure well and there were no immediate complications. Patient noted improvement in  pain.   EBL: 0mL    Ultrasound images saved as seen below: Femoral artery noted on the right side of the screen, needle tip on the lower left          INITIAL ASSESSMENT, COURSE AND PLAN  Care Narrative: Patient presents after a fall of unclear etiology.  He did not have any preceding chest pain, lightheadedness or shortness of breath to suggest PE, ACS, low suspicion for seizure.    EKG demonstrates no high risk features for cardiac syncope, including ischemia, high-grade heart block, Brugada syndrome, Fran-Parkinson-White syndrome, arrhythmogenic right ventricular dysplasia, long QT, short QT, hypertrophic obstructive cardiomyopathy.    Clinical exam consistent with right hip fracture.  He does have diffuse spinal tenderness, therefore imaging performed of this.  Chest and abdomen reassuring.  Patient failed to have hip fracture.  Was given femoral nerve block, discussed with orthopedics, then admitted to the hospitalist    1:07 AM - Patient seen and examined at the charge desk. Discussed plan of care, including imaging to rule out possible brain bleeds. Patient agrees to the plan of care. Ordered for labs and imaging to evaluate his symptoms.         DISPOSITION AND DISCUSSIONS  I have discussed management of the patient with the following physicians and GHAZAL's: Dr. Urrutia, orthopedics, Dr. Guzman, hospitalist      FINAL DIANGOSIS  1. Closed fracture of right hip, initial encounter ([facility])    2. Fall, initial encounter    3. History of stroke          Case discussed with Dr. Guzman, who will evaluate the patient for hospitalization. Patient will be hospitalized in guarded condition.    The note accurately reflects work and decisions made by me.  Edi Roa M.D.  8/28/2023  6:45 AM     Reema TAVERAS (Kojo), am scribing for, and in the presence of, Edi Roa M.D..    Electronically signed by: Reema Payton), 8/28/2023    Edi TAVERAS M.D. personally performed the services described in  this documentation, as scribed by Reema Leon in my presence, and it is both accurate and complete.

## 2023-08-28 NOTE — THERAPY
Occupational Therapy Contact Note    Patient Name: Moe Nickerson  Age:  78 y.o., Sex:  male  Medical Record #: [id number]  Today's Date: 8/28/2023 08/28/23 0815   Interdisciplinary Plan of Care Collaboration   Collaboration Comments OT consult received. However, per pt's chart pt is scheduled for R ANGELA today. Will reattempt OT evaluation post op once appropriate

## 2023-08-28 NOTE — H&P
Hospital Medicine History & Physical Note    Date of Service  8/28/2023    Primary Care Physician  José Robert P.A.-C.    Consultants  Orthopedic surgery    Code Status  DNAR/DNI    Chief Complaint  Chief Complaint   Patient presents with    Fall    Hip Pain       History of Presenting Illness  Moe Nickerson is a 78 y.o. male who presented 8/28/2023 with a mechanical fall.  Patient got out of bed today and was walking.  His shoes then got caught, causing him to trip and fall.  He hit his head on the floor.  No loss of consciousness was noted.  He noted immediate right hip pain after.  He was then taken to ER for further evaluation    In the ED, patient found to have elevated blood pressure.  Pertinent labs include prerenal azotemia.  X-ray right femur showing impacted right subcapital femoral neck fracture.  CT head C-spine lumbar spine negative for acute pathology.    I discussed the plan of care with patient.    Review of Systems  Review of Systems   Constitutional:  Positive for malaise/fatigue.   HENT: Negative.     Eyes: Negative.    Cardiovascular: Negative.    Gastrointestinal: Negative.    Genitourinary: Negative.    Musculoskeletal:  Positive for joint pain.   Skin: Negative.    Neurological: Negative.    Endo/Heme/Allergies: Negative.    Psychiatric/Behavioral: Negative.         Past Medical History   has a past medical history of Anxiety, Congestive heart failure (HCC), Heart attack (HCC), Hypertension, and Stroke (cerebrum) (HCC).    Surgical History  No pertinent surgical hx      Family History   Family history reviewed with patient. There is no family history that is pertinent to the chief complaint.     Social History   reports that he has never smoked. He has never used smokeless tobacco. He reports that he does not drink alcohol and does not use drugs.    Allergies  Allergies   Allergen Reactions    Amlodipine      Edema    Cortisone Palpitations     Patient thinks gave him chest pain  8/28/23    Morphine Anxiety and Unspecified     Hallucinations       Medications  Prior to Admission Medications   Prescriptions Last Dose Informant Patient Reported? Taking?   acetaminophen (TYLENOL) 325 MG Tab 8/27/2023 at PRN Patient Yes Yes   Sig: Take 325-650 mg by mouth every four hours as needed.   clopidogrel (PLAVIX) 75 MG Tab 8/27/2023 at 1000 Patient No No   Sig: Take 1 Tablet by mouth every day.   ketoconazole (NIZORAL) 2 % shampoo > 1 WEEK at PRN Patient No No   Sig: Apply 120 mL topically 1 time a day as needed for Itching.   lisinopril (PRINIVIL) 40 MG tablet 8/27/2023 at 1000 Patient Yes Yes   Sig: Take 20 mg by mouth every day. 20 mg = 1/2 tablet   nitroglycerin (NITROSTAT) 0.4 MG SL Tab FEW DAYS AGO at PRN Patient No No   Sig: DISSOLVE 1 TABLET UNDER THE TONGUE EVERY 5 MINUTES AS NEEDED MAX 3 TABLETS IN 15 MINUTES   omeprazole (PRILOSEC) 20 MG delayed-release capsule 8/27/2023 at 1000 Patient No No   Sig: Take 1 Capsule by mouth every day. Take 1/2 hour prior to same meal.   simvastatin (ZOCOR) 10 MG Tab 8/26/2023 at 2200 Patient No No   Sig: Take 1 Tablet by mouth every evening.      Facility-Administered Medications: None       Physical Exam  Temp:  [36.3 °C (97.4 °F)] 36.3 °C (97.4 °F)  Pulse:  [73-74] 74  Resp:  [17-24] 19  BP: (130-172)/(78-88) 172/78  SpO2:  [97 %-99 %] 99 %  Blood Pressure : (!) 172/78   Temperature: 36.3 °C (97.4 °F)   Pulse: 74   Respiration: 19   Pulse Oximetry: 99 %       Physical Exam  Constitutional:       Appearance: Normal appearance. He is normal weight.   HENT:      Head: Normocephalic.      Nose: Nose normal.      Mouth/Throat:      Mouth: Mucous membranes are moist.   Eyes:      Pupils: Pupils are equal, round, and reactive to light.   Cardiovascular:      Rate and Rhythm: Normal rate and regular rhythm.      Pulses: Normal pulses.   Pulmonary:      Effort: Pulmonary effort is normal.      Breath sounds: Normal breath sounds.   Abdominal:      General: Abdomen is  "flat.      Palpations: Abdomen is soft.   Musculoskeletal:      Cervical back: Neck supple.      Comments: Right hip slightly externally rotated  ROM limited due to pain   Skin:     General: Skin is warm.   Neurological:      General: No focal deficit present.      Mental Status: He is alert and oriented to person, place, and time. Mental status is at baseline.   Psychiatric:         Mood and Affect: Mood normal.         Behavior: Behavior normal.         Thought Content: Thought content normal.         Judgment: Judgment normal.         Laboratory:  Recent Labs     08/28/23 0133   WBC 4.6*   RBC 4.63*   HEMOGLOBIN 15.0   HEMATOCRIT 44.1   MCV 95.2   MCH 32.4   MCHC 34.0   RDW 44.8   PLATELETCT 171   MPV 11.6     Recent Labs     08/28/23 0133   SODIUM 141   POTASSIUM 4.3   CHLORIDE 105   CO2 25   GLUCOSE 119*   BUN 24*   CREATININE 1.05   CALCIUM 9.3     Recent Labs     08/28/23 0133   ALTSGPT 33   ASTSGOT 33   ALKPHOSPHAT 82   TBILIRUBIN 0.7   GLUCOSE 119*         No results for input(s): \"NTPROBNP\" in the last 72 hours.      No results for input(s): \"TROPONINT\" in the last 72 hours.    Imaging:  DX-CHEST-PORTABLE (1 VIEW)   Final Result         1.  No acute cardiopulmonary disease.      DX-PELVIS-1 OR 2 VIEWS   Final Result         1.  Impacted right subcapital femoral neck fracture   2.  Atherosclerosis      DX-FEMUR-2+ RIGHT   Final Result         1.  Subcapital right femoral neck fracture.   2.  Atherosclerosis subcapital right femoral neck fracture      CT-LSPINE W/O PLUS RECONS   Final Result         1.  No acute traumatic bony injury of the lumbar spine.   2.  Bilateral neural foraminal stenosis at L4/L5 and L5/S1   3.  Atherosclerosis      CT-TSPINE W/O PLUS RECONS   Final Result         1.  No acute traumatic bony injury of the thoracic spine.   2.  Atherosclerosis and atherosclerotic coronary artery disease      CT-CSPINE WITHOUT PLUS RECONS   Final Result         1.  Multilevel degenerative changes of " the cervical spine limit diagnostic sensitivity of this examination, otherwise no acute traumatic bony injury of the cervical spine is apparent.   2.  Atherosclerosis      CT-HEAD W/O   Final Result         1.  No acute intracranial abnormality is identified, there are nonspecific white matter changes, commonly associated with small vessel ischemic disease.  Associated mild cerebral atrophy is noted.   2.  Bilateral frontal and ethmoid sinusitis changes   3.  Atherosclerosis.             EKG:  I have personally reviewed the images and compared with prior images.    Assessment/Plan:  Justification for Admission Status  I anticipate this patient will require at least two midnights for appropriate medical management, necessitating inpatient admission because patient found to have right femoral neck fracture    Patient will need a Med/Surg bed on EMERGENCY service .  The need is secondary to femoral neck fracture.    * Femoral neck fracture (HCC)  Assessment & Plan  Spoke with ERP, who consulted orthopedic surgery.  Patient shoes got caught today while ambulating, causing him to slip and fall.  Head strike noted without loss of consciousness.  Noted immediate right hip pain after.  X-ray showing right femoral neck fracture.  Orthopedic surgery consulted, patient for or in a.m.  Patient will be monitored overnight for fluids, morphine as needed.  Femoral nerve block to be performed by ERP.  Monitor for respiratory depression from morphine administration    LBBB (left bundle branch block)  Assessment & Plan  Noted to have left bundle branch block on EKG, not seen in previous EKG.  Troponin pending, denies any chest pain at this time  Will eventually need cardiology consult    Heart failure with reduced ejection fraction (HCC)  Assessment & Plan  History of reduced ejection fraction with EF 25%  Continue lisinopril  Cautious fluid administration    ACP (advance care planning)  Assessment & Plan  16 minutes spent discussing  goals of care with patient. He was explained his diagnosis, treatment, plan of care, prognosis. When asked about code status, he states that he would like to be dnr/dni.    Hyperlipidemia  Assessment & Plan  Continue zocor        VTE prophylaxis: pharmacologic prophylaxis contraindicated due to surgery in am

## 2023-08-28 NOTE — ASSESSMENT & PLAN NOTE
Chronic not in acute exacerbation  Resume home medications  Repeat TTE ordered showing stable reduced EF of 35% mural thrombus was noted

## 2023-08-28 NOTE — ASSESSMENT & PLAN NOTE
History of PCI  Resume statin therapy  Patient will need to be started on full anticoagulation for LV thrombus will need to discuss if this should be continued with addition of Plavix  Continue to hold Plavix for now

## 2023-08-28 NOTE — THERAPY
Physical Therapy Contact Note    Patient Name: Moe Nickerson  Age:  78 y.o., Sex:  male  Medical Record #: [id number]  Today's Date: 8/28/2023 08/28/23 0839   Treatment Variance   Reason For Missed Therapy Medical - Patient on Hold from Therapy   Interdisciplinary Plan of Care Collaboration   Collaboration Comments Orders received, chart reviewed. Pt to have surgery for his femur fx. Will hold PT, monitor EMR and eval when appropriate.   Session Information   Date / Session Number  8/28: sx (eval)

## 2023-08-28 NOTE — ED NOTES
Patient return from CT scan. Patient AAOx4, GCS 15, breathing normally on room air. Patient connected to cardiac monitor for vitals. Patient high risk for fall, side rails of bed up, bed locked and to its lowest position. Advised patient to use the call bell if need any assistance.

## 2023-08-28 NOTE — ASSESSMENT & PLAN NOTE
Noted to have left bundle branch block on EKG, not seen in previous EKG.  Troponin pending, denies any chest pain at this time  Close outpatient cardiology follow-up

## 2023-08-28 NOTE — ED TRIAGE NOTES
Moe Nickerson  78 y.o. male    Chief Complaint   Patient presents with    Fall    Hip Pain       Pt arrives via EMS with complaints of mechanical ground level fall and R sided hip pain. Abrasions to R forearm. Patient unsure if he hit his head. + Plavix. - LOC. A&Ox4. Hx prior stroke- some expressive aphasia. Pt appears very anxious.

## 2023-08-28 NOTE — ANESTHESIA TIME REPORT
Anesthesia Start and Stop Event Times     Date Time Event    8/28/2023 1415 Ready for Procedure     1444 Anesthesia Start     1640 Anesthesia Stop        Responsible Staff  08/28/23    Name Role Begin End    Kamran Bravo M.D. Anesth 1444 1640        Overtime Reason:  no overtime (within assigned shift)    Comments:

## 2023-08-28 NOTE — ANESTHESIA PREPROCEDURE EVALUATION
Case: [id number] Date/Time: 08/28/23 1829    Procedure: ARTHROPLASTY, HIP, TOTAL (Right)    Location: [facility] SURGERY [facility]    Surgeons: Manan Mei M.D.          Relevant Problems   CARDIAC   (positive) Congestive heart failure (HCC)   (positive) Coronary artery disease involving native coronary artery of native heart without angina pectoris   (positive) Hemorrhoid   (positive) Hypertension   (positive) LBBB (left bundle branch block)      GI   (positive) Gastroesophageal reflux disease without esophagitis       Physical Exam    Airway   Mallampati: II  TM distance: >3 FB  Neck ROM: full       Cardiovascular - normal exam  Rhythm: regular  Rate: normal  (-) murmur     Dental - normal exam        Facial Hair   Pulmonary - normal exam  Breath sounds clear to auscultation     Abdominal    Neurological - normal exam               Anesthesia Plan    ASA 3   ASA physical status 3 criteria: moderate reduction of ejection fraction    Plan - general and peripheral nerve block     Peripheral nerve block will be post-op pain control  Airway plan will be ETT    (Chronic systolic heart failure LVEF 35-40% per Echo 8/28, hypertension, CAD with prior PCI, dyslipidemia,  history of CVA )      Induction: intravenous    Postoperative Plan: Postoperative administration of opioids is intended.    Pertinent diagnostic labs and testing reviewed    Informed Consent:    Anesthetic plan and risks discussed with patient.    Use of blood products discussed with: patient whom consented to blood products.          Patient notified

## 2023-08-28 NOTE — PROGRESS NOTES
Admitted by my colleague earlier this morning.    This is an 78-year-old male with past medical history of chronic systolic heart failure LVEF 25%, hypertension, CAD with prior PCI, dyslipidemia,  history of CVA who presented to the ED 8/28/2023 after sustaining a fall.    Head CT negative, no fractures identified on C/T/L-spine CT.  X-ray significant for impacted right femoral neck fracture.  Patient is for operative care 8/28.  To be evaluated by PT/OT postoperatively.    Patient noted to have chronic systolic heart failure last echo a year ago, repeat echo ordered, EKG noted LBBB, patient without any chest pain.  Patient last seen by cardiology about a year ago.    ---  Notified by cardiology, patient appears to have a chronic appearing thrombus on TTE, once cleared by orthopedic surgery, will start patient on therapeutic Lovenox/anticoagulation.  Patient is to have close follow-up with cardiology.    LVEF improved to 35-40%.    Bettie Caldera DO

## 2023-08-28 NOTE — ASSESSMENT & PLAN NOTE
Notified by cardiology, patient appears to have a chronic appearing thrombus on TTE, once cleared by orthopedic surgery, will start patient on therapeutic Lovenox/anticoagulation.  Patient is to have close follow-up with cardiology.

## 2023-08-28 NOTE — ANESTHESIA PROCEDURE NOTES
Peripheral Block    Date/Time: 8/28/2023 2:54 PM    Performed by: Kamran Bravo M.D.  Authorized by: Kamran Bravo M.D.    Patient Location:  OR  Start Time:  8/28/2023 2:54 PM  End Time:  8/28/2023 2:57 PM  Reason for Block: at surgeon's request and post-op pain management ONLY    patient identified, IV checked, site marked, risks and benefits discussed, surgical consent, monitors and equipment checked, pre-op evaluation and timeout performed    Patient Position:  Supine  Prep: ChloraPrep    Monitoring:  Heart rate, continuous pulse ox and cardiac monitor  Block Region:  Lower Extremity  Lower Extremity - Block Type:  FEMORAL nerve block, Supra-Inguinal Fascia Iliaca approach    Laterality:  Right  Procedures: ultrasound guided  Image captured, interpreted and electronically stored.  Block Type:  Single-shot  Needle Length:  50mm  Needle Gauge:  22 G  Needle Localization:  Ultrasound guidance  Injection Assessment:  Negative aspiration for heme, no paresthesia on injection, incremental injection and local visualized surrounding nerve on ultrasound  Evidence of intravascular injection: No     Suprainguinal Fascia Iliaca Block   With the patient supine, the US transducer was placed perpendicular to the ASIS of the operative side. The ASIS was identified at a point where the internal oblique, transversus abdominis and psoas major are stacked medial to the iliacus muscle. The plane in between was the fascia iliaca. A nerve block needle was advanced into the fascia iliaca and local anesthetic was injected deep/lateral to the fascia iliaca, just above the iliacus muscle.

## 2023-08-28 NOTE — ANESTHESIA PROCEDURE NOTES
Airway    Date/Time: 8/28/2023 2:49 PM    Performed by: Kamran Bravo M.D.  Authorized by: Kamran Bravo M.D.    Location:  OR  Urgency:  Elective  Indications for Airway Management:  Anesthesia      Spontaneous Ventilation: absent    Sedation Level:  Deep  Preoxygenated: Yes    Patient Position:  Sniffing  Mask Difficulty Assessment:  1 - vent by mask  Final Airway Type:  Endotracheal airway  Final Endotracheal Airway:  ETT  Cuffed: Yes    Technique Used for Successful ETT Placement:  Direct laryngoscopy    Insertion Site:  Oral  Blade Type:  Avery  Laryngoscope Blade/Videolaryngoscope Blade Size:  3  ETT Size (mm):  7.5  Measured from:  Teeth  ETT to Teeth (cm):  24  Placement Verified by: auscultation and capnometry    Cormack-Lehane Classification:  Grade IIa - partial view of glottis  Number of Attempts at Approach:  1

## 2023-08-28 NOTE — CONSULTS
DATE OF SERVICE:  08/28/2023     ORTHOPEDIC CONSULTATION     REQUESTING PHYSICIAN:  Ricki Urrutia MD, orthopedic surgery.     REASON FOR CONSULTATION:  Right femoral neck fracture.     CHIEF COMPLAINT:  Right hip pain.     HISTORY OF PRESENT ILLNESS:  The patient is an 78 years old.  He fell  earlier this morning and injured his right hip.  He presented to [facility]   Emergency Department, was found to have a right femoral neck fracture.  Dr. Urrutia was initially consulted and asked if I would be available for   definitive surgical management.  The patient has been evaluated for admission   to the hospitalist service.  He denies pain other than that to the right hip   area.     PHYSICAL EXAMINATION:  VITAL SIGNS:  Temperature is 97.4, heart rate 74, respiratory rate 16, blood   pressure 93/62, pulse oximetry 91% on room air.  GENERAL APPEARANCE:  The patient is alert, pleasant, cooperative, in no acute   distress.  MUSCULOSKELETAL:  Right lower extremity has pain with log roll localized to   the right lower extremity.  He is able to dorsi and plantarflex the foot, and   flex and extend toes.  He is nontender to palpation of the right knee.  There   is no evidence of obvious traumatic deformity of left lower or bilateral upper   extremities, which are grossly neurovascularly intact.     DIAGNOSTIC IMAGING:  Plain x-rays, 2 views of right femur as well as AP pelvis   show right impacted, but displaced femoral neck fracture.  He has some   underlying osteoarthritis.     ASSESSMENT:  An 78-year-old male with some underlying medical comorbidities   and a right mildly displaced femoral neck fracture.  There is some   displacement seen on the lateral view.  He has an underlying hip arthritis.     RECOMMENDATIONS:  1.  I discussed these findings with the patient and I recommend surgical   management with hip arthroplasty.  We discussed options for total versus hip   hemiarthroplasty.  He does state that he has had some  pain in his hip prior to   his fall and does have some underlying osteoarthritis.  He may benefit most   from total hip arthroplasty to treat both fracture and his underlying   arthritis.  2.  We discussed potential risks of surgery such as limb length inequality,   hip instability, iatrogenic periprosthetic related fracture, potential for   injury to neurovascular structures, blood loss, possibly requiring transfusion   and general risks of anesthesia.  He expressed understanding and wished to   proceed with surgery when possible, so we can make preparations to get him to   the operating room later today pending medical optimization.        ______________________________  MD STEFFANIE Hargrove/THOR/BARRY    DD:  08/28/2023 07:33  DT:  08/28/2023 10:16    Job#:  [id number]

## 2023-08-29 LAB
ANION GAP SERPL CALC-SCNC: 9 MMOL/L (ref 7–16)
BUN SERPL-MCNC: 24 MG/DL (ref 8–22)
CALCIUM SERPL-MCNC: 8.4 MG/DL (ref 8.5–10.5)
CHLORIDE SERPL-SCNC: 106 MMOL/L (ref 96–112)
CO2 SERPL-SCNC: 24 MMOL/L (ref 20–33)
CREAT SERPL-MCNC: 1.12 MG/DL (ref 0.5–1.4)
ERYTHROCYTE [DISTWIDTH] IN BLOOD BY AUTOMATED COUNT: 45.6 FL (ref 35.9–50)
GFR SERPLBLD CREATININE-BSD FMLA CKD-EPI: 66 ML/MIN/1.73 M 2
GLUCOSE SERPL-MCNC: 143 MG/DL (ref 65–99)
HCT VFR BLD AUTO: 37.7 % (ref 42–52)
HGB BLD-MCNC: 12.7 G/DL (ref 14–18)
MAGNESIUM SERPL-MCNC: 1.6 MG/DL (ref 1.5–2.5)
MCH RBC QN AUTO: 32.1 PG (ref 27–33)
MCHC RBC AUTO-ENTMCNC: 33.7 G/DL (ref 32.3–36.5)
MCV RBC AUTO: 95.2 FL (ref 81.4–97.8)
PHOSPHATE SERPL-MCNC: 3.2 MG/DL (ref 2.5–4.5)
PLATELET # BLD AUTO: 139 K/UL (ref 164–446)
PMV BLD AUTO: 11.9 FL (ref 9–12.9)
POTASSIUM SERPL-SCNC: 4.4 MMOL/L (ref 3.6–5.5)
RBC # BLD AUTO: 3.96 M/UL (ref 4.7–6.1)
SODIUM SERPL-SCNC: 139 MMOL/L (ref 135–145)
WBC # BLD AUTO: 14.5 K/UL (ref 4.8–10.8)

## 2023-08-29 PROCEDURE — 84100 ASSAY OF PHOSPHORUS: CPT

## 2023-08-29 PROCEDURE — 97166 OT EVAL MOD COMPLEX 45 MIN: CPT

## 2023-08-29 PROCEDURE — 700111 HCHG RX REV CODE 636 W/ 250 OVERRIDE (IP): Performed by: ORTHOPAEDIC SURGERY

## 2023-08-29 PROCEDURE — A9270 NON-COVERED ITEM OR SERVICE: HCPCS | Performed by: STUDENT IN AN ORGANIZED HEALTH CARE EDUCATION/TRAINING PROGRAM

## 2023-08-29 PROCEDURE — 85027 COMPLETE CBC AUTOMATED: CPT

## 2023-08-29 PROCEDURE — 700111 HCHG RX REV CODE 636 W/ 250 OVERRIDE (IP): Performed by: STUDENT IN AN ORGANIZED HEALTH CARE EDUCATION/TRAINING PROGRAM

## 2023-08-29 PROCEDURE — 36415 COLL VENOUS BLD VENIPUNCTURE: CPT

## 2023-08-29 PROCEDURE — 80048 BASIC METABOLIC PNL TOTAL CA: CPT

## 2023-08-29 PROCEDURE — 700102 HCHG RX REV CODE 250 W/ 637 OVERRIDE(OP): Performed by: STUDENT IN AN ORGANIZED HEALTH CARE EDUCATION/TRAINING PROGRAM

## 2023-08-29 PROCEDURE — 700102 HCHG RX REV CODE 250 W/ 637 OVERRIDE(OP): Performed by: GENERAL PRACTICE

## 2023-08-29 PROCEDURE — 99223 1ST HOSP IP/OBS HIGH 75: CPT | Performed by: PHYSICAL MEDICINE & REHABILITATION

## 2023-08-29 PROCEDURE — 83735 ASSAY OF MAGNESIUM: CPT

## 2023-08-29 PROCEDURE — 97530 THERAPEUTIC ACTIVITIES: CPT

## 2023-08-29 PROCEDURE — 770001 HCHG ROOM/CARE - MED/SURG/GYN PRIV*

## 2023-08-29 PROCEDURE — 97535 SELF CARE MNGMENT TRAINING: CPT

## 2023-08-29 PROCEDURE — 700105 HCHG RX REV CODE 258: Performed by: ORTHOPAEDIC SURGERY

## 2023-08-29 PROCEDURE — 99233 SBSQ HOSP IP/OBS HIGH 50: CPT | Performed by: STUDENT IN AN ORGANIZED HEALTH CARE EDUCATION/TRAINING PROGRAM

## 2023-08-29 PROCEDURE — 97163 PT EVAL HIGH COMPLEX 45 MIN: CPT

## 2023-08-29 PROCEDURE — A9270 NON-COVERED ITEM OR SERVICE: HCPCS | Performed by: GENERAL PRACTICE

## 2023-08-29 RX ORDER — OMEPRAZOLE 20 MG/1
20 CAPSULE, DELAYED RELEASE ORAL DAILY
Status: DISCONTINUED | OUTPATIENT
Start: 2023-08-29 | End: 2023-08-30 | Stop reason: HOSPADM

## 2023-08-29 RX ORDER — ENOXAPARIN SODIUM 100 MG/ML
1 INJECTION SUBCUTANEOUS DAILY
Status: DISCONTINUED | OUTPATIENT
Start: 2023-08-29 | End: 2023-08-30 | Stop reason: HOSPADM

## 2023-08-29 RX ADMIN — SENNOSIDES AND DOCUSATE SODIUM 2 TABLET: 50; 8.6 TABLET ORAL at 17:59

## 2023-08-29 RX ADMIN — OXYCODONE HYDROCHLORIDE 5 MG: 5 TABLET ORAL at 20:03

## 2023-08-29 RX ADMIN — OMEPRAZOLE 20 MG: 20 CAPSULE, DELAYED RELEASE ORAL at 08:30

## 2023-08-29 RX ADMIN — OXYCODONE HYDROCHLORIDE 5 MG: 5 TABLET ORAL at 08:14

## 2023-08-29 RX ADMIN — ACETAMINOPHEN 1000 MG: 500 TABLET, FILM COATED ORAL at 14:38

## 2023-08-29 RX ADMIN — CEFAZOLIN 2 G: 2 INJECTION, POWDER, FOR SOLUTION INTRAMUSCULAR; INTRAVENOUS at 05:46

## 2023-08-29 RX ADMIN — OXYCODONE HYDROCHLORIDE 5 MG: 5 TABLET ORAL at 14:42

## 2023-08-29 RX ADMIN — SENNOSIDES AND DOCUSATE SODIUM 2 TABLET: 50; 8.6 TABLET ORAL at 05:37

## 2023-08-29 RX ADMIN — SIMVASTATIN 10 MG: 20 TABLET, FILM COATED ORAL at 17:58

## 2023-08-29 RX ADMIN — LISINOPRIL 20 MG: 20 TABLET ORAL at 05:37

## 2023-08-29 RX ADMIN — ACETAMINOPHEN 1000 MG: 500 TABLET, FILM COATED ORAL at 08:14

## 2023-08-29 RX ADMIN — VANCOMYCIN HYDROCHLORIDE 750 MG: 5 INJECTION, POWDER, LYOPHILIZED, FOR SOLUTION INTRAVENOUS at 01:37

## 2023-08-29 RX ADMIN — ENOXAPARIN SODIUM 60 MG: 100 INJECTION SUBCUTANEOUS at 18:20

## 2023-08-29 RX ADMIN — ACETAMINOPHEN 1000 MG: 500 TABLET, FILM COATED ORAL at 20:03

## 2023-08-29 ASSESSMENT — GAIT ASSESSMENTS
GAIT LEVEL OF ASSIST: MINIMAL ASSIST
DISTANCE (FEET): 30
DEVIATION: ANTALGIC;BRADYKINETIC
ASSISTIVE DEVICE: FRONT WHEEL WALKER

## 2023-08-29 ASSESSMENT — COGNITIVE AND FUNCTIONAL STATUS - GENERAL
HELP NEEDED FOR BATHING: A LOT
TOILETING: A LITTLE
MOVING FROM LYING ON BACK TO SITTING ON SIDE OF FLAT BED: UNABLE
SUGGESTED CMS G CODE MODIFIER DAILY ACTIVITY: CK
SUGGESTED CMS G CODE MODIFIER MOBILITY: CL
TURNING FROM BACK TO SIDE WHILE IN FLAT BAD: UNABLE
DAILY ACTIVITIY SCORE: 18
STANDING UP FROM CHAIR USING ARMS: A LITTLE
DRESSING REGULAR UPPER BODY CLOTHING: A LITTLE
CLIMB 3 TO 5 STEPS WITH RAILING: TOTAL
MOBILITY SCORE: 10
DRESSING REGULAR LOWER BODY CLOTHING: A LOT
WALKING IN HOSPITAL ROOM: A LITTLE
MOVING TO AND FROM BED TO CHAIR: UNABLE

## 2023-08-29 ASSESSMENT — ENCOUNTER SYMPTOMS
FALLS: 1
CHILLS: 0
DIZZINESS: 0
FEVER: 0
VOMITING: 0
NAUSEA: 0
SENSORY CHANGE: 0
SHORTNESS OF BREATH: 0

## 2023-08-29 ASSESSMENT — PAIN DESCRIPTION - PAIN TYPE
TYPE: ACUTE PAIN;SURGICAL PAIN
TYPE: SURGICAL PAIN
TYPE: ACUTE PAIN;SURGICAL PAIN
TYPE: SURGICAL PAIN
TYPE: SURGICAL PAIN

## 2023-08-29 ASSESSMENT — ACTIVITIES OF DAILY LIVING (ADL): TOILETING: INDEPENDENT

## 2023-08-29 NOTE — DISCHARGE PLANNING
[facility] Rehabilitation Transitional Care Coordination    Referral from: Dr Emerson  Insurance Provider on Facesheet: Medicare  Potential Rehab Diagnosis: Hip fx    Chart review indicates patient may have on going medical management and may have therapy needs to possibly meet inpatient rehab facility criteria with the goal of returning to community.    D/C support: Pt is a resident at [facility], [facility]. [facility] assists w/ cleaning, groceries, and transportation.      Physiatry consultation forwarded per protocol.     Hip fx - physiatry to to consult, per attending anticipate clearance tomorrow. TCC will follow.     Thank you for the referral.

## 2023-08-29 NOTE — DISCHARGE PLANNING
Ongoing medical management as well as therapy need. Anticipate post acute services to facilitate a successful transition to community. Please consider a PM&R referral to assist with discharge planning.

## 2023-08-29 NOTE — PROGRESS NOTES
4 Eyes Skin Assessment Completed by VIANEY RED and VIANEY Lenz.    Head WDL  Ears WDL  Nose WDL  Mouth WDL  Neck WDL  Breast/Chest WDL  Shoulder Blades WDL  Spine WDL  (R) Arm/Elbow/Hand Scab, scratches  (L) Arm/Elbow/Hand WDL  Abdomen Redness and Blanching(slow)  Groin WDL  Scrotum/Coccyx/Buttocks WDL  (R) Leg Surgical Incision  (L) Leg WDL  (R) Heel/Foot/Toe WDL  (L) Heel/Foot/Toe WDL          Devices In Places Pulse Ox, SCD's, and Nasal Cannula      Interventions In Place NC W/Ear Foams, Waffle Overlay, Pillows, and Barrier Cream    Possible Skin Injury No    Pictures Uploaded Into Epic N/A  Wound Consult Placed N/A  RN Wound Prevention Protocol Ordered No

## 2023-08-29 NOTE — CARE PLAN
The patient is Stable - Low risk of patient condition declining or worsening    Shift Goals  Clinical Goals: pain mgmt, mobility, safety  Patient Goals: pain control, rest, comfort  Family Goals: not present    Progress made toward(s) clinical / shift goals:        Problem: Pain - Standard  Goal: Alleviation of pain or a reduction in pain to the patient’s comfort goal  Outcome: Progressing     Problem: Knowledge Deficit - Standard  Goal: Patient and family/care givers will demonstrate understanding of plan of care, disease process/condition, diagnostic tests and medications  Outcome: Progressing     Problem: Fall Risk  Goal: Patient will remain free from falls  Outcome: Progressing     Problem: Skin Integrity  Goal: Skin integrity is maintained or improved  Outcome: Progressing       Patient is not progressing towards the following goals:

## 2023-08-29 NOTE — THERAPY
"Occupational Therapy   Initial Evaluation     Patient Name: Moe Nickerson  Age:  78 y.o., Sex:  male  Medical Record #: [id number]  Today's Date: 8/29/2023     Precautions  Precautions: Fall Risk, Posterior Hip Precautions, Weight Bearing As Tolerated Right Lower Extremity  Comments: WBAT RLE    Assessment  Patient is 78 y.o. male admitted to [facility] after GLF resulting in R femoral neck fx s/p R ANGELA. PMHx of GERD, CAD, GLENYS, HTN, hyperlipidemia, and hearing loss.      Pt greeted and agreeable to OT evaluation. Upon OT arrival, pt lying in semi fowlers position. OT instructed pt to walk BLE across bed to sit EOB. Pt had difficulty and required modA to manage RLE but was able to walk  LLE across bed. Pt participated in seated and standing ADLs (grooming & LBD). Pt returned to recliner post OT evaluation. Pt was educated on posterior hip precautions, adaptive equipment for LBD, adaptive techniques for ADLs and importance of frequent OOB activity once he returns home. Pt verbalized understanding. Pt's PLOF was IND w/all ADLs. However, pt demonstrated poor postural control, decreased dynamic standing balance, dynamic sitting balance, activity tolerance, and endurance which impact his ability to complete ADLs: At this time, pt would benefit from acute IP occupational therapy services to address these deficits 3x/wk.     Plan  Occupational Therapy Initial Treatment Plan   Treatment Interventions: Self Care / Activities of Daily Living, Neuro Re-Education / Balance, Therapeutic Exercises, Therapeutic Activity  Treatment Frequency: 3 Times per Week  Duration: Until Therapy Goals Met    DC Equipment Recommendations: Unable to determine at this time  Discharge Recommendations: Recommend post-acute placement for additional occupational therapy services prior to discharge home     Subjective  \"I have fell three times in the past year since I've been at [facility]\"     Objective   08/29/23 0849   Initial Contact Note    Initial " Contact Note Order Received and Verified, Occupational Therapy Evaluation in Progress with Full Report to Follow.   Prior Living Situation   Prior Services Home-Independent   Housing / Facility Independent Living Facility  (Pt reports he lives on the 5th floor at [facility] on the [place].)   Elevator Yes   Bathroom Set up Walk In Shower   Equipment Owned Single Point Cane   Lives with - Patient's Self Care Capacity Alone and Able to Care For Self   Comments Pt reports that he has a son that lives in [place], [place] but the rest of his family resides in [place]. Pt reports that son will be available to assist him with any needs upon d/c.   Prior Level of ADL Function   Self Feeding Independent   Grooming / Hygiene Independent   Bathing Independent   Dressing Independent   Toileting Independent   Prior Level of IADL Function   Medication Management Independent   Laundry Independent   Kitchen Mobility Independent   Finances Independent   Home Management Requires Assist  (Pt reports that staff at [facility] comes once per week to clean his apartment.)   Shopping Requires Assist  (Pt reports that there is a community bus at 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 Pt's BP was assessed and was 150/72 while sitting EOB. After minutes of resting, pt explained that his dizziness had subsided.)   O2 Delivery Device None -  Room Air   Pain 0 - 10 Group   Therapist Pain Assessment Post Activity Pain Same as Prior to Activity  (Pt did not c/o of any pain during OT evaluation.)   Cognition    Cognition / Consciousness WDL   Speech/ Communication Hard of Hearing  (Pt observed to have a hard time getting words out and have prolongations.)   Level of Consciousness Alert   Comments Pt is very pleasant and cooperative   Active ROM Upper Body   Active ROM Upper Body  WDL   Strength Upper Body   Upper Body Strength  X   Gross Strength Generalized Weakness, Equal Bilaterally.    Neurological Concerns   Neurological Concerns No   Coordination Upper Body   Coordination WDL   Balance Assessment   Sitting Balance (Static) Fair   Sitting Balance (Dynamic) Fair -   Standing Balance (Static) Fair -   Standing Balance (Dynamic) Fair -   Weight Shift Sitting Poor   Weight Shift Standing Poor   Comments w/SPC   Bed Mobility    Supine to Sit Moderate Assist  (Pt required modA to manage RLE across bed while he was able to manage LLE)   ADL Assessment   Grooming Standing;Contact Guard Assist  (washed his face and brushed his teeth while standing sinkside. Pt noted to use one hand on countertop to stabilize for balance. However, pt did not display any LOB during task when he used B hands simulataneously.)   Lower Body Dressing Moderate Assist  (Pt required modA to don underwear while sitting EOB and utilizing a reacher.)   Toileting   (Pt politely declined to participate as he recently used the bathroom w/RN.)   Comments   (Pt requires extended time and increased effort to complete tasks.)   How much help from another person does the patient currently need...   Putting on and taking off regular lower body clothing? 2   Bathing (including washing, rinsing, and drying)? 2   Toileting, which includes using a toilet, bedpan, or urinal? 3   Putting on and taking off regular upper body clothing? 3   Taking care of personal grooming such as brushing teeth? 4   Eating  meals? 4   6 Clicks Daily Activity Score 18   Functional Mobility   Sit to Stand Minimal Assist   Bed, Chair, Wheelchair Transfer Minimal Assist   Transfer Method Stand Step   Mobility supine>EOB>standing sinkside>recliner  (w/SPC)   Activity Tolerance   Sitting in Chair   (Pt left sitting in recliner post OT eval)   Sitting Edge of Bed +5 mins   Standing <5 mins   Patient / Family Goals   Patient / Family Goal #1 get stronger   Short Term Goals   Short Term Goal # 1 Pt will complete LBD using AE PRN w/spv.   Short Term Goal # 2 Pt will complete toilet txf w/spv.   Short Term Goal # 3 Pt will complete standing g/h task with 1 or less signs of fatigue w/spv.   Short Term Goal # 4 Pt will verbalize and adhere to 3/3 posterior hip precautions.   Education Group   Education Provided Hip Precautions;Role of Occupational Therapist;Activities of Daily Living   Role of Occupational Therapist Patient Response Patient;Acceptance;Explanation;Verbal Demonstration   Hip Precautions Patient Response Patient;Acceptance;Explanation;Verbal Demonstration;Handout;Demonstration;Action Demonstration   ADL Patient Response Patient;Acceptance;Explanation;Verbal Demonstration;Action Demonstration   Additional Comments Pt received educational handout regarding posterior hip precautions. OT also demo'd positions to avoid during completion of ADLs. Pt verbalized understanding. OT demo'd use of reacher to don underwear. Pt rehearsed but required modA.   Occupational Therapy Initial Treatment Plan    Treatment Interventions Self Care / Activities of Daily Living;Neuro Re-Education / Balance;Therapeutic Exercises;Therapeutic Activity   Treatment Frequency 3 Times per Week   Duration Until Therapy Goals Met   Problem List   Problem List Decreased Active Daily Living Skills;Decreased Functional Mobility;Decreased Activity Tolerance;Impaired Postural Control / Balance;Limited Knowledge of Post Op Precautions   Anticipated Discharge Equipment and  Recommendations   DC Equipment Recommendations Unable to determine at this time   Discharge Recommendations Recommend post-acute placement for additional occupational therapy services prior to discharge home   Interdisciplinary Plan of Care Collaboration   IDT Collaboration with  Nursing   Patient Position at End of Therapy Seated;Phone within Reach;Tray Table within Reach;Call Light within Reach   Collaboration Comments RN updated regarding pt's functional status   Session Information   Date / Session Number  8/29 #1 (1/3, 9/4)

## 2023-08-29 NOTE — CONSULTS
Physical Medicine and Rehabilitation Consultation              Date of initial consultation: 8/29/2023  Requesting provider: ordered by Teresa Emerson M.D. at 08/29/23 2234   Consulting provider: Shanelle Carpenter D.O.  Reason for consultation: assess for acute inpatient rehab appropriateness  LOS: 1 Day(s)    Chief complaint: R hip pain after GLF     HPI: The patient is a 78 y.o.  male with a past medical history of CHF, HTN, and prior stroke;  who presented on 8/28/2023 12:57 AM with right hip pain after GLF. Per documentation, patient was attempting to get out of bed when he tripped and fell. He hit his head on the floor but did not have LOC. Upon eval in the ED CT head and C spine were negative for acute findings. Xray right femur showed an impacted right subcapital femoral neck fracture. Ortho was consulted, and patient was taken to the OR on 8/28 for open treatment of the right femoral neck fracture with total hip arthoplasty performed by Dr. Mei. Post op patient is WBAT RLE, with posterior hip precautions. Post op, patient has ABLA and leukocytosis. Patient has been able to participate with therapies, patient has had some orthostatic hypotension with mobility with nursing.     Patient seen and examined at bedside.  Patient reports he feels okay.  Patient reports he is finally able to eat again.  Reports that his pain was so bad he was unable to eat until today.  Otherwise patient denies numbness tingling or weakness in his lower extremities. Does not report HA, lightheadedness, SOB, CP, abdominal pain, or changes in numbness/tingling/weakness.  Has no other complaints besides wanting to be disconnected from his pulse ox so he can eat.      Social Hx:  Patient [facility] at [facility] access, has son that lives near by in [address]  At prior level of function patient was Independent with mobility and ADLs. Required assistance with IADLs     Tobacco: Denies   Alcohol: Denies   Drugs: Denies      THERAPY:  Restrictions: Fall Risk, Posterior hip precautions, WBAT RLE   PT: Functional mobility   8/29 Min A FWW x 30 ft, CGA sit to stand     OT: ADLs  8/29 Mod A bed mobility, Mod A lower body dressing     SLP:   None     IMAGING:  DX-CHEST-PORTABLE (1 VIEW)   Final Result           1.  No acute cardiopulmonary disease.       DX-PELVIS-1 OR 2 VIEWS   Final Result           1.  Impacted right subcapital femoral neck fracture   2.  Atherosclerosis       DX-FEMUR-2+ RIGHT   Final Result           1.  Subcapital right femoral neck fracture.   2.  Atherosclerosis subcapital right femoral neck fracture       CT-LSPINE W/O PLUS RECONS   Final Result           1.  No acute traumatic bony injury of the lumbar spine.   2.  Bilateral neural foraminal stenosis at L4/L5 and L5/S1   3.  Atherosclerosis       CT-TSPINE W/O PLUS RECONS   Final Result           1.  No acute traumatic bony injury of the thoracic spine.   2.  Atherosclerosis and atherosclerotic coronary artery disease       CT-CSPINE WITHOUT PLUS RECONS   Final Result           1.  Multilevel degenerative changes of the cervical spine limit diagnostic sensitivity of this examination, otherwise no acute traumatic bony injury of the cervical spine is apparent.   2.  Atherosclerosis       CT-HEAD W/O   Final Result           1.  No acute intracranial abnormality is identified, there are nonspecific white matter changes, commonly associated with small vessel ischemic disease.  Associated mild cerebral atrophy is noted.   2.  Bilateral frontal and ethmoid sinusitis changes   3.  Atherosclerosis.       PROCEDURES:  8/28 Open treatment of right femoral neck fracture with total hip arthroplasty by Dr. Mei     PMH:  Past Medical History:   Diagnosis Date    Anxiety     Congestive heart failure (HCC)     Heart attack (HCC)     Hypertension     Stroke (cerebrum) (HCC)        PSH:  Past Surgical History:   Procedure Laterality Date    [place] TOTAL HIP ARTHROPLASTY Right  "8/28/2023    Procedure: ARTHROPLASTY, HIP, TOTAL;  Surgeon: Manan Mei M.D.;  Location: SURGERY [facility];  Service: Orthopedics       FHX:  History reviewed. No pertinent family history.    Medications:  Current Facility-Administered Medications   Medication Dose    omeprazole (PriLOSEC) capsule 20 mg  20 mg    lisinopril (Prinivil) tablet 20 mg  20 mg    simvastatin (Zocor) tablet 10 mg  10 mg    [Held by provider] clopidogrel (Plavix) tablet 75 mg  75 mg    senna-docusate (Pericolace Or Senokot S) 8.6-50 MG per tablet 2 Tablet  2 Tablet    And    polyethylene glycol/lytes (Miralax) PACKET 1 Packet  1 Packet    And    magnesium hydroxide (Milk Of Magnesia) suspension 30 mL  30 mL    And    bisacodyl (Dulcolax) suppository 10 mg  10 mg    enoxaparin (Lovenox) inj 30 mg  30 mg    hydrALAZINE (Apresoline) injection 10 mg  10 mg    ondansetron (Zofran) syringe/vial injection 4 mg  4 mg    ondansetron (Zofran ODT) dispertab 4 mg  4 mg    Pharmacy Consult Request ...Pain Management Review 1 Each  1 Each    acetaminophen (Tylenol) tablet 1,000 mg  1,000 mg    Followed by    [START ON 9/2/2023] acetaminophen (Tylenol) tablet 1,000 mg  1,000 mg    oxyCODONE immediate-release (Roxicodone) tablet 2.5 mg  2.5 mg    Or    oxyCODONE immediate-release (Roxicodone) tablet 5 mg  5 mg    Or    HYDROmorphone (Dilaudid) injection 0.25 mg  0.25 mg       Allergies:  Allergies   Allergen Reactions    Amlodipine      Edema    Cortisone Palpitations     Patient thinks gave him chest pain 8/28/23    Morphine Anxiety and Unspecified     Hallucinations       Physical Exam:  Vitals: /65   Pulse (!) 54   Temp 37.1 °C (98.8 °F) (Temporal)   Resp 16   Ht 1.829 m (6' 0.01\")   Wt 54.4 kg (120 lb)   SpO2 97%   Gen: NAD, laying comfortably in bed  Head:  NC/AT  Eyes/ Nose/ Mouth: PERRLA, moist mucous membranes  Cardio: RRR, good distal perfusion, warm extremities  Pulm: normal respiratory effort, no cyanosis, on room air  Abd: " Soft NTND, negative borborygmi   Ext: No peripheral edema. No calf tenderness. No clubbing.    Mental status:  A&Ox4 (person, place, date, situation) answers questions appropriately follows commands  Speech: fluent, no aphasia or dysarthria, is soft-spoken but not hypophonic    CRANIAL NERVES:  2,3: visual acuity grossly intact, PERRL  3,4,6: EOMI bilaterally, no nystagmus or diplopia  5: sensation intact to light touch bilaterally and symmetric  7: no facial asymmetry  8: hearing grossly intact      Motor:      Upper Extremity  Myotome R L   Shoulder flexion C5 5/5 5/5   Elbow flexion C5 5/5 5/5   Wrist extension C6 5/5 5/5   Elbow extension C7 5/5 5/5   Finger flexion C8 5/5 5/5   Finger abduction T1 5/5 5/5     Lower Extremity Myotome R L   Hip flexion L2 2/5 5/5   Knee extension L3 2, limited by pain/5 5/5   Ankle dorsiflexion L4 5/5 5/5   Toe extension L5 5/5 5/5   Ankle plantarflexion S1 5/5 5/5       Negative Pronator drift bilaterally    Sensory:   intact to light touch through out    DTRs: 2+ in bilateral  biceps  No clonus at bilateral ankles  Negative Frias b/l     Tone: no spasticity noted, no cogwheeling noted    Coordination:   intact finger to nose bilaterally  intact fine motor with fingers bilaterally      Labs: Reviewed and significant for   Recent Labs     08/28/23 0133 08/29/23 0047   RBC 4.63* 3.96*   HEMOGLOBIN 15.0 12.7*   HEMATOCRIT 44.1 37.7*   PLATELETCT 171 139*     Recent Labs     08/28/23 0133 08/29/23 0047   SODIUM 141 139   POTASSIUM 4.3 4.4   CHLORIDE 105 106   CO2 25 24   GLUCOSE 119* 143*   BUN 24* 24*   CREATININE 1.05 1.12   CALCIUM 9.3 8.4*     Recent Results (from the past 24 hour(s))   CBC WITHOUT DIFFERENTIAL    Collection Time: 08/29/23 12:47 AM   Result Value Ref Range    WBC 14.5 (H) 4.8 - 10.8 K/uL    RBC 3.96 (L) 4.70 - 6.10 M/uL    Hemoglobin 12.7 (L) 14.0 - 18.0 g/dL    Hematocrit 37.7 (L) 42.0 - 52.0 %    MCV 95.2 81.4 - 97.8 fL    MCH 32.1 27.0 - 33.0 pg    MCHC  33.7 32.3 - 36.5 g/dL    RDW 45.6 35.9 - 50.0 fL    Platelet Count 139 (L) 164 - 446 K/uL    MPV 11.9 9.0 - 12.9 fL   Basic Metabolic Panel    Collection Time: 08/29/23 12:47 AM   Result Value Ref Range    Sodium 139 135 - 145 mmol/L    Potassium 4.4 3.6 - 5.5 mmol/L    Chloride 106 96 - 112 mmol/L    Co2 24 20 - 33 mmol/L    Glucose 143 (H) 65 - 99 mg/dL    Bun 24 (H) 8 - 22 mg/dL    Creatinine 1.12 0.50 - 1.40 mg/dL    Calcium 8.4 (L) 8.5 - 10.5 mg/dL    Anion Gap 9.0 7.0 - 16.0   MAGNESIUM    Collection Time: 08/29/23 12:47 AM   Result Value Ref Range    Magnesium 1.6 1.5 - 2.5 mg/dL   PHOSPHORUS    Collection Time: 08/29/23 12:47 AM   Result Value Ref Range    Phosphorus 3.2 2.5 - 4.5 mg/dL   ESTIMATED GFR    Collection Time: 08/29/23 12:47 AM   Result Value Ref Range    GFR (CKD-EPI) 66 >60 mL/min/1.73 m 2         ASSESSMENT:  Patient is a 78 y.o. male admitted with right hip fracture     C Code / Diagnosis to Support: 0008.11 - Orthopaedic Disorders: Status Post Unilateral Hip Fracture    Rehabilitation: Impaired ADLs and mobility  Patient is a good candidate for inpatient rehab based on needs for PT, OT, see disposition details below    Barriers to transfer include: Insurance authorization, TCCs to verify disposition, medical clearance and bed availability     Additional Recommendations:  Femoral neck fracture   - sustained during GLF   - images showed an impacted right subcapital femoral neck fractuer  - ortho consulted  -s/p  open treatment of right femoral neck fracture with total hip arthoplasty by Dr. Mei  - posterior hip precautions, WBAT RLE   - continue with PT/OT     Chronic mural thrombus   Heart failure with reduced EF   - updated ECHO obtained showed EF of 35%, and noted mural thrombus   - patient on therapeutic dose of lovenox     Prior CVA  - minimal deficits, no lateralizing weakness, no known dysphagia but patient has soft-spoken/hypophonic voice  - continue statin, plavix held     CAD  with history of PCI   - statin and plavix at home  - plavix currently held     Dispo:  - patient is currently functioning below their level of baseline, recommend post acute rehab  - recommend IRF level therapy with 3hr of therapy 5 days per week  - piror to acceptance to IRF, will need confirmation of DC support from son  - TCC to assist with insurance auth and DC support         Medical Complexity:  Femoral neck fracture   Chronic mural thrombus   Heart failure with reduced EF   Prior CVA  CAD with history of PCI   Impaired mobility and ADLs       DVT PPX: Lovenox       Thank you for allowing us to participate in the care of this patient.     Patient was seen for 81 minutes on unit/floor of which > 50% of time was spent on counseling and coordination of care regarding the above, including prognosis, risk reduction, benefits of treatment, and options for next stage of care.    Shanelle Carpenter D.O.   Physical Medicine and Rehabilitation     Please note that this dictation was created using voice recognition software. I have made every reasonable attempt to correct obvious errors, but there may be errors of grammar and possibly content that I did not discover before finalizing the note.

## 2023-08-29 NOTE — OP REPORT
DATE OF SERVICE:  08/28/2023     PREOPERATIVE DIAGNOSIS:  Right displaced femoral neck fracture.     POSTOPERATIVE DIAGNOSIS:  Right displaced femoral neck fracture.     PROCEDURE PERFORMED:  Open treatment of right femoral neck fracture with total   hip arthroplasty.     SURGEON:  Manan Mei MD     ANESTHESIOLOGIST:  Kamran Bravo MD     ANESTHESIA:  General and regional.     ASSISTANT:  Stephanie Borja PA-C     ESTIMATED BLOOD LOSS:  200 mL     IMPLANTS:  Padmini press-fit total hip arthroplasty with following components:  1.  Size 6 Accolade II femoral stem.  2.  A 56-mm Trident II acetabular shell.  3.  MDM acetabular liner.  4.  A 28-mm ceramic head.  5.  An MDM outer femoral head.  6.  Padmini 2-mm cable and cable crimp.     INDICATIONS FOR PROCEDURE:  The patient is 78 years old.  He had sustained a   right displaced femoral neck fracture with mild displacement, but posterior   displacement seen on lateral radiographs.  He has some underlying   osteoarthritis of the hip joint.  I was consulted to provide treatment   recommendations. I recommended hip arthroplasty.  We discussed options for   graciela versus total hip arthroplasty given his underlying arthritis, preexisting   pain in preference.  We elected to proceed with total hip arthroplasty over   hemiarthroplasty.  He signed informed consent preoperatively and wished to   proceed with surgery as outlined above.     DESCRIPTION OF PROCEDURE:  The patient was met in the preoperative holding   area.  Surgical site was signed.  His consent was confirmed to be accurate.    He was taken back to the operating room and general anesthesia was induced.    Dr. Bravo performed a regional anesthetic at my request to help with   postoperative pain control.  He was then positioned in left lateral decubitus   position with an axillary roll and Stulberg positioners.  Right lower   extremity was provisionally cleansed with isopropyl alcohol and then prepped   and  draped in the usual sterile fashion.  A formal timeout was performed to   confirm patient's correct name, correct surgical site, correct procedure and   correct laterality.  A posterior approach to the hip was then performed with a   scalpel down through skin.  Dissection was carried with Bovie cautery through   subcutaneous tissue down through the iliotibial band and the gluteal fascia,   which was split longitudinally.  A plane was developed between piriformis and   gluteus minimus and I released the short external rotators and the   full-thickness sleeve off the proximal femur with the posterior capsule.  I   made an in situ femoral neck cut, removing excess femoral neck and then   removed the head from the acetabulum.  There was moderate articular wear.  I   removed periacetabular soft tissue with Bovie cautery as well as the soft   tissue from the cotyloid fossa and sequentially reamed down the cotyloid   baseplate with a 50 mm reamer and then expanded up to 56-mm reamer, which had   good bleeding peripheral bone.  I then thoroughly irrigated out the acetabulum   and inserted a component 56-mm Trident II Acetabular shell, aiming for about   40 degrees of abduction, adding about 10 degrees of anteversion compared to   his native anteversion.  I then inserted 2 superior acetabular screws, both of   which had excellent bony purchase.  I then thoroughly cleansed and dried the   shell and inserted the MDM acetabular liner.  I then turned my attention to   the femur. As a prophylactic measure, I incised the vastus lateralis fascia   and bluntly dissected down to the subtrochanteric femur area and carefully   passed a cable passer and placed one cerclage cable distal to the lesser   trochanter for prophylactic cable to prevent potential for fracture   propagation prior to femoral preparation and stem insertion.  I then with   appropriate retractors in place, started preparing the femoral canal with a   box osteotome  followed by canal finding reamer followed by sequential   broaching up to ultimately a size 6 stem, which had good rotational stability   and good medial lateral fit.  I removed and then trialed and selected that   stem.  I removed the trial components, thoroughly irrigated out the femoral   canal, inserted the component, femoral stem to the appropriate depth.  I   confirmed no evidence of obvious iatrogenic calcar fracture.  I then trialed   again and selected a +0 mm neck length.  I removed the trial head, thoroughly   cleansed and dried the trunnion and inserted the component MDM head, gently   impacted down to the stem and reduced the hip.  The wound was then soaked in   Betadine solution for 3 minutes.  It was then thoroughly irrigated once again   with Pulsavac using normal saline.  I repaired the posterior capsule and short   external rotators through bone tunnels and greater trochanter with #5   Ti-Cron, placed a gram of vancomycin powder within the hip joint.  I then   sutured down and closed down the hip joint and the external rotators.  After   the vancomycin was placed, the IT band and gluteal fascia was repaired with a   size 2 Stratafix suture, subcutaneous layers with 0 Vicryl, 2-0 Vicryl and the   skin edges with staples.  Wounds were thoroughly cleansed, dried and a   sterile silver-impregnated dressing was applied.  He was then transferred in   supine position, transferred on the [place], woken up from anesthesia and taken   to postanesthesia care in stable condition.     PLAN:  1.  The patient will be readmitted postoperatively to the hospitalist service.  2.  He should weightbear as tolerated on the right lower extremity and   maintain posterior hip precautions.  3.  He will need Ancef and vancomycin for 24 hours postop for infection   prophylaxis.  4.  He should work with physical and occupational therapy as soon as possible   for mobilization.  5.  He can be started on Lovenox or equivalent  tomorrow morning and should   continue SCDs for DVT prophylaxis in the meantime.  6.  Ultimately, he will need to follow up with me 2 weeks postop for routine   wound check, staple removal and x-rays, 2 views of the right hip including   cross table lateral and AP pelvis.        ______________________________  MD STEFFANIE Hargrove/DARNELL    DD:  08/28/2023 16:32  DT:  08/28/2023 18:55    Job#:  [id number]

## 2023-08-29 NOTE — THERAPY
Physical Therapy   Initial Evaluation     Patient Name: Moe Nickerson  Age:  78 y.o., Sex:  male  Medical Record #: [id number]  Today's Date: 8/29/2023     Precautions  Precautions: Fall Risk;Posterior Hip Precautions;Weight Bearing As Tolerated Right Lower Extremity  Comments: Right displaced femoral neck fracture s/p hemiathroplasty    Assessment  Patient is 78 y.o. male with GLF, Right displaced femoral neck fracture s/p hemiathroplasty.  PMhx of anxiety, CHF, heart attack, HTN, stroke (chronic aphasia, disarthria). [facility] [place] [facility] and uses cane as needed. Son lives in [facility].     Pt was resting his R LE in min internal rotation with knee bent for comfort. Education on posterior hip precautions. Pt required assistance with all mobility, able to amb in room with FWW and assisted back to bed per his request. R LE in proper position in bed at end of visit. Pt will continue to benefit from acute physical therapy to assist towards established goals. Pt would benefit from PM&R consult. Pt can tolerate 3 hours of multiple therapies, is motivated and willing to participate. Pt has a good and safe discharge plan. Pt is in agreement.     Plan    Physical Therapy Initial Treatment Plan   Treatment Plan : Bed Mobility, Gait Training, Neuro Re-Education / Balance, Therapeutic Activities, Therapeutic Exercise, Self Care / Home Evaluation, Equipment  Treatment Frequency: 5 Times per Week  Duration: Until Therapy Goals Met    DC Equipment Recommendations: Front-Wheel Walker  Discharge Recommendations: Recommend post-acute placement for additional physical therapy services prior to discharge home       Subjective    Pt with eyes closed upon arrival. Agreed to PT.      Objective       08/29/23 1549   Initial Contact Note    Initial Contact Note Order Received and Verified, Physical Therapy Evaluation in Progress with Full Report to Follow.   Precautions   Precautions Fall Risk;Posterior Hip Precautions;Weight Bearing As Tolerated  Right Lower Extremity   Comments Right displaced femoral neck fracture s/p hemiathroplasty   Vitals   Pulse Oximetry 96 %   O2 Delivery Device None - Room Air   Pain 0 - 10 Group   Location Hip   Location Orientation Right   Therapist Pain Assessment During Activity   Prior Living Situation   Prior Services Home-Independent   Housing / Facility Independent Living Facility   Elevator Yes   Equipment Owned Single Point Cane   Lives with - Patient's Self Care Capacity Alone and Able to Care For Self   Comments Pt lives in [facility] [place]. Son lives in town.   Prior Level of Functional Mobility   Assistive Devices Used None  (uses SPC on occasion)   History of Falls   History of Falls Yes  (pt stated that he has had multiple falls, he says that he trips his shoes on the carpet)   Date of Last Fall 08/28/23   Cognition    Cognition / Consciousness WDL   Speech/ Communication Hard of Hearing;Dysarthric;Expressive Aphasia  (chronic-hx of CVA)   Level of Consciousness Alert   Comments requires extra time to verbalize needs   Active ROM Lower Body    Comments R LE limited due to pain and posterior hip precautions   Strength Lower Body   Comments pt unable to lift R LE off bed   Coordination Lower Body    Coordination Lower Body  WDL   Vision   Vision Comments wears glasses   Balance Assessment   Sitting Balance (Static) Fair   Sitting Balance (Dynamic) Fair -   Standing Balance (Static) Fair -   Standing Balance (Dynamic) Fair -   Weight Shift Sitting Fair   Weight Shift Standing Fair   Comments standing assessed with FWW   Bed Mobility    Supine to Sit Modified Independent  (assistance with R LE, HOB elevated to 45 degrees, bed rail used)   Sit to Supine Moderate Assist  (assistance with R LE back to bed and to scoot up in bed)   Scooting Minimal Assist  (pt able to assist with scooting at EOB)   Gait Analysis   Gait Level Of Assist Minimal Assist   Assistive Device Front Wheel Walker   Distance (Feet) 30   # of  Times Distance was Traveled 1   Deviation Antalgic;Bradykinetic  (min internal rotation at R LE- VCs for proper positioning, VCs when turning, min flexed knee with stance phase)   Weight Bearing Status WBAT R LE   Functional Mobility   Sit to Stand Contact Guard Assist   Bed, Chair, Wheelchair Transfer Contact Guard Assist   Transfer Method Stand Step   Mobility with FWW   Comments Vcs for hand placement for safety   How much difficulty does the patient currently have...   Turning over in bed (including adjusting bedclothes, sheets and blankets)? 1   Sitting down on and standing up from a chair with arms (e.g., wheelchair, bedside commode, etc.) 1   Moving from lying on back to sitting on the side of the bed? 1   How much help from another person does the patient currently need...   Moving to and from a bed to a chair (including a wheelchair)? 3   Need to walk in a hospital room? 3   Climbing 3-5 steps with a railing? 1   6 clicks Mobility Score 10   Activity Tolerance   Comments limited amb distance due to fatigue and pain   Short Term Goals    Short Term Goal # 1 bed mob supervised in 6 visits   Short Term Goal # 2 transfers with FWW supervised in 6 visits   Short Term Goal # 3 amb with FWW 100ft superised in 6 visits   Short Term Goal # 4 pt independent with teaching back and demonstrating mobility within posterior hip precautions for safe mobility in 6 visits   Education Group   Education Provided Hip Precautions Posterior;Role of Physical Therapist;Gait Training;Use of Assistive Device;Exercises - Supine;Weight Bearing Status   Hip Precautions Posterior Patient Response Patient;Acceptance;Explanation;Demonstration;Handout;Action Demonstration;Reinforcement Needed   Role of Physical Therapist Patient Response Patient;Acceptance;Explanation;Verbal Demonstration   Gait Training Patient Response Patient;Acceptance;Explanation;Action Demonstration   Use of Assistive Device Patient Response  Patient;Acceptance;Explanation;Demonstration;Action Demonstration   Exercises - Supine Patient Response Patient;Acceptance;Explanation;Demonstration;Action Demonstration  (APs in bed)   Weight Bearing Status Patient Response Patient;Acceptance;Explanation;Action Demonstration   Physical Therapy Initial Treatment Plan    Treatment Plan  Bed Mobility;Gait Training;Neuro Re-Education / Balance;Therapeutic Activities;Therapeutic Exercise;Self Care / Home Evaluation;Equipment   Treatment Frequency 5 Times per Week   Duration Until Therapy Goals Met   Problem List    Problems Impaired Bed Mobility;Impaired Transfers;Impaired Ambulation;Pain;Functional Strength Deficit;Impaired Balance;Decreased Activity Tolerance;Limited Knowledge of Post-Op Precautions   Anticipated Discharge Equipment and Recommendations   DC Equipment Recommendations Front-Wheel Walker   Discharge Recommendations Recommend post-acute placement for additional physical therapy services prior to discharge home   Interdisciplinary Plan of Care Collaboration   IDT Collaboration with  Nursing;Occupational Therapist   Patient Position at End of Therapy In Bed;Phone within Reach;Tray Table within Reach;Call Light within Reach

## 2023-08-29 NOTE — PROGRESS NOTES
78yoM with right displaced femoral neck fx s/p ANGELA 8/28.    S: Some right hip pain but says he was able to mobilize earlier today.    O:    Vitals:    08/29/23 1200   BP: 134/65   Pulse: (!) 54   Resp: 16   Temp: 37.1 °C (98.8 °F)   SpO2: 97%     Exam:  General-NAD, alert and following commands  RLE-hip dressing c/d/I, NVI distally    A: 78yoM with right displaced femoral neck fx s/p ANGELA 8/28.    Recs:  --WBAT RLE  --posterior hip precautions  --ancef/vanc x 2 doses postop  --PT/OT for mobilization ASAP  --continue DVT chemoprophylaxis and SCDs  --fu 2 weeks postop

## 2023-08-29 NOTE — CARE PLAN
The patient is Stable - Low risk of patient condition declining or worsening    Shift Goals  Clinical Goals: pain management, work with PT/OT  Patient Goals: comfort, mobility  Family Goals: not present    Progress made toward(s) clinical / shift goals:  Patient out of bed to work with PT/OT. Patient up in chair for lunch. Patient states 0/10 pain following PRN medication. See MAR.     Patient is not progressing towards the following goals:

## 2023-08-29 NOTE — PROGRESS NOTES
Hospital Medicine Daily Progress Note    Date of Service  8/29/2023    Chief Complaint  Moe Nickerson is a 78 y.o. male admitted 8/28/2023 with Rt hip pain     Hospital Course  This is an 78-year-old male with past medical history of chronic systolic heart failure LVEF 25%, hypertension, CAD with prior PCI, dyslipidemia,  history of CVA who presented to the ED 8/28/2023 after sustaining mechanical ground-level fall with immediate right hip pain noted.  He presented to the ED for further evaluation.  On admission he was found to have elevated blood pressure.  Labs consistent with prerenal acidemia.    Head CT negative, no fractures identified on C/T/L-spine CT.  X-ray significant for impacted right femoral neck fracture.  Orthopedic surgery was consulted for surgical intervention.      Interval Problem Update  Patient seen and examined, POD #1 open right femoral neck fracture with total hip arthroplasty .Having some postoperative right hip pain as expected.  I suspect he will need postacute placement  -I have placed a consult for evaluation by physiatry for inpatient rehab  -Monitor H&H postop  -Preoperative echo was done which shows chronic reduced systolic function with an EF of 35% and likely chronic LV thrombus, plan is to start full anticoagulation once cleared by surgery, patient was restarted on Lovenox DVT prophylaxis today.  If H&H stable tomorrow we will discuss with surgery if okay to start full anticoagulation    I have discussed this patient's plan of care and discharge plan at IDT rounds today with Case Management, Nursing, Nursing leadership, and other members of the IDT team.    Consultants/Specialty  orthopedics    Code Status  DNAR/DNI    Disposition  The patient is not medically cleared for discharge to home or a post-acute facility.  Anticipate discharge to: an inpatient rehabilitation hospital    I have placed the appropriate orders for post-discharge needs.    Review of Systems  Review of  Systems   Constitutional:  Positive for malaise/fatigue. Negative for chills and fever.   Respiratory:  Negative for shortness of breath.    Cardiovascular:  Negative for chest pain.   Gastrointestinal:  Negative for nausea and vomiting.   Musculoskeletal:  Positive for falls and joint pain.   Neurological:  Negative for dizziness and sensory change.        Physical Exam  Temp:  [36.5 °C (97.7 °F)-37.5 °C (99.5 °F)] 37.1 °C (98.8 °F)  Pulse:  [52-93] 54  Resp:  [14-20] 16  BP: (110-153)/() 134/65  SpO2:  [96 %-100 %] 97 %    Physical Exam  Vitals and nursing note reviewed.   Constitutional:       Comments: Thin elderly male   HENT:      Head: Normocephalic.      Mouth/Throat:      Mouth: Mucous membranes are dry.   Eyes:      Extraocular Movements: Extraocular movements intact.      Conjunctiva/sclera: Conjunctivae normal.   Cardiovascular:      Rate and Rhythm: Normal rate.      Heart sounds: Normal heart sounds.   Pulmonary:      Breath sounds: Normal breath sounds.   Abdominal:      General: Bowel sounds are normal.      Palpations: Abdomen is soft.   Musculoskeletal:         General: Swelling, tenderness and signs of injury present.      Cervical back: Neck supple.      Right lower leg: No edema.      Left lower leg: No edema.      Comments: Postop dressings to lateral right hip postop swelling noted with some minor bruising   Skin:     General: Skin is warm.   Neurological:      Mental Status: He is alert and oriented to person, place, and time. Mental status is at baseline.   Psychiatric:         Mood and Affect: Mood normal.         Behavior: Behavior normal.         Fluids    Intake/Output Summary (Last 24 hours) at 8/29/2023 1346  Last data filed at 8/29/2023 0814  Gross per 24 hour   Intake 920 ml   Output 540 ml   Net 380 ml       Laboratory  Recent Labs     08/28/23  0133 08/29/23  0047   WBC 4.6* 14.5*   RBC 4.63* 3.96*   HEMOGLOBIN 15.0 12.7*   HEMATOCRIT 44.1 37.7*   MCV 95.2 95.2   MCH 32.4  32.1   MCHC 34.0 33.7   RDW 44.8 45.6   PLATELETCT 171 139*   MPV 11.6 11.9     Recent Labs     08/28/23  0133 08/29/23  0047   SODIUM 141 139   POTASSIUM 4.3 4.4   CHLORIDE 105 106   CO2 25 24   GLUCOSE 119* 143*   BUN 24* 24*   CREATININE 1.05 1.12   CALCIUM 9.3 8.4*                   Imaging  DX-PELVIS-1 OR 2 VIEWS   Final Result         1. Status post right hip arthroplasty without evidence of early hardware complication.      EC-ECHOCARDIOGRAM COMPLETE W/ CONT   Final Result      DX-CHEST-PORTABLE (1 VIEW)   Final Result         1.  No acute cardiopulmonary disease.      DX-PELVIS-1 OR 2 VIEWS   Final Result         1.  Impacted right subcapital femoral neck fracture   2.  Atherosclerosis      DX-FEMUR-2+ RIGHT   Final Result         1.  Subcapital right femoral neck fracture.   2.  Atherosclerosis subcapital right femoral neck fracture      CT-LSPINE W/O PLUS RECONS   Final Result         1.  No acute traumatic bony injury of the lumbar spine.   2.  Bilateral neural foraminal stenosis at L4/L5 and L5/S1   3.  Atherosclerosis      CT-TSPINE W/O PLUS RECONS   Final Result         1.  No acute traumatic bony injury of the thoracic spine.   2.  Atherosclerosis and atherosclerotic coronary artery disease      CT-CSPINE WITHOUT PLUS RECONS   Final Result         1.  Multilevel degenerative changes of the cervical spine limit diagnostic sensitivity of this examination, otherwise no acute traumatic bony injury of the cervical spine is apparent.   2.  Atherosclerosis      CT-HEAD W/O   Final Result         1.  No acute intracranial abnormality is identified, there are nonspecific white matter changes, commonly associated with small vessel ischemic disease.  Associated mild cerebral atrophy is noted.   2.  Bilateral frontal and ethmoid sinusitis changes   3.  Atherosclerosis.              Assessment/Plan  * Femoral neck fracture (HCC)  Assessment & Plan   Patient shoes got caught today while ambulating, causing him to  slip and fall.  Head strike noted without loss of consciousness.  Noted immediate right hip pain after.  X-ray showing right femoral neck fracture.   Orthopedic surgery consulted  Status post ORIF with total hip arthroplasty  Supportive care with pain control, monitor for respiratory depression from morphine administration  PT OT postoperatively  IPR referral placed    Mural thrombus of cardiac apex  Assessment & Plan  Notified by cardiology, patient appears to have a chronic appearing thrombus on TTE, once cleared by orthopedic surgery, will start patient on therapeutic Lovenox/anticoagulation.  Patient is to have close follow-up with cardiology.    LBBB (left bundle branch block)  Assessment & Plan  Noted to have left bundle branch block on EKG, not seen in previous EKG.  Troponin pending, denies any chest pain at this time  Close outpatient cardiology follow-up    Heart failure with reduced ejection fraction (HCC)  Assessment & Plan  Chronic not in acute exacerbation  Resume home medications  Repeat TTE ordered showing stable reduced EF of 35% mural thrombus was noted    ACP (advance care planning)  Assessment & Plan  Confirmed DNR/DNI status    Hyperlipidemia  Assessment & Plan  Continue zocor    History of cardioembolic cerebrovascular accident (CVA)- (present on admission)  Assessment & Plan  Resume statin therapy    Coronary artery disease involving native coronary artery of native heart without angina pectoris  Assessment & Plan  History of PCI  Resume statin therapy  Patient will need to be started on full anticoagulation for LV thrombus will need to discuss if this should be continued with addition of Plavix  Continue to hold Plavix for now         VTE prophylaxis:    enoxaparin ppx      I have performed a physical exam and reviewed and updated ROS and Plan today (8/29/2023). In review of yesterday's note (8/28/2023), there are no changes except as documented above.      Greater than 51 minutes spent  prepping to see patient (e.g. review of tests) obtaining and/or reviewing separately obtained history. Performing a medically appropriate examination and/ evaluation.  Counseling and educating the patient/family/caregiver.  Ordering medications, tests, or procedures.  Referring and communicating with other health care professionals.  Documenting clinical information in EPIC.  Independently interpreting results and communicating results to patient/family/caregiver.  Care coordination.

## 2023-08-29 NOTE — ANESTHESIA POSTPROCEDURE EVALUATION
Patient: Moe Nickerson    Procedure Summary     Date: 08/28/23 Room / Location: [place] [id number] / SURGERY [facility]    Anesthesia Start: 1444 Anesthesia Stop: 1640    Procedure: ARTHROPLASTY, HIP, TOTAL (Right: Hip) Diagnosis: (Right femoral neck fracture)    Surgeons: Manan Mei M.D. Responsible Provider: Kamran Bravo M.D.    Anesthesia Type: general, peripheral nerve block ASA Status: 3          Final Anesthesia Type: general, peripheral nerve block  Last vitals  BP   Blood Pressure : (!) 143/71    Temp   37.6 °C (99.6 °F)    Pulse   74   Resp   18    SpO2   99 %      Anesthesia Post Evaluation    Patient location during evaluation: PACU  Patient participation: complete - patient participated  Level of consciousness: awake and alert  Pain score: 0    Airway patency: patent  Anesthetic complications: no  Cardiovascular status: hemodynamically stable  Respiratory status: acceptable  Hydration status: euvolemic    PONV: none          No notable events documented.     Nurse Pain Score: 0 (NPRS)

## 2023-08-29 NOTE — PROGRESS NOTES
"     Orthopedic PA Progress Note    Interval changes:  Patient doing well postop.  RLE dressings are CDI  WBAT RLE with posterior hip precautions  12.7 Hgb  Continue plavix postop  Follow up with Dr. Mei in 2 weeks  Cleared for DC from orthopedic standpoint pending therapy recs and medical optimization    ROS - Patient denies any new issues.  Denies any numbness or tingling. Pain well controlled.    /65   Pulse (!) 54   Temp 37.1 °C (98.8 °F) (Temporal)   Resp 16   Ht 1.829 m (6' 0.01\")   Wt 54.4 kg (120 lb)   SpO2 97%     Patient seen and examined  No acute distress  Breathing non labored  RRR  RLE: Surgical dressing is clean, dry, and intact. Patient clearly fires tibialis anterior, EHL, and gastrocnemius/soleus. Sensation is intact to light touch throughout superficial peroneal, deep peroneal, tibial, saphenous, and sural nerve distributions. Strong and palpable 2+ dorsalis pedis and posterior tibial pulses with capillary refill less than 2 seconds.  Recent Labs     08/28/23  0133 08/29/23  0047   WBC 4.6* 14.5*   RBC 4.63* 3.96*   HEMOGLOBIN 15.0 12.7*   HEMATOCRIT 44.1 37.7*   MCV 95.2 95.2   MCH 32.4 32.1   MCHC 34.0 33.7   RDW 44.8 45.6   PLATELETCT 171 139*   MPV 11.6 11.9       Active Hospital Problems    Diagnosis     Hyperlipidemia [E78.5]     Femoral neck fracture (HCC) [S72.009A]     ACP (advance care planning) [Z71.89]     Heart failure with reduced ejection fraction (HCC) [I50.20]     LBBB (left bundle branch block) [I44.7]     Mural thrombus of cardiac apex [I51.3]     Coronary artery disease involving native coronary artery of native heart without angina pectoris [I25.10]     History of cardioembolic cerebrovascular accident (CVA) [Z86.73]        Assessment/Plan:  Patient doing well postop.  RLE dressings are CDI  WBAT RLE with posterior hip precautions  12.7 Hgb  Continue plavix postop  Follow up with Dr. Mei in 2 weeks  Cleared for DC from orthopedic standpoint pending therapy " recs and medical optimization    POD#1 S/p  Open treatment of right femoral neck fracture with total   hip arthroplasty.  Wt bearing status - WBAT RLE with posterior hip precautions  Wound care/Drains - Dressings to be changed every other day by nursing. Or PRN for saturation starting POD#2  Future Procedures - None planned   Lovenox: Start 8/29, Duration-until ambulatory > 150'  Sutures/Staples out- 14-21 days post operatively. Removal will completed by ortho GHAZAL's unless transferred.  PT/OT-initiated  Antibiotics:  Perioperative completed  DVT Prophylaxis- TEDS/SCDs/Foot pumps  Garcia-not needed per ortho  Case Coordination for Discharge Planning - Disposition per therapy recs.

## 2023-08-30 ENCOUNTER — PATIENT OUTREACH (OUTPATIENT)
Dept: SCHEDULING | Facility: IMAGING CENTER | Age: 78
End: 2023-08-30
Payer: MEDICARE

## 2023-08-30 ENCOUNTER — HOSPITAL ENCOUNTER (INPATIENT)
Facility: REHABILITATION | Age: 78
LOS: 13 days | DRG: 559 | End: 2023-09-12
Attending: PHYSICAL MEDICINE & REHABILITATION | Admitting: PHYSICAL MEDICINE & REHABILITATION
Payer: MEDICARE

## 2023-08-30 VITALS
BODY MASS INDEX: 16.25 KG/M2 | TEMPERATURE: 98.6 F | RESPIRATION RATE: 16 BRPM | HEART RATE: 69 BPM | HEIGHT: 72 IN | WEIGHT: 120 LBS | SYSTOLIC BLOOD PRESSURE: 147 MMHG | OXYGEN SATURATION: 96 % | DIASTOLIC BLOOD PRESSURE: 74 MMHG

## 2023-08-30 DIAGNOSIS — I10 PRIMARY HYPERTENSION: ICD-10-CM

## 2023-08-30 DIAGNOSIS — Z95.5 HISTORY OF CORONARY ARTERY STENT PLACEMENT: ICD-10-CM

## 2023-08-30 DIAGNOSIS — K64.9 HEMORRHOIDS, UNSPECIFIED HEMORRHOID TYPE: ICD-10-CM

## 2023-08-30 DIAGNOSIS — S09.90XS COGNITIVE DEFICIT DUE TO OLD HEAD INJURY: ICD-10-CM

## 2023-08-30 DIAGNOSIS — I50.20 HEART FAILURE WITH REDUCED EJECTION FRACTION (HCC): ICD-10-CM

## 2023-08-30 DIAGNOSIS — R41.89 COGNITIVE DEFICIT DUE TO OLD HEAD INJURY: ICD-10-CM

## 2023-08-30 DIAGNOSIS — E78.00 PURE HYPERCHOLESTEROLEMIA: ICD-10-CM

## 2023-08-30 DIAGNOSIS — Z87.81 HISTORY OF FEMUR FRACTURE: ICD-10-CM

## 2023-08-30 DIAGNOSIS — I51.3 MURAL THROMBUS OF CARDIAC APEX: ICD-10-CM

## 2023-08-30 LAB
ANION GAP SERPL CALC-SCNC: 8 MMOL/L (ref 7–16)
BASOPHILS # BLD AUTO: 0.3 % (ref 0–1.8)
BASOPHILS # BLD: 0.03 K/UL (ref 0–0.12)
BUN SERPL-MCNC: 21 MG/DL (ref 8–22)
CALCIUM SERPL-MCNC: 8.2 MG/DL (ref 8.5–10.5)
CHLORIDE SERPL-SCNC: 107 MMOL/L (ref 96–112)
CO2 SERPL-SCNC: 23 MMOL/L (ref 20–33)
CREAT SERPL-MCNC: 0.97 MG/DL (ref 0.5–1.4)
EOSINOPHIL # BLD AUTO: 0.34 K/UL (ref 0–0.51)
EOSINOPHIL NFR BLD: 2.9 % (ref 0–6.9)
ERYTHROCYTE [DISTWIDTH] IN BLOOD BY AUTOMATED COUNT: 45.8 FL (ref 35.9–50)
GFR SERPLBLD CREATININE-BSD FMLA CKD-EPI: 79 ML/MIN/1.73 M 2
GLUCOSE SERPL-MCNC: 118 MG/DL (ref 65–99)
HCT VFR BLD AUTO: 37.1 % (ref 42–52)
HGB BLD-MCNC: 12.3 G/DL (ref 14–18)
IMM GRANULOCYTES # BLD AUTO: 0.05 K/UL (ref 0–0.11)
IMM GRANULOCYTES NFR BLD AUTO: 0.4 % (ref 0–0.9)
LYMPHOCYTES # BLD AUTO: 0.63 K/UL (ref 1–4.8)
LYMPHOCYTES NFR BLD: 5.4 % (ref 22–41)
MAGNESIUM SERPL-MCNC: 1.7 MG/DL (ref 1.5–2.5)
MCH RBC QN AUTO: 31.8 PG (ref 27–33)
MCHC RBC AUTO-ENTMCNC: 33.2 G/DL (ref 32.3–36.5)
MCV RBC AUTO: 95.9 FL (ref 81.4–97.8)
MONOCYTES # BLD AUTO: 1.07 K/UL (ref 0–0.85)
MONOCYTES NFR BLD AUTO: 9.2 % (ref 0–13.4)
NEUTROPHILS # BLD AUTO: 9.52 K/UL (ref 1.82–7.42)
NEUTROPHILS NFR BLD: 81.8 % (ref 44–72)
NRBC # BLD AUTO: 0 K/UL
NRBC BLD-RTO: 0 /100 WBC (ref 0–0.2)
PLATELET # BLD AUTO: 125 K/UL (ref 164–446)
PMV BLD AUTO: 12.8 FL (ref 9–12.9)
POTASSIUM SERPL-SCNC: 4.3 MMOL/L (ref 3.6–5.5)
RBC # BLD AUTO: 3.87 M/UL (ref 4.7–6.1)
SODIUM SERPL-SCNC: 138 MMOL/L (ref 135–145)
WBC # BLD AUTO: 11.6 K/UL (ref 4.8–10.8)

## 2023-08-30 PROCEDURE — 770010 HCHG ROOM/CARE - REHAB SEMI PRIVAT*

## 2023-08-30 PROCEDURE — A9270 NON-COVERED ITEM OR SERVICE: HCPCS | Performed by: STUDENT IN AN ORGANIZED HEALTH CARE EDUCATION/TRAINING PROGRAM

## 2023-08-30 PROCEDURE — 83735 ASSAY OF MAGNESIUM: CPT

## 2023-08-30 PROCEDURE — 80048 BASIC METABOLIC PNL TOTAL CA: CPT

## 2023-08-30 PROCEDURE — 700102 HCHG RX REV CODE 250 W/ 637 OVERRIDE(OP): Performed by: PHYSICAL MEDICINE & REHABILITATION

## 2023-08-30 PROCEDURE — A9270 NON-COVERED ITEM OR SERVICE: HCPCS | Performed by: GENERAL PRACTICE

## 2023-08-30 PROCEDURE — 700102 HCHG RX REV CODE 250 W/ 637 OVERRIDE(OP): Performed by: STUDENT IN AN ORGANIZED HEALTH CARE EDUCATION/TRAINING PROGRAM

## 2023-08-30 PROCEDURE — 700102 HCHG RX REV CODE 250 W/ 637 OVERRIDE(OP): Performed by: GENERAL PRACTICE

## 2023-08-30 PROCEDURE — 85025 COMPLETE CBC W/AUTO DIFF WBC: CPT

## 2023-08-30 PROCEDURE — 99239 HOSP IP/OBS DSCHRG MGMT >30: CPT | Performed by: STUDENT IN AN ORGANIZED HEALTH CARE EDUCATION/TRAINING PROGRAM

## 2023-08-30 PROCEDURE — 99223 1ST HOSP IP/OBS HIGH 75: CPT | Performed by: PHYSICAL MEDICINE & REHABILITATION

## 2023-08-30 PROCEDURE — 94760 N-INVAS EAR/PLS OXIMETRY 1: CPT

## 2023-08-30 PROCEDURE — 36415 COLL VENOUS BLD VENIPUNCTURE: CPT

## 2023-08-30 PROCEDURE — 97535 SELF CARE MNGMENT TRAINING: CPT | Mod: CQ

## 2023-08-30 PROCEDURE — A9270 NON-COVERED ITEM OR SERVICE: HCPCS | Performed by: PHYSICAL MEDICINE & REHABILITATION

## 2023-08-30 PROCEDURE — 93005 ELECTROCARDIOGRAM TRACING: CPT | Performed by: PHYSICAL MEDICINE & REHABILITATION

## 2023-08-30 RX ORDER — NITROGLYCERIN 0.4 MG/1
0.4 TABLET SUBLINGUAL
Status: DISCONTINUED | OUTPATIENT
Start: 2023-08-30 | End: 2023-09-12 | Stop reason: HOSPADM

## 2023-08-30 RX ORDER — ECHINACEA PURPUREA EXTRACT 125 MG
2 TABLET ORAL PRN
Status: DISCONTINUED | OUTPATIENT
Start: 2023-08-30 | End: 2023-09-12 | Stop reason: HOSPADM

## 2023-08-30 RX ORDER — OXYCODONE HYDROCHLORIDE 5 MG/1
5 TABLET ORAL
Status: DISCONTINUED | OUTPATIENT
Start: 2023-08-30 | End: 2023-09-05

## 2023-08-30 RX ORDER — LISINOPRIL 20 MG/1
20 TABLET ORAL
Status: DISCONTINUED | OUTPATIENT
Start: 2023-08-31 | End: 2023-09-12 | Stop reason: HOSPADM

## 2023-08-30 RX ORDER — SIMVASTATIN 20 MG
10 TABLET ORAL EVERY EVENING
Status: DISCONTINUED | OUTPATIENT
Start: 2023-08-30 | End: 2023-09-12 | Stop reason: HOSPADM

## 2023-08-30 RX ORDER — HYDROMORPHONE HYDROCHLORIDE 2 MG/ML
0.25 INJECTION, SOLUTION INTRAMUSCULAR; INTRAVENOUS; SUBCUTANEOUS
Status: DISCONTINUED | OUTPATIENT
Start: 2023-08-30 | End: 2023-08-30

## 2023-08-30 RX ORDER — OMEPRAZOLE 20 MG/1
20 CAPSULE, DELAYED RELEASE ORAL DAILY
Status: DISCONTINUED | OUTPATIENT
Start: 2023-08-31 | End: 2023-09-12 | Stop reason: HOSPADM

## 2023-08-30 RX ORDER — OXYCODONE HYDROCHLORIDE 5 MG/1
2.5 TABLET ORAL
Status: DISCONTINUED | OUTPATIENT
Start: 2023-08-30 | End: 2023-09-05

## 2023-08-30 RX ORDER — BISACODYL 10 MG
10 SUPPOSITORY, RECTAL RECTAL
Status: DISCONTINUED | OUTPATIENT
Start: 2023-08-30 | End: 2023-09-08

## 2023-08-30 RX ORDER — HYDRALAZINE HYDROCHLORIDE 25 MG/1
25 TABLET, FILM COATED ORAL EVERY 8 HOURS PRN
Status: DISCONTINUED | OUTPATIENT
Start: 2023-08-30 | End: 2023-09-12 | Stop reason: HOSPADM

## 2023-08-30 RX ORDER — OMEPRAZOLE 20 MG/1
20 CAPSULE, DELAYED RELEASE ORAL DAILY
Status: DISCONTINUED | OUTPATIENT
Start: 2023-08-30 | End: 2023-08-30

## 2023-08-30 RX ORDER — ACETAMINOPHEN 500 MG
1000 TABLET ORAL EVERY 6 HOURS
Qty: 30 TABLET | Refills: 0 | Status: ON HOLD
Start: 2023-08-30 | End: 2023-09-12

## 2023-08-30 RX ORDER — ONDANSETRON 4 MG/1
4 TABLET, ORALLY DISINTEGRATING ORAL 4 TIMES DAILY PRN
Status: DISCONTINUED | OUTPATIENT
Start: 2023-08-30 | End: 2023-09-12 | Stop reason: HOSPADM

## 2023-08-30 RX ORDER — AMOXICILLIN 250 MG
2 CAPSULE ORAL 2 TIMES DAILY
Status: DISCONTINUED | OUTPATIENT
Start: 2023-08-30 | End: 2023-09-08

## 2023-08-30 RX ORDER — OXYCODONE HYDROCHLORIDE 5 MG/1
2.5-5 TABLET ORAL
Qty: 30 TABLET | Refills: 0 | Status: ON HOLD
Start: 2023-08-30 | End: 2023-09-12

## 2023-08-30 RX ORDER — ACETAMINOPHEN 325 MG/1
650 TABLET ORAL 3 TIMES DAILY
Status: DISCONTINUED | OUTPATIENT
Start: 2023-08-30 | End: 2023-09-05

## 2023-08-30 RX ORDER — POLYETHYLENE GLYCOL 3350 17 G/17G
1 POWDER, FOR SOLUTION ORAL
Status: DISCONTINUED | OUTPATIENT
Start: 2023-08-30 | End: 2023-09-08

## 2023-08-30 RX ORDER — ONDANSETRON 2 MG/ML
4 INJECTION INTRAMUSCULAR; INTRAVENOUS 4 TIMES DAILY PRN
Status: DISCONTINUED | OUTPATIENT
Start: 2023-08-30 | End: 2023-09-12 | Stop reason: HOSPADM

## 2023-08-30 RX ORDER — CLOPIDOGREL BISULFATE 75 MG/1
75 TABLET ORAL DAILY
Status: DISCONTINUED | OUTPATIENT
Start: 2023-08-30 | End: 2023-09-12 | Stop reason: HOSPADM

## 2023-08-30 RX ADMIN — ACETAMINOPHEN 650 MG: 325 TABLET ORAL at 20:39

## 2023-08-30 RX ADMIN — ACETAMINOPHEN 650 MG: 325 TABLET ORAL at 16:27

## 2023-08-30 RX ADMIN — SENNOSIDES AND DOCUSATE SODIUM 2 TABLET: 50; 8.6 TABLET ORAL at 20:39

## 2023-08-30 RX ADMIN — CLOPIDOGREL BISULFATE 75 MG: 75 TABLET ORAL at 13:53

## 2023-08-30 RX ADMIN — APIXABAN 5 MG: 5 TABLET, FILM COATED ORAL at 20:51

## 2023-08-30 RX ADMIN — LISINOPRIL 20 MG: 20 TABLET ORAL at 04:31

## 2023-08-30 RX ADMIN — OMEPRAZOLE 20 MG: 20 CAPSULE, DELAYED RELEASE ORAL at 06:22

## 2023-08-30 RX ADMIN — OXYCODONE HYDROCHLORIDE 5 MG: 5 TABLET ORAL at 20:41

## 2023-08-30 RX ADMIN — ACETAMINOPHEN 1000 MG: 500 TABLET, FILM COATED ORAL at 08:00

## 2023-08-30 RX ADMIN — SIMVASTATIN 10 MG: 20 TABLET, FILM COATED ORAL at 20:39

## 2023-08-30 RX ADMIN — OXYCODONE HYDROCHLORIDE 5 MG: 5 TABLET ORAL at 04:29

## 2023-08-30 RX ADMIN — SENNOSIDES AND DOCUSATE SODIUM 2 TABLET: 50; 8.6 TABLET ORAL at 04:31

## 2023-08-30 ASSESSMENT — PAIN DESCRIPTION - PAIN TYPE: TYPE: ACUTE PAIN;SURGICAL PAIN

## 2023-08-30 ASSESSMENT — PAIN SCALES - PAIN ASSESSMENT IN ADVANCED DEMENTIA (PAINAD)
FACIALEXPRESSION: SMILING OR INEXPRESSIVE
TOTALSCORE: 0
FACIALEXPRESSION: SMILING OR INEXPRESSIVE
CONSOLABILITY: NO NEED TO CONSOLE
BODYLANGUAGE: RELAXED
TOTALSCORE: 0
BREATHING: NORMAL
CONSOLABILITY: NO NEED TO CONSOLE
BODYLANGUAGE: RELAXED
BREATHING: NORMAL

## 2023-08-30 ASSESSMENT — LIFESTYLE VARIABLES
EVER HAD A DRINK FIRST THING IN THE MORNING TO STEADY YOUR NERVES TO GET RID OF A HANGOVER: NO
EVER FELT BAD OR GUILTY ABOUT YOUR DRINKING: NO
EVER_SMOKED: NEVER
ON A TYPICAL DAY WHEN YOU DRINK ALCOHOL HOW MANY DRINKS DO YOU HAVE: 0
HAVE YOU EVER FELT YOU SHOULD CUT DOWN ON YOUR DRINKING: NO
TOTAL SCORE: 0
HOW MANY TIMES IN THE PAST YEAR HAVE YOU HAD 5 OR MORE DRINKS IN A DAY: 0
TOTAL SCORE: 0
AVERAGE NUMBER OF DAYS PER WEEK YOU HAVE A DRINK CONTAINING ALCOHOL: 0
ALCOHOL_USE: NO
HAVE PEOPLE ANNOYED YOU BY CRITICIZING YOUR DRINKING: NO
CONSUMPTION TOTAL: NEGATIVE
TOTAL SCORE: 0

## 2023-08-30 ASSESSMENT — COPD QUESTIONNAIRES
HAVE YOU SMOKED AT LEAST 100 CIGARETTES IN YOUR ENTIRE LIFE: NO/DON'T KNOW
DURING THE PAST 4 WEEKS HOW MUCH DID YOU FEEL SHORT OF BREATH: NONE/LITTLE OF THE TIME
DO YOU EVER COUGH UP ANY MUCUS OR PHLEGM?: NO/ONLY WITH OCCASIONAL COLDS OR INFECTIONS
COPD SCREENING SCORE: 2

## 2023-08-30 ASSESSMENT — PATIENT HEALTH QUESTIONNAIRE - PHQ9
SUM OF ALL RESPONSES TO PHQ9 QUESTIONS 1 AND 2: 0
SUM OF ALL RESPONSES TO PHQ9 QUESTIONS 1 AND 2: 0
2. FEELING DOWN, DEPRESSED, IRRITABLE, OR HOPELESS: NOT AT ALL
1. LITTLE INTEREST OR PLEASURE IN DOING THINGS: NOT AT ALL
2. FEELING DOWN, DEPRESSED, IRRITABLE, OR HOPELESS: NOT AT ALL
1. LITTLE INTEREST OR PLEASURE IN DOING THINGS: NOT AT ALL

## 2023-08-30 ASSESSMENT — FIBROSIS 4 INDEX
FIB4 SCORE: 3.68
FIB4 SCORE: 3.68

## 2023-08-30 ASSESSMENT — PAIN SCALES - WONG BAKER
WONGBAKER_NUMERICALRESPONSE: HURTS JUST A LITTLE BIT
WONGBAKER_NUMERICALRESPONSE: DOESN'T HURT AT ALL

## 2023-08-30 NOTE — DISCHARGE SUMMARY
Discharge Summary    CHIEF COMPLAINT ON ADMISSION  Chief Complaint   Patient presents with    Fall    Hip Pain       Reason for Admission  TBI    Admission Date  8/28/2023     CODE STATUS  DNAR/DNI    HPI & HOSPITAL COURSE    Mr. Nickerson is a pleasant 78-year-old male with past medical history of chronic systolic heart failure,  LVEF 25%, hypertension, CAD with prior PCI, dyslipidemia,  history of CVA  with residual mild expressive aphasia who presented to the ED on 8/28/2023 after sustaining mechanical ground-level fall with immediate right hip pain.  He presented to the ED for further evaluation.  On admission he was found to have elevated blood pressure and acute kidney injury. He underwent imaging of his head which was negative, as well as   no fractures identified on C/T/L-spine.  X-ray of hip was significant for impacted right femoral neck fracture. Orthopedic surgery was consulted and he underwent open treatment of right humoral neck fracture with total hip arthroplasty on 8/28/2023. He had no post op complications and post acute care was recommended.   Due to his history of heart failure, preoperative echo was done which showed EF 35% and LV mural thrombus which was not noted on previous imaging although there is documentation of intracardiac thrombus from outside hospital record in 2019 although details on not available. Patient is currently on plavix 75 mg daily given CAD and CVA history.   I discussed with patient that recommendation is for full anticoagulation given high stroke risk, per patient and record he did have a incident of GI bleeding several years ago but has not had any issues since. I discussed that there is risk for bleeding while on eliquis and also plavix however after discussion he was agreeable to starting treatment with close outpatient cardiology follow up.   Should he have any evidence of bleeding, this is will need to be discussed further regarding risks/benefits.   I did clear  initiation of full dose anticoagulation with orthopedics prior to starting, he was started on full dose lovenox without adverse effects which was then transitioned to oral eliquis at the time of transfer to rehab.       Therefore, he is discharged in fair and stable condition to an inpatient rehabilitation hospital.    The patient met 2-midnight criteria for an inpatient stay at the time of discharge.      FOLLOW UP ITEMS POST DISCHARGE  Cardiology follow up  Monitor for adverse side effects bleeding with start of AC + plavix   Therapy needs   Ortho follow up     DISCHARGE DIAGNOSES  Principal Problem:    Femoral neck fracture (HCC) (POA: Unknown)  Active Problems:    Coronary artery disease involving native coronary artery of native heart without angina pectoris (POA: Unknown)    History of cardioembolic cerebrovascular accident (CVA) (POA: Yes)    Hyperlipidemia (POA: Unknown)    ACP (advance care planning) (POA: Unknown)    Heart failure with reduced ejection fraction (HCC) (POA: Unknown)    LBBB (left bundle branch block) (POA: Unknown)    Mural thrombus of cardiac apex (POA: Unknown)  Resolved Problems:    * No resolved hospital problems. *      FOLLOW UP  No future appointments.  Aurelio Butler M.D.  [address]  [place] [address]  [phone]    Schedule an appointment as soon as possible for a visit in 2 week(s)  call to schedule follow up appointment once discharged from rehab    José Robert P.A.-C.  [address] [facility] [address]  [place] [address]  [phone]    Schedule an appointment as soon as possible for a visit in 2 week(s)        MEDICATIONS ON DISCHARGE     Medication List        START taking these medications        Instructions   apixaban 5mg Tabs  Commonly known as: Eliquis   Take 1 Tablet by mouth 2 times a day.  Dose: 5 mg     oxyCODONE immediate-release 5 MG Tabs  Commonly known as: Roxicodone   Take 0.5-1 Tablets by mouth every 3 hours as needed for Severe  Pain for up to 7 days.  Dose: 2.5-5 mg            CHANGE how you take these medications        Instructions   acetaminophen 500 MG Tabs  What changed:   medication strength  how much to take  when to take this  reasons to take this  Commonly known as: Tylenol   Take 2 Tablets by mouth every 6 hours.  Dose: 1,000 mg            CONTINUE taking these medications        Instructions   clopidogrel 75 MG Tabs  Commonly known as: Plavix   Take 1 Tablet by mouth every day.  Dose: 75 mg     ketoconazole 2 % shampoo  Commonly known as: Nizoral   Apply 120 mL topically 1 time a day as needed for Itching.  Dose: 120 mL     lisinopril 40 MG tablet  Commonly known as: Prinivil   Take 20 mg by mouth every day. 20 mg = 1/2 tablet  Dose: 20 mg     nitroglycerin 0.4 MG Subl  Commonly known as: Nitrostat   DISSOLVE 1 TABLET UNDER THE TONGUE EVERY 5 MINUTES AS NEEDED MAX 3 TABLETS IN 15 MINUTES     omeprazole 20 MG delayed-release capsule  Commonly known as: PriLOSEC   Take 1 Capsule by mouth every day. Take 1/2 hour prior to same meal.  Dose: 20 mg     simvastatin 10 MG Tabs  Commonly known as: Zocor   Take 1 Tablet by mouth every evening.  Dose: 10 mg              Allergies  Allergies   Allergen Reactions    Amlodipine      Edema    Cortisone Palpitations     Patient thinks gave him chest pain 8/28/23    Morphine Anxiety and Unspecified     Hallucinations       DIET  Orders Placed This Encounter   Procedures    Diet Order Diet: Regular     Standing Status:   Standing     Number of Occurrences:   1     Order Specific Question:   Diet:     Answer:   Regular [1]       ACTIVITY  As tolerated.  Weight bearing as tolerated    LINES, DRAINS, AND WOUNDS  This is an automated list. Peripheral IVs will be removed prior to discharge.  Peripheral IV 08/28/23 20 G Right Antecubital (Active)   Site Assessment Clean;Dry 08/30/23 0800   Dressing Type Transparent 08/30/23 0800   Line Status Flushed;Saline locked 08/30/23 0800   Dressing Status  Clean;Dry;Intact 08/30/23 0800   Dressing Intervention N/A 08/30/23 0800   Infiltration Grading ([facility], CV) 0 08/30/23 0800   Phlebitis Scale ([facility] Only) 0 08/30/23 0800       Wound 08/28/23 Incision Hip Right mepilex (Active)   Site Assessment KORI 08/30/23 0800   Periwound Assessment Callused;KORI 08/29/23 2113   Margins KORI 08/29/23 2113   Drainage Amount None 08/28/23 1640   Dressing Status Clean;Dry;Intact 08/30/23 0800   Dressing Changed Observed 08/29/23 2113   Dressing Options Other (Comments) 08/28/23 1640       Peripheral IV 08/28/23 20 G Right Antecubital (Active)   Site Assessment Clean;Dry 08/30/23 0800   Dressing Type Transparent 08/30/23 0800   Line Status Flushed;Saline locked 08/30/23 0800   Dressing Status Clean;Dry;Intact 08/30/23 0800   Dressing Intervention N/A 08/30/23 0800   Infiltration Grading ([facility], CV) 0 08/30/23 0800   Phlebitis Scale ([facility] Only) 0 08/30/23 0800               MENTAL STATUS ON TRANSFER      Alert and oriented x 3, at cognitive baseline        CONSULTATIONS  Orthopedic surgery     PROCEDURES  8/28/2023 Open treatment of right femoral neck fracture with total   hip arthroplasty.    LABORATORY  Lab Results   Component Value Date    SODIUM 138 08/30/2023    POTASSIUM 4.3 08/30/2023    CHLORIDE 107 08/30/2023    CO2 23 08/30/2023    GLUCOSE 118 (H) 08/30/2023    BUN 21 08/30/2023    CREATININE 0.97 08/30/2023        Lab Results   Component Value Date    WBC 11.6 (H) 08/30/2023    HEMOGLOBIN 12.3 (L) 08/30/2023    HEMATOCRIT 37.1 (L) 08/30/2023    PLATELETCT 125 (L) 08/30/2023        Total time of the discharge process exceeds 55 minutes.

## 2023-08-30 NOTE — PROGRESS NOTES
Discharge order received from MD. Discharge education/instructions provided to patient. All questions/concerns addressed. IV removed with tip intact. All personal belongings present and accounted for. Report called to Mone WINTERS at [facility] rehab. Patient transported off floor by [facility] @11:20.

## 2023-08-30 NOTE — DISCHARGE PLANNING
0939: Per attending patient is medically cleared for rehab, forwarded PAS to Dr Vicente at [facility] to review.    1109: Patient has been accepted by Dr Vicente at [facility]. Transport set up for 1100/1130 GMT w/c, care team notified. Met with patient at bedside to discuss IPR, pt is agreeable. Contacted son and left vm.

## 2023-08-30 NOTE — FLOWSHEET NOTE
08/30/23 1644   Events/Summary/Plan   Events/Summary/Plan RT Assessment   Vital Signs   Pulse 83   Respiration 16   Pulse Oximetry 97 %   $ Pulse Oximetry (Spot Check) Yes   Respiratory Assessment   Respiratory Pattern Within Normal Limits   Level of Consciousness Alert   Chest Exam   Work Of Breathing / Effort Within Normal Limits   Breath Sounds   RUL Breath Sounds Clear   RML Breath Sounds Clear   RLL Breath Sounds Clear   IDRIS Breath Sounds Clear   LLL Breath Sounds Clear   Secretions   Cough Non Productive   Oxygen   O2 Delivery Device None - Room Air   Smoking History   Have you ever smoked Never

## 2023-08-30 NOTE — PROGRESS NOTES
4 Eyes Skin Assessment Completed by VIANEY Navas and VIANEY Shore.    Head WDL  Ears WDL  Nose WDL  Mouth WDL  Neck WDL  Breast/Chest WDL  Shoulder Blades WDL  Spine WDL  (R) Arm/Elbow/Hand Redness and Abrasion  (L) Arm/Elbow/Hand WDL  Abdomen WDL  Groin WDL  Scrotum/Coccyx/Buttocks WDL  (R) Leg Redness, Bruising, Swelling, and Incision  (L) Leg WDL  (R) Heel/Foot/Toe Swelling  (L) Heel/Foot/Toe WDL      Interventions In Place Waffle Overlay and Pillows    Possible Skin Injury No    Pictures Uploaded Into Epic Yes  Wound Consult Placed N/A  RN Wound Prevention Protocol Ordered Yes

## 2023-08-30 NOTE — DISCHARGE PLANNING
Case Management Discharge Planning    Admission Date: 8/28/2023  GMLOS: 4  ALOS: 2    6-Clicks ADL Score: 18  6-Clicks Mobility Score: 10  PT and/or OT Eval ordered: Yes  Post-acute Referrals Ordered: Yes  Post-acute Choice Obtained: Yes  Has referral(s) been sent to post-acute provider:  Yes      Anticipated Discharge Dispo: Discharge Disposition: Disch to  rehab facility or distinct part unit (62)  Discharge Address: [facility]    DME Needed: No    Action(s) Taken: DC Assessment Complete (See below) and OTHER    LSW met with patient at bedside to complete assessment. Patient confirmed demographic information on facesheet. Patient reported to live in a independent living apartment complex. There is a elevator patient can use to get in his apartment.Patient reported to be fairly independent with ADLs/IADLs prior to admission. Patient reported to ambulate with a cane and walker. Patient denied a hx of mental health and substance abuse.     [facility]. Patient is agreeable with discharging to [facility]. Transport arranged for [id number] via GMT. LSW obtained rehab choice and faxed to UZAIR Gooden. Patient provided written consent for transfer.       No other CM needs identified at this time.     Escalations Completed: None    Medically Clear: Yes    Next Steps: Pending transportation to rehab.     Barriers to Discharge: None    Is the patient up for discharge tomorrow: No, today @      Care Transition Team Assessment    Information Source  Orientation Level: Oriented X4  Information Given By: Patient  Who is responsible for making decisions for patient? : Patient    Readmission Evaluation  Is this a readmission?: No    Elopement Risk  Legal Hold: No  Ambulatory or Self Mobile in Wheelchair: No-Not an Elopement Risk  Elopement Risk: Not at Risk for Elopement    Interdisciplinary Discharge Planning  Lives with - Patient's Self Care Capacity: Alone and Able to Care For  "Self  Patient or legal guardian wants to designate a caregiver: No  Support Systems: Family Member(s)  Housing / Facility: Independent Living Facility  Name of Care Facility: [facility]  Prior Services: Home-Independent  Durable Medical Equipment: Not Applicable    Discharge Preparedness  What is your plan after discharge?: Other (comment) (Rehab)  What are your discharge supports?: Child  Prior Functional Level: Independent with Activities of Daily Living, Independent with Medication Management, Uses Cane, Uses Walker  Difficulity with ADLs: None  Difficulity with IADLs: Driving    Functional Assesment  Prior Functional Level: Independent with Activities of Daily Living, Independent with Medication Management, Uses Cane, Uses Walker    Finances  Financial Barriers to Discharge: No  Prescription Coverage: No    Vision / Hearing Impairment  Vision Impairment : Yes  Right Eye Vision: Impaired, Wears Glasses  Left Eye Vision: Impaired, Wears Glasses  Hearing Impairment : Yes  Hearing Impairment: Both Ears, Hearing Device Not Available  Does Pt Need Special Equipment for the Hearing Impaired?: No         Advance Directive  Advance Directive?: None (Pt stated \" I don't know\")    Domestic Abuse  Have you ever been the victim of abuse or violence?: No  Physical Abuse or Sexual Abuse: No  Verbal Abuse or Emotional Abuse: No  Possible Abuse/Neglect Reported to:: Not Applicable    Psychological Assessment  History of Substance Abuse: None  History of Psychiatric Problems: Yes    Discharge Risks or Barriers  Discharge risks or barriers?: No    Anticipated Discharge Information  Discharge Disposition: Disch to  rehab facility or distinct part unit (62)  Discharge Address: [facility]        "

## 2023-08-30 NOTE — PROGRESS NOTES
Patient admitted to facility at 1130 via GMT; accompanied by hospital transport.  Patient assisted to room and positioned in bed for comfort and safety; call light within reach.  Patient assisted with stowing belongings and oriented to room and facility.  Admission assessment performed and documented in computer.      Patient slightly aphasic - slow speech - however is able to answer all questions appropriately and has a good understanding of his med purposes.     Patient has glasses at bedside. No hearing aids. No dentures.    Alarms activated.    Waffle placed on bed.

## 2023-08-30 NOTE — PROGRESS NOTES
4 Eyes Skin Assessment Completed by VIANEY Colon and VIANEY Aaron.    Head WDL  Ears WDL  Nose WDL  Mouth WDL  Neck WDL  Breast/Chest WDL  Shoulder Blades WDL  Spine WDL  (R) Arm/Elbow/Hand WDL  (L) Arm/Elbow/Hand WDL  Abdomen WDL  Groin WDL  Scrotum/Coccyx/Buttocks WDL  (R) Leg WDL  (L) Leg Bruising  (R) Heel/Foot/Toe WDL  (L) Heel/Foot/Toe WDL          Devices In Places Pulse Ox, SCD's, and Oxy Mask      Interventions In Place Pillows    Possible Skin Injury No    Pictures Uploaded Into Epic N/A  Wound Consult Placed N/A  RN Wound Prevention Protocol Ordered No

## 2023-08-30 NOTE — H&P
Physical Medicine & Rehabilitation  History and Physical (H&P)  &     Post Admission Physician Evaluation (SHARI)       Date of Admission: 8/30/2023  Date of Service: 8/30/2023   Moe Nickerson  [id number]    [facility] Code to Support Admission: 0008.51 - Orthopaedic Disorders: Status Post Unilateral Hip Replacement  Etiologic Diagnosis: History of femur fracture    _______________________________________________    Chief Complaint: Weakness    History of Present Illness:  Adapted from the PM&R Consult by Dr. Carpenter:   The patient is a 78 y.o.  male with a past medical history of CHF, HTN, and prior stroke;  who presented on 8/28/2023 12:57 AM with right hip pain after GLF. Per documentation, patient was attempting to get out of bed when he tripped and fell. He hit his head on the floor but did not have LOC. Upon eval in the ED CT head and C spine were negative for acute findings. Xray right femur showed an impacted right subcapital femoral neck fracture. Ortho was consulted, and patient was taken to the OR on 8/28 for open treatment of the right femoral neck fracture with total hip arthoplasty performed by Dr. Mei. Post op patient is WBAT RLE, with posterior hip precautions. Post op, patient has ABLA and leukocytosis. Patient has been able to participate with therapies, patient has had some orthostatic hypotension with mobility with nursing.     PROCEDURES:  8/28 Open treatment of right femoral neck fracture with total hip arthroplasty by Dr. Mei     Review of Systems:     Comprehensive 14 point ROS was reviewed and all were negative except as noted elsewhere in this document.     Past Medical History:  Past Medical History:   Diagnosis Date    Anxiety     Congestive heart failure (HCC)     Heart attack (HCC)     Hypertension     Stroke (cerebrum) (HCC)        Past Surgical History:  Past Surgical History:   Procedure Laterality Date    CO TOTAL HIP ARTHROPLASTY Right 8/28/2023    Procedure: ARTHROPLASTY, HIP,  TOTAL;  Surgeon: Manan Mei M.D.;  Location: SURGERY [facility];  Service: Orthopedics       Family History:  No family history on file.    Medications:  Current Facility-Administered Medications   Medication Dose    Respiratory Therapy Consult      Pharmacy Consult Request ...Pain Management Review 1 Each  1 Each    hydrALAZINE (Apresoline) tablet 25 mg  25 mg    senna-docusate (Pericolace Or Senokot S) 8.6-50 MG per tablet 2 Tablet  2 Tablet    And    polyethylene glycol/lytes (Miralax) PACKET 1 Packet  1 Packet    And    magnesium hydroxide (Milk Of Magnesia) suspension 30 mL  30 mL    And    bisacodyl (Dulcolax) suppository 10 mg  10 mg    ondansetron (Zofran ODT) dispertab 4 mg  4 mg    Or    ondansetron (Zofran) syringe/vial injection 4 mg  4 mg    sodium chloride (Ocean) 0.65 % nasal spray 2 Spray  2 Spray    oxyCODONE immediate-release (Roxicodone) tablet 2.5 mg  2.5 mg    Or    oxyCODONE immediate-release (Roxicodone) tablet 5 mg  5 mg    clopidogrel (Plavix) tablet 75 mg  75 mg    [START ON 8/31/2023] lisinopril (Prinivil) tablet 20 mg  20 mg    simvastatin (Zocor) tablet 10 mg  10 mg    apixaban (Eliquis) tablet 5 mg  5 mg    acetaminophen (Tylenol) tablet 650 mg  650 mg    nitroglycerin (Nitrostat) tablet 0.4 mg  0.4 mg    [START ON 8/31/2023] omeprazole (PriLOSEC) capsule 20 mg  20 mg       Allergies:  Amlodipine, Cortisone, and Morphine    Psychosocial History:  Prior Living Situation:   Housing / Facility: Independent Living Facility  Lives with - Patient's Self Care Capacity: Alone and Able to Care For Self  Equipment Owned: Single Point Cane     Prior Level of Function / Living Situation:   Physical Therapy: Prior Services: Home-Independent  Housing / Facility: Independent Living Facility  Elevator: Yes  Bathroom Set up: Walk In Shower  Equipment Owned: Single Point Cane  Lives with - Patient's Self Care Capacity: Alone and Able to Care For Self  Assistive Devices Used: None (uses SPC on  occasion)  Current Level of Function:   Gait Level Of Assist: Minimal Assist  Assistive Device: Front Wheel Walker  Distance (Feet): 30  Deviation: Antalgic, Bradykinetic (min internal rotation at R LE- VCs for proper positioning, VCs when turning, min flexed knee with stance phase)  Weight Bearing Status: WBAT R LE  Supine to Sit: Modified Independent (assistance with R LE, HOB elevated to 45 degrees, bed rail used)  Sit to Supine: Moderate Assist (assistance with R LE back to bed and to scoot up in bed)  Scooting: Minimal Assist (pt able to assist with scooting at EOB)  Sit to Stand: Contact Guard Assist  Bed, Chair, Wheelchair Transfer: Contact Guard Assist  Transfer Method: Stand Step  Sitting in Chair:  (Pt left sitting in recliner post OT eval)  Sitting Edge of Bed: +5 mins  Standing: <5 mins  Occupational Therapy:   Self Feeding: Independent  Grooming / Hygiene: Independent  Bathing: Independent  Dressing: Independent  Toileting: Independent  Medication Management: Independent  Laundry: Independent  Kitchen Mobility: Independent  Finances: Independent  Home Management: Requires Assist (Pt reports that staff at [facility] comes once per week to clean his apartment.)  Shopping: Requires Assist (Pt reports that there is a community bus at [facility] on the [place] that takes all of the residents to the grocery store.)  Prior Level Of Mobility: Independent With Device in Community, Independent With Device in Home  Driving / Transportation: Other (Comments) (Pt's [facility] provides all transportation needs.)  Prior Services: Home-Independent  Housing / Facility: Independent Living Facility  Occupation (Pre-Hospital Vocational): Retired Due To Age (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   General: Well developed, Well nourished, No Acute Distress  HEENT: Normocephalic, atraumatic. Anicteric sclera  Cardiac: Physiologic rate and regular rhythm, no murmurs  Pulmonary: Lungs are clear to auscultation bilaterally, comfortable on room air  Abdomen: Soft, non-tender, non-distended. Bowel sounds normoactive.   Extremities: Warm and well perfused, no clubbing, cyanosis. no edema.   Skin: No visible rashes or lesions.   Psych: calm, no agitation, congruent mood/affect    NEUROLOGIC EXAM:  Gen:  Alert and oriented to person, place, date, and circumstance. Appropriately interactive  Speech/Language/Comprehension: Fluent speech w/o paraphasic errors, follows simple and complex commands without L/R confusion .?  Attention: Attentive to conversation.   Memory: Recalls her primary reason for being in the hospital  Judgment: Demonstrates appropriate use of call light       Cranial Nerves:   II:   Visual fields full to confrontation. Pupils equally round & reactive to light. ??  III, IV, VI:  EOMI w/o nystagmus or diplopia. No ptosis.????  V:  Sensation intact to light touch. ??  VII:  Left facial droop  VIII:  Hears functionally.????  IX, X: hypophonic voice Palate elevates symmetrically.????  XI:  Trapezii full.????  XII:  Tongue protrudes midline.????    Motor:?  ?? Delt???? Bi???? Tri???? Wrist ??  Ext?? DIP flex ??  (FDP)?? Finger ABd?? IP???? Quad???? Hamst???? TibAnt???? EHL???? Plantar  flexion   ???? C5???? C6???? C7???? ?C6?? C8/T1?? C8/T1???? L2???? L3???? L4-S1???? L4???? L5???? S1   L???? 4/5 4/5 4/5 4/5 4/5 4/5 4/5 4/5 4/5 4/5 4/5 4/5   R???? 5/5 5/5 5/5 5/5 5/5 5/5 4/5 4/5 4/5 4/5 4/5 4/5       Sensation: Intact to light touch in the major dermatomes of the upper and lower extremities.      Coordination: Normal finger-to-nose testing without dysmetria. Normal rapid alternating movements.     Reflexes: Reflexes are 2+ at the biceps, triceps, brachioradialis, patella, and achilles.    Radiology:  DX-CHEST-PORTABLE (1 VIEW)   Final Result           1.  No acute cardiopulmonary disease.       DX-PELVIS-1 OR 2 VIEWS   Final Result           1.  Impacted right subcapital femoral neck fracture   2.  Atherosclerosis       DX-FEMUR-2+ RIGHT   Final Result           1.  Subcapital right femoral neck fracture.   2.  Atherosclerosis subcapital right femoral neck fracture       CT-LSPINE W/O PLUS RECONS   Final Result           1.  No acute traumatic bony injury of the lumbar spine.   2.  Bilateral neural foraminal stenosis at L4/L5 and L5/S1   3.  Atherosclerosis       CT-TSPINE W/O PLUS RECONS   Final Result           1.  No acute traumatic bony injury of the thoracic spine.   2.  Atherosclerosis and atherosclerotic coronary artery disease       CT-CSPINE WITHOUT PLUS RECONS   Final Result           1.  Multilevel degenerative changes of the cervical spine limit diagnostic sensitivity of this examination, otherwise no acute traumatic bony injury of the cervical spine is apparent.   2.  Atherosclerosis       CT-HEAD W/O   Final Result           1.  No acute intracranial abnormality is identified, there are nonspecific white matter changes, commonly associated with small vessel ischemic disease.  Associated mild cerebral atrophy is noted.   2.  Bilateral frontal and ethmoid sinusitis changes   3.  Atherosclerosis.       Laboratory Values:  Recent Labs     08/28/23  0133 08/29/23  0047 08/30/23  0025   SODIUM 141 139 138   POTASSIUM 4.3 4.4 4.3   CHLORIDE 105 106 107   CO2 25 24 23   GLUCOSE 119* 143* 118*   BUN 24* 24* 21   CREATININE 1.05 1.12 0.97   CALCIUM 9.3 8.4* 8.2*     Recent Labs     08/28/23  0133 08/29/23  0047 08/30/23  0025   WBC 4.6* 14.5* 11.6*   RBC 4.63* 3.96* 3.87*   HEMOGLOBIN 15.0 12.7*  12.3*   HEMATOCRIT 44.1 37.7* 37.1*   MCV 95.2 95.2 95.9   MCH 32.4 32.1 31.8   MCHC 34.0 33.7 33.2   RDW 44.8 45.6 45.8   PLATELETCT 171 139* 125*   MPV 11.6 11.9 12.8           Primary Rehabilitation Diagnosis:    This patient is a 78 y.o. male admitted for acute inpatient rehabilitation with   History of femur fracture.    Impairments:   ADLs/IADLs  Mobility    Secondary Diagnosis/Medical Co-morbidities Affecting Function  Prior stroke, CHF, LV thrombus    Relevant Changes Since Preadmission Evaluation:    Status unchanged    The patient's rehabilitation potential is Good  The patient's medical prognosis is good    Rehabilitation Plan:   Discussion and Recommendations:   1. The patient requires an acute inpatient rehabilitation program with a coordinated program of care at an intensity and frequency not available at a lower level of care. This recommendation is substantiated by the patient's medical physicians who recommend that the patient's intervention and assessment of medical issues needs to be done at an acute level of care for patient's safety and maximum outcome.   2. A coordinated program of care will be supplied by an interdisciplinary team of physical therapy, occupational therapy, rehab physician, rehab nursing, and, if needed, speech therapy and rehab psychology. Rehab team presents a patient-specific rehabilitation and education program concentrating on prevention of future problems related to accessibility, mobility, skin, bowel, bladder, sexuality, and psychosocial and medical/surgical problems.   3. Need for Rehabilitation Physician: The rehab physician will be evaluating the patient on a multi-weekly basis to help coordinate the program of care. The rehab physician communicates between medical physicians, therapists, and nurses to maximize the patient's potential outcome. Specific areas in which the rehab physician will be providing daily assessment include the following:   A. Assessing the  patient's heart rate and blood pressure response (vitals monitoring) to activity and making adjustments in medications or conservative measures as needed.   B. The rehab physician will be assessing the frequency at which the program can be increased to allow the patient to reach optimal functional outcome.   C. The rehab physician will also provide assessments in daily skin care, especially in light of patient's impairments in mobility.   D. The rehab physician will provide special expertise in understanding how to work with functional impairment and recommend appropriate interventions, compensatory techniques, and education that will facilitate the patient's outcome.   4. Rehab R.N.   The rehab RN will be working with patient to carry over in room mobility and activities of daily living when the patient is not in 3 hours of skilled therapy. Rehab nursing will be working in conjunction with rehab physician to address all the medical issues above and continue to assess laboratory work and discuss abnormalities with the treating physicians, assess vitals, and response to activity, and discuss and report abnormalities with the rehab physician. Rehab RN will also continue daily skin care, supervise bladder/bowel program, instruct in medication administration, and ensure patient safety.   5. Rehab Therapy: Therapies to treat at intensity and frequency of (may change after completion of evaluation by all therapeutic disciplines):       PT:  Physical therapy to address mobility, transfer, gait training and evaluation for adaptive equipment needs 1.5 hour/day at least 5 days/week for the duration of the ELOS (see below)       OT:  Occupational therapy to address ADLs, self-care, home management training, functional mobility/transfers and assistive device evaluation, and community re-integration 1 .5 hour/day at least 5 days/week for the duration of the ELOS (see below).       Medical management / Rehabilitation Issues/  Adverse Potential as part of rehabilitation plan     Rehabilitation Issues/Adverse Potential  1.  Right total hip:Patient demonstrates functional deficits in strength, balance, coordination, and ADL's. Patient is admitted to [facility] for comprehensive rehabilitation therapy as described below.   Rehabilitation nursing monitors bowel and bladder control, educates on medication administration, co-morbidities and monitors patient safety.    2.  Neurostimulants: None at this time but continue to assess daily for need to initiate should status change.    3.  DVT prophylaxis:  Patient is on Eliquis for anticoagulation upon transfer. Encourage OOB. Monitor daily for signs and symptoms of DVT including but not limited to swelling and pain to prevent the development of DVT that may interfere with therapies.    4.  GI prophylaxis:  On prilosec to prevent gastritis/dyspepsia which may interfere with therapies.    5.  Pain: No issues with pain currently / Controlled with Oxycodone, tylenol    6.  Nutrition/Dysphagia: Dietician monitors nutrient intake, recommend supplements prn and provide nutrition education to pt/family to promote optimal nutrition for wound healing/recovery.     7.  Bladder/bowel:  Start bowel and bladder program as described below, to prevent constipation, urinary retention (which may lead to UTI), and urinary incontinence (which will impact upon pt's functional independence).   - TV Q3h while awake with post void bladder scans, I&O cath for PVRs >400  - up to commode after meal     8.  Skin/dermal ulcer prophylaxis: Monitor for new skin conditions with q.2 h. turns as required to prevent the development of skin breakdown.     9.  Cognition/Behavior: As needed psychologist provides adjustment counseling to illness and psychosocial barriers that may be potential barriers to rehabilitation.     10. Respiratory therapy: RT performs O2 management prn, breathing retraining, pulmonary  hygiene and bronchospasm management prn to optimize participation in therapies.     Medical Co-Morbidities/Adverse Potential Affecting Function:    Femoral neck fracture   - sustained during GLF   - images showed an impacted right subcapital femoral neck fracture  -s/p  open treatment of right femoral neck fracture with total hip arthoplasty by Dr. Mei  - posterior hip precautions, WBAT RLE   - continue with PT/OT inpatient     Chronic mural thrombus   Heart failure with reduced EF   - updated ECHO obtained showed EF of 35%, and noted mural thrombus   - Eliquis 5mg BID started 8/30 with Dr Mei approval  -Hx of hematuria with Eliquis, monitor closely     Prior CVA  - minimal deficits, no lateralizing weakness, no known dysphagia but patient has soft-spoken/hypophonic voice  - continue statin, plavix       CAD with history of PCI   - statin and plavix at home  -EKG on admission  - continue    Neurogenic bladder:  - Timed voids with PVR q4H x3. If PVR > 400mL or if patient is unable to void, straight cath patient.    Neurogenic bowel:  -  Colace, Senna BID on admission  - Goal of 1BM/day.  -      Circadian Rhythm disorder:   Recommend lights on during the day/off at night, minimize nighttime interruptions as able.     Mood  - at risk of adjustment disorder, depression, and anxiety due to functional decline    ID:  - at risk for Urinary tract infection    Skin/Wounds:  - Pressure relief q2h while in bed. Close monitoring for signs of breakdown    Pain:  - Neuroceptic - On Tylenol prn, oxycodone    DVT prophylaxis:  Patient is on Eliquis.     GI prophylaxis:  On Prilosec 20mg daily     Medical Problem List:    HTN: Lisinopril 20mg daily continue      -Follow-up ortho, Dr Mei    I personally performed a complete drug regimen review and no potential clinically significant medication issues were identified.     Goals/Expected Level of Function Based on Current Medical and Functional Status:  (may change based on  patient's medical status and rate of impairment recovery)  Transfers:   Supervision  Mobility/Gait:   Supervision  ADL's:   Supervision    DISPOSITION: Discharge to pre-morbid independent living setting with the supportive care of patient's family.    ELOS: 10-14 days  ____________________________________    Kamran Vicente MD  Physical Medicine & Rehabilitation   Brain Injury Medicine   ____________________________________    Pt was seen today for 79 min, and entire time spent in face-to-face contact was >50% in counseling and coordination of care as detailed in A/P above.

## 2023-08-30 NOTE — PREADMISSION SCREENING NOTE
Pre-Admission Screening Form    Patient Information:   Name: Moe Nickerson     MRN: [id number]       : 1945      Age: 78 y.o.   Gender: male      Race: White [7]       Marital Status:  [2]  Family Contact: KrishanTroy  Denae Nickerson        Relationship: Son [15]  Other [10]  Home Phone: [phone]             Cell Phone: [phone]  Advanced Directives: None  Code Status:  DNAR/DNI  Current Attending Provider: Teresa Emerson M.D.  Referring Physician: Dr. Emesron      Physiatrist Consult: Dr. Carpenter       Referral Date: 23  Primary Payor Source:  MEDICARE  Secondary Payor Source:  [facility]    Medical Information:   Date of Admission to Acute Care [place]2023  Room Number: T319/00  Rehabilitation Diagnosis: 0008.11 - Orthopaedic Disorders: Status Post Unilateral Hip Fracture  Immunization History   Administered Date(s) Administered    Influenza, Unspecified - HISTORICAL DATA 10/16/2018    MODERNA SARS-COV-2 VACCINE (12+) 01/15/2021, 2021    Pneumococcal Vaccine (PCV7) - HISTORICAL DATA 2017     Allergies   Allergen Reactions    Amlodipine      Edema    Cortisone Palpitations     Patient thinks gave him chest pain 23    Morphine Anxiety and Unspecified     Hallucinations     Past Medical History:   Diagnosis Date    Anxiety     Congestive heart failure (HCC)     Heart attack (HCC)     Hypertension     Stroke (cerebrum) (HCC)      Past Surgical History:   Procedure Laterality Date    ID TOTAL HIP ARTHROPLASTY Right 2023    Procedure: ARTHROPLASTY, HIP, TOTAL;  Surgeon: Manan Mei M.D.;  Location: SURGERY [facility];  Service: Orthopedics       History Leading to Admission, Conditions that Caused the Need for Rehab (CMS):     Hospital Medicine History & Physical Note     Date of Service  2023     Primary Care Physician  José Robert P.A.-C.     Consultants  Orthopedic surgery     Code Status  DNAR/DNI     Chief Complaint      Chief  Complaint   Patient presents with    Fall    Hip Pain         History of Presenting Illness  Moe Nickerson is a 78 y.o. male who presented 8/28/2023 with a mechanical fall.  Patient got out of bed today and was walking.  His shoes then got caught, causing him to trip and fall.  He hit his head on the floor.  No loss of consciousness was noted.  He noted immediate right hip pain after.  He was then taken to ER for further evaluation     In the ED, patient found to have elevated blood pressure.  Pertinent labs include prerenal azotemia.  X-ray right femur showing impacted right subcapital femoral neck fracture.  CT head C-spine lumbar spine negative for acute pathology.     I discussed the plan of care with patient.     Review of Systems  Review of Systems   Constitutional:  Positive for malaise/fatigue.   HENT: Negative.     Eyes: Negative.    Cardiovascular: Negative.    Gastrointestinal: Negative.    Genitourinary: Negative.    Musculoskeletal:  Positive for joint pain.   Skin: Negative.    Neurological: Negative.    Endo/Heme/Allergies: Negative.    Psychiatric/Behavioral: Negative.           Past Medical History   has a past medical history of Anxiety, Congestive heart failure (HCC), Heart attack (HCC), Hypertension, and Stroke (cerebrum) (formerly [facility]).     Surgical History  No pertinent surgical hx       Family History   Family history reviewed with patient. There is no family history that is pertinent to the chief complaint.      Social History   reports that he has never smoked. He has never used smokeless tobacco. He reports that he does not drink alcohol and does not use drugs.     Allergies        Allergies   Allergen Reactions    Amlodipine         Edema    Cortisone Palpitations       Patient thinks gave him chest pain 8/28/23    Morphine Anxiety and Unspecified       Hallucinations         Medications  Prior to Admission Medications   Prescriptions Last Dose Informant Patient Reported? Taking?    acetaminophen (TYLENOL) 325 MG Tab 8/27/2023 at PRN Patient Yes Yes   Sig: Take 325-650 mg by mouth every four hours as needed.   clopidogrel (PLAVIX) 75 MG Tab 8/27/2023 at 1000 Patient No No   Sig: Take 1 Tablet by mouth every day.   ketoconazole (NIZORAL) 2 % shampoo > 1 WEEK at PRN Patient No No   Sig: Apply 120 mL topically 1 time a day as needed for Itching.   lisinopril (PRINIVIL) 40 MG tablet 8/27/2023 at 1000 Patient Yes Yes   Sig: Take 20 mg by mouth every day. 20 mg = 1/2 tablet   nitroglycerin (NITROSTAT) 0.4 MG SL Tab FEW DAYS AGO at PRN Patient No No   Sig: DISSOLVE 1 TABLET UNDER THE TONGUE EVERY 5 MINUTES AS NEEDED MAX 3 TABLETS IN 15 MINUTES   omeprazole (PRILOSEC) 20 MG delayed-release capsule 8/27/2023 at 1000 Patient No No   Sig: Take 1 Capsule by mouth every day. Take 1/2 hour prior to same meal.   simvastatin (ZOCOR) 10 MG Tab 8/26/2023 at 2200 Patient No No   Sig: Take 1 Tablet by mouth every evening.      Facility-Administered Medications: None         Physical Exam  Temp:  [36.3 °C (97.4 °F)] 36.3 °C (97.4 °F)  Pulse:  [73-74] 74  Resp:  [17-24] 19  BP: (130-172)/(78-88) 172/78  SpO2:  [97 %-99 %] 99 %  Blood Pressure : (!) 172/78   Temperature: 36.3 °C (97.4 °F)   Pulse: 74   Respiration: 19   Pulse Oximetry: 99 %         Physical Exam  Constitutional:       Appearance: Normal appearance. He is normal weight.   HENT:      Head: Normocephalic.      Nose: Nose normal.      Mouth/Throat:      Mouth: Mucous membranes are moist.   Eyes:      Pupils: Pupils are equal, round, and reactive to light.   Cardiovascular:      Rate and Rhythm: Normal rate and regular rhythm.      Pulses: Normal pulses.   Pulmonary:      Effort: Pulmonary effort is normal.      Breath sounds: Normal breath sounds.   Abdominal:      General: Abdomen is flat.      Palpations: Abdomen is soft.   Musculoskeletal:      Cervical back: Neck supple.      Comments: Right hip slightly externally rotated  ROM limited due to pain  "  Skin:     General: Skin is warm.   Neurological:      General: No focal deficit present.      Mental Status: He is alert and oriented to person, place, and time. Mental status is at baseline.   Psychiatric:         Mood and Affect: Mood normal.         Behavior: Behavior normal.         Thought Content: Thought content normal.         Judgment: Judgment normal.            Laboratory:      Recent Labs     08/28/23 0133   WBC 4.6*   RBC 4.63*   HEMOGLOBIN 15.0   HEMATOCRIT 44.1   MCV 95.2   MCH 32.4   MCHC 34.0   RDW 44.8   PLATELETCT 171   MPV 11.6          Recent Labs     08/28/23 0133   SODIUM 141   POTASSIUM 4.3   CHLORIDE 105   CO2 25   GLUCOSE 119*   BUN 24*   CREATININE 1.05   CALCIUM 9.3          Recent Labs     08/28/23 0133   ALTSGPT 33   ASTSGOT 33   ALKPHOSPHAT 82   TBILIRUBIN 0.7   GLUCOSE 119*          No results for input(s): \"NTPROBNP\" in the last 72 hours.      No results for input(s): \"TROPONINT\" in the last 72 hours.     Imaging:  DX-CHEST-PORTABLE (1 VIEW)   Final Result           1.  No acute cardiopulmonary disease.       DX-PELVIS-1 OR 2 VIEWS   Final Result           1.  Impacted right subcapital femoral neck fracture   2.  Atherosclerosis       DX-FEMUR-2+ RIGHT   Final Result           1.  Subcapital right femoral neck fracture.   2.  Atherosclerosis subcapital right femoral neck fracture       CT-LSPINE W/O PLUS RECONS   Final Result           1.  No acute traumatic bony injury of the lumbar spine.   2.  Bilateral neural foraminal stenosis at L4/L5 and L5/S1   3.  Atherosclerosis       CT-TSPINE W/O PLUS RECONS   Final Result           1.  No acute traumatic bony injury of the thoracic spine.   2.  Atherosclerosis and atherosclerotic coronary artery disease       CT-CSPINE WITHOUT PLUS RECONS   Final Result           1.  Multilevel degenerative changes of the cervical spine limit diagnostic sensitivity of this examination, otherwise no acute traumatic bony injury of the cervical spine " is apparent.   2.  Atherosclerosis       CT-HEAD W/O   Final Result           1.  No acute intracranial abnormality is identified, there are nonspecific white matter changes, commonly associated with small vessel ischemic disease.  Associated mild cerebral atrophy is noted.   2.  Bilateral frontal and ethmoid sinusitis changes   3.  Atherosclerosis.                 EKG:  I have personally reviewed the images and compared with prior images.     Assessment/Plan:  Justification for Admission Status  I anticipate this patient will require at least two midnights for appropriate medical management, necessitating inpatient admission because patient found to have right femoral neck fracture     Patient will need a Med/Surg bed on EMERGENCY service .  The need is secondary to femoral neck fracture.     * Femoral neck fracture (HCC)  Assessment & Plan  Spoke with ERP, who consulted orthopedic surgery.  Patient shoes got caught today while ambulating, causing him to slip and fall.  Head strike noted without loss of consciousness.  Noted immediate right hip pain after.  X-ray showing right femoral neck fracture.  Orthopedic surgery consulted, patient for or in a.m.  Patient will be monitored overnight for fluids, morphine as needed.  Femoral nerve block to be performed by ERP.  Monitor for respiratory depression from morphine administration     LBBB (left bundle branch block)  Assessment & Plan  Noted to have left bundle branch block on EKG, not seen in previous EKG.  Troponin pending, denies any chest pain at this time  Will eventually need cardiology consult     Heart failure with reduced ejection fraction (HCC)  Assessment & Plan  History of reduced ejection fraction with EF 25%  Continue lisinopril  Cautious fluid administration     ACP (advance care planning)  Assessment & Plan  16 minutes spent discussing goals of care with patient. He was explained his diagnosis, treatment, plan of care, prognosis. When asked about code  status, he states that he would like to be dnr/dni.     Hyperlipidemia  Assessment & Plan  Continue zocor           VTE prophylaxis: pharmacologic prophylaxis contraindicated due to surgery in am             Physical Medicine and Rehabilitation Consultation                                                                                  Date of initial consultation: 8/29/2023  Requesting provider: ordered by Teresa Emerson M.D. at 08/29/23 1962   Consulting provider: Shanelle Carpenter D.O.  Reason for consultation: assess for acute inpatient rehab appropriateness  LOS: 1 Day(s)     Chief complaint: R hip pain after GLF      HPI: The patient is a 78 y.o.  male with a past medical history of CHF, HTN, and prior stroke;  who presented on 8/28/2023 12:57 AM with right hip pain after GLF. Per documentation, patient was attempting to get out of bed when he tripped and fell. He hit his head on the floor but did not have LOC. Upon eval in the ED CT head and C spine were negative for acute findings. Xray right femur showed an impacted right subcapital femoral neck fracture. Ortho was consulted, and patient was taken to the OR on 8/28 for open treatment of the right femoral neck fracture with total hip arthoplasty performed by Dr. Mei. Post op patient is WBAT RLE, with posterior hip precautions. Post op, patient has ABLA and leukocytosis. Patient has been able to participate with therapies, patient has had some orthostatic hypotension with mobility with nursing.      Patient seen and examined at bedside.  Patient reports he feels okay.  Patient reports he is finally able to eat again.  Reports that his pain was so bad he was unable to eat until today.  Otherwise patient denies numbness tingling or weakness in his lower extremities. Does not report HA, lightheadedness, SOB, CP, abdominal pain, or changes in numbness/tingling/weakness.  Has no other complaints besides wanting to be disconnected from his pulse ox so he can  eat.        Social Hx:  [facility], has son that lives near by in [place]   0 BOB  At prior level of function patient was Independent with mobility and ADLs. Required assistance with IADLs      Tobacco: Denies   Alcohol: Denies   Drugs: Denies      THERAPY:  Restrictions: Fall Risk, Posterior hip precautions, WBAT RLE   PT: Functional mobility   8/29 Min A FWW x 30 ft, CGA sit to stand      OT: ADLs  8/29 Mod A bed mobility, Mod A lower body dressing      SLP:   None      IMAGING:  DX-CHEST-PORTABLE (1 VIEW)   Final Result           1.  No acute cardiopulmonary disease.       DX-PELVIS-1 OR 2 VIEWS   Final Result           1.  Impacted right subcapital femoral neck fracture   2.  Atherosclerosis       DX-FEMUR-2+ RIGHT   Final Result           1.  Subcapital right femoral neck fracture.   2.  Atherosclerosis subcapital right femoral neck fracture       CT-LSPINE W/O PLUS RECONS   Final Result           1.  No acute traumatic bony injury of the lumbar spine.   2.  Bilateral neural foraminal stenosis at L4/L5 and L5/S1   3.  Atherosclerosis       CT-TSPINE W/O PLUS RECONS   Final Result           1.  No acute traumatic bony injury of the thoracic spine.   2.  Atherosclerosis and atherosclerotic coronary artery disease       CT-CSPINE WITHOUT PLUS RECONS   Final Result           1.  Multilevel degenerative changes of the cervical spine limit diagnostic sensitivity of this examination, otherwise no acute traumatic bony injury of the cervical spine is apparent.   2.  Atherosclerosis       CT-HEAD W/O   Final Result           1.  No acute intracranial abnormality is identified, there are nonspecific white matter changes, commonly associated with small vessel ischemic disease.  Associated mild cerebral atrophy is noted.   2.  Bilateral frontal and ethmoid sinusitis changes   3.  Atherosclerosis.         PROCEDURES:  8/28 Open treatment of right femoral neck fracture with total hip arthroplasty by  "Dr. Mei      Allergies:        Allergies   Allergen Reactions    Amlodipine         Edema    Cortisone Palpitations       Patient thinks gave him chest pain 8/28/23    Morphine Anxiety and Unspecified       Hallucinations         Physical Exam:  Vitals: /65   Pulse (!) 54   Temp 37.1 °C (98.8 °F) (Temporal)   Resp 16   Ht 1.829 m (6' 0.01\")   Wt 54.4 kg (120 lb)   SpO2 97%   Gen: NAD, laying comfortably in bed  Head:  NC/AT  Eyes/ Nose/ Mouth: PERRLA, moist mucous membranes  Cardio: RRR, good distal perfusion, warm extremities  Pulm: normal respiratory effort, no cyanosis, on room air  Abd: Soft NTND, negative borborygmi   Ext: No peripheral edema. No calf tenderness. No clubbing.     Mental status:  A&Ox4 (person, place, date, situation) answers questions appropriately follows commands  Speech: fluent, no aphasia or dysarthria, is soft-spoken but not hypophonic     CRANIAL NERVES:  2,3: visual acuity grossly intact, PERRL  3,4,6: EOMI bilaterally, no nystagmus or diplopia  5: sensation intact to light touch bilaterally and symmetric  7: no facial asymmetry  8: hearing grossly intact        Motor:                            Upper Extremity  Myotome R L   Shoulder flexion C5 5/5 5/5   Elbow flexion C5 5/5 5/5   Wrist extension C6 5/5 5/5   Elbow extension C7 5/5 5/5   Finger flexion C8 5/5 5/5   Finger abduction T1 5/5 5/5      Lower Extremity Myotome R L   Hip flexion L2 2/5 5/5   Knee extension L3 2, limited by pain/5 5/5   Ankle dorsiflexion L4 5/5 5/5   Toe extension L5 5/5 5/5   Ankle plantarflexion S1 5/5 5/5         Negative Pronator drift bilaterally     Sensory:   intact to light touch through out     DTRs: 2+ in bilateral  biceps  No clonus at bilateral ankles  Negative Frias b/l      Tone: no spasticity noted, no cogwheeling noted     Coordination:   intact finger to nose bilaterally  intact fine motor with fingers bilaterally        Labs: Reviewed and significant for        Recent Labs "     08/28/23 0133 08/29/23 0047   RBC 4.63* 3.96*   HEMOGLOBIN 15.0 12.7*   HEMATOCRIT 44.1 37.7*   PLATELETCT 171 139*           Recent Labs     08/28/23 0133 08/29/23 0047   SODIUM 141 139   POTASSIUM 4.3 4.4   CHLORIDE 105 106   CO2 25 24   GLUCOSE 119* 143*   BUN 24* 24*   CREATININE 1.05 1.12   CALCIUM 9.3 8.4*      Recent Results         Recent Results (from the past 24 hour(s))   CBC WITHOUT DIFFERENTIAL     Collection Time: 08/29/23 12:47 AM   Result Value Ref Range     WBC 14.5 (H) 4.8 - 10.8 K/uL     RBC 3.96 (L) 4.70 - 6.10 M/uL     Hemoglobin 12.7 (L) 14.0 - 18.0 g/dL     Hematocrit 37.7 (L) 42.0 - 52.0 %     MCV 95.2 81.4 - 97.8 fL     MCH 32.1 27.0 - 33.0 pg     MCHC 33.7 32.3 - 36.5 g/dL     RDW 45.6 35.9 - 50.0 fL     Platelet Count 139 (L) 164 - 446 K/uL     MPV 11.9 9.0 - 12.9 fL   Basic Metabolic Panel     Collection Time: 08/29/23 12:47 AM   Result Value Ref Range     Sodium 139 135 - 145 mmol/L     Potassium 4.4 3.6 - 5.5 mmol/L     Chloride 106 96 - 112 mmol/L     Co2 24 20 - 33 mmol/L     Glucose 143 (H) 65 - 99 mg/dL     Bun 24 (H) 8 - 22 mg/dL     Creatinine 1.12 0.50 - 1.40 mg/dL     Calcium 8.4 (L) 8.5 - 10.5 mg/dL     Anion Gap 9.0 7.0 - 16.0   MAGNESIUM     Collection Time: 08/29/23 12:47 AM   Result Value Ref Range     Magnesium 1.6 1.5 - 2.5 mg/dL   PHOSPHORUS     Collection Time: 08/29/23 12:47 AM   Result Value Ref Range     Phosphorus 3.2 2.5 - 4.5 mg/dL   ESTIMATED GFR     Collection Time: 08/29/23 12:47 AM   Result Value Ref Range     GFR (CKD-EPI) 66 >60 mL/min/1.73 m 2               ASSESSMENT:  Patient is a 78 y.o. male admitted with right hip fracture      [facility] Code / Diagnosis to Support: 0008.11 - Orthopaedic Disorders: Status Post Unilateral Hip Fracture     Rehabilitation: Impaired ADLs and mobility  Patient is a good candidate for inpatient rehab based on needs for PT, OT, see disposition details below     Barriers to transfer include: Insurance authorization, TCCs to  verify disposition, medical clearance and bed availability      Additional Recommendations:  Femoral neck fracture   - sustained during GLF   - images showed an impacted right subcapital femoral neck fractuer  - ortho consulted  -s/p  open treatment of right femoral neck fracture with total hip arthoplasty by Dr. Mei  - posterior hip precautions, WBAT RLE   - continue with PT/OT      Chronic mural thrombus   Heart failure with reduced EF   - updated ECHO obtained showed EF of 35%, and noted mural thrombus   - patient on therapeutic dose of lovenox      Prior CVA  - minimal deficits, no lateralizing weakness, no known dysphagia but patient has soft-spoken/hypophonic voice  - continue statin, plavix held      CAD with history of PCI   - statin and plavix at home  - plavix currently held      Dispo:  - patient is currently functioning below their level of baseline, recommend post acute rehab  - recommend IRF level therapy with 3hr of therapy 5 days per week  - piror to acceptance to IRF, will need confirmation of DC support from son  - TCC to assist with insurance auth and DC support            Medical Complexity:  Femoral neck fracture   Chronic mural thrombus   Heart failure with reduced EF   Prior CVA  CAD with history of PCI   Impaired mobility and ADLs         DVT PPX: Lovenox         Thank you for allowing us to participate in the care of this patient.      Patient was seen for 81 minutes on unit/floor of which > 50% of time was spent on counseling and coordination of care regarding the above, including prognosis, risk reduction, benefits of treatment, and options for next stage of care.     Shanelle Carpenter D.O.   Physical Medicine and Rehabilitation      Please note that this dictation was created using voice recognition software. I have made every reasonable attempt to correct obvious errors, but there may be errors of grammar and possibly content that I did not discover before finalizing the note.          DATE OF SERVICE:  08/28/2023      ORTHOPEDIC CONSULTATION     REQUESTING PHYSICIAN:  Ricki Urrutia MD, orthopedic surgery.     REASON FOR CONSULTATION:  Right femoral neck fracture.     CHIEF COMPLAINT:  Right hip pain.     HISTORY OF PRESENT ILLNESS:  The patient is an 78 years old.  He got ____   earlier this morning and fell injuring his right hip.  He presented to [facility]   Emergency Department, was found to have a right femoral neck fracture.  Dr. Urrutia was initially consulted and asked if I would be available for   definitive surgical management.  The patient has been evaluated for admission   to the hospitalist service.  He denies pain other than that to the right hip   area.     PHYSICAL EXAMINATION:  VITAL SIGNS:  Temperature is 97.4, heart rate 74, respiratory rate 16, blood   pressure 93/62, pulse oximetry 91% on room air.  GENERAL APPEARANCE:  The patient is alert, pleasant, cooperative, in no acute   distress.  MUSCULOSKELETAL:  Right lower extremity has pain with log roll localized to   the right lower extremity.  He is able to dorsi and plantarflex the foot, and   flex and extend toes.  He is nontender to palpation of the right knee.  There   is no evidence of obvious traumatic deformity of left lower or bilateral upper   extremities, which are grossly neurovascularly intact.     DIAGNOSTIC IMAGING:  Plain x-rays, 2 views of right femur as well as AP pelvis   show right impacted, but displaced femoral neck fracture.  He has some   underlying osteoarthritis.     ASSESSMENT:  An 78-year-old male with some underlying medical comorbidities   and a right mildly displaced femoral neck fracture.  There is some   displacement seen on the lateral view.  He has an underlying hip arthritis.     RECOMMENDATIONS:  1.  I discussed these findings with the patient and I recommend surgical   management with hip arthroplasty.  We discussed options for total versus hip   hemiarthroplasty.  He does state  that he has had some pain in his hip prior to   his fall and does have some underlying osteoarthritis.  He may benefit most   from total hip arthroplasty to treat both fracture and his underlying   arthritis.  2.  We discussed potential risks of surgery such as limb length inequality,   hip instability, iatrogenic periprosthetic related fracture, potential for   injury to neurovascular structures, blood loss, possibly requiring transfusion   and general risks of anesthesia.  He expressed understanding and wished to   proceed with surgery when possible, so we can make preparations to get him to   the operating room later today pending medical optimization.           ______________________________  MD STEFFANIE Hargrove/THOR/BARRY     DD:  08/28/2023 07:33  DT:  08/28/2023 10:16     Job#:  [id number]         DATE OF SERVICE:  08/28/2023      PREOPERATIVE DIAGNOSIS:  Right displaced femoral neck fracture.     POSTOPERATIVE DIAGNOSIS:  Right displaced femoral neck fracture.     PROCEDURE PERFORMED:  Open treatment of right femoral neck fracture with total   hip arthroplasty.     SURGEON:  Manan Mei MD     ANESTHESIOLOGIST:  Kamran Bravo MD     ANESTHESIA:  General and regional.     ASSISTANT:  Stephanie Borja PA-C     ESTIMATED BLOOD LOSS:  200 mL     IMPLANTS:  Padmini press-fit total hip arthroplasty with following components:  1.  Size 6 Accolade II femoral stem.  2.  A 56-mm Trident II acetabular shell.  3.  MDM acetabular liner.  4.  A 28-mm ceramic head.  5.  An MDM outer femoral head.  6.  Raymond 2-mm cable and cable crimp.     INDICATIONS FOR PROCEDURE:  The patient is 78 years old.  He had sustained a   right displaced femoral neck fracture with mild displacement, but posterior   displacement seen on lateral radiographs.  He has some underlying   osteoarthritis of the hip joint.  I was consulted to provide treatment   recommendations. I recommended hip arthroplasty.  We discussed options for    graciela versus total hip arthroplasty given his underlying arthritis, preexisting   pain in preference.  We elected to proceed with total hip arthroplasty over   hemiarthroplasty.  He signed informed consent preoperatively and wished to   proceed with surgery as outlined above.     DESCRIPTION OF PROCEDURE:  The patient was met in the preoperative holding   area.  Surgical site was signed.  His consent was confirmed to be accurate.    He was taken back to the operating room and general anesthesia was induced.    Dr. Bravo performed a regional anesthetic at my request to help with   postoperative pain control.  He was then positioned in left lateral decubitus   position with an axillary roll and Stulberg positioners.  Right lower   extremity was provisionally cleansed with isopropyl alcohol and then prepped   and draped in the usual sterile fashion.  A formal timeout was performed to   confirm patient's correct name, correct surgical site, correct procedure and   correct laterality.  A posterior approach to the hip was then performed with a   scalpel down through skin.  Dissection was carried with Bovie cautery through   subcutaneous tissue down through the iliotibial band and the gluteal fascia,   which was split longitudinally.  A plane was developed between piriformis and   gluteus minimus and I released the short external rotators and the   full-thickness sleeve off the proximal femur with the posterior capsule.  I   made an in situ femoral neck cut, removing excess femoral neck and then   removed the head from the acetabulum.  There was moderate articular wear.  I   removed periacetabular soft tissue with Bovie cautery as well as the soft   tissue from the cotyloid fossa and sequentially reamed down the cotyloid   baseplate with a 50 mm reamer and then expanded up to 56-mm reamer, which had   good bleeding peripheral bone.  I then thoroughly irrigated out the acetabulum   and inserted a component 56-mm Trident II  Acetabular shell, aiming for about   40 degrees of abduction, adding about 10 degrees of anteversion compared to   his native anteversion.  I then inserted 2 superior acetabular screws, both of   which had excellent bony purchase.  I then thoroughly cleansed and dried the   shell and inserted the MDM acetabular liner.  I then turned my attention to   the femur. As a prophylactic measure, I incised the vastus lateralis fascia   and bluntly dissected down to the subtrochanteric femur area and carefully   passed a cable passer and placed one cerclage cable distal to the lesser   trochanter for prophylactic cable to prevent potential for fracture   propagation prior to femoral preparation and stem insertion.  I then with   appropriate retractors in place, started preparing the femoral canal with a   box osteotome followed by canal finding reamer followed by sequential   broaching up to ultimately a size 6 stem, which had good rotational stability   and good medial lateral fit.  I removed and then trialed and selected that   stem.  I removed the trial components, thoroughly irrigated out the femoral   canal, inserted the component, femoral stem to the appropriate depth.  I   confirmed no evidence of obvious iatrogenic calcar fracture.  I then trialed   again and selected a +0 mm neck length.  I removed the trial head, thoroughly   cleansed and dried the trunnion and inserted the component MDM head, gently   impacted down to the stem and reduced the hip.  The wound was then soaked in   Betadine solution for 3 minutes.  It was then thoroughly irrigated once again   with Pulsavac using normal saline.  I repaired the posterior capsule and short   external rotators through bone tunnels and greater trochanter with #5   Ti-Cron, placed a gram of vancomycin powder within the hip joint.  I then   sutured down and closed down the hip joint and the external rotators.  After   the vancomycin was placed, the IT band and gluteal  fascia was repaired with a   size 2 Stratafix suture, subcutaneous layers with 0 Vicryl, 2-0 Vicryl and the   skin edges with staples.  Wounds were thoroughly cleansed, dried and a   sterile silver-impregnated dressing was applied.  He was then transferred in   supine position, transferred on the gurney, woken up from anesthesia and taken   to postanesthesia care in stable condition.     PLAN:  1.  The patient will be readmitted postoperatively to the hospitalist service.  2.  He should weightbear as tolerated on the right lower extremity and   maintain posterior hip precautions.  3.  He will need Ancef and vancomycin for 24 hours postop for infection   prophylaxis.  4.  He should work with physical and occupational therapy as soon as possible   for mobilization.  5.  He can be started on Lovenox or equivalent tomorrow morning and should   continue SCDs for DVT prophylaxis in the meantime.  6.  Ultimately, he will need to follow up with me 2 weeks postop for routine   wound check, staple removal and x-rays, 2 views of the right hip including   cross table lateral and AP pelvis.           ______________________________  Manan Mei MD     [facility] [place]:  08/28/2023 16:32  DT:  08/28/2023 18:55     Job#:  [id number]          Immediate Post OP Note     PreOp Diagnosis: Right displaced femoral neck fracture        PostOp Diagnosis: same        Procedure(s):  ARTHROPLASTY, HIP, TOTAL - Wound Class: Clean     Surgeon(s):  Manan Mei M.D.     Anesthesiologist/Type of Anesthesia:  Anesthesiologist: Kamran Bravo M.D./General     Surgical Staff:  Circulator: Liv Overton R.N.  Limb Javier: Nicole Yee  Scrub Person: Pita Archuleta  First Assist: Stephanie Borja P.A.-C.     Specimens removed if any:  * No specimens in log *     Estimated Blood Loss: 200cc     Findings: see dictation     Complications: none known     Plan:  --readmit postop  --WBAT RLE  --posterior hip  precautions  --ancef/vanc x 2 doses postop  --PT/OT for mobilization ASAP  --okay to start lovenox or equivalent tomorrow am, continue SCDs  --fu 2 weeks postop      Co-morbidities:  See PMH  Potential Risk - Complications: Contractures, Deep Vein Thrombosis, Pain, Pneumonia, and Pressure Ulcer  Level of Risk: High    Ongoing Medical Management Needed (Medical/Nursing Needs):   Patient Active Problem List    Diagnosis Date Noted    Hyperlipidemia 08/28/2023    Femoral neck fracture (HCC) 08/28/2023    ACP (advance care planning) 08/28/2023    Heart failure with reduced ejection fraction (HCC) 08/28/2023    LBBB (left bundle branch block) 08/28/2023    Mural thrombus of cardiac apex 08/28/2023    Risk for falls 02/14/2023    Hemorrhoid 03/18/2022    Gastroesophageal reflux disease without esophagitis 03/18/2022    Body mass index less than 19, adult 07/30/2020    Depression 07/30/2020    Coronary artery disease involving native coronary artery of native heart without angina pectoris 03/13/2020    Congestive heart failure (HCC) 03/13/2020    Pain of lumbar spine 03/13/2020    Benign prostatic hyperplasia 02/28/2020    Cataract, bilateral 02/28/2020    Eczema 02/28/2020    Inguinal hernia, left 02/28/2020    Swallowing problem 02/28/2020    History of cardioembolic cerebrovascular accident (CVA) 11/28/2019    Decreased mobility 10/15/2019    Oropharyngeal dysphagia 10/15/2019    Vision changes 09/17/2019    Constipation 08/20/2019    Seborrheic dermatitis of scalp 06/04/2019    Chronic rhinitis 06/04/2019    Neuropathy 10/05/2017    Candidal intertrigo 10/05/2017    Irritable bowel disease 08/17/2017    Pure hypercholesterolemia 08/17/2017    Hypertension 08/17/2017    GLENYS (generalized anxiety disorder) 07/30/2017    Hearing loss 07/29/2017    DNR (do not resuscitate) 07/28/2017    History of coronary artery stent placement 02/28/2011     A/o  Current Vital Signs:   Temperature: 37 °C (98.6 °F) Pulse: 69 Respiration:  "16 Blood Pressure : (!) 147/74  Weight: 54.4 kg (120 lb) Height: 182.9 cm (6' 0.01\")  Pulse Oximetry: 96 % O2 (LPM): 0      Completed Laboratory Reports:  Recent Labs     08/28/23  0133 08/29/23  0047 08/30/23  0025   WBC 4.6* 14.5* 11.6*   HEMOGLOBIN 15.0 12.7* 12.3*   HEMATOCRIT 44.1 37.7* 37.1*   PLATELETCT 171 139* 125*   SODIUM 141 139 138   POTASSIUM 4.3 4.4 4.3   BUN 24* 24* 21   CREATININE 1.05 1.12 0.97   ALBUMIN 4.0  --   --    GLUCOSE 119* 143* 118*     Additional Labs: Not Applicable    Prior Living Situation:   Housing / Facility: Independent Living Facility  Lives with - Patient's Self Care Capacity: Alone and Able to Care For Self  Equipment Owned: Single Point Cane    Prior Level of Function / Living Situation:   Physical Therapy: Prior Services: Home-Independent  Housing / Facility: Independent Living Facility  Elevator: Yes  Bathroom Set up: Walk In Shower  Equipment Owned: Single Point Cane  Lives with - Patient's Self Care Capacity: Alone and Able to Care For Self  Assistive Devices Used: None (uses SPC on occasion)  Current Level of Function:   Gait Level Of Assist: Minimal Assist  Assistive Device: Front Wheel Walker  Distance (Feet): 30  Deviation: Antalgic, Bradykinetic (min internal rotation at R LE- VCs for proper positioning, VCs when turning, min flexed knee with stance phase)  Weight Bearing Status: WBAT R LE  Supine to Sit: Modified Independent (assistance with R LE, HOB elevated to 45 degrees, bed rail used)  Sit to Supine: Moderate Assist (assistance with R LE back to bed and to scoot up in bed)  Scooting: Minimal Assist (pt able to assist with scooting at EOB)  Sit to Stand: Contact Guard Assist  Bed, Chair, Wheelchair Transfer: Contact Guard Assist  Transfer Method: Stand Step  Sitting in Chair:  (Pt left sitting in recliner post OT eval)  Sitting Edge of Bed: +5 mins  Standing: <5 mins  Occupational Therapy:   Self Feeding: Independent  Grooming / Hygiene: Independent  Bathing: " Independent  Dressing: Independent  Toileting: Independent  Medication Management: Independent  Laundry: Independent  Kitchen Mobility: Independent  Finances: Independent  Home Management: Requires Assist (Pt reports that staff at [facility] comes once per week to clean his apartment.)  Shopping: Requires Assist (Pt reports that there is a community bus [facility] that takes all of the residents to the grocery store.)  Prior Level Of Mobility: Independent With Device in Community, Independent With Device in Home  Driving / Transportation: Other (Comments) (Pt's [facility] provides all transportation needs.)  Prior Services: Home-Independent  Housing / Facility: Independent Living Facility  Occupation (Pre-Hospital Vocational): Retired Due To Age (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 Therapeutic Interventions Required: Maximize Endurance, Mobility, Strength, and Safety  Occupational Therapy: 1.5 hr / day 5 days / week. Therapeutic Interventions Required: Maximize Self Care, ADLs, IADLs, and Energy Conservation  Rehabilitation Nursin/7. Therapeutic Interventions Required: Monitor Pain, Skin, Vital Signs, Intake and Output, Labs, Safety, and Family Training  Rehabilitation Physician: 3 - 5 days / week. Therapeutic Interventions Required: Medical Management    He requires 24-hour rehabilitation nursing to manage bowel and bladder function, skin care, surgical incision, nutrition and fluid intake, pain control, safety, medication  management, and patient/family goals. In addition, rehabilitation nursing will reiterate and reinforce therapy skills and equipment use, including ADLs, as well as provide education to the patient and family. Moe Nickerson is willing to participate in and is able to tolerate the proposed plan of care.    Rehabilitation Goals and Plan (Expected frequency & duration of treatment in the IRF):   Return to the Community and Modified Independent Level of Care  Anticipated Date of Rehabilitation Admission: 8/30/23  Patient/Family oriented IRF level of care/facility/plan: Yes  Patient/Family willing to participate in IRF care/facility/plan: Yes  Patient able to tolerate IRF level of care proposed: Yes  Patient has potential to benefit IRF level of care proposed: Yes  Comments: Not Applicable    Special Needs or Precautions - Medical Necessity:  Safety Concerns/Precautions:  Fall Risk / High Risk for Falls and Balance  Pain Management  IV Site: Peripheral  Current Medications:    Current Facility-Administered Medications Ordered in Epic   Medication Dose Route Frequency Provider Last Rate Last Admin    omeprazole (PriLOSEC) capsule 20 mg  20 mg Oral DAILY Teresa Emerson M.D.   20 mg at 08/30/23 0622    enoxaparin (Lovenox) inj 60 mg  1 mg/kg Subcutaneous DAILY AT 1800 Teresa Emerson M.D.   60 mg at 08/29/23 1820    lisinopril (Prinivil) tablet 20 mg  20 mg Oral DAILY José Guzman M.D.   20 mg at 08/30/23 0431    simvastatin (Zocor) tablet 10 mg  10 mg Oral Q EVENING José Guzman M.D.   10 mg at 08/29/23 1758    clopidogrel (Plavix) tablet 75 mg  75 mg Oral DAILY Bettie Caldera D.O.        senna-docusate (Pericolace Or Senokot S) 8.6-50 MG per tablet 2 Tablet  2 Tablet Oral BID ELIZABETH CoreraO.   2 Tablet at 08/30/23 0431    And    polyethylene glycol/lytes (Miralax) PACKET 1 Packet  1 Packet Oral QDAY PRN ELIZABETH CorreaONadine        And    magnesium hydroxide (Milk Of Magnesia) suspension 30 mL   30 mL Oral QDAY PRN Bettie Caldera, D.O.        And    bisacodyl (Dulcolax) suppository 10 mg  10 mg Rectal QDAY PRN Bettie Caldera, D.O.        hydrALAZINE (Apresoline) injection 10 mg  10 mg Intravenous Q4HRS PRN Bettie Caldera, D.O.        ondansetron (Zofran) syringe/vial injection 4 mg  4 mg Intravenous Q4HRS PRN Bettie Caldera D.O.        ondansetron (Zofran ODT) dispertab 4 mg  4 mg Oral Q4HRS PRN Bettie Caldera, D.O.   4 mg at 08/28/23 1140    Pharmacy Consult Request ...Pain Management Review 1 Each  1 Each Other PHARMACY TO DOSE Bettie Caldera D.O.        acetaminophen (Tylenol) tablet 1,000 mg  1,000 mg Oral Q6HRS Bettie Caldera, D.O.   1,000 mg at 08/30/23 0800    Followed by    [START ON 9/2/2023] acetaminophen (Tylenol) tablet 1,000 mg  1,000 mg Oral Q6HRS PRN Bettie Caldera, D.O.        oxyCODONE immediate-release (Roxicodone) tablet 2.5 mg  2.5 mg Oral Q3HRS PRN Bettie Caldera, D.O.   2.5 mg at 08/28/23 0820    Or    oxyCODONE immediate-release (Roxicodone) tablet 5 mg  5 mg Oral Q3HRS PRN Bettie Caldera, D.O.   5 mg at 08/30/23 0429    Or    HYDROmorphone (Dilaudid) injection 0.25 mg  0.25 mg Intravenous Q3HRS PRN Bettie Caldera, D.O.         No current Epic-ordered outpatient medications on file.     Diet:   DIET ORDERS (From admission to next 24h)       Start     Ordered    08/28/23 1813  Diet Order Diet: Regular  ALL MEALS        Question:  Diet:  Answer:  Regular    08/28/23 1812                    Anticipated Discharge Destination / Patient/Family Goal:  Destination: Home with Assistance Support System: Family  and Attendant  Anticipated home health services: OT, PT, and Nursing  Previously used HH service/ provider: Not Applicable  Anticipated DME Needs: Walker  Outpatient Services: OT and PT  Alternative resources to address additional identified needs:   No future appointments.   Pre-Screen Completed: 8/30/2023 9:33 AM Jossy Gonzalez

## 2023-08-30 NOTE — THERAPY
Physical Therapy Contact Note    Patient Name: Moe Nickerson  Age:  78 y.o., Sex:  male  Medical Record #: [id number]  Today's Date: 8/30/2023    Pt seen and greeted for PT session. Pt informed PT that he had just been approved to [facility] today but requested to ambulate into bathroom before leaving. Pt unable to recall hip precautions, during pt ed transport arrived for pt and no OOB activity was perform. Pt supplied with wash cloth and toothbrush per request. Pt expressed feeling overwhelmed by how fast discharging was happening.

## 2023-08-31 ENCOUNTER — APPOINTMENT (OUTPATIENT)
Dept: OCCUPATIONAL THERAPY | Facility: REHABILITATION | Age: 78
DRG: 559 | End: 2023-08-31
Attending: PHYSICAL MEDICINE & REHABILITATION
Payer: MEDICARE

## 2023-08-31 ENCOUNTER — APPOINTMENT (OUTPATIENT)
Dept: PHYSICAL THERAPY | Facility: REHABILITATION | Age: 78
DRG: 559 | End: 2023-08-31
Attending: PHYSICAL MEDICINE & REHABILITATION
Payer: MEDICARE

## 2023-08-31 LAB
ALBUMIN SERPL BCP-MCNC: 3.3 G/DL (ref 3.2–4.9)
ALBUMIN/GLOB SERPL: 1.3 G/DL
ALP SERPL-CCNC: 68 U/L (ref 30–99)
ALT SERPL-CCNC: 13 U/L (ref 2–50)
ANION GAP SERPL CALC-SCNC: 10 MMOL/L (ref 7–16)
AST SERPL-CCNC: 27 U/L (ref 12–45)
BASOPHILS # BLD AUTO: 0.2 % (ref 0–1.8)
BASOPHILS # BLD: 0.02 K/UL (ref 0–0.12)
BILIRUB SERPL-MCNC: 1.3 MG/DL (ref 0.1–1.5)
BUN SERPL-MCNC: 17 MG/DL (ref 8–22)
CALCIUM ALBUM COR SERPL-MCNC: 9.2 MG/DL (ref 8.5–10.5)
CALCIUM SERPL-MCNC: 8.6 MG/DL (ref 8.5–10.5)
CHLORIDE SERPL-SCNC: 104 MMOL/L (ref 96–112)
CO2 SERPL-SCNC: 25 MMOL/L (ref 20–33)
CREAT SERPL-MCNC: 0.88 MG/DL (ref 0.5–1.4)
EKG IMPRESSION: NORMAL
EOSINOPHIL # BLD AUTO: 0.56 K/UL (ref 0–0.51)
EOSINOPHIL NFR BLD: 5.9 % (ref 0–6.9)
ERYTHROCYTE [DISTWIDTH] IN BLOOD BY AUTOMATED COUNT: 45.6 FL (ref 35.9–50)
EST. AVERAGE GLUCOSE BLD GHB EST-MCNC: 91 MG/DL
GFR SERPLBLD CREATININE-BSD FMLA CKD-EPI: 87 ML/MIN/1.73 M 2
GLOBULIN SER CALC-MCNC: 2.6 G/DL (ref 1.9–3.5)
GLUCOSE SERPL-MCNC: 96 MG/DL (ref 65–99)
HBA1C MFR BLD: 4.8 % (ref 4–5.6)
HCT VFR BLD AUTO: 39.4 % (ref 42–52)
HGB BLD-MCNC: 13.3 G/DL (ref 14–18)
IMM GRANULOCYTES # BLD AUTO: 0.02 K/UL (ref 0–0.11)
IMM GRANULOCYTES NFR BLD AUTO: 0.2 % (ref 0–0.9)
LYMPHOCYTES # BLD AUTO: 0.96 K/UL (ref 1–4.8)
LYMPHOCYTES NFR BLD: 10.2 % (ref 22–41)
MAGNESIUM SERPL-MCNC: 1.9 MG/DL (ref 1.5–2.5)
MCH RBC QN AUTO: 32.8 PG (ref 27–33)
MCHC RBC AUTO-ENTMCNC: 33.8 G/DL (ref 32.3–36.5)
MCV RBC AUTO: 97.3 FL (ref 81.4–97.8)
MONOCYTES # BLD AUTO: 1 K/UL (ref 0–0.85)
MONOCYTES NFR BLD AUTO: 10.6 % (ref 0–13.4)
NEUTROPHILS # BLD AUTO: 6.86 K/UL (ref 1.82–7.42)
NEUTROPHILS NFR BLD: 72.9 % (ref 44–72)
NRBC # BLD AUTO: 0 K/UL
NRBC BLD-RTO: 0 /100 WBC (ref 0–0.2)
PLATELET # BLD AUTO: 134 K/UL (ref 164–446)
PMV BLD AUTO: 12.3 FL (ref 9–12.9)
POTASSIUM SERPL-SCNC: 4.1 MMOL/L (ref 3.6–5.5)
PROT SERPL-MCNC: 5.9 G/DL (ref 6–8.2)
RBC # BLD AUTO: 4.05 M/UL (ref 4.7–6.1)
SODIUM SERPL-SCNC: 139 MMOL/L (ref 135–145)
TSH SERPL DL<=0.005 MIU/L-ACNC: 2.67 UIU/ML (ref 0.38–5.33)
WBC # BLD AUTO: 9.4 K/UL (ref 4.8–10.8)

## 2023-08-31 PROCEDURE — 84443 ASSAY THYROID STIM HORMONE: CPT

## 2023-08-31 PROCEDURE — 97167 OT EVAL HIGH COMPLEX 60 MIN: CPT

## 2023-08-31 PROCEDURE — A9270 NON-COVERED ITEM OR SERVICE: HCPCS | Performed by: PHYSICAL MEDICINE & REHABILITATION

## 2023-08-31 PROCEDURE — 36415 COLL VENOUS BLD VENIPUNCTURE: CPT

## 2023-08-31 PROCEDURE — 83036 HEMOGLOBIN GLYCOSYLATED A1C: CPT

## 2023-08-31 PROCEDURE — 97535 SELF CARE MNGMENT TRAINING: CPT

## 2023-08-31 PROCEDURE — 85025 COMPLETE CBC W/AUTO DIFF WBC: CPT

## 2023-08-31 PROCEDURE — 83735 ASSAY OF MAGNESIUM: CPT

## 2023-08-31 PROCEDURE — 97110 THERAPEUTIC EXERCISES: CPT

## 2023-08-31 PROCEDURE — 97530 THERAPEUTIC ACTIVITIES: CPT

## 2023-08-31 PROCEDURE — 97161 PT EVAL LOW COMPLEX 20 MIN: CPT

## 2023-08-31 PROCEDURE — 770010 HCHG ROOM/CARE - REHAB SEMI PRIVAT*

## 2023-08-31 PROCEDURE — 93010 ELECTROCARDIOGRAM REPORT: CPT | Performed by: INTERNAL MEDICINE

## 2023-08-31 PROCEDURE — 99232 SBSQ HOSP IP/OBS MODERATE 35: CPT | Performed by: PHYSICAL MEDICINE & REHABILITATION

## 2023-08-31 PROCEDURE — 700102 HCHG RX REV CODE 250 W/ 637 OVERRIDE(OP): Performed by: PHYSICAL MEDICINE & REHABILITATION

## 2023-08-31 PROCEDURE — 80053 COMPREHEN METABOLIC PANEL: CPT

## 2023-08-31 RX ADMIN — ACETAMINOPHEN 650 MG: 325 TABLET ORAL at 20:48

## 2023-08-31 RX ADMIN — SENNOSIDES AND DOCUSATE SODIUM 2 TABLET: 50; 8.6 TABLET ORAL at 08:29

## 2023-08-31 RX ADMIN — ACETAMINOPHEN 650 MG: 325 TABLET ORAL at 08:29

## 2023-08-31 RX ADMIN — APIXABAN 5 MG: 5 TABLET, FILM COATED ORAL at 08:29

## 2023-08-31 RX ADMIN — OXYCODONE HYDROCHLORIDE 5 MG: 5 TABLET ORAL at 07:25

## 2023-08-31 RX ADMIN — OMEPRAZOLE 20 MG: 20 CAPSULE, DELAYED RELEASE ORAL at 08:29

## 2023-08-31 RX ADMIN — ACETAMINOPHEN 650 MG: 325 TABLET ORAL at 14:35

## 2023-08-31 RX ADMIN — POLYETHYLENE GLYCOL 3350 1 PACKET: 17 POWDER, FOR SOLUTION ORAL at 08:29

## 2023-08-31 RX ADMIN — OXYCODONE HYDROCHLORIDE 5 MG: 5 TABLET ORAL at 20:51

## 2023-08-31 RX ADMIN — SIMVASTATIN 10 MG: 20 TABLET, FILM COATED ORAL at 20:48

## 2023-08-31 RX ADMIN — APIXABAN 5 MG: 5 TABLET, FILM COATED ORAL at 20:47

## 2023-08-31 RX ADMIN — LISINOPRIL 20 MG: 20 TABLET ORAL at 05:16

## 2023-08-31 RX ADMIN — CLOPIDOGREL BISULFATE 75 MG: 75 TABLET ORAL at 08:29

## 2023-08-31 SDOH — ECONOMIC STABILITY: TRANSPORTATION INSECURITY
IN THE PAST 12 MONTHS, HAS THE LACK OF TRANSPORTATION KEPT YOU FROM MEDICAL APPOINTMENTS OR FROM GETTING MEDICATIONS?: NO

## 2023-08-31 SDOH — ECONOMIC STABILITY: TRANSPORTATION INSECURITY
IN THE PAST 12 MONTHS, HAS LACK OF RELIABLE TRANSPORTATION KEPT YOU FROM MEDICAL APPOINTMENTS, MEETINGS, WORK OR FROM GETTING THINGS NEEDED FOR DAILY LIVING?: NO

## 2023-08-31 ASSESSMENT — BRIEF INTERVIEW FOR MENTAL STATUS (BIMS)
ASKED TO RECALL BED: YES, NO CUE REQUIRED
WHAT DAY OF THE WEEK IS IT: CORRECT
WHAT YEAR IS IT: CORRECT
BIMS SUMMARY SCORE: 14
ASKED TO RECALL BLUE: YES, NO CUE REQUIRED
INITIAL REPETITION OF BED BLUE SOCK - FIRST ATTEMPT: 2
ASKED TO RECALL SOCK: YES, NO CUE REQUIRED
WHAT MONTH IS IT: ACCURATE WITHIN 5 DAYS

## 2023-08-31 ASSESSMENT — GAIT ASSESSMENTS
GAIT LEVEL OF ASSIST: CONTACT GUARD ASSIST
ASSISTIVE DEVICE: FRONT WHEEL WALKER
DEVIATION: ANTALGIC;STEP TO;BRADYKINETIC;SHUFFLED GAIT
DISTANCE (FEET): 85

## 2023-08-31 ASSESSMENT — ACTIVITIES OF DAILY LIVING (ADL)
BED_CHAIR_WHEELCHAIR_TRANSFER_DESCRIPTION: INCREASED TIME;INITIAL PREPARATION FOR TASK;SET-UP OF EQUIPMENT;SUPERVISION FOR SAFETY;VERBAL CUEING
TOILET_TRANSFER_DESCRIPTION: GRAB BAR;SUPERVISION FOR SAFETY;VERBAL CUEING
TOILETING: INDEPENDENT
TOILETING_LEVEL_OF_ASSIST_DESCRIPTION: ASSIST FOR STANDING BALANCE;GRAB BAR;INCREASED TIME;SET-UP OF EQUIPMENT;SUPERVISION FOR SAFETY;VERBAL CUEING
TUB_SHOWER_TRANSFER_DESCRIPTION: GRAB BAR;SHOWER BENCH;SUPERVISION FOR SAFETY;SET-UP OF EQUIPMENT;VERBAL CUEING
BED_CHAIR_WHEELCHAIR_TRANSFER_DESCRIPTION: ADAPTIVE EQUIPMENT;INCREASED TIME;INITIAL PREPARATION FOR TASK;SET-UP OF EQUIPMENT;SUPERVISION FOR SAFETY;VERBAL CUEING

## 2023-08-31 NOTE — CARE PLAN
The patient is Stable - Low risk of patient condition declining or worsening    Shift Goals  Clinical Goals: safety  Patient Goals: safety      Problem: Fall Risk - Rehab  Goal: Patient will remain free from falls  8/31/2023 1353 by Mony Esteban R.N.  Pt uses call light consistently and appropriately. Waits for assistance does not attempt self transfer this shift. Able to verbalize needs.       Problem: Pain - Standard  Goal: Alleviation of pain or a reduction in pain to the patient’s comfort goal  8/31/2023 1353 by Mony Esteban R.N.

## 2023-08-31 NOTE — CARE PLAN
"The patient is Watcher - Medium risk of patient condition declining or worsening    Shift Goals  Clinical Goals: safety  Patient Goals: safety, pain management      Problem: Fall Risk - Rehab  Goal: Patient will remain free from falls  Outcome: Progressing  Note: Shayna Bunn Fall risk Assessment Score: 16    High fall risk Interventions   - Alarming seatbelt  - Bed and strip alarm   - Yellow sign by the door   - Yellow wrist band \"Fall risk\"  - Room near to the nurse station  - Do not leave patient unattended in the bathroom  - Fall risk education provided  Patient uses call light consistently and appropriately this shift.  Waits for assistance when needed and does not attempt self transfer.  Able to verbalize needs.  Will continue to monitor.      Problem: Pain - Standard  Goal: Alleviation of pain or a reduction in pain to the patient’s comfort goal  Outcome: Progressing  Flowsheets (Taken 8/31/2023 0057)  OB Pain Intervention: Medication - See MAR  Note: Patient able to verbalize pain level and verbalize an acceptable level of pain.      "

## 2023-08-31 NOTE — THERAPY
"Physical Therapy   Initial Evaluation     Patient Name: Moe Nickerson  Age:  78 y.o., Sex:  male  Medical Record #: [id number]  Today's Date: 8/31/2023     Subjective    \"I want to move how I used to\"     Objective       08/31/23 0930   PT Charge Group   PT Therapeutic Exercise (Units) 1   PT Evaluation PT Evaluation Low   PT Total Time Spent   PT Individual Total Time Spent (Mins) 60   Prior Living Situation   Prior Services Housekeeping / Homemaker Services   Housing / Facility Independent Living Facility   Steps Into Home 0   Steps In Home 0   Elevator Yes   Bathroom Set up Grab Bars;Walk In Shower   Equipment Owned Single Point Cane   Lives with - Patient's Self Care Capacity Alone and Able to Care For Self   Comments [facility], son lives in [place] and works fulltime   Prior Level of Functional Mobility   Bed Mobility Independent   Transfer Status Independent   Ambulation Independent   Distance Ambulation (Feet)   (limited community)   Assistive Devices Used Single Point Cane  (occasionally)   Pain   Intervention Cold Pack;Repositioned   Pain 0 - 10 Group   Location Hip   Location Orientation Right   Pain Rating Scale (NPRS) 8   Description Aching;Sore   Comfort Goal Comfort with Movement;Perform Activity   Therapist Pain Assessment Post Activity Pain Same as Prior to Activity   Cognition    Speech/ Communication Expressive Aphasia;Delayed Responses   Passive ROM Lower Body   Passive ROM Lower Body WDL   Strength Lower Body   Lower Body Strength  X   Rt Hip Flexion Strength 3- (F-)   Rt Knee Flexion Strength 3 (F)   Rt Knee Extension Strength 3- (F-)   Rt Ankle Dorsiflexion Strength 3 (F)   Lt Hip Flexion Strength 4- (G-)   Lt Knee Extension Strength 4- (G-)   Sensation Lower Body   Lower Extremity Sensation   WDL   Balance Assessment   Sitting Balance (Static) Good   Sitting Balance (Dynamic) Good   Standing Balance (Static) Poor   Standing Balance (Dynamic) Poor   Weight Shift Sitting Good   Weight " Shift Standing Poor   Bed Mobility    Supine to Sit Minimal Assist   Sit to Supine Minimal Assist   Sit to Stand Minimal Assist   Scooting Minimal Assist   Rolling Minimal Assist to Rt.;Minimum Assist to Lt.   Roll Left and Right   Assistance Needed Physical assistance   Physical Assistance Level 25% or less   CARE Score - Roll Left and Right 3   Roll Left and Right Discharge Goal   Discharge Goal 6   Sit to Lying   Assistance Needed Physical assistance   Physical Assistance Level 25% or less   CARE Score - Sit to Lying 3   Sit to Lying Discharge Goal   Discharge Goal 6   Lying to Sitting on Side of Bed   Assistance Needed Physical assistance   Physical Assistance Level 25% or less   CARE Score - Lying to Sitting on Side of Bed 3   Lying to Sitting on Side of Bed Discharge Goal   Discharge Goal 6   Sit to Stand   Assistance Needed Physical assistance   Physical Assistance Level 25% or less   CARE Score - Sit to Stand 3   Sit to Stand Discharge Goal   Discharge Goal 6   Chair/Bed-to-Chair Transfer   Assistance Needed Physical assistance   Physical Assistance Level 25% or less   CARE Score - Chair/Bed-to-Chair Transfer 3   Chair/Bed-to-Chair Transfer Discharge Goal   Discharge Goal 6   Toilet Transfer   Reason if not Attempted Refused to perform   CARE Score - Toilet Transfer 7   Toilet Transfer Discharge Goal   Discharge Goal 6   Car Transfer   Reason if not Attempted Environmental limitations   CARE Score - Car Transfer 10   Car Transfer Discharge Goal   Discharge Goal 5   Walk 10 Feet   Assistance Needed Incidental touching   CARE Score - Walk 10 Feet 4   Walk 10 Feet Discharge Goal   Discharge Goal 6   Walk 50 Feet with Two Turns   Assistance Needed Incidental touching   CARE Score - Walk 50 Feet with Two Turns 4   Walk 50 Feet with Two Turns Discharge Goal   Discharge Goal 6   Walk 150 Feet   Reason if not Attempted Safety concerns   CARE Score - Walk 150 Feet 88   Walk 150 Feet Discharge Goal   Discharge Goal 6    Walking 10 Feet on Uneven Surfaces   Reason if not Attempted Activity not applicable   CARE Score - Walking 10 Feet on Uneven Surfaces 9   Walking 10 Feet on Uneven Surfaces Discharge Goal   Discharge Goal 9   1 Step (Curb)   Reason if not Attempted Refused to perform   CARE Score - 1 Step (Curb) 7   1 Step (Curb) Discharge Goal   Discharge Goal 5   4 Steps   Reason if not Attempted Activity not applicable   CARE Score - 4 Steps 9   4 Steps Discharge Goal   Discharge Goal 9   12 Steps   Reason if not Attempted Activity not applicable   CARE Score - 12 Steps 9   12 Steps Discharge Goal   Discharge Goal 9   Picking Up Object   Reason if not Attempted Safety concerns   CARE Score - Picking Up Object 88   Picking Up Object Discharge Goal   Discharge Goal 5   Wheel 50 Feet with Two Turns   Reason if not Attempted Activity not applicable   CARE Score - Wheel 50 Feet with Two Turns 9   Wheel 50 Feet with Two Turns Discharge Goal   Discharge Goal 9   Wheel 150 Feet   Reason if not Attempted Activity not applicable   CARE Score - Wheel 150 Feet 9   Wheel 150 Feet Discharge Goal   Discharge Goal 9   Gait Functional Level of Assist    Gait Level Of Assist Contact Guard Assist   Assistive Device Front Wheel Walker   Distance (Feet) 85   # of Times Distance was Traveled 1   Deviation Antalgic;Step To;Bradykinetic;Shuffled Gait  (progressed to step through)   Stairs Functional Level of Assist   Level of Assist with Stairs Refused   Transfer Functional Level of Assist   Bed, Chair, Wheelchair Transfer Minimal Assist   Bed Chair Wheelchair Transfer Description Adaptive equipment;Increased time;Initial preparation for task;Set-up of equipment;Supervision for safety;Verbal cueing  (stand step FWW)   Problem List    Problems Pain;Impaired Bed Mobility;Impaired Transfers;Impaired Ambulation;Functional Strength Deficit;Impaired Balance;Limited Knowledge of Post-Op Precautions   Precautions   Precautions Fall Risk;Posterior Hip  Precautions;Weight Bearing As Tolerated Right Lower Extremity   Current Discharge Plan   Current Discharge Plan Return to Prior Living Situation   Interdisciplinary Plan of Care Collaboration   IDT Collaboration with  Occupational Therapist   Patient Position at End of Therapy In Bed;Bed Alarm On;Call Light within Reach;Tray Table within Reach;Phone within Reach   Collaboration Comments CLOF/POC   Benefit   Therapy Benefit Patient Would Benefit from Inpatient Rehabilitation Physical Therapy to Maximize Functional [place] with ADLs, IADLs and Mobility.   Strengths & Barriers   Strengths Able to follow instructions;Good insight into deficits/needs;Independent prior level of function;Making steady progress towards goals;Motivated for self care and independence;Pleasant and cooperative;Willingly participates in therapeutic activities   Barriers Aphasia expressive;Decreased endurance;Fatigue;Impaired activity tolerance;Impaired balance;Generalized weakness;Pain       Pt reporting HA and had already taken Tylenol. Requested to make up therapy tomorrow. Time spent repositioning pt, providing ice pack, checking vitals, and discussing POC   08/31/23 1530   Therapy Missed   Missed Therapy (Minutes) 15   Reason For Missed Therapy Non-Medical - Patient Refused  (Agreeable to make up tomorrow)   PT Charge Group   PT Therapeutic Activities (Units) 1   PT Total Time Spent   PT Individual Total Time Spent (Mins) 15   Vitals   Pulse 74   Patient BP Position Ely's Position   Blood Pressure  132/78   Pain   Intervention Cold Pack;Repositioned;Therapeutic Touch  (PROM)       Assessment  Patient is 78 y.o. male with a diagnosis of R ANGELA WBAT with posterior hip precautions after GLF.  Additional factors influencing patient status / progress (ie: cognitive factors, co-morbidities, social support, etc): hx of CVA, hx of falls, indep PLOF.      Plan  Recommend Physical Therapy  minutes per day 5-7 days per week for 7-10 days  for the following treatments:  PT Gait Training, PT Therapeutic Exercises, PT Neuro Re-Ed/Balance, PT Therapeutic Activity, PT Manual Therapy, and PT Evaluation.    Passport items to be completed:  Get in/out of bed safely, in/out of a vehicle, safely use mobility device, walk or wheel around home/community, navigate up and down stairs, show how to get up/down from the ground, ensure home is accessible, demonstrate HEP, complete caregiver training    Goals:  Long term and short term goals have been discussed with patient and they are in agreement.    Physical Therapy Problems (Active)       Problem: Mobility       Dates: Start:  08/31/23         Goal: STG-Within one week, patient will ambulate 150ft with SBA using LRAD       Dates: Start:  08/31/23            Goal: STG-Within one week, patient will ambulate up/down a curb with [place] and LRAD       Dates: Start:  08/31/23               Problem: Mobility Transfers       Dates: Start:  08/31/23         Goal: STG-Within one week, patient will perform bed mobility [place]       Dates: Start:  08/31/23            Goal: STG-Within one week, patient will transfer bed to chair with SBA       Dates: Start:  08/31/23               Problem: PT-Long Term Goals       Dates: Start:  08/31/23         Goal: LTG-By discharge, patient will ambulate 200ft indoors and outdoors [place] with LRAD       Dates: Start:  08/31/23            Goal: LTG-By discharge, patient will transfer one surface to another [place]       Dates: Start:  08/31/23            Goal: LTG-By discharge, patient will perform home exercise program independently       Dates: Start:  08/31/23            Goal: LTG-By discharge, patient will transfer in/out of a car with set up assist       Dates: Start:  08/31/23

## 2023-08-31 NOTE — DISCHARGE PLANNING
CASE MANAGEMENT INITIAL ASSESSMENT    Admit Date:  8/30/2023     I met with patient to discuss role of case management / discharge planning / team conference.   Patient is a  78 y.o. male transferred from [facility].    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    Risk for falls 02/14/2023    Hemorrhoid 03/18/2022    Gastroesophageal reflux disease without esophagitis 03/18/2022    Body mass index less than 19, adult 07/30/2020    Depression 07/30/2020    Coronary artery disease involving native coronary artery of native heart without angina pectoris 03/13/2020    Congestive heart failure (HCC) 03/13/2020    Pain of lumbar spine 03/13/2020    Benign prostatic hyperplasia 02/28/2020    Cataract, bilateral 02/28/2020    Eczema 02/28/2020    Inguinal hernia, left 02/28/2020    Swallowing problem 02/28/2020    History of cardioembolic cerebrovascular accident (CVA) 11/28/2019    Decreased mobility 10/15/2019    Oropharyngeal dysphagia 10/15/2019    Vision changes 09/17/2019    Constipation 08/20/2019    Seborrheic dermatitis of scalp 06/04/2019    Chronic rhinitis 06/04/2019    Neuropathy 10/05/2017    Candidal intertrigo 10/05/2017    Irritable bowel disease 08/17/2017    Pure hypercholesterolemia 08/17/2017    Hypertension 08/17/2017    GLENYS (generalized anxiety disorder) 07/30/2017    Hearing loss 07/29/2017    DNR (do not resuscitate) 07/28/2017    History of coronary artery stent placement 02/28/2011     Prior Living Situation:  Housing / Facility: Independent Living Facility  Lives with - Patient's Self Care Capacity: Alone and Able to Care  For Self    Prior Level of Function:  Medication Management: Independent  Finances: Independent  Home Management: Requires Assist  Shopping: Requires Assist  Prior Level Of Mobility: Independent With Device in Community, Independent Without Device in Home  Driving / Transportation: Relatives / Others Provide Transportation (ILF provides transport)    Support Systems:  Primary : Troy Nickerson-son: [phone] or Denae Nickerson (relation?): [phone]         Previous Services Utilized:   Equipment Owned: Single Point Cane  Prior Services: Housekeeping / Homemaker Services    Other Information:  Occupation (Pre-Hospital Vocational): Retired Due To Age     Primary Payor Source: Medicare A, Medicare B  Secondary Payor Source: Other (Comments)  Primary Care Practitioner : DIEGO Ardon    Patient / Family Goal:  Patient / Family Goal: Gain mobility    Plan:  1. Continue to follow patient through hospitalization and provide discharge planning in collaboration with patient, family, physicians and ancillary services.     2. Utilize community resources to ensure a safe discharge.

## 2023-08-31 NOTE — THERAPY
Occupational Therapy   Initial Evaluation     Patient Name: Moe Nickerson  Age:  78 y.o., Sex:  male  Medical Record #: [id number]  Today's Date: 8/31/2023     Subjective    Pt seen 2x this day, and agreeable to both sessions. Initial eval and tx completed at 0700 for 60 min, and second session spent completing grooming and toileting portions of IRF-MARISA.      Objective       08/31/23 0701   OT Charge Group   Charges Yes   OT Self Care / ADL (Units) 1   OT Evaluation OT Evaluation High   OT Total Time Spent   OT Individual Total Time Spent (Mins) 60   Prior Living Situation   Prior Services Housekeeping / Homemaker Services;Other (Comments)  (Pt lives in [facility])   Housing / Facility 1 Story Apartment / Condo;Independent Living Facility   Steps Into Home 0   Steps In Home 0   Elevator Yes   Bathroom Set up Walk In Shower;Grab Bars  (very small and square, pt reports there may not be room for a shower chair.)   Equipment Owned Single Point Cane   Lives with - Patient's Self Care Capacity Alone and Able to Care For Self   Comments Pt lives at [facility] on the [address] in [facility], and has a son who lives in [place]   Prior Level of ADL Function   Self Feeding Independent   Grooming / Hygiene Independent   Bathing Independent   Dressing Independent   Toileting Independent   Prior Level of IADL Function   Medication Management Independent   Laundry Independent   Kitchen Mobility Independent   Finances Independent   Home Management Requires Assist   Shopping Requires Assist   Prior Level Of Mobility Independent With Device in Community;Independent Without Device in Home   Driving / Transportation Relatives / Others Provide Transportation  ([facility] provides transport)   Occupation (Pre-Hospital Vocational) Retired Due To Age   Prior Functioning: Everyday Activities   Self Care Independent   Indoor Mobility (Ambulation) Independent   Stairs Unknown   Functional Cognition Independent   Prior Device Use None of the given options  "  Vitals   Pulse 77   Patient BP Position Supine   Blood Pressure  136/84   Respiration 17   Pulse Oximetry 96 %   O2 Delivery Device None - Room Air   Pain   Intervention Medication (see MAR);Emotional Support;Distraction;Nurse Notified;Repositioned;Rest   Pain 0 - 10 Group   Location Hip   Location Orientation Right   Pain Rating Scale (NPRS) 8   Description Aching;Sore   Comfort Goal Comfort with Movement;Perform Activity   Therapist Pain Assessment Post Activity Pain Same as Prior to Activity;Nurse Notified   Cognition    Cognition / Consciousness X   Speech/ Communication Delayed Responses;Expressive Aphasia   Level of Consciousness Alert   Comments Due to prior CVA pt remains with aphasia. Motivated and pleasant   ABS (Agitated Behavior Scale)   Agitated Behavior Scale Performed No   Cognitive Pattern Assessment   Cognitive Pattern Assessment Used BIMS   Brief Interview for Mental Status (BIMS)   Repetition of Three Words (First Attempt) 2   Temporal Orientation: Year Correct   Temporal Orientation: Month Accurate within 5 days   Temporal Orientation: Day Correct   Recall: \"Sock\" Yes, no cue required   Recall: \"Blue\" Yes, no cue required   Recall: \"Bed\" Yes, no cue required   BIMS Summary Score 14   Confusion Assessment Method (CAM)   Is there evidence of an acute change in mental status from the patient's baseline? No   Inattention Behavior not present   Disorganized thinking Behavior not present   Altered level of consciousness Behavior not present   Vision Screen   Vision Not tested   Passive ROM Upper Body   Passive ROM Upper Body Not Tested   Comments BUES WFL in function.   Active ROM Upper Body   Active ROM Upper Body  Not Tested   Dominant Hand Right   Comments BUES WFL in function.   Strength Upper Body   Upper Body Strength  Not Tested   Comments BUES WFL in function.   Sensation Upper Body   Upper Extremity Sensation  WDL   Comments BUES, light touch intact   Upper Body Muscle Tone   Upper Body " Muscle Tone  WDL   Comments BUES   Balance Assessment   Sitting Balance (Static) Good   Sitting Balance (Dynamic) Good   Standing Balance (Static) Poor   Standing Balance (Dynamic) Poor   Weight Shift Sitting Good   Weight Shift Standing Poor   Comments As supported by GB or FWW   Bed Mobility    Supine to Sit Minimal Assist   Sit to Stand Minimal Assist   Coordination Upper Body   Coordination WDL   Comments BUES   Eating   Assistance Needed Independent   CARE Score - Eating 6   Eating Discharge Goal   Discharge Goal 6   Oral Hygiene   Assistance Needed Set-up / clean-up;Supervision;Verbal cues   CARE Score - Oral Hygiene 4   Oral Hygiene Discharge Goal   Discharge Goal 6   Shower/Bathe Self   Assistance Needed Physical assistance   Physical Assistance Level 51%-75%   CARE Score - Shower/Bathe Self 2   Shower/Bathe Self Discharge Goal   Discharge Goal 6   Upper Body Dressing   Assistance Needed Set-up / clean-up   CARE Score - Upper Body Dressing 5   Upper Body Dressing Discharge Goal   Discharge Goal 6   Lower Body Dressing   Assistance Needed Physical assistance   Physical Assistance Level 76% or more   CARE Score - Lower Body Dressing 2   Lower Body Dressing Discharge Goal   Discharge Goal 6   Putting On/Taking Off Footwear   Assistance Needed Physical assistance   Physical Assistance Level Total assistance   CARE Score - Putting On/Taking Off Footwear 1   Putting On/Taking Off Footwear Discharge Goal   Discharge Goal 6   Toileting Hygiene   Assistance Needed Physical assistance   Physical Assistance Level 25% or less   CARE Score - Toileting Hygiene 3   Toileting Hygiene Discharge Goal   Discharge Goal 6   Toilet Transfer   Assistance Needed Physical assistance   Physical Assistance Level 25% or less   CARE Score - Toilet Transfer 3   Toilet Transfer Discharge Goal   Discharge Goal 6   Hearing, Speech, and Vision   Ability to Hear Adequate   Ability to See in Adequate Light Adequate   Expression of Ideas and  Wants Without difficulty   Understanding Verbal and Non-Verbal Content Understands   Functional Level of Assist   Eating Independent   Grooming Supervision;Seated   Grooming Description Increased time;Seated in wheelchair at sink;Set-up of equipment;Supervision for safety;Verbal cueing  (Vc for hip precautions during oral care )   Bathing Moderate Assist   Bathing Description Hand held shower;Tub bench;Assit with back;Assit wtih lower extremities;Assit with perineal;Increased time;Initial preparation for task;Set-up of equipment;Supervision for safety;Verbal cueing  (Assist with knee to toe and min A to stand for rear. )   Upper Body Dressing Supervision   Upper Body Dressing Description Set-up of equipment;Increased time   Lower Body Dressing Maximal Assist   Lower Body Dressing Description Grab bar;Assist with threading into pant leg;Increased time;Set-up of equipment;Supervision for safety;Initial preparation for task;Verbal cueing   Toileting Minimal Assist   Toileting Description Assist for standing balance;Grab bar;Increased time;Set-up of equipment;Supervision for safety;Verbal cueing   Bed, Chair, Wheelchair Transfer Minimal Assist   Bed Chair Wheelchair Transfer Description Increased time;Initial preparation for task;Set-up of equipment;Supervision for safety;Verbal cueing  (stand step HHA)   Toilet Transfers Minimal Assist   Toilet Transfer Description Grab bar;Supervision for safety;Verbal cueing   Tub / Shower Transfers Minimal Assist   Tub Shower Transfer Description Grab bar;Shower bench;Supervision for safety;Set-up of equipment;Verbal cueing   Comprehension Independent   Expression Independent   Problem Solving Minimal Assist   Problem Solving Description Verbal cueing;Supervision;Seat belt;Increased time   Memory Supervision   Memory Description Verbal cueing;Supervision   Problem List   Problem List Decreased Homemaking Skills;Decreased Active Daily Living Skills;Decreased Upper Extremity Strength  Left;Decreased Upper Extremity Strength Right;Decreased Functional Mobility;Decreased Activity Tolerance;Impaired Postural Control / Balance;Limited Knowledge of Post Op Precautions   Precautions   Precautions Fall Risk;Posterior Hip Precautions;Weight Bearing As Tolerated Right Lower Extremity   Comments Aphasia   Current Discharge Plan   Current Discharge Plan Return to Prior Living Situation   Benefit    Therapy Benefit Patient Would Benefit from Inpatient Rehab Occupational Therapy to Maximize Humphreys with ADLs, IADLs and Functional Mobility.   Interdisciplinary Plan of Care Collaboration   IDT Collaboration with  Nursing;Physical Therapist   Patient Position at End of Therapy Seated;Chair Alarm On;Self Releasing Lap Belt Applied  (Pt left seated in dining room for breakfats.)   Collaboration Comments CLOF, POC   Equipment Needs   Assistive Device / DME Parallel Bars;Front-Wheel Walker;Wheelchair;Tub Transfer Bench;Raised Toilet Seat;Grab Bars In Shower / Tub;Grab Bars By Toilet   Adaptive Equipment Reacher;Sock Aide;Dressing Stick;Long Handled Shoe Horn;Elastic Shoe Laces;Long Handled Sponge;Leg    Strengths & Barriers   Strengths Able to follow instructions;Alert and oriented;Independent prior level of function;Making steady progress towards goals;Manages pain appropriately;Motivated for self care and independence;Pleasant and cooperative;Supportive family;Willingly participates in therapeutic activities   Barriers Aphasia expressive;Decreased endurance;Fatigue;Generalized weakness;Orthostatic hypotension;Impaired balance;Impaired carryover of learning;Impaired insight/denial of deficits;Limited mobility;Pain   Occupational Therapist Assigned   Assigned OT / Treatment Time / Comments ERVIN, 30/60       Patient education: reviewed OT plan of care, rehab expectations, goal setting, ADL needs, orientation to schedule, role of OT in recovery, fall risk and use of call light. Also discussed posterior hip  precautions and WBAT.       Assessment  Patient is 78 y.o. Male with a diagnosis of femur fracture s/p unilateral hip replacement.  Additional factors influencing patient status / progress (ie: cognitive factors, co-morbidities, social support, etc): Per Dr. Vicente's H&P - Pt with a past medical history of CHF, HTN, and prior stroke;  who presented on 8/28/2023 12:57 AM with right hip pain after GLF. Per documentation, patient was attempting to get out of bed when he tripped and fell. He hit his head on the floor but did not have LOC. Upon eval in the ED CT head and C spine were negative for acute findings. Xray right femur showed an impacted right subcapital femoral neck fracture. Ortho was consulted, and patient was taken to the OR on 8/28 for open treatment of the right femoral neck fracture with total hip arthoplasty performed by Dr. Mei. Post op patient is WBAT RLE, with posterior hip precautions. Post op, patient has ABLA and leukocytosis. Patient has been able to participate with therapies, patient has had some orthostatic hypotension with mobility with nursing. .     OT evaluation performed today; functional performance at today's assessment is as above. During evaluation pt limited by fatigue and 8/10 pain. He moves slowly, possibly due to fear of pain and falling, or previous CVA with delayed processing. Pt presents to rehab below his normal baseline Ind level, currently functioning between Max A-SBA for ADLs, and Min A for functional transfers. Pt is limited by decreased strength, decreased balance, decreased endurance, pain, cognitive deficits, problem solving, limited knowledge of post-op precautions, and decreased LB strength. Pt lives in a HOUSING FACILITY: 1 Story Apartment/[facility] with 0STE and son who can assist intermittently. Pt will benefit from ongoing care from this skilled interdisciplinary high intensity rehabilitation program to address the aforementioned deficits in order to maximize  ind with ADLs, IADLs, and household/community mobility while reducing burden of care, supporting a safe return ILF upon DC.      Plan  Recommend Occupational Therapy  minutes per day 5-7 days per week for 10-14 days for the following treatments:  OT Group Therapy, OT Self Care/ADL, OT Cognitive Skill Dev, OT Community Reintegration, OT Manual Ther Technique, OT Neuro Re-Ed/Balance, OT Therapeutic Activity, OT Evaluation, and OT Therapeutic Exercise.    Passport items to be completed:  Perform bathroom transfers, complete dressing, complete feeding, get ready for the day, prepare a simple meal, participate in household tasks, adapt home for safety needs, demonstrate home exercise program, complete caregiver training     Goals:  Long term and short term goals have been discussed with patient and they are in agreement.    Occupational Therapy Goals (Active)       Problem: Bathing       Dates: Start:  08/31/23         Goal: STG-Within one week, patient will bathe at Min A level using AE/DME PRN.       Dates: Start:  08/31/23               Problem: Dressing       Dates: Start:  08/31/23         Goal: STG-Within one week, patient will dress LB at Min A level using AE/DME PRN.       Dates: Start:  08/31/23               Problem: Functional Transfers       Dates: Start:  08/31/23         Goal: STG-Within one week, patient will transfer to step in shower at Min A level using AE/DME PRN.       Dates: Start:  08/31/23               Problem: IADL's       Dates: Start:  08/31/23         Goal: STG-Within one week, patient will access kitchen area at SBA level using AE/DME PRN.       Dates: Start:  08/31/23               Problem: OT Long Term Goals       Dates: Start:  08/31/23         Goal: LTG-By discharge, patient will complete basic self care tasks at SPV-Mod I level using AE/DME PRN.       Dates: Start:  08/31/23            Goal: LTG-By discharge, patient will perform bathroom transfers at SPV-Mod I level using AE/DME  PRN.       Dates: Start:  08/31/23            Goal: LTG-By discharge, patient will complete basic home management at SPV-Mod I level using AE/DME PRN.       Dates: Start:  08/31/23               Problem: Toileting       Dates: Start:  08/31/23         Goal: STG-Within one week, patient will complete toileting tasks at Min A level using AE/DME PRN.       Dates: Start:  08/31/23

## 2023-08-31 NOTE — PROGRESS NOTES
NURSING DAILY NOTE    Name: Moe Nickerson   Date of Admission: 8/30/2023   Admitting Diagnosis: History of femur fracture  Attending Physician: Kamran Vicente M.d.  Allergies: Amlodipine, Cortisone, and Morphine    Safety  Patient Assist     Patient Precautions     Precaution Comments     Bed Transfer Status     Toilet Transfer Status      Assistive Devices     Oxygen  None - Room Air  Diet/Therapeutic Dining  Current Diet Order   Procedures    Diet Order Diet: Cardiac     Pill Administration  whole  Agitated Behavioral Scale     ABS Level of Severity       Fall Risk  Has the patient had a fall this admission?   No  Shayna Bunn Fall Risk Scoring  16, HIGH RISK  Fall Risk Safety Measures  bed alarm, chair alarm, and poor balance    Vitals  Temperature: 36.6 °C (97.8 °F)  Temp src: Oral  Pulse: 71  Respiration: 16  Blood Pressure : (!) 144/75  Blood Pressure MAP (Calculated): 98 MM HG  BP Location: Right, Upper Arm  Patient BP Position: Supine     Oxygen  Pulse Oximetry: 96 %  O2 Delivery Device: None - Room Air    Bowel and Bladder  Last Bowel Movement   (before 28th - patient has no BM since hospitalized per staff - pt states he had a soft BM yesterday -)  Stool Type     Bowel Device     Continent  Bladder: Continent void   Bowel: Continent movement  Bladder Function     Genitourinary Assessment   Bladder Assessment (WDL):  Within Defined Limits  Bladder Device: Urinal (300)    Skin  Manas Score   16  Sensory Interventions   Bed Types: Standard/Trauma Mattress  Skin Preventative Measures: Waffle Overlay  Moisture Interventions         Pain  Pain Rating Scale  0 - No Pain  Pain Location  Hip  Pain Location Orientation  Right  Pain Interventions   Medication (see MAR)    ADLs    Bathing      Linen Change      Personal Hygiene     Chlorhexidine Bath      Oral Care     Teeth/Dentures     Shave     Nutrition Percentage Eaten     Environmental Precautions      Patient Turns/Positioning  Patient Turns Self from Side to Side  Patient Turns Assistance/Tolerance     Bed Positions  Bed Controls On  Head of Bed Elevated  Self regulated      Psychosocial/Neurologic Assessment  Psychosocial Assessment  Psychosocial (WDL):  Within Defined Limits  Neurologic Assessment  Level of Consciousness: Alert  EENT (WDL):  (glasses on person)    Cardio/Pulmonary Assessment  Edema      Respiratory Breath Sounds  RUL Breath Sounds: Clear  RML Breath Sounds: Clear  RLL Breath Sounds: Clear  IDRIS Breath Sounds: Clear  LLL Breath Sounds: Clear  Cardiac Assessment   Cardiac (WDL):  WDL Except (HFrEF)

## 2023-08-31 NOTE — PROGRESS NOTES
Patient care assumed. Report received from [facility] BHUMIKA Thomas. Patient is alert and calm, resting in bed. Call light and bedside table within reach. Will continue to monitor.

## 2023-08-31 NOTE — PROGRESS NOTES
NURSING DAILY NOTE    Name: Moe Nickerson   Date of Admission: 8/30/2023   Admitting Diagnosis: History of femur fracture  Attending Physician: Kamran Vicente M.d.  Allergies: Amlodipine, Cortisone, and Morphine    Safety  Patient Assist     Patient Precautions     Precaution Comments     Bed Transfer Status     Toilet Transfer Status      Assistive Devices     Oxygen  None - Room Air  Diet/Therapeutic Dining  Current Diet Order   Procedures    Diet Order Diet: Cardiac     Pill Administration  whole  Agitated Behavioral Scale     ABS Level of Severity       Fall Risk  Has the patient had a fall this admission?   No  Shayna Bunn Fall Risk Scoring  15, HIGH RISK  Fall Risk Safety Measures  bed alarm and chair alarm    Vitals  Temperature: 36.8 °C (98.2 °F)  Temp src: Oral  Pulse: 83  Respiration: 16  Blood Pressure : (!) 154/82  Blood Pressure MAP (Calculated): 106 MM HG  BP Location: Right, Upper Arm  Patient BP Position: Supine     Oxygen  Pulse Oximetry: 97 %  O2 Delivery Device: None - Room Air    Bowel and Bladder  Last Bowel Movement   (before 28th - patient has no BM since hospitalized per staff - pt states he had a soft BM yesterday -)  Stool Type     Bowel Device     Continent  Bladder: Continent void   Bowel: Continent movement  Bladder Function     Genitourinary Assessment   Bladder Assessment (WDL):  Within Defined Limits    Skin  Manas Score   17  Sensory Interventions   Bed Types: Standard/Trauma Mattress  Skin Preventative Measures: Waffle Overlay  Moisture Interventions         Pain  Pain Rating Scale  0 - No Pain  Pain Location     Pain Location Orientation     Pain Interventions        ADLs    Bathing      Linen Change      Personal Hygiene     Chlorhexidine Bath      Oral Care     Teeth/Dentures     Shave     Nutrition Percentage Eaten     Environmental Precautions     Patient Turns/Positioning  Patient Turns Self from Side to  Side  Patient Turns Assistance/Tolerance     Bed Positions     Head of Bed Elevated         Psychosocial/Neurologic Assessment  Psychosocial Assessment  Psychosocial (WDL):  Within Defined Limits  Neurologic Assessment  Level of Consciousness: Alert  EENT (WDL):  (glasses on person)    Cardio/Pulmonary Assessment  Edema      Respiratory Breath Sounds  RUL Breath Sounds: Clear  RML Breath Sounds: Clear  RLL Breath Sounds: Clear  IDRIS Breath Sounds: Clear  LLL Breath Sounds: Clear  Cardiac Assessment   Cardiac (WDL):  WDL Except (HFrEF)

## 2023-08-31 NOTE — PROGRESS NOTES
"  Physical Medicine & Rehabilitation Progress Note    Encounter Date: 8/31/2023    Chief Complaint: Weakness    Interval Events (Subjective):  Seen in chair. No concerns overnight. No concerns per RN or therapy    Objective:  VITAL SIGNS: /84   Pulse 77   Temp 36.6 °C (97.8 °F) (Oral)   Resp 17   Ht 1.778 m (5' 10\")   Wt 55.1 kg (121 lb 8 oz)   SpO2 96%   BMI 17.43 kg/m²   Gen: No acute distress, well developed well nourished adult  HEENT: Normal Cephalic Atraumatic, Normal conjunctiva.   CV: warm extremities, well perfused, no edema  Resp: symmetric chest rise, breathing comfortably on room air  Abd: Soft, Non distended  Extremities: normal bulk, no atrophy  Skin: no visible rashes or lesions.   Neuro: alert, awake  Psych: Mood and affect appropriate and congruent    Laboratory Values:  Recent Results (from the past 72 hour(s))   CBC WITHOUT DIFFERENTIAL    Collection Time: 08/29/23 12:47 AM   Result Value Ref Range    WBC 14.5 (H) 4.8 - 10.8 K/uL    RBC 3.96 (L) 4.70 - 6.10 M/uL    Hemoglobin 12.7 (L) 14.0 - 18.0 g/dL    Hematocrit 37.7 (L) 42.0 - 52.0 %    MCV 95.2 81.4 - 97.8 fL    MCH 32.1 27.0 - 33.0 pg    MCHC 33.7 32.3 - 36.5 g/dL    RDW 45.6 35.9 - 50.0 fL    Platelet Count 139 (L) 164 - 446 K/uL    MPV 11.9 9.0 - 12.9 fL   Basic Metabolic Panel    Collection Time: 08/29/23 12:47 AM   Result Value Ref Range    Sodium 139 135 - 145 mmol/L    Potassium 4.4 3.6 - 5.5 mmol/L    Chloride 106 96 - 112 mmol/L    Co2 24 20 - 33 mmol/L    Glucose 143 (H) 65 - 99 mg/dL    Bun 24 (H) 8 - 22 mg/dL    Creatinine 1.12 0.50 - 1.40 mg/dL    Calcium 8.4 (L) 8.5 - 10.5 mg/dL    Anion Gap 9.0 7.0 - 16.0   MAGNESIUM    Collection Time: 08/29/23 12:47 AM   Result Value Ref Range    Magnesium 1.6 1.5 - 2.5 mg/dL   PHOSPHORUS    Collection Time: 08/29/23 12:47 AM   Result Value Ref Range    Phosphorus 3.2 2.5 - 4.5 mg/dL   ESTIMATED GFR    Collection Time: 08/29/23 12:47 AM   Result Value Ref Range    GFR (CKD-EPI) " 66 >60 mL/min/1.73 m 2   CBC WITH DIFFERENTIAL    Collection Time: 23 12:25 AM   Result Value Ref Range    WBC 11.6 (H) 4.8 - 10.8 K/uL    RBC 3.87 (L) 4.70 - 6.10 M/uL    Hemoglobin 12.3 (L) 14.0 - 18.0 g/dL    Hematocrit 37.1 (L) 42.0 - 52.0 %    MCV 95.9 81.4 - 97.8 fL    MCH 31.8 27.0 - 33.0 pg    MCHC 33.2 32.3 - 36.5 g/dL    RDW 45.8 35.9 - 50.0 fL    Platelet Count 125 (L) 164 - 446 K/uL    MPV 12.8 9.0 - 12.9 fL    Neutrophils-Polys 81.80 (H) 44.00 - 72.00 %    Lymphocytes 5.40 (L) 22.00 - 41.00 %    Monocytes 9.20 0.00 - 13.40 %    Eosinophils 2.90 0.00 - 6.90 %    Basophils 0.30 0.00 - 1.80 %    Immature Granulocytes 0.40 0.00 - 0.90 %    Nucleated RBC 0.00 0.00 - 0.20 /100 WBC    Neutrophils (Absolute) 9.52 (H) 1.82 - 7.42 K/uL    Lymphs (Absolute) 0.63 (L) 1.00 - 4.80 K/uL    Monos (Absolute) 1.07 (H) 0.00 - 0.85 K/uL    Eos (Absolute) 0.34 0.00 - 0.51 K/uL    Baso (Absolute) 0.03 0.00 - 0.12 K/uL    Immature Granulocytes (abs) 0.05 0.00 - 0.11 K/uL    NRBC (Absolute) 0.00 K/uL   Basic Metabolic Panel    Collection Time: 23 12:25 AM   Result Value Ref Range    Sodium 138 135 - 145 mmol/L    Potassium 4.3 3.6 - 5.5 mmol/L    Chloride 107 96 - 112 mmol/L    Co2 23 20 - 33 mmol/L    Glucose 118 (H) 65 - 99 mg/dL    Bun 21 8 - 22 mg/dL    Creatinine 0.97 0.50 - 1.40 mg/dL    Calcium 8.2 (L) 8.5 - 10.5 mg/dL    Anion Gap 8.0 7.0 - 16.0   MAGNESIUM    Collection Time: 23 12:25 AM   Result Value Ref Range    Magnesium 1.7 1.5 - 2.5 mg/dL   ESTIMATED GFR    Collection Time: 23 12:25 AM   Result Value Ref Range    GFR (CKD-EPI) 79 >60 mL/min/1.73 m 2   EKG    Collection Time: 23  6:51 PM   Result Value Ref Range    Report       [facility]    Test Date:  2023  Pt Name:    DARBY RODRIGUEZ                Department: [facility]  MRN:        [id number]                      Room:       [facility]  Gender:     Male                         Technician: [id number] WT  :        1945                    Requested By:MARÍA SMITH  Order #:    [id number]                    Reading MD:    Measurements  Intervals                                Axis  Rate:       65                           P:          72  ND:         153                          QRS:        -75  QRSD:       148                          T:          96  QT:         418  QTc:        435    Interpretive Statements  Sinus rhythm  Left atrial enlargement  RBBB and LAFB  LVH with secondary repolarization abnormality  Compared to ECG 08/28/2023 02:20:39  Left anterior fascicular block now present  Right bundle-branch block now present  Left ventricular hypertrophy now present  Early repolarization now present  Left bundle-branch block no longer present     CBC with Differential    Collection Time: 08/31/23  5:42 AM   Result Value Ref Range    WBC 9.4 4.8 - 10.8 K/uL    RBC 4.05 (L) 4.70 - 6.10 M/uL    Hemoglobin 13.3 (L) 14.0 - 18.0 g/dL    Hematocrit 39.4 (L) 42.0 - 52.0 %    MCV 97.3 81.4 - 97.8 fL    MCH 32.8 27.0 - 33.0 pg    MCHC 33.8 32.3 - 36.5 g/dL    RDW 45.6 35.9 - 50.0 fL    Platelet Count 134 (L) 164 - 446 K/uL    MPV 12.3 9.0 - 12.9 fL    Neutrophils-Polys 72.90 (H) 44.00 - 72.00 %    Lymphocytes 10.20 (L) 22.00 - 41.00 %    Monocytes 10.60 0.00 - 13.40 %    Eosinophils 5.90 0.00 - 6.90 %    Basophils 0.20 0.00 - 1.80 %    Immature Granulocytes 0.20 0.00 - 0.90 %    Nucleated RBC 0.00 0.00 - 0.20 /100 WBC    Neutrophils (Absolute) 6.86 1.82 - 7.42 K/uL    Lymphs (Absolute) 0.96 (L) 1.00 - 4.80 K/uL    Monos (Absolute) 1.00 (H) 0.00 - 0.85 K/uL    Eos (Absolute) 0.56 (H) 0.00 - 0.51 K/uL    Baso (Absolute) 0.02 0.00 - 0.12 K/uL    Immature Granulocytes (abs) 0.02 0.00 - 0.11 K/uL    NRBC (Absolute) 0.00 K/uL   Comp Metabolic Panel (CMP)    Collection Time: 08/31/23  5:42 AM   Result Value Ref Range    Sodium 139 135 - 145 mmol/L    Potassium 4.1 3.6 - 5.5 mmol/L    Chloride 104 96 - 112 mmol/L    Co2 25 20 - 33 mmol/L    Anion Gap 10.0 7.0 -  16.0    Glucose 96 65 - 99 mg/dL    Bun 17 8 - 22 mg/dL    Creatinine 0.88 0.50 - 1.40 mg/dL    Calcium 8.6 8.5 - 10.5 mg/dL    Correct Calcium 9.2 8.5 - 10.5 mg/dL    AST(SGOT) 27 12 - 45 U/L    ALT(SGPT) 13 2 - 50 U/L    Alkaline Phosphatase 68 30 - 99 U/L    Total Bilirubin 1.3 0.1 - 1.5 mg/dL    Albumin 3.3 3.2 - 4.9 g/dL    Total Protein 5.9 (L) 6.0 - 8.2 g/dL    Globulin 2.6 1.9 - 3.5 g/dL    A-G Ratio 1.3 g/dL   HEMOGLOBIN A1C    Collection Time: 08/31/23  5:42 AM   Result Value Ref Range    Glycohemoglobin 4.8 4.0 - 5.6 %    Est Avg Glucose 91 mg/dL   Magnesium    Collection Time: 08/31/23  5:42 AM   Result Value Ref Range    Magnesium 1.9 1.5 - 2.5 mg/dL   TSH with Reflex to FT4    Collection Time: 08/31/23  5:42 AM   Result Value Ref Range    TSH 2.670 0.380 - 5.330 uIU/mL   ESTIMATED GFR    Collection Time: 08/31/23  5:42 AM   Result Value Ref Range    GFR (CKD-EPI) 87 >60 mL/min/1.73 m 2       Medications:  Scheduled Medications   Medication Dose Frequency    Pharmacy Consult Request  1 Each PHARMACY TO DOSE    senna-docusate  2 Tablet BID    clopidogrel  75 mg DAILY    lisinopril  20 mg Q DAY    simvastatin  10 mg Q EVENING    apixaban  5 mg BID    acetaminophen  650 mg TID    omeprazole  20 mg DAILY     PRN medications: Respiratory Therapy Consult, hydrALAZINE, senna-docusate **AND** polyethylene glycol/lytes **AND** magnesium hydroxide **AND** bisacodyl, ondansetron **OR** ondansetron, sodium chloride, oxyCODONE immediate-release **OR** oxyCODONE immediate-release **OR** [DISCONTINUED] HYDROmorphone, nitroglycerin    Diet:  Current Diet Order   Procedures    Diet Order Diet: Cardiac       Medical Decision Making and Plan:     Femoral neck fracture   - sustained during GLF   - images showed an impacted right subcapital femoral neck fracture  -s/p  open treatment of right femoral neck fracture with total hip arthoplasty by Dr. Mei  - posterior hip precautions, WBAT RLE   - continue with PT/OT  inpatient     Chronic mural thrombus   Heart failure with reduced EF   - updated ECHO obtained showed EF of 35%, and noted mural thrombus   - Eliquis 5mg BID started 8/30 with Dr Mei approval  -Hx of hematuria with Eliquis,   -no bleeding to date, continue eliquis     Prior CVA  - minimal deficits, no lateralizing weakness, no known dysphagia but patient has soft-spoken/hypophonic voice  - continue statin, plavix       CAD with history of PCI   - statin and plavix at home  -EKG on admission  - continue     Neurogenic bladder:  - Timed voids with PVR q4H x3. If PVR > 400mL or if patient is unable to void, straight cath patient.     Neurogenic bowel:  -  Colace, Senna BID on admission  - Goal of 1BM/day.  -      Circadian Rhythm disorder:   Recommend lights on during the day/off at night, minimize nighttime interruptions as able.     Mood  - at risk of adjustment disorder, depression, and anxiety due to functional decline     ID:  - at risk for Urinary tract infection     Skin/Wounds:  - Pressure relief q2h while in bed. Close monitoring for signs of breakdown     Pain:  - Neuroceptic - On Tylenol prn, oxycodone. Continue medications     DVT prophylaxis:  Patient is on Eliquis.      GI prophylaxis:  On Prilosec 20mg daily      Medical Problem List:     HTN: Lisinopril 20mg daily continue medications        -Follow-up Dr Sigifredo farnsworth    ____________________________________    Kamran Vicente MD  Physical Medicine & Rehabilitation   Brain Injury Medicine   ____________________________________

## 2023-08-31 NOTE — CARE PLAN
The patient is Stable - Low risk of patient condition declining or worsening    Problem: Knowledge Deficit - Standard  Goal: Patient and family/care givers will demonstrate understanding of plan of care, disease process/condition, diagnostic tests and medications  Outcome: Progressing. Reviewed POC, all questions answered.        Problem: Fall Risk - Rehab  Goal: Patient will remain free from falls  Outcome: Progressing. Call light within reach, pt educated to use for assistance for safe transferring.        Shift Goals  Clinical Goals: Safety  Patient Goals: Education

## 2023-09-01 ENCOUNTER — APPOINTMENT (OUTPATIENT)
Dept: PHYSICAL THERAPY | Facility: REHABILITATION | Age: 78
DRG: 559 | End: 2023-09-01
Attending: PHYSICAL MEDICINE & REHABILITATION
Payer: MEDICARE

## 2023-09-01 ENCOUNTER — APPOINTMENT (OUTPATIENT)
Dept: OCCUPATIONAL THERAPY | Facility: REHABILITATION | Age: 78
DRG: 559 | End: 2023-09-01
Attending: PHYSICAL MEDICINE & REHABILITATION
Payer: MEDICARE

## 2023-09-01 PROCEDURE — 99232 SBSQ HOSP IP/OBS MODERATE 35: CPT | Performed by: PHYSICAL MEDICINE & REHABILITATION

## 2023-09-01 PROCEDURE — 97116 GAIT TRAINING THERAPY: CPT

## 2023-09-01 PROCEDURE — 700102 HCHG RX REV CODE 250 W/ 637 OVERRIDE(OP): Performed by: PHYSICAL MEDICINE & REHABILITATION

## 2023-09-01 PROCEDURE — A9270 NON-COVERED ITEM OR SERVICE: HCPCS | Performed by: PHYSICAL MEDICINE & REHABILITATION

## 2023-09-01 PROCEDURE — 97535 SELF CARE MNGMENT TRAINING: CPT

## 2023-09-01 PROCEDURE — 97530 THERAPEUTIC ACTIVITIES: CPT

## 2023-09-01 PROCEDURE — 97110 THERAPEUTIC EXERCISES: CPT

## 2023-09-01 PROCEDURE — 770010 HCHG ROOM/CARE - REHAB SEMI PRIVAT*

## 2023-09-01 RX ADMIN — OXYCODONE HYDROCHLORIDE 5 MG: 5 TABLET ORAL at 20:50

## 2023-09-01 RX ADMIN — OXYCODONE HYDROCHLORIDE 5 MG: 5 TABLET ORAL at 08:48

## 2023-09-01 RX ADMIN — SENNOSIDES AND DOCUSATE SODIUM 2 TABLET: 50; 8.6 TABLET ORAL at 08:48

## 2023-09-01 RX ADMIN — APIXABAN 5 MG: 5 TABLET, FILM COATED ORAL at 20:50

## 2023-09-01 RX ADMIN — OXYCODONE HYDROCHLORIDE 5 MG: 5 TABLET ORAL at 13:16

## 2023-09-01 RX ADMIN — CLOPIDOGREL BISULFATE 75 MG: 75 TABLET ORAL at 08:49

## 2023-09-01 RX ADMIN — OXYCODONE HYDROCHLORIDE 5 MG: 5 TABLET ORAL at 05:22

## 2023-09-01 RX ADMIN — ACETAMINOPHEN 650 MG: 325 TABLET ORAL at 08:49

## 2023-09-01 RX ADMIN — ACETAMINOPHEN 650 MG: 325 TABLET ORAL at 14:31

## 2023-09-01 RX ADMIN — APIXABAN 5 MG: 5 TABLET, FILM COATED ORAL at 08:49

## 2023-09-01 RX ADMIN — ACETAMINOPHEN 650 MG: 325 TABLET ORAL at 20:51

## 2023-09-01 RX ADMIN — SIMVASTATIN 10 MG: 20 TABLET, FILM COATED ORAL at 20:50

## 2023-09-01 RX ADMIN — OMEPRAZOLE 20 MG: 20 CAPSULE, DELAYED RELEASE ORAL at 08:48

## 2023-09-01 RX ADMIN — SENNOSIDES AND DOCUSATE SODIUM 2 TABLET: 50; 8.6 TABLET ORAL at 20:51

## 2023-09-01 RX ADMIN — LISINOPRIL 20 MG: 20 TABLET ORAL at 05:22

## 2023-09-01 ASSESSMENT — ACTIVITIES OF DAILY LIVING (ADL)
BED_CHAIR_WHEELCHAIR_TRANSFER_DESCRIPTION: ADAPTIVE EQUIPMENT;SET-UP OF EQUIPMENT;SUPERVISION FOR SAFETY;VERBAL CUEING
TOILET_TRANSFER_DESCRIPTION: GRAB BAR;ADAPTIVE EQUIPMENT;SET-UP OF EQUIPMENT;SUPERVISION FOR SAFETY;VERBAL CUEING

## 2023-09-01 ASSESSMENT — GAIT ASSESSMENTS
DEVIATION: ANTALGIC;DECREASED BASE OF SUPPORT;BRADYKINETIC;SHUFFLED GAIT
GAIT LEVEL OF ASSIST: STANDBY ASSIST
DISTANCE (FEET): 50
DISTANCE (FEET): 120
GAIT LEVEL OF ASSIST: CONTACT GUARD ASSIST
ASSISTIVE DEVICE: FRONT WHEEL WALKER
DEVIATION: ANTALGIC;DECREASED BASE OF SUPPORT;STEP TO;BRADYKINETIC
ASSISTIVE DEVICE: FRONT WHEEL WALKER

## 2023-09-01 NOTE — CARE PLAN
Problem: Fall Risk - Rehab  Goal: Patient will remain free from falls  Note: Patient remains free from falls this shift. Patient was educated on using the call light to prevent falls. Patients bed is in the lowest position. The patients belongings are placed in near proximity to the patient.       Problem: Pain - Standard  Goal: Alleviation of pain or a reduction in pain to the patient’s comfort goal  Flowsheets  Taken 9/1/2023 0848 by Monse Torrez R.N.  Pain Rating Scale (NPRS): 8     The patient is Stable - Low risk of patient condition declining or worsening

## 2023-09-01 NOTE — THERAPY
Physical Therapy   Daily Treatment     Patient Name: Moe Nickerson  Age:  78 y.o., Sex:  male  Medical Record #: [id number]  Today's Date: 9/1/2023     Precautions  Precautions: Fall Risk, Posterior Hip Precautions, Weight Bearing As Tolerated Right Lower Extremity  Comments: Aphasia    Subjective    Pt reporting increased soreness from increased activity levels today     Objective       09/01/23 1245   PT Charge Group   PT Gait Training (Units) 1   PT Therapeutic Exercise (Units) 3   PT Therapeutic Activities (Units) 1   PT Total Time Spent   PT Individual Total Time Spent (Mins) 75   Pain   Intervention Cold Pack;Repositioned   Pain 0 - 10 Group   Location Hip   Location Orientation Right   Pain Rating Scale (NPRS) 8   Description Sore   Therapist Pain Assessment Post Activity Pain Same as Prior to Activity;Nurse Notified   Gait Functional Level of Assist    Gait Level Of Assist Contact Guard Assist   Assistive Device Front Wheel Walker   Distance (Feet) 50   # of Times Distance was Traveled 2   Deviation Antalgic;Decreased Base Of Support;Step To;Bradykinetic   Transfer Functional Level of Assist   Bed, Chair, Wheelchair Transfer Standby Assist   Bed Chair Wheelchair Transfer Description Adaptive equipment;Set-up of equipment;Supervision for safety;Verbal cueing   Toilet Transfers Contact Guard Assist   Toilet Transfer Description Grab bar;Adaptive equipment;Set-up of equipment;Supervision for safety;Verbal cueing   Supine Lower Body Exercise   Bridges Two Legged;2 sets of 10   Short Arc Quad 2 sets of 10;Right   (2lb ankle weight)   Heel Slide 2 sets of 10;Right  (on powderboard)   Standing Lower Body Exercises   Hamstring Curl 1 set of 10;Right    Hip Flexion 1 set of 10;Right    Hip Extension 1 set of 10;Right    Hip Abduction 1 set of 10;Right    Heel Rise 1 set of 10;Bilateral   Comments B UE support // bars, seated rest between sets   Bed Mobility    Supine to Sit Minimal Assist   Sit to Supine Contact  Guard Assist   Sit to Stand Contact Guard Assist   Interdisciplinary Plan of Care Collaboration   IDT Collaboration with  Nursing   Patient Position at End of Therapy In Bed;Bed Alarm On;Call Light within Reach;Tray Table within Reach;Phone within Reach   Collaboration Comments pain med request     Assisted pt with toileting in urinal in standing and standing and standing hand hygiene CGA. Yanet for pants management and extra time  Swapped WC to 20H for improved positioning and decreased risk of dislocation sitting to low seat    Assessment    Pt transferred from WC to bed with FWW as good as SBA and verbal cueing for safety today. He benefits from demonstration and breaking commands into steps due to difficulty following multiple step commands.  Strengths: Able to follow instructions, Good insight into deficits/needs, Independent prior level of function, Making steady progress towards goals, Motivated for self care and independence, Pleasant and cooperative, Willingly participates in therapeutic activities  Barriers: Aphasia expressive, Decreased endurance, Fatigue, Impaired activity tolerance, Impaired balance, Generalized weakness, Pain    Plan    Gait FWW with goal of improved R LE weight acceptance, R LE therex, NuStep for endurance, bed mobility independence    Passport items to be completed:  Get in/out of bed safely, in/out of a vehicle, safely use mobility device, walk or wheel around home/community, navigate up and down stairs, show how to get up/down from the ground, ensure home is accessible, demonstrate HEP, complete caregiver training    Physical Therapy Problems (Active)       Problem: Mobility       Dates: Start:  08/31/23         Goal: STG-Within one week, patient will ambulate 150ft with SBA using LRAD       Dates: Start:  08/31/23            Goal: STG-Within one week, patient will ambulate up/down a curb with Yanet and LRAD       Dates: Start:  08/31/23               Problem: Mobility Transfers        Dates: Start:  08/31/23         Goal: STG-Within one week, patient will perform bed mobility Hector       Dates: Start:  08/31/23            Goal: STG-Within one week, patient will transfer bed to chair with SBA       Dates: Start:  08/31/23               Problem: PT-Long Term Goals       Dates: Start:  08/31/23         Goal: LTG-By discharge, patient will ambulate 200ft indoors and outdoors Hector with LRAD       Dates: Start:  08/31/23            Goal: LTG-By discharge, patient will transfer one surface to another Hector       Dates: Start:  08/31/23            Goal: LTG-By discharge, patient will perform home exercise program independently       Dates: Start:  08/31/23            Goal: LTG-By discharge, patient will transfer in/out of a car with set up assist       Dates: Start:  08/31/23

## 2023-09-01 NOTE — CARE PLAN
"The patient is Stable - Low risk of patient condition declining or worsening    Shift Goals  Clinical Goals: safety  Patient Goals: safety    Progress made toward(s) clinical / shift goals:      Problem: Fall Risk - Rehab  Goal: Patient will remain free from falls  Outcome: Progressing  Note: Shayna Bunn Fall risk Assessment Score: 16    High fall risk Interventions   - Alarming seatbelt  - Bed and strip alarm   - Yellow sign by the door   - Yellow wrist band \"Fall risk\"  - Room near to the nurse station  - Do not leave patient unattended in the bathroom  - Fall risk education provided       Problem: Pain - Standard  Goal: Alleviation of pain or a reduction in pain to the patient’s comfort goal  Outcome: Progressing  Note: Roxicodone 5mg given PRN per MAR for c/o right hip pain with adequate relief. Pt sleeps good. Will continue to monitor.       Patient is not progressing towards the following goals:      "

## 2023-09-01 NOTE — THERAPY
"Occupational Therapy  Daily Treatment     Patient Name: Moe Nickerson  Age:  78 y.o., Sex:  male  Medical Record #: [id number]  Today's Date: 9/1/2023     Precautions  Precautions: Fall Risk, Posterior Hip Precautions, Weight Bearing As Tolerated Right Lower Extremity  Comments: Aphasia         Subjective    \"They had me do PT early this morning, and I'm so sore from all the walking.\"     Objective       09/01/23 0831   OT Charge Group   OT Self Care / ADL (Units) 4   OT Total Time Spent   OT Individual Total Time Spent (Mins) 60   Pain   Intervention Medication (see MAR);Repositioned;Rest;Nurse Notified   Pain 0 - 10 Group   Location Hip   Location Orientation Right   Pain Rating Scale (NPRS) 8   Description Aching;Sore   Comfort Goal Comfort with Movement;Perform Activity   Therapist Pain Assessment Post Activity Pain Same as Prior to Activity;Nurse Notified   Cognition    Cognition / Consciousness X   Speech/ Communication Expressive Aphasia;Delayed Responses   Level of Consciousness Alert   Safety Awareness Impaired   New Learning Impaired   Sequencing Impaired   Functional Level of Assist   Grooming Supervision;Seated   Grooming Description Increased time;Seated in wheelchair at sink;Set-up of equipment;Supervision for safety;Verbal cueing  (Vc for hip precautions during oral care)   Lower Body Dressing Moderate Assist   Lower Body Dressing Description Grab bar;Reacher;Sock aid;Assist with threading into pant leg;Increased time;Initial preparation for task;Set-up of equipment;Supervision for safety;Verbal cueing  (Initiated AE training with [facility] assist required for all sequencing with reacher or dressing stick during don/doff.)   Toileting Minimal Assist   Toileting Description Assist for standing balance;Grab bar;Increased time;Supervision for safety;Verbal cueing;Set-up of equipment  (Min A for standing balance during urination with cues for maintaining UB support.)   Interdisciplinary Plan of Care " Collaboration   Patient Position at End of Therapy Seated;Chair Alarm On;Self Releasing Lap Belt Applied;Tray Table within Reach;Call Light within Reach;Phone within Reach         Assessment    Significant time spent educating pt for use of AE for LBD, with multiple trials attempted, but consistent [employer] required due to delayed processing. MD notified with SLP referral made for cognitive screening, to improve pt's ind with self-care skills.   Strengths: Able to follow instructions, Alert and oriented, Independent prior level of function, Making steady progress towards goals, Manages pain appropriately, Motivated for self care and independence, Pleasant and cooperative, Supportive family, Willingly participates in therapeutic activities  Barriers: Aphasia expressive, Decreased endurance, Fatigue, Generalized weakness, Orthostatic hypotension, Impaired balance, Impaired carryover of learning, Impaired insight/denial of deficits, Limited mobility, Pain    Plan    ADLs with posterior hip precautions, LBD with AE block practice, standing balance/tolerance, BUE strengthening    Passport items to be completed:  Perform bathroom transfers, complete dressing, complete feeding, get ready for the day, prepare a simple meal, participate in household tasks, adapt home for safety needs, demonstrate home exercise program, complete caregiver training     Occupational Therapy Goals (Active)       Problem: Bathing       Dates: Start:  08/31/23         Goal: STG-Within one week, patient will bathe at Min A level using AE/DME PRN.       Dates: Start:  08/31/23               Problem: Dressing       Dates: Start:  08/31/23         Goal: STG-Within one week, patient will dress LB at Min A level using AE/DME PRN.       Dates: Start:  08/31/23               Problem: Functional Transfers       Dates: Start:  08/31/23         Goal: STG-Within one week, patient will transfer to step in shower at Min A level using AE/DME PRN.       Dates: Start:   08/31/23               Problem: IADL's       Dates: Start:  08/31/23         Goal: STG-Within one week, patient will access kitchen area at SBA level using AE/DME PRN.       Dates: Start:  08/31/23               Problem: OT Long Term Goals       Dates: Start:  08/31/23         Goal: LTG-By discharge, patient will complete basic self care tasks at SPV-Mod I level using AE/DME PRN.       Dates: Start:  08/31/23            Goal: LTG-By discharge, patient will perform bathroom transfers at SPV-Mod I level using AE/DME PRN.       Dates: Start:  08/31/23            Goal: LTG-By discharge, patient will complete basic home management at SPV-Mod I level using AE/DME PRN.       Dates: Start:  08/31/23               Problem: Toileting       Dates: Start:  08/31/23         Goal: STG-Within one week, patient will complete toileting tasks at Min A level using AE/DME PRN.       Dates: Start:  08/31/23

## 2023-09-01 NOTE — THERAPY
Physical Therapy   Daily Treatment     Patient Name: Moe Nickerson  Age:  78 y.o., Sex:  male  Medical Record #: [id number]  Today's Date: 9/1/2023     Precautions  Precautions: (P) Fall Risk, Posterior Hip Precautions, Weight Bearing As Tolerated Right Lower Extremity  Comments: (P) Aphasia    Subjective    Pt asleep in bed at start of session. Upon awakening he was agreeable to therapy.      Objective       09/01/23 0700   PT Charge Group   Charges Yes   PT Gait Training (Units) 2   PT Therapeutic Exercise (Units) 1   PT Therapeutic Activities (Units) 1   PT Total Time Spent   PT Individual Total Time Spent (Mins) 60   Precautions   Precautions Fall Risk;Posterior Hip Precautions;Weight Bearing As Tolerated Right Lower Extremity   Comments Aphasia   Pain 0 - 10 Group   Location Hip   Location Orientation Right   Pain Rating Scale (NPRS) 2   Gait Functional Level of Assist    Gait Level Of Assist Standby Assist   Assistive Device Front Wheel Walker   Distance (Feet) 120   # of Times Distance was Traveled 2   Deviation Antalgic;Decreased Base Of Support;Bradykinetic;Shuffled Gait   Transfer Functional Level of Assist   Bed, Chair, Wheelchair Transfer Contact Guard Assist   Bed Mobility    Supine to Sit Minimal Assist   Sit to Stand Contact Guard Assist   Interdisciplinary Plan of Care Collaboration   IDT Collaboration with  Certified Nursing Assistant   Patient Position at End of Therapy Seated;Chair Alarm On;Phone within Reach;Tray Table within Reach;Call Light within Reach     Pt supine in bed at start of session. Supine to sit with MIN A. Pt performed lower body dressing with MIN A for threading RLE and pulling up pants in standing. Pt ambulated to toilet and urinated in standing with FWW and CGA, ambulated back to bathroom.     Pt was brought to therapy gym via w/c.     Pt ambulated 2 additional bouts of 120' with FWW and CGA, he demonstrates slow shuffled gait pattern with decreased base of support. VC for  upright posture with increased step distance with minimal improvements note.d     Sit to stand x 8 reps with focus on proper hand placement with good carryover as activity progressed.   Standing calf raise x 15 with BUE support of FWW.     Motomed BLE G1 10 mins seated in chair.     Pt was brought to dining room and remained seated with chair alarm on.       Assessment    Pt tolerated session well with minimal reports of pain. He does endorse fatigue as session progresses.   Strengths: Able to follow instructions, Good insight into deficits/needs, Independent prior level of function, Making steady progress towards goals, Motivated for self care and independence, Pleasant and cooperative, Willingly participates in therapeutic activities  Barriers: Aphasia expressive, Decreased endurance, Fatigue, Impaired activity tolerance, Impaired balance, Generalized weakness, Pain    Plan    Continue to progress pt's functional independence for anticipate discharge.     Passport items to be completed:  Get in/out of bed safely, in/out of a vehicle, safely use mobility device, walk or wheel around home/community, navigate up and down stairs, show how to get up/down from the ground, ensure home is accessible, demonstrate HEP, complete caregiver training    Physical Therapy Problems (Active)       Problem: Mobility       Dates: Start:  08/31/23         Goal: STG-Within one week, patient will ambulate 150ft with SBA using LRAD       Dates: Start:  08/31/23            Goal: STG-Within one week, patient will ambulate up/down a curb with Yanet and LRAD       Dates: Start:  08/31/23               Problem: Mobility Transfers       Dates: Start:  08/31/23         Goal: STG-Within one week, patient will perform bed mobility Hector       Dates: Start:  08/31/23            Goal: STG-Within one week, patient will transfer bed to chair with SBA       Dates: Start:  08/31/23               Problem: PT-Long Term Goals       Dates: Start:  08/31/23          Goal: LTG-By discharge, patient will ambulate 200ft indoors and outdoors Hector with LRAD       Dates: Start:  08/31/23            Goal: LTG-By discharge, patient will transfer one surface to another Hector       Dates: Start:  08/31/23            Goal: LTG-By discharge, patient will perform home exercise program independently       Dates: Start:  08/31/23            Goal: LTG-By discharge, patient will transfer in/out of a car with set up assist       Dates: Start:  08/31/23

## 2023-09-01 NOTE — DIETARY
"Nutrition services: Day 2 of admit.  Moe Nickerson is a 78 y.o. male with admitting DX of Unilateral hip fracture.  Consult received for Low BMI.      Assessment:  Height: 177.8 cm (5' 10\")  Weight: 55.1 kg (121 lb 8 oz)  Body mass index is 17.43 kg/m²., BMI classification: Underweight.  Diet/Intake: Cardiac/50-75% x 1.    Evaluation:   PMHx: GERD, CHF, swallowing problem, HTN  MAR: Prilosec  Chart hx reflects a 15 lb wt loss in 2019 but wt has been stable since then.  Pt reports his PO was normal at home but now is not eating well because he is nauseated and doesn't like the food. He mentioned that he can't get full fat milk for his coffee and doesn't get enough Camb[place]ian toast. He likes soups and bread.    Malnutrition Risk: Low risk d/t PO intake.    Recommendations/Plan:  Liberalize diet to Regular to encourage intake.  Add Boost Plus TID to optimize nutrient intake.   Encourage intake of >50%.  Document intake of all meals and supplements as % taken in ADL's to provide interdisciplinary communication across all shifts.   Monitor weight.  Nutrition rep will continue to see patient for ongoing meal and snack preferences.       RD following.  "

## 2023-09-01 NOTE — PROGRESS NOTES
NURSING DAILY NOTE    Name: Moe Nickerson   Date of Admission: 8/30/2023   Admitting Diagnosis: History of femur fracture  Attending Physician: Kamran Vicente M.d.  Allergies: Amlodipine, Cortisone, and Morphine    Safety  Patient Assist  Mod assist  Patient Precautions  Fall Risk, Posterior Hip Precautions, Weight Bearing As Tolerated Right Lower Extremity  Precaution Comments  Aphasia  Bed Transfer Status  Minimal Assist  Toilet Transfer Status   Minimal Assist  Assistive Devices  Wheelchair  Oxygen  None - Room Air  Diet/Therapeutic Dining  Current Diet Order   Procedures    Diet Order Diet: Cardiac     Pill Administration  whole and one at a time   Agitated Behavioral Scale     ABS Level of Severity       Fall Risk  Has the patient had a fall this admission?   No  Shayna Bunn Fall Risk Scoring  16, HIGH RISK  Fall Risk Safety Measures  bed alarm, chair alarm, poor balance, and low vision/ hearing    Vitals  Temperature: 36.9 °C (98.4 °F)  Temp src: Temporal  Pulse: 70  Respiration: 18  Blood Pressure : 115/66  Blood Pressure MAP (Calculated): 82 MM HG  BP Location: Left, Upper Arm  Patient BP Position: Supine     Oxygen  Pulse Oximetry: 98 %  O2 (LPM): 0  O2 Delivery Device: None - Room Air    Bowel and Bladder  Last Bowel Movement  08/31/23  Stool Type  Type 6: Fluffy pieces with ragged edges, a mushy stool  Bowel Device     Continent  Bladder: Continent void   Bowel: Continent movement  Bladder Function  Number of Times Voided: 1  Urine Color: Unable To Evaluate  Genitourinary Assessment   Bladder Assessment (WDL):  WDL Except  Urine Color: Unable To Evaluate  Bladder Device: Bathroom  Bladder Scan: Post Void  $ Bladder Scan Results (mL): 86    Skin  Manas Score   18  Sensory Interventions   Bed Types: Standard/Trauma Mattress  Skin Preventative Measures: Waffle Overlay  Moisture Interventions         Pain  Pain Rating Scale  7 - Focus of  attention, prevents doing daily activities  Pain Location  Hip  Pain Location Orientation  Right  Pain Interventions   Medication (see MAR)    ADLs    Bathing      Linen Change      Personal Hygiene     Chlorhexidine Bath      Oral Care     Teeth/Dentures     Shave     Nutrition Percentage Eaten     Environmental Precautions     Patient Turns/Positioning  Patient Turns Self from Side to Side  Patient Turns Assistance/Tolerance     Bed Positions  Bed Controls On, Bed Locked  Head of Bed Elevated  Self regulated      Psychosocial/Neurologic Assessment  Psychosocial Assessment  Psychosocial (WDL):  Within Defined Limits  Neurologic Assessment  Neuro (WDL): Within Defined Limits  Level of Consciousness: Alert  EENT (WDL):  WDL Except    Cardio/Pulmonary Assessment  Edema      Respiratory Breath Sounds  RUL Breath Sounds: Clear  RML Breath Sounds: Clear  RLL Breath Sounds: Clear  IDRIS Breath Sounds: Clear  LLL Breath Sounds: Clear  Cardiac Assessment   Cardiac (WDL):  WDL Except

## 2023-09-02 ENCOUNTER — APPOINTMENT (OUTPATIENT)
Dept: OCCUPATIONAL THERAPY | Facility: REHABILITATION | Age: 78
DRG: 559 | End: 2023-09-02
Attending: PHYSICAL MEDICINE & REHABILITATION
Payer: MEDICARE

## 2023-09-02 ENCOUNTER — APPOINTMENT (OUTPATIENT)
Dept: PHYSICAL THERAPY | Facility: REHABILITATION | Age: 78
DRG: 559 | End: 2023-09-02
Attending: PHYSICAL MEDICINE & REHABILITATION
Payer: MEDICARE

## 2023-09-02 ENCOUNTER — APPOINTMENT (OUTPATIENT)
Dept: SPEECH THERAPY | Facility: REHABILITATION | Age: 78
DRG: 559 | End: 2023-09-02
Attending: PHYSICAL MEDICINE & REHABILITATION
Payer: MEDICARE

## 2023-09-02 PROCEDURE — A9270 NON-COVERED ITEM OR SERVICE: HCPCS | Performed by: PHYSICAL MEDICINE & REHABILITATION

## 2023-09-02 PROCEDURE — 97110 THERAPEUTIC EXERCISES: CPT

## 2023-09-02 PROCEDURE — 700102 HCHG RX REV CODE 250 W/ 637 OVERRIDE(OP): Performed by: PHYSICAL MEDICINE & REHABILITATION

## 2023-09-02 PROCEDURE — 770010 HCHG ROOM/CARE - REHAB SEMI PRIVAT*

## 2023-09-02 PROCEDURE — 97535 SELF CARE MNGMENT TRAINING: CPT

## 2023-09-02 PROCEDURE — 92523 SPEECH SOUND LANG COMPREHEN: CPT

## 2023-09-02 PROCEDURE — 97116 GAIT TRAINING THERAPY: CPT

## 2023-09-02 RX ADMIN — APIXABAN 5 MG: 5 TABLET, FILM COATED ORAL at 21:32

## 2023-09-02 RX ADMIN — OMEPRAZOLE 20 MG: 20 CAPSULE, DELAYED RELEASE ORAL at 08:26

## 2023-09-02 RX ADMIN — SIMVASTATIN 10 MG: 20 TABLET, FILM COATED ORAL at 21:33

## 2023-09-02 RX ADMIN — ACETAMINOPHEN 650 MG: 325 TABLET ORAL at 14:21

## 2023-09-02 RX ADMIN — ACETAMINOPHEN 650 MG: 325 TABLET ORAL at 08:26

## 2023-09-02 RX ADMIN — OXYCODONE HYDROCHLORIDE 5 MG: 5 TABLET ORAL at 05:53

## 2023-09-02 RX ADMIN — APIXABAN 5 MG: 5 TABLET, FILM COATED ORAL at 08:26

## 2023-09-02 RX ADMIN — SENNOSIDES AND DOCUSATE SODIUM 2 TABLET: 50; 8.6 TABLET ORAL at 21:32

## 2023-09-02 RX ADMIN — SENNOSIDES AND DOCUSATE SODIUM 2 TABLET: 50; 8.6 TABLET ORAL at 08:27

## 2023-09-02 RX ADMIN — CLOPIDOGREL BISULFATE 75 MG: 75 TABLET ORAL at 08:26

## 2023-09-02 RX ADMIN — ACETAMINOPHEN 650 MG: 325 TABLET ORAL at 21:32

## 2023-09-02 RX ADMIN — LISINOPRIL 20 MG: 20 TABLET ORAL at 05:52

## 2023-09-02 ASSESSMENT — ACTIVITIES OF DAILY LIVING (ADL)
BED_CHAIR_WHEELCHAIR_TRANSFER_DESCRIPTION: ADAPTIVE EQUIPMENT;INCREASED TIME;SET-UP OF EQUIPMENT;VERBAL CUEING
TOILET_TRANSFER_DESCRIPTION: GRAB BAR;ADAPTIVE EQUIPMENT;SUPERVISION FOR SAFETY

## 2023-09-02 ASSESSMENT — GAIT ASSESSMENTS
DEVIATION: ANTALGIC;BRADYKINETIC
ASSISTIVE DEVICE: FRONT WHEEL WALKER
GAIT LEVEL OF ASSIST: CONTACT GUARD ASSIST
DISTANCE (FEET): 150

## 2023-09-02 NOTE — PROGRESS NOTES
NURSING DAILY NOTE    Name: Moe Nickerson   Date of Admission: 8/30/2023   Admitting Diagnosis: History of femur fracture  Attending Physician: Kamran Vicente M.d.  Allergies: Amlodipine, Cortisone, and Morphine    Safety  Patient Assist  Mod assist  Patient Precautions  Fall Risk, Posterior Hip Precautions, Weight Bearing As Tolerated Right Lower Extremity  Precaution Comments  Aphasia  Bed Transfer Status  Contact Guard Assist  Toilet Transfer Status   Standby Assist  Assistive Devices  Wheelchair  Oxygen  None - Room Air  Diet/Therapeutic Dining  Current Diet Order   Procedures    Diet Order Diet: Regular     Pill Administration  whole and one at a time   Agitated Behavioral Scale     ABS Level of Severity       Fall Risk  Has the patient had a fall this admission?   No  Shayna Bunn Fall Risk Scoring  14, MODERATE RISK  Fall Risk Safety Measures  bed alarm, chair alarm, poor balance, and low vision/ hearing    Vitals  Temperature: 37 °C (98.6 °F)  Temp src: Oral  Pulse: 70  Respiration: 18  Blood Pressure : 138/72  Blood Pressure MAP (Calculated): 94 MM HG  BP Location: Right, Upper Arm  Patient BP Position: Supine     Oxygen  Pulse Oximetry: 94 %  O2 (LPM): 0  O2 Delivery Device: None - Room Air    Bowel and Bladder  Last Bowel Movement  09/02/23  Stool Type  Type 5: Soft blob with clear cut edges (passed easily)  Bowel Device     Continent  Bladder: Continent void   Bowel: Continent movement  Bladder Function  Urine Void (mL): 300 ml  Number of Times Voided: 1  Urine Color: Yellow  Genitourinary Assessment   Bladder Assessment (WDL):  WDL Except  Urine Color: Yellow  Bladder Device: Urinal  Bladder Scan: Post Void  $ Bladder Scan Results (mL): 98    Skin  Manas Score   18  Sensory Interventions   Bed Types: Standard/Trauma Mattress  Skin Preventative Measures: Waffle Overlay  Moisture Interventions         Pain  Pain Rating Scale  0 - No  Pain  Pain Location  Hip  Pain Location Orientation  Right  Pain Interventions   Declines    ADLs    Bathing      Linen Change      Personal Hygiene  Moist Ruth Wipes, Perineal Care  Chlorhexidine Bath      Oral Care  Refused  Teeth/Dentures     Shave     Nutrition Percentage Eaten  *  * Meal *  *, Lunch, 0% Consumed  Environmental Precautions     Patient Turns/Positioning  Patient Turns Self from Side to Side  Patient Turns Assistance/Tolerance     Bed Positions  Bed Controls On, Bed Locked  Head of Bed Elevated  Self regulated      Psychosocial/Neurologic Assessment  Psychosocial Assessment  Psychosocial (WDL):  Within Defined Limits  Neurologic Assessment  Neuro (WDL): Within Defined Limits  Level of Consciousness: Alert  EENT (WDL):  WDL Except    Cardio/Pulmonary Assessment  Edema      Respiratory Breath Sounds  RUL Breath Sounds: Clear  RML Breath Sounds: Clear  RLL Breath Sounds: Clear  IDRIS Breath Sounds: Clear  LLL Breath Sounds: Clear  Cardiac Assessment   Cardiac (WDL):  WDL Except

## 2023-09-02 NOTE — CARE PLAN
Problem: Skin Integrity  Goal: Skin integrity is maintained or improved  Note: Right hip incision is CDI, no noted s/s of infection. Incision left VALERIA.      Problem: Fall Risk - Rehab  Goal: Patient will remain free from falls  Note: Patient remains free from falls this shift. Patient was educated on using the call light to prevent falls. Patients bed is in the lowest position. The patients belongings are placed in near proximity to the patient.     The patient is Stable - Low risk of patient condition declining or worsening

## 2023-09-02 NOTE — THERAPY
Speech Language Pathology   Initial Assessment     Patient Name: Moe Nickerson  AGE:  78 y.o., SEX:  male  Medical Record #: [id number]  Today's Date: 9/2/2023     Subjective    Patient participated in cognitive linguistic evaluation. [facility] Previously managed medications and finances, but utilized housekeeping services.     Objective       09/02/23 1332   Evaluation Charges   SLP Speech Language Evaluation Speech Sound Language Comprehension   SLP Total Time Spent   SLP Individual Total Time Spent (Mins) 60   Prior Living Situation   Prior Services Housekeeping / Homemaker Services   Housing / Facility Independent Living Facility   Lives with - Patient's Self Care Capacity Alone and Able to Care For Self   Prior Level Of Function   Communication Within Functional Limits   Swallow Within Functional Limits   Hearing Within Functional Limits for Evaluation   Hearing Aid None   Vision Wears Corrective Lenses;Reading ;Distance   Patient's Primary Language English   Occupation (Pre-Hospital Vocational) Retired Due To Age   Functional Level of Assist   Comprehension Moderate Assist   Comprehension Description Verbal cues   Expression Minimal Assist   Expression Description Verbal cueing   Social Interaction Supervision   Problem Solving Moderate Assist   Problem Solving Description Verbal cueing;Therapy schedule;Supervision;Increased time;Bed/chair alarm   Memory Maximal Assist   Memory Description Verbal cueing;Therapy schedule;Supervision;Increased time;Bed/chair alarm   Outcome Measures   Outcome Measures Utilized SCCAN   SCCAN (Scales of Cognitive and Communicative Ability for Neurorehabilitation)   Oral Expression - Raw Score 11   Oral Expression - Scale Performance Score 58   Orientation - Raw Score 12   Orientation - Scale Performance Score 100   Memory - Raw Score 6   Memory - Scale Performance Score 32   Speech Comprehension - Raw Score 7   Speech Comprehension - Scale  Performance Score 54   Reading Comprehension - Raw Score 7   Reading Comprehension - Scale Performance Score 58   Writing - Raw Score 4   Writing - Scale Performance Score 57   Attention - Raw Score 9   Attention - Scale Performance Score 56   Problem Solving - Raw Score 12   Problem Solving - Scale Performance Score 52   SCCAN Total Raw Score 56   SCCAN Degree of Severity Moderate Impairment   Problem List   Problem List Cognitive-Linguistic Deficits   Current Discharge Plan   Current Discharge Plan Return to Prior Living Situation   Benefit   Therapy Benefit Patient would benefit from Inpatient Rehab Speech-Language Pathology to address above identified deficits.   Speech Language Pathologist Assigned   Assigned SLP / Treatment Time / Comments LM 60         Assessment    Patient is 78 y.o. male with a diagnosis of hip fracture s/p GLF.  Additional factors influencing patient status/progress (ie: cognitive factors, co-morbidities, social support, etc): IPLOF, motivation.    Cognitive linguistic evaluation was completed. Patient participated in SCCAN with a final raw score of 56 indicating moderate cognitive deficits. Patient demonstrated difficulties across all cognitive domains other than orientation. Patient reports increased difficulties d/t using wrong glasses.   Speech dysfluencies noted at conversation level which patient reports as baseline since stroke 4 years ago.         Plan  Recommend Speech Therapy 30-60 minutes per day 5-6 days per week for 1-2 weeks for the following treatments:  SLP Speech Language Treatment, SLP Self Care / ADL Training , SLP Cognitive Skill Development, and SLP Group Treatment.    Passport items to be completed:  Express basic needs, understand food/liquid recommendations, consistently follow swallow precautions, manage finances, manage medications, arrive to therapy appointments on time, complete daily memory log entries, solve problems related to safety situations, review  education related to hospitalization, complete caregiver training     Goals:  Long term and short term goals have been discussed with patient and they are in agreement.    Speech Therapy Problems (Active)       Problem: Memory STGs       Dates: Start:  09/02/23         Goal: STG-Within one week, patient will demonstrate use of memory strategies in order to recall safety precautions related to hospitalization.        Dates: Start:  09/02/23               Problem: Problem Solving STGs       Dates: Start:  09/02/23         Goal: STG-Within one week, patient will complete medication management tasks with 80% accuracy min A       Dates: Start:  09/02/23            Goal: STG-Within one week, patient will complete alternating attention tasks with 75% accuracy, min a       Dates: Start:  09/02/23               Problem: Speech/Swallowing LTGs       Dates: Start:  09/02/23         Goal: LTG-By discharge, patient will solve complex problems relate to IADL's in order to d/c home with mod I        Dates: Start:  09/02/23

## 2023-09-02 NOTE — THERAPY
Physical Therapy   Daily Treatment     Patient Name: Moe Nickerson  Age:  78 y.o., Sex:  male  Medical Record #: [id number]  Today's Date: 9/2/2023     Precautions  Precautions: Fall Risk, Posterior Hip Precautions, Weight Bearing As Tolerated Right Lower Extremity  Comments: Aphasia    Subjective    Pt resting in bed, finishing breakfast and willing to participate.     Objective       09/02/23 0831   PT Charge Group   PT Gait Training (Units) 2   PT Therapeutic Exercise (Units) 2   PT Total Time Spent   PT Individual Total Time Spent (Mins) 60   Gait Functional Level of Assist    Gait Level Of Assist Contact Guard Assist   Assistive Device Front Wheel Walker   Distance (Feet) 150   # of Times Distance was Traveled 2   Deviation Antalgic;Bradykinetic   Transfer Functional Level of Assist   Bed, Chair, Wheelchair Transfer Contact Guard Assist   Bed Chair Wheelchair Transfer Description Adaptive equipment;Increased time;Set-up of equipment;Verbal cueing   Supine Lower Body Exercise   Bridges Two Legged;2 sets of 10   Heel Slide 2 sets of 10   Sitting Lower Body Exercises   Sitting Lower Body Exercises Yes   Ankle Pumps 2 sets of 10   Hip Flexion 2 sets of 10   Hip Abduction 2 sets of 10   Hip Adduction 2 sets of 10   Long Arc Quad 2 sets of 10   Hamstring Curl 2 sets of 10   Bed Mobility    Supine to Sit Minimal Assist   Sit to Supine Minimal Assist   Sit to Stand Contact Guard Assist   Interdisciplinary Plan of Care Collaboration   Patient Position at End of Therapy In Bed;Bed Alarm On;Call Light within Reach;Tray Table within Reach;Phone within Reach         Assessment    Pt demonstrated improved step through gait patterning today and mild antalgic gait but remains slow to mobilize. Requires AAROM for R hip flexion exercises in both sitting and supine due to weakness/ pain.  Strengths: Able to follow instructions, Good insight into deficits needs, Independent prior level of function, Making steady progress  towards goals, Motivated for self care and independence, Pleasant and cooperative, Willingly participates in therapeutic activities  Barriers: Aphasia expressive, Decreased endurance, Fatigue, Impaired activity tolerance, Impaired balance, Generalized weakness, Pain    Plan    Gait FWW with goal of improved R LE weight acceptance, R LE therex, NuStep for endurance, bed mobility independence     Passport items to be completed:  Get in/out of bed safely, in/out of a vehicle, safely use mobility device, walk or wheel around home/community, navigate up and down stairs, show how to get up/down from the ground, ensure home is accessible, demonstrate HEP, complete caregiver training      Physical Therapy Problems (Active)       Problem: Mobility       Dates: Start:  08/31/23         Goal: STG-Within one week, patient will ambulate 150ft with SBA using LRAD       Dates: Start:  08/31/23            Goal: STG-Within one week, patient will ambulate up/down a curb with Yanet and LRAD       Dates: Start:  08/31/23               Problem: Mobility Transfers       Dates: Start:  08/31/23         Goal: STG-Within one week, patient will perform bed mobility Hector       Dates: Start:  08/31/23            Goal: STG-Within one week, patient will transfer bed to chair with SBA       Dates: Start:  08/31/23               Problem: PT-Long Term Goals       Dates: Start:  08/31/23         Goal: LTG-By discharge, patient will ambulate 200ft indoors and outdoors Hector with LRAD       Dates: Start:  08/31/23            Goal: LTG-By discharge, patient will transfer one surface to another Hector       Dates: Start:  08/31/23            Goal: LTG-By discharge, patient will perform home exercise program independently       Dates: Start:  08/31/23            Goal: LTG-By discharge, patient will transfer in/out of a car with set up assist       Dates: Start:  08/31/23

## 2023-09-02 NOTE — PROGRESS NOTES
NURSING DAILY NOTE    Name: Moe Nickerson   Date of Admission: 8/30/2023   Admitting Diagnosis: History of femur fracture  Attending Physician: Kamran Vicente M.d.  Allergies: Amlodipine, Cortisone, and Morphine    Safety  Patient Assist  Mod assist  Patient Precautions  Fall Risk, Posterior Hip Precautions, Weight Bearing As Tolerated Right Lower Extremity  Precaution Comments  Aphasia  Bed Transfer Status  Standby Assist  Toilet Transfer Status   Contact Guard Assist  Assistive Devices  Wheelchair, Rails  Oxygen  None - Room Air  Diet/Therapeutic Dining  Current Diet Order   Procedures    Diet Order Diet: Regular     Pill Administration  whole and one at a time   Agitated Behavioral Scale     ABS Level of Severity       Fall Risk  Has the patient had a fall this admission?   No  Shayna Bunn Fall Risk Scoring  14, MODERATE RISK  Fall Risk Safety Measures  bed alarm, chair alarm, poor balance, and low vision/ hearing    Vitals  Temperature: 36.7 °C (98 °F)  Temp src: Oral  Pulse: (!) 59  Respiration: 18  Blood Pressure : 102/56  Blood Pressure MAP (Calculated): 71 MM HG  BP Location: Left, Upper Arm  Patient BP Position: Sitting     Oxygen  Pulse Oximetry: 99 %  O2 (LPM): 0  O2 Delivery Device: None - Room Air    Bowel and Bladder  Last Bowel Movement  08/31/23  Stool Type  Not observed  Bowel Device     Continent  Bladder: Continent void   Bowel: Continent movement  Bladder Function  Urine Void (mL): 250 ml  Number of Times Voided: 1  Urine Color: Yoselin  Genitourinary Assessment   Bladder Assessment (WDL):  Within Defined Limits  Urine Color: Yoselin  Bladder Device: Bathroom  Bladder Scan: Post Void  $ Bladder Scan Results (mL): 111    Skin  Manas Score   18  Sensory Interventions   Bed Types: Standard/Trauma Mattress  Skin Preventative Measures: Waffle Overlay  Moisture Interventions         Pain  Pain Rating Scale  7 - Focus of attention, prevents  doing daily activities  Pain Location  Hip  Pain Location Orientation  Right  Pain Interventions   Cold Pack, Repositioned    ADLs    Bathing      Linen Change      Personal Hygiene     Chlorhexidine Bath      Oral Care     Teeth/Dentures     Shave     Nutrition Percentage Eaten  *  * Meal *  *, Lunch, Between 50-75% Consumed  Environmental Precautions     Patient Turns/Positioning  Patient Turns Self from Side to Side  Patient Turns Assistance/Tolerance     Bed Positions  Bed Controls On, Bed Locked  Head of Bed Elevated  Self regulated      Psychosocial/Neurologic Assessment  Psychosocial Assessment  Psychosocial (WDL):  Within Defined Limits  Neurologic Assessment  Neuro (WDL): Within Defined Limits  Level of Consciousness: Alert  EENT (WDL):  WDL Except    Cardio/Pulmonary Assessment  Edema      Respiratory Breath Sounds  RUL Breath Sounds: Clear  RML Breath Sounds: Clear  RLL Breath Sounds: Clear  IDRIS Breath Sounds: Clear  LLL Breath Sounds: Clear  Cardiac Assessment   Cardiac (WDL):  WDL Except

## 2023-09-02 NOTE — PROGRESS NOTES
NURSING DAILY NOTE    Name: Moe Nickerson   Date of Admission: 8/30/2023   Admitting Diagnosis: History of femur fracture  Attending Physician: Kamran Vicente M.d.  Allergies: Amlodipine, Cortisone, and Morphine    Safety  Patient Assist  Mod assist  Patient Precautions  Fall Risk, Posterior Hip Precautions, Weight Bearing As Tolerated Right Lower Extremity  Precaution Comments  Aphasia  Bed Transfer Status  Standby Assist  Toilet Transfer Status   Contact Guard Assist  Assistive Devices  Wheelchair  Oxygen  None - Room Air  Diet/Therapeutic Dining  Current Diet Order   Procedures    Diet Order Diet: Regular     Pill Administration  whole and floated  Agitated Behavioral Scale     ABS Level of Severity       Fall Risk  Has the patient had a fall this admission?   No  Shayna Bunn Fall Risk Scoring  14, MODERATE RISK  Fall Risk Safety Measures  bed alarm, chair alarm, poor balance, and low vision/ hearing    Vitals  Temperature: 36.8 °C (98.2 °F)  Temp src: Temporal  Pulse: 70  Respiration: 18  Blood Pressure : 133/81  Blood Pressure MAP (Calculated): 98 MM HG  BP Location: Left, Upper Arm  Patient BP Position: Sitting     Oxygen  Pulse Oximetry: 97 %  O2 (LPM): 0  O2 Delivery Device: None - Room Air    Bowel and Bladder  Last Bowel Movement  08/31/23  Stool Type  Not observed  Bowel Device     Continent  Bladder: Continent void   Bowel: Continent movement  Bladder Function  Urine Void (mL): 350 ml  Number of Times Voided: 1  Urine Color: Yoselin  Genitourinary Assessment   Bladder Assessment (WDL):  WDL Except  Urine Color: Yoselin  Bladder Device: Bathroom  Bladder Scan: Post Void  $ Bladder Scan Results (mL): 120    Skin  Manas Score   18  Sensory Interventions   Bed Types: Standard/Trauma Mattress  Skin Preventative Measures: Waffle Overlay  Moisture Interventions         Pain  Pain Rating Scale  1 - Hardly Notice Pain  Pain Location  Hip  Pain Location  Orientation  Right  Pain Interventions   Rest    ADLs    Bathing      Linen Change      Personal Hygiene     Chlorhexidine Bath      Oral Care  Refused  Teeth/Dentures     Shave     Nutrition Percentage Eaten  *  * Meal *  *, Lunch, Between 50-75% Consumed  Environmental Precautions     Patient Turns/Positioning  Patient Turns Self from Side to Side  Patient Turns Assistance/Tolerance     Bed Positions  Bed Controls On, Bed Locked  Head of Bed Elevated  Self regulated      Psychosocial/Neurologic Assessment  Psychosocial Assessment  Psychosocial (WDL):  Within Defined Limits  Neurologic Assessment  Neuro (WDL): Within Defined Limits  Level of Consciousness: Alert  EENT (WDL):  WDL Except    Cardio/Pulmonary Assessment  Edema      Respiratory Breath Sounds  RUL Breath Sounds: Clear  RML Breath Sounds: Clear  RLL Breath Sounds: Clear  IDRIS Breath Sounds: Clear  LLL Breath Sounds: Clear  Cardiac Assessment   Cardiac (WDL):  WDL Except

## 2023-09-02 NOTE — CARE PLAN
The patient is Stable - Low risk of patient condition declining or worsening    Shift Goals  Clinical Goals: safety  Patient Goals: safety    Progress made toward(s) clinical / shift goals:      Problem: Pain - Standard  Goal: Alleviation of pain or a reduction in pain to the patient’s comfort goal  Outcome: Progressing  Note: Roxicodone 5mg given PRN per MAR for c/o right hip incision pain with adequate relief. Pt sleeps good. Will continue to monitor.       Patient is not progressing towards the following goals:

## 2023-09-02 NOTE — THERAPY
Occupational Therapy  Daily Treatment     Patient Name: Moe Nickerson  Age:  78 y.o., Sex:  male  Medical Record #: [id number]  Today's Date: 9/2/2023     Precautions  Precautions: Fall Risk, Posterior Hip Precautions, Weight Bearing As Tolerated Right Lower Extremity  Comments: Aphasia         Subjective    Patient was walking to bathroom with CNA upon OT arrival.  He stated he has been vomiting today.     Objective       09/02/23 1231   OT Charge Group   OT Self Care / ADL (Units) 3   OT Therapeutic Exercise (Units) 1   OT Total Time Spent   OT Individual Total Time Spent (Mins) 60   Precautions   Precautions Fall Risk;Posterior Hip Precautions;Weight Bearing As Tolerated Right Lower Extremity   Cognition    New Learning Impaired   Sequencing Impaired   Functional Level of Assist   Grooming Standing   Grooming Description Standing at sink;Supervision for safety  (to wash hands)   Lower Body Dressing Standby Assist   Lower Body Dressing Description Reacher;Dressing stick;Sock aid;Increased time;Set-up of equipment;Supervision for safety;Verbal cueing  (pt don/doff socks using dressing stick and sock aide (initially attempted to doff with reacher, but then used dressing stick upuon OT recommendation).  Required extensive time to place socks on sock aide with verbal cues and min assist.)   Toileting Standby Assist   Toilet Transfers Standby Assist   Toilet Transfer Description Grab bar;Adaptive equipment;Supervision for safety  (fww)   Sitting Upper Body Exercises   Sitting Upper Body Exercises Yes   Upper Extremity Bike Minutes / Rest Breaks (See Comments)  (motomed cycle gear 3 x 7:15.)   Interdisciplinary Plan of Care Collaboration   Patient Position at End of Therapy Seated;Call Light within Reach;Tray Table within Reach;Phone within Reach;Chair Alarm On;Self Releasing Lap Belt Applied     Re-education on purpose of and how to use AE for LB dressing. Only able to name 1/3 posterior hip precautions.      Assessment    Patient presents with impaired problem solving and carryover of purpose of and how to use AE for LB dressing.  He at times attempted to pull sock onto sock aide using reacher.  SBA for toileting, toilet transfer and handwashing at sink.    Strengths: Able to follow instructions, Alert and oriented, Independent prior level of function, Making steady progress towards goals, Manages pain appropriately, Motivated for self care and independence, Pleasant and cooperative, Supportive family, Willingly participates in therapeutic activities  Barriers: Aphasia expressive, Decreased endurance, Fatigue, Generalized weakness, Orthostatic hypotension, Impaired balance, Impaired carryover of learning, Impaired insight/denial of deficits, Limited mobility, Pain    Plan    ADLs with posterior hip precautions, LBD with AE block practice, standing balance/tolerance, BUE strengthening    Occupational Therapy Goals (Active)       Problem: Bathing       Dates: Start:  08/31/23         Goal: STG-Within one week, patient will bathe at Min A level using AE/DME PRN.       Dates: Start:  08/31/23               Problem: Dressing       Dates: Start:  08/31/23         Goal: STG-Within one week, patient will dress LB at Min A level using AE/DME PRN.       Dates: Start:  08/31/23               Problem: Functional Transfers       Dates: Start:  08/31/23         Goal: STG-Within one week, patient will transfer to step in shower at Min A level using AE/DME PRN.       Dates: Start:  08/31/23               Problem: IADL's       Dates: Start:  08/31/23         Goal: STG-Within one week, patient will access kitchen area at SBA level using AE/DME PRN.       Dates: Start:  08/31/23               Problem: OT Long Term Goals       Dates: Start:  08/31/23         Goal: LTG-By discharge, patient will complete basic self care tasks at SPV-Mod I level using AE/DME PRN.       Dates: Start:  08/31/23            Goal: LTG-By discharge, patient  will perform bathroom transfers at SPV-Mod I level using AE/DME PRN.       Dates: Start:  08/31/23            Goal: LTG-By discharge, patient will complete basic home management at SPV-Mod I level using AE/DME PRN.       Dates: Start:  08/31/23               Problem: Toileting       Dates: Start:  08/31/23         Goal: STG-Within one week, patient will complete toileting tasks at Min A level using AE/DME PRN.       Dates: Start:  08/31/23

## 2023-09-03 ENCOUNTER — APPOINTMENT (OUTPATIENT)
Dept: SPEECH THERAPY | Facility: REHABILITATION | Age: 78
DRG: 559 | End: 2023-09-03
Attending: PHYSICAL MEDICINE & REHABILITATION
Payer: MEDICARE

## 2023-09-03 PROCEDURE — A9270 NON-COVERED ITEM OR SERVICE: HCPCS | Performed by: PHYSICAL MEDICINE & REHABILITATION

## 2023-09-03 PROCEDURE — 770010 HCHG ROOM/CARE - REHAB SEMI PRIVAT*

## 2023-09-03 PROCEDURE — 97129 THER IVNTJ 1ST 15 MIN: CPT

## 2023-09-03 PROCEDURE — 700102 HCHG RX REV CODE 250 W/ 637 OVERRIDE(OP): Performed by: PHYSICAL MEDICINE & REHABILITATION

## 2023-09-03 PROCEDURE — 97130 THER IVNTJ EA ADDL 15 MIN: CPT

## 2023-09-03 RX ADMIN — OMEPRAZOLE 20 MG: 20 CAPSULE, DELAYED RELEASE ORAL at 08:02

## 2023-09-03 RX ADMIN — SENNOSIDES AND DOCUSATE SODIUM 2 TABLET: 50; 8.6 TABLET ORAL at 08:02

## 2023-09-03 RX ADMIN — APIXABAN 5 MG: 5 TABLET, FILM COATED ORAL at 21:23

## 2023-09-03 RX ADMIN — ACETAMINOPHEN 650 MG: 325 TABLET ORAL at 21:23

## 2023-09-03 RX ADMIN — OXYCODONE HYDROCHLORIDE 5 MG: 5 TABLET ORAL at 08:08

## 2023-09-03 RX ADMIN — SIMVASTATIN 10 MG: 20 TABLET, FILM COATED ORAL at 21:23

## 2023-09-03 RX ADMIN — OXYCODONE HYDROCHLORIDE 5 MG: 5 TABLET ORAL at 21:22

## 2023-09-03 RX ADMIN — ACETAMINOPHEN 650 MG: 325 TABLET ORAL at 08:01

## 2023-09-03 RX ADMIN — APIXABAN 5 MG: 5 TABLET, FILM COATED ORAL at 08:01

## 2023-09-03 RX ADMIN — HYDRALAZINE HYDROCHLORIDE 25 MG: 25 TABLET, FILM COATED ORAL at 05:34

## 2023-09-03 RX ADMIN — CLOPIDOGREL BISULFATE 75 MG: 75 TABLET ORAL at 08:01

## 2023-09-03 RX ADMIN — LISINOPRIL 20 MG: 20 TABLET ORAL at 05:26

## 2023-09-03 RX ADMIN — ACETAMINOPHEN 650 MG: 325 TABLET ORAL at 14:55

## 2023-09-03 ASSESSMENT — PAIN DESCRIPTION - PAIN TYPE: TYPE: ACUTE PAIN

## 2023-09-03 ASSESSMENT — FIBROSIS 4 INDEX: FIB4 SCORE: 4.47

## 2023-09-03 NOTE — PROGRESS NOTES
NURSING DAILY NOTE    Name: Moe Nickerson   Date of Admission: 8/30/2023   Admitting Diagnosis: History of femur fracture  Attending Physician: Kamran Vicente M.d.  Allergies: Amlodipine, Cortisone, and Morphine    Safety  Patient Assist  Mod assist  Patient Precautions  Fall Risk, Posterior Hip Precautions, Weight Bearing As Tolerated Right Lower Extremity  Precaution Comments  Aphasia  Bed Transfer Status  Contact Guard Assist  Toilet Transfer Status   Standby Assist  Assistive Devices  Wheelchair  Oxygen  None - Room Air  Diet/Therapeutic Dining  Current Diet Order   Procedures    Diet Order Diet: Regular     Pill Administration  whole  Agitated Behavioral Scale     ABS Level of Severity       Fall Risk  Has the patient had a fall this admission?   No  Shayna Bunn Fall Risk Scoring  18, HIGH RISK  Fall Risk Safety Measures  bed alarm, chair alarm, poor balance, and low vision/ hearing    Vitals  Temperature: 37.7 °C (99.8 °F)  Temp src: Oral  Pulse: 83  Respiration: 18  Blood Pressure : (!) 166/71  Blood Pressure MAP (Calculated): 103 MM HG  BP Location: Left, Upper Arm  Patient BP Position: Ely's Position     Oxygen  Pulse Oximetry: 97 %  O2 (LPM): 0  O2 Delivery Device: None - Room Air    Bowel and Bladder  Last Bowel Movement  09/02/23  Stool Type  Type 5: Soft blob with clear cut edges (passed easily)  Bowel Device     Continent  Bladder: Continent void   Bowel: Continent movement  Bladder Function  Urine Void (mL): 200 ml  Number of Times Voided: 1  Urine Color: Yellow  Genitourinary Assessment   Bladder Assessment (WDL):  WDL Except  Urine Color: Yellow  Bladder Device: Urinal  Bladder Scan: Post Void  $ Bladder Scan Results (mL): 98    Skin  Manas Score   18  Sensory Interventions   Bed Types: Standard/Trauma Mattress with Overlay  Skin Preventative Measures: Waffle Overlay  Moisture Interventions         Pain  Pain Rating Scale  0 - No  Pain  Pain Location  Hip  Pain Location Orientation  Right  Pain Interventions   [place]    ADLs    Bathing      Linen Change      Personal Hygiene  Ruth Bottle, Moist Ruth Wipes  Chlorhexidine Bath      Oral Care  Refused  Teeth/Dentures     Shave     Nutrition Percentage Eaten  *  * Meal *  *, Lunch, 0% Consumed  Environmental Precautions  Bed in Low Position, Personal Belongings, Wastebasket, Call Bell etc. in Easy Reach, Treaded Slipper Socks on Patient  Patient Turns/Positioning  Patient Turns Self from Side to Side  Patient Turns Assistance/Tolerance     Bed Positions  Bed Controls On, Bed Locked  Head of Bed Elevated  Self regulated      Psychosocial/Neurologic Assessment  Psychosocial Assessment  Psychosocial (WDL):  Within Defined Limits  Neurologic Assessment  Neuro (WDL): Within Defined Limits  Level of Consciousness: Alert  EENT (WDL):  WDL Except    Cardio/Pulmonary Assessment  Edema      Respiratory Breath Sounds  RUL Breath Sounds: Clear  RML Breath Sounds: Clear  RLL Breath Sounds: Clear  IDRIS Breath Sounds: Clear  LLL Breath Sounds: Clear  Cardiac Assessment   Cardiac (WDL):  WDL Except

## 2023-09-03 NOTE — THERAPY
Speech Language Pathology  Daily Treatment     Patient Name: Moe Nickerson  Age:  78 y.o., Sex:  male  Medical Record #: [id number]  Today's Date: 9/3/2023     Precautions  Precautions: Fall Risk, Posterior Hip Precautions, Weight Bearing As Tolerated Right Lower Extremity  Comments: Aphasia    Subjective    Patient seen at bedside.  He was agreeable to therapy.     Objective       09/03/23 1401   Treatment Charges   Charges Yes   SLP Cognitive Skill Development First 15 Minutes 1   SLP Cognitive Skill Development Additional 15 Minutes 1   SLP Total Time Spent   SLP Individual Total Time Spent (Mins) 30   Cognition   Simple Attention Minimal (4)   Prospective Memory Moderate (3)   Functional Memory Activities Moderate (3)         Assessment    Patient introduced to and educated on use of memory log to record new training and skills he needs to remember to use.  Reviewed [facility] memory strategies.  Used strategies to recall 5 unrelated words.  Patient recalled 3/5 after several reviews and a 5 min delay.         Plan    Target recall, alternating attention, medication management.    Passport items to be completed:  Express basic needs, understand food/liquid recommendations, consistently follow swallow precautions, manage finances, manage medications, arrive to therapy appointments on time, complete daily memory log entries, solve problems related to safety situations, review education related to hospitalization, complete caregiver training     Speech Therapy Problems (Active)       Problem: Memory STGs       Dates: Start:  09/02/23         Goal: STG-Within one week, patient will demonstrate use of memory strategies in order to recall safety precautions related to hospitalization.        Dates: Start:  09/02/23               Problem: Problem Solving STGs       Dates: Start:  09/02/23         Goal: STG-Within one week, patient will complete medication management tasks with 80% accuracy min A       Dates: Start:   09/02/23            Goal: STG-Within one week, patient will complete alternating attention tasks with 75% accuracy, min a       Dates: Start:  09/02/23               Problem: Speech/Swallowing LTGs       Dates: Start:  09/02/23         Goal: LTG-By discharge, patient will solve complex problems relate to IADL's in order to d/c home with mod I        Dates: Start:  09/02/23

## 2023-09-03 NOTE — CARE PLAN
"  Problem: Fall Risk - Rehab  Goal: Patient will remain free from falls  Outcome: Progressing  Note: Shayna Bunn Fall risk Assessment Score: 18    High fall risk Interventions   -- Bed and strip alarm   - Yellow sign by the door   - Yellow wrist band \"Fall risk\"  - Room near to the nurse station  - Do not leave patient unattended in the bathroom  - Fall risk education provided      Problem: Bladder / Voiding  Goal: Patient will establish and maintain regular urinary output  Outcome: Progressing  Note: Pt continent , uses the urinal , voiding freely to an [place] urine.   The patient is Stable - Low risk of patient condition declining or worsening    Shift Goals  Clinical Goals: safety  Patient Goals: safety    Progress made toward(s) clinical / shift goals:  Pt free form fall and injury.      "

## 2023-09-03 NOTE — PROGRESS NOTES
NURSING DAILY NOTE    Name: Moe Nickerson   Date of Admission: 8/30/2023   Admitting Diagnosis: History of femur fracture  Attending Physician: Kamran Vicente M.d.  Allergies: Amlodipine, Cortisone, and Morphine    Safety  Patient Assist  Mod assist  Patient Precautions  Fall Risk, Posterior Hip Precautions, Weight Bearing As Tolerated Right Lower Extremity  Precaution Comments  Aphasia  Bed Transfer Status  Contact Guard Assist  Toilet Transfer Status   Standby Assist  Assistive Devices  Wheelchair  Oxygen  None - Room Air  Diet/Therapeutic Dining  Current Diet Order   Procedures    Diet Order Diet: Regular     Pill Administration  whole and one at a time   Agitated Behavioral Scale     ABS Level of Severity       Fall Risk  Has the patient had a fall this admission?   No  Shayna Bunn Fall Risk Scoring  18, HIGH RISK  Fall Risk Safety Measures  bed alarm, chair alarm, poor balance, and low vision/ hearing    Vitals  Temperature: 37.2 °C (98.9 °F)  Temp src: Oral  Pulse: 68  Respiration: 16  Blood Pressure : 102/62  Blood Pressure MAP (Calculated): 75 MM HG  BP Location: Left, Upper Arm  Patient BP Position: Sitting     Oxygen  Pulse Oximetry: 98 %  O2 (LPM): 0  O2 Delivery Device: None - Room Air    Bowel and Bladder  Last Bowel Movement  09/02/23  Stool Type  Type 5: Soft blob with clear cut edges (passed easily)  Bowel Device     Continent  Bladder: Continent void   Bowel: Continent movement  Bladder Function  Urine Void (mL): 250 ml  Number of Times Voided: 1  Urine Color: Yoselin  Genitourinary Assessment   Bladder Assessment (WDL):  WDL Except  Urine Color: Yoselin  Bladder Device: Urinal  Bladder Scan: Post Void  $ Bladder Scan Results (mL): 98    Skin  Manas Score   18  Sensory Interventions   Bed Types: Standard/Trauma Mattress with Overlay  Skin Preventative Measures: Waffle Overlay  Moisture Interventions         Pain  Pain Rating Scale  7 -  Focus of attention, prevents doing daily activities  Pain Location  Hip  Pain Location Orientation  Right  Pain Interventions   Medication (see MAR)    ADLs    Bathing      Linen Change      Personal Hygiene  Ruth Bottle, Moist Ruth Wipes  Chlorhexidine Bath      Oral Care  Refused  Teeth/Dentures     Shave     Nutrition Percentage Eaten  *  * Meal *  *, Lunch, Between 50-75% Consumed  Environmental Precautions  Bed in Low Position, Personal Belongings, Wastebasket, Call Bell etc. in Easy Reach, Treaded Slipper Socks on Patient  Patient Turns/Positioning  Patient Turns Self from Side to Side  Patient Turns Assistance/Tolerance     Bed Positions  Bed Controls On, Bed Locked  Head of Bed Elevated  Self regulated      Psychosocial/Neurologic Assessment  Psychosocial Assessment  Psychosocial (WDL):  Within Defined Limits  Neurologic Assessment  Neuro (WDL): Within Defined Limits  Level of Consciousness: Alert  EENT (WDL):  WDL Except    Cardio/Pulmonary Assessment  Edema      Respiratory Breath Sounds  RUL Breath Sounds: Clear  RML Breath Sounds: Clear  RLL Breath Sounds: Clear  IDRIS Breath Sounds: Clear  LLL Breath Sounds: Clear  Cardiac Assessment   Cardiac (WDL):  WDL Except

## 2023-09-03 NOTE — CARE PLAN
Problem: Fall Risk - Rehab  Goal: Patient will remain free from falls  Note: Patient remains free from falls this shift. Patient was educated on using the call light to prevent falls. Patients bed is in the lowest position. The patients belongings are placed in near proximity to the patient.       Problem: Pain - Standard  Goal: Alleviation of pain or a reduction in pain to the patient’s comfort goal  Flowsheets (Taken 9/3/2023 0808)  Pain Rating Scale (NPRS): 8   The patient is Stable - Low risk of patient condition declining or worsening

## 2023-09-04 ENCOUNTER — APPOINTMENT (OUTPATIENT)
Dept: PHYSICAL THERAPY | Facility: REHABILITATION | Age: 78
DRG: 559 | End: 2023-09-04
Attending: PHYSICAL MEDICINE & REHABILITATION
Payer: MEDICARE

## 2023-09-04 ENCOUNTER — APPOINTMENT (OUTPATIENT)
Dept: SPEECH THERAPY | Facility: REHABILITATION | Age: 78
DRG: 559 | End: 2023-09-04
Attending: PHYSICAL MEDICINE & REHABILITATION
Payer: MEDICARE

## 2023-09-04 ENCOUNTER — APPOINTMENT (OUTPATIENT)
Dept: OCCUPATIONAL THERAPY | Facility: REHABILITATION | Age: 78
DRG: 559 | End: 2023-09-04
Attending: PHYSICAL MEDICINE & REHABILITATION
Payer: MEDICARE

## 2023-09-04 PROCEDURE — 97110 THERAPEUTIC EXERCISES: CPT

## 2023-09-04 PROCEDURE — 97530 THERAPEUTIC ACTIVITIES: CPT

## 2023-09-04 PROCEDURE — 97129 THER IVNTJ 1ST 15 MIN: CPT

## 2023-09-04 PROCEDURE — 97535 SELF CARE MNGMENT TRAINING: CPT

## 2023-09-04 PROCEDURE — A9270 NON-COVERED ITEM OR SERVICE: HCPCS | Performed by: PHYSICAL MEDICINE & REHABILITATION

## 2023-09-04 PROCEDURE — 97130 THER IVNTJ EA ADDL 15 MIN: CPT

## 2023-09-04 PROCEDURE — 700102 HCHG RX REV CODE 250 W/ 637 OVERRIDE(OP): Performed by: PHYSICAL MEDICINE & REHABILITATION

## 2023-09-04 PROCEDURE — 770010 HCHG ROOM/CARE - REHAB SEMI PRIVAT*

## 2023-09-04 PROCEDURE — 97116 GAIT TRAINING THERAPY: CPT

## 2023-09-04 RX ADMIN — ACETAMINOPHEN 650 MG: 325 TABLET ORAL at 09:47

## 2023-09-04 RX ADMIN — SIMVASTATIN 10 MG: 20 TABLET, FILM COATED ORAL at 20:42

## 2023-09-04 RX ADMIN — APIXABAN 5 MG: 5 TABLET, FILM COATED ORAL at 20:42

## 2023-09-04 RX ADMIN — OMEPRAZOLE 20 MG: 20 CAPSULE, DELAYED RELEASE ORAL at 09:47

## 2023-09-04 RX ADMIN — OXYCODONE HYDROCHLORIDE 5 MG: 5 TABLET ORAL at 05:44

## 2023-09-04 RX ADMIN — CLOPIDOGREL BISULFATE 75 MG: 75 TABLET ORAL at 09:47

## 2023-09-04 RX ADMIN — APIXABAN 5 MG: 5 TABLET, FILM COATED ORAL at 09:47

## 2023-09-04 RX ADMIN — ACETAMINOPHEN 650 MG: 325 TABLET ORAL at 14:14

## 2023-09-04 RX ADMIN — ACETAMINOPHEN 650 MG: 325 TABLET ORAL at 20:42

## 2023-09-04 RX ADMIN — LISINOPRIL 20 MG: 20 TABLET ORAL at 05:43

## 2023-09-04 ASSESSMENT — ACTIVITIES OF DAILY LIVING (ADL)
BED_CHAIR_WHEELCHAIR_TRANSFER_DESCRIPTION: ADAPTIVE EQUIPMENT;SUPERVISION FOR SAFETY;VERBAL CUEING;SET-UP OF EQUIPMENT
BED_CHAIR_WHEELCHAIR_TRANSFER_DESCRIPTION: ADAPTIVE EQUIPMENT;INCREASED TIME;SET-UP OF EQUIPMENT;SUPERVISION FOR SAFETY;VERBAL CUEING
BED_CHAIR_WHEELCHAIR_TRANSFER_DESCRIPTION: ADAPTIVE EQUIPMENT

## 2023-09-04 ASSESSMENT — GAIT ASSESSMENTS
GAIT LEVEL OF ASSIST: STANDBY ASSIST
GAIT LEVEL OF ASSIST: STANDBY ASSIST
DISTANCE (FEET): 200
DISTANCE (FEET): 10
GAIT LEVEL OF ASSIST: CONTACT GUARD ASSIST
DEVIATION: BRADYKINETIC;DECREASED BASE OF SUPPORT;ANTALGIC
ASSISTIVE DEVICE: FRONT WHEEL WALKER
ASSISTIVE DEVICE: FRONT WHEEL WALKER
DEVIATION: ANTALGIC;BRADYKINETIC
ASSISTIVE DEVICE: FRONT WHEEL WALKER
DISTANCE (FEET): 150
DEVIATION: BRADYKINETIC;DECREASED HEEL STRIKE;DECREASED TOE OFF

## 2023-09-04 NOTE — CARE PLAN
The patient is Stable - Low risk of patient condition declining or worsening    Shift Goals  Clinical Goals: safety  Patient Goals: safety    Progress made toward(s) clinical / shift goals:      Problem: Bladder / Voiding  Goal: Patient will establish and maintain regular urinary output  Outcome: Progressing  Note: Pt continent of bladder. Voiding adequately using urinal. He denies dysuria.     Problem: Pain - Standard  Goal: Alleviation of pain or a reduction in pain to the patient’s comfort goal  Outcome: Progressing  Note: Roxicodone 5mg given PRN per MAR for c/o right hip incision pain with adequate relief. Pt sleeps good. Will continue to monitor.       Patient is not progressing towards the following goals:

## 2023-09-04 NOTE — THERAPY
Physical Therapy   Daily Treatment     Patient Name: Moe Nickerson  Age:  78 y.o., Sex:  male  Medical Record #: [id number]  Today's Date: 9/4/2023     Precautions  Precautions: (P) Fall Risk, Posterior Hip Precautions, Weight Bearing As Tolerated Right Lower Extremity  Comments: (P) Aphasia    Subjective    Pt reported nausea upon PT arrival; pt would prefer longer breaks between sessions.      Objective       09/04/23 1001   PT Charge Group   PT Therapeutic Activities (Units) 2   PT Total Time Spent   PT Individual Total Time Spent (Mins) 30   Precautions   Precautions Fall Risk;Posterior Hip Precautions;Weight Bearing As Tolerated Right Lower Extremity   Comments Aphasia   Gait Functional Level of Assist    Gait Level Of Assist Contact Guard Assist   Assistive Device Front Wheel Walker   Distance (Feet) 10  (bedside, return to bed, nauseous)   # of Times Distance was Traveled 1   Deviation Bradykinetic;Decreased Heel Strike;Decreased Toe Off   Transfer Functional Level of Assist   Bed, Chair, Wheelchair Transfer Contact Guard Assist   Bed Chair Wheelchair Transfer Description Adaptive equipment  (SPT FWW)   Supine Lower Body Exercise   Bridges Two Legged  (~5x to reposition)   Ankle Pumps 1 set of 15;Bilateral   Gluteal Isometrics 1 set of 10;Bilateral   Quadriceps Isometrics 1 set of 10;Bilateral   Bed Mobility    Sit to Supine Minimal Assist  (leg )   Sit to Stand Minimal Assist  (FWW)   Scooting Minimal Assist   Rolling Minimal Assist to Rt.   Neuro-Muscular Treatments   Neuro-Muscular Treatments Weight Shift Left;Weight Shift Right;Verbal Cuing;Sequencing   Interdisciplinary Plan of Care Collaboration   IDT Collaboration with  Nursing;Physical Therapist   Patient Position at End of Therapy In Bed;Call Light within Reach;Tray Table within Reach   Collaboration Comments Nausea, provided with basin and ginger ale, helped         Assessment    Pt was limited by nausea, and provided with ginger ale. Pt  reported improvement, but preferred to return to bed. Pt was further limited by fatigue from previous session. Pt participated in short EOM ambulation, bed mobility with bed rail and leg , and supine ther ex.     Strengths: Able to follow instructions, Good insight into deficits/needs, Independent prior level of function, Making steady progress towards goals, Motivated for self care and independence, Pleasant and cooperative, Willingly participates in therapeutic activities  Barriers: Aphasia expressive, Decreased endurance, Fatigue, Impaired activity tolerance, Impaired balance, Generalized weakness, Pain    Plan    Gait FWW with goal of improved R LE weight acceptance, R LE therex, NuStep for endurance, bed mobility independence    Passport items to be completed:  Get in/out of bed safely, in/out of a vehicle, safely use mobility device, walk or wheel around home/community, navigate up and down stairs, show how to get up/down from the ground, ensure home is accessible, demonstrate HEP, complete caregiver training    Physical Therapy Problems (Active)       Problem: Mobility       Dates: Start:  08/31/23         Goal: STG-Within one week, patient will ambulate 150ft with SBA using LRAD       Dates: Start:  08/31/23            Goal: STG-Within one week, patient will ambulate up/down a curb with [place] and LRAD       Dates: Start:  08/31/23               Problem: Mobility Transfers       Dates: Start:  08/31/23         Goal: STG-Within one week, patient will perform bed mobility [place]       Dates: Start:  08/31/23            Goal: STG-Within one week, patient will transfer bed to chair with SBA       Dates: Start:  08/31/23               Problem: PT-Long Term Goals       Dates: Start:  08/31/23         Goal: LTG-By discharge, patient will ambulate 200ft indoors and outdoors [place] with LRAD       Dates: Start:  08/31/23            Goal: LTG-By discharge, patient will transfer one surface to another [place]       Dates:  Start:  08/31/23            Goal: LTG-By discharge, patient will perform home exercise program independently       Dates: Start:  08/31/23            Goal: LTG-By discharge, patient will transfer in/out of a car with set up assist       Dates: Start:  08/31/23

## 2023-09-04 NOTE — CARE PLAN
Problem: Fall Risk - Rehab  Goal: Patient will remain free from falls  Outcome: Progressing     Problem: Mobility  Goal: Patient's capacity to carry out activities will improve  Outcome: Progressing     Problem: Pain - Standard  Goal: Alleviation of pain or a reduction in pain to the patient’s comfort goal  Outcome: Progressing         The patient is Stable - Low risk of patient condition declining or worsening    Shift Goals  Clinical Goals: safety  Patient Goals: safety

## 2023-09-04 NOTE — THERAPY
Physical Therapy   Daily Treatment     Patient Name: Moe Nickerson  Age:  78 y.o., Sex:  male  Medical Record #: [id number]  Today's Date: 9/4/2023     Precautions  Precautions: Fall Risk, Posterior Hip Precautions, Weight Bearing As Tolerated Right Lower Extremity  Comments: Aphasia    Subjective    Attempted at 830 however pt had just received breakfast tray and requested PT return. Agreeable at 9am     Objective       09/04/23 0901   Therapy Missed   Missed Therapy (Minutes) 30   Reason For Missed Therapy Non-Medical - Other (Please Comment)  (late breakfast tray)   PT Charge Group   PT Gait Training (Units) 1   PT Therapeutic Activities (Units) 1   PT Total Time Spent   PT Individual Total Time Spent (Mins) 30   Gait Functional Level of Assist    Gait Level Of Assist Standby Assist   Assistive Device Front Wheel Walker   Distance (Feet) 150   # of Times Distance was Traveled 1   Deviation Antalgic;Bradykinetic  (step to progressed to step through with VC's and warm up; slight R LE IR improved with VC's/demo)   Wheelchair Functional Level of Assist   Wheelchair Assist Supervised   Distance Wheelchair (Feet or Distance) 25   Transfer Functional Level of Assist   Bed, Chair, Wheelchair Transfer Standby Assist   Bed Chair Wheelchair Transfer Description Adaptive equipment;Supervision for safety;Verbal cueing;Set-up of equipment   Bed Mobility    Supine to Sit Standby Assist  (with leg )   Sit to Stand Standby Assist   Interdisciplinary Plan of Care Collaboration   IDT Collaboration with  Physical Therapist   Patient Position at End of Therapy Seated;Chair Alarm On;Self Releasing Lap Belt Applied  (in bathroom completing seated grooming/hygiene)   Collaboration Comments POC, nausea/vomiting     extra time for bed mobility and LBD dressing seated EOB with LHAE and modA + max verbal cueing  Pt set up for seated G/H after demonstrating ability to propel WC and verbalizing to pull cord in bathroom if unable to  propel back to bedside       09/04/23 1530   PT Charge Group   PT Gait Training (Units) 1   PT Therapeutic Exercise (Units) 1   PT Total Time Spent   PT Individual Total Time Spent (Mins) 30   Gait Functional Level of Assist    Gait Level Of Assist Standby Assist   Assistive Device Front Wheel Walker   Distance (Feet) 200   # of Times Distance was Traveled 1   Deviation Bradykinetic;Decreased Base Of Support;Antalgic   Transfer Functional Level of Assist   Bed, Chair, Wheelchair Transfer Standby Assist   Bed Chair Wheelchair Transfer Description Adaptive equipment;Increased time;Set-up of equipment;Supervision for safety;Verbal cueing   Sitting Lower Body Exercises   Nustep Resistance Level 2  (BUE/LE 5.5 min 330 steps)   Standing Lower Body Exercises   Hip Extension 2 sets of 10;Right ;Left   Hip Abduction 2 sets of 10;Right ;Left   Comments B UE support   Bed Mobility    Supine to Sit Standby Assist   Sit to Supine Standby Assist   Sit to Stand Standby Assist   Interdisciplinary Plan of Care Collaboration   Patient Position at End of Therapy In Bed;Bed Alarm On;Call Light within Reach;Tray Table within Reach       Assessment    Pt continues to present with bradykinesia, antalgic gait, and slow processing/problem-solving. He is concerned about upcoming DC despite moving well with therapy.   Strengths: Able to follow instructions, Good insight into deficits/needs, Independent prior level of function, Making steady progress towards goals, Motivated for self care and independence, Pleasant and cooperative, Willingly participates in therapeutic activities  Barriers: Aphasia expressive, Decreased endurance, Fatigue, Impaired activity tolerance, Impaired balance, Generalized weakness, Pain    Plan    Activity tolerance  B LE therex  [place] with bed mob, transfers, household ambulation in anticipation of upcoming DC    Passport items to be completed:  Get in/out of bed safely, in/out of a vehicle, safely use  mobility device, walk or wheel around home/community, navigate up and down stairs, show how to get up/down from the ground, ensure home is accessible, demonstrate HEP, complete caregiver training    Physical Therapy Problems (Active)       Problem: Mobility       Dates: Start:  08/31/23         Goal: STG-Within one week, patient will ambulate 150ft with SBA using LRAD       Dates: Start:  08/31/23            Goal: STG-Within one week, patient will ambulate up/down a curb with [place] and LRAD       Dates: Start:  08/31/23               Problem: Mobility Transfers       Dates: Start:  08/31/23         Goal: STG-Within one week, patient will perform bed mobility [place]       Dates: Start:  08/31/23            Goal: STG-Within one week, patient will transfer bed to chair with SBA       Dates: Start:  08/31/23               Problem: PT-Long Term Goals       Dates: Start:  08/31/23         Goal: LTG-By discharge, patient will ambulate 200ft indoors and outdoors H[place] with LRAD       Dates: Start:  08/31/23            Goal: LTG-By discharge, patient will transfer one surface to another Hector       Dates: Start:  08/31/23            Goal: LTG-By discharge, patient will perform home exercise program independently       Dates: Start:  08/31/23            Goal: LTG-By discharge, patient will transfer in/out of a car with set up assist       Dates: Start:  08/31/23

## 2023-09-04 NOTE — PROGRESS NOTES
NURSING DAILY NOTE    Name: Moe Nickerson   Date of Admission: 8/30/2023   Admitting Diagnosis: History of femur fracture  Attending Physician: Kamran Vicente M.d.  Allergies: Amlodipine, Cortisone, and Morphine    Safety  Patient Assist  Mod assist  Patient Precautions  Fall Risk, Posterior Hip Precautions, Weight Bearing As Tolerated Right Lower Extremity  Precaution Comments  Aphasia  Bed Transfer Status  Contact Guard Assist  Toilet Transfer Status   Standby Assist  Assistive Devices  Wheelchair  Oxygen  None - Room Air  Diet/Therapeutic Dining  Current Diet Order   Procedures    Diet Order Diet: Regular     Pill Administration  whole and floated  Agitated Behavioral Scale     ABS Level of Severity       Fall Risk  Has the patient had a fall this admission?   No  Shayna Bunn Fall Risk Scoring  18, HIGH RISK  Fall Risk Safety Measures  bed alarm, chair alarm, poor balance, and low vision/ hearing    Vitals  Temperature: 36.6 °C (97.9 °F)  Temp src: Temporal  Pulse: 62  Respiration: 18  Blood Pressure : 110/66  Blood Pressure MAP (Calculated): 81 MM HG  BP Location: Left, Upper Arm  Patient BP Position: Supine     Oxygen  Pulse Oximetry: 98 %  O2 (LPM): 0  O2 Delivery Device: None - Room Air    Bowel and Bladder  Last Bowel Movement  09/03/23  Stool Type  Type 5: Soft blob with clear cut edges (passed easily)  Bowel Device     Continent  Bladder: Continent void   Bowel: Continent movement  Bladder Function  Urine Void (mL): 200 ml  Number of Times Voided: 1  Urine Color: Yoseiln  Genitourinary Assessment   Bladder Assessment (WDL):  WDL Except  Garcia Catheter: Not Applicable  Urine Color: Yoselin  Bladder Device: Urinal  Bladder Scan: Post Void  $ Bladder Scan Results (mL): 88    Skin  Manas Score   18  Sensory Interventions   Bed Types: Standard/Trauma Mattress  Skin Preventative Measures: Waffle Overlay  Moisture Interventions         Pain  Pain  Rating Scale  1 - Hardly Notice Pain  Pain Location  Hip  Pain Location Orientation  Right  Pain Interventions   Rest    ADLs    Bathing      Linen Change      Personal Hygiene  Ruth Bottle, Moist Ruth Wipes  Chlorhexidine Bath      Oral Care  Refused  Teeth/Dentures     Shave     Nutrition Percentage Eaten  *  * Meal *  *, Lunch, Between 50-75% Consumed  Environmental Precautions  Bed in Low Position, Personal Belongings, Wastebasket, Call Bell etc. in Easy Reach, Treaded Slipper Socks on Patient  Patient Turns/Positioning  Patient Turns Self from Side to Side  Patient Turns Assistance/Tolerance     Bed Positions  Bed Controls On, Bed Locked  Head of Bed Elevated  Self regulated      Psychosocial/Neurologic Assessment  Psychosocial Assessment  Psychosocial (WDL):  Within Defined Limits  Neurologic Assessment  Neuro (WDL): Within Defined Limits  Level of Consciousness: Alert  EENT (WDL):  WDL Except    Cardio/Pulmonary Assessment  Edema      Respiratory Breath Sounds  RUL Breath Sounds: Clear  RML Breath Sounds: Clear  RLL Breath Sounds: Clear  IDRIS Breath Sounds: Clear  LLL Breath Sounds: Clear  Cardiac Assessment   Cardiac (WDL):  WDL Except

## 2023-09-04 NOTE — THERAPY
Speech Language Pathology  Daily Treatment     Patient Name: Moe Nickerson  Age:  78 y.o., Sex:  male  Medical Record #: [id number]  Today's Date: 9/4/2023     Precautions  Precautions: Fall Risk, Posterior Hip Precautions, Weight Bearing As Tolerated Right Lower Extremity  Comments: Aphasia    Subjective    Patient agreeable to therapy.  Slow to process.      Objective       09/04/23 1301   Treatment Charges   SLP Cognitive Skill Development First 15 Minutes 1   SLP Cognitive Skill Development Additional 15 Minutes 3   SLP Total Time Spent   SLP Individual Total Time Spent (Mins) 60   Cognition   Simple Attention Minimal (4)   Prospective Memory Moderate (3)   Functional Memory Activities Moderate (3)   Functional Level of Assist   Comprehension Moderate Assist   Comprehension Description Verbal cues;Glasses   Expression Minimal Assist   Expression Description Verbal cueing   Social Interaction Supervision   Social Interaction Description Increased time   Problem Solving Moderate Assist   Problem Solving Description Increased time;Seat belt;Supervision;Therapy schedule;Verbal cueing   Memory Moderate Assist   Memory Description Seat belt;Supervision;Increased time;Bed/chair alarm;Therapy schedule;Verbal cueing   Strengths & Barriers   Strengths Able to follow instructions;Alert and oriented;Independent prior level of function;Motivated for self care and independence;Pleasant and cooperative;Supportive family;Willingly participates in therapeutic activities   Barriers Decreased endurance;Fatigue;Generalized weakness;Impaired balance;Impaired carryover of learning;Impaired functional cognition  (slow to process.  decreased initiation.)         Assessment    Noted patient to show dysfluencies in conversation.  Patient reports that he displayed stuttering after his stroke 4 years ago, but his stroke symptoms eventually resolved.  Patient is very slow to organize and express his thoughts.   Patient was able to recall  2/5 unrelated words previously trained increased to 4/5 with min cues.   Patient needed mod to max A for alternating attention in task.    Strengths: (P) Able to follow instructions, Alert and oriented, Independent prior level of function, Motivated for self care and independence, Pleasant and cooperative, Supportive family, Willingly participates in therapeutic activities  Barriers: (P) Decreased endurance, Fatigue, Generalized weakness, Impaired balance, Impaired carryover of learning, Impaired functional cognition (slow to process.  decreased initiation.)    Plan    Target recall, alternating attention, medication management.    Passport items to be completed:  Express basic needs, understand food/liquid recommendations, consistently follow swallow precautions, manage finances, manage medications, arrive to therapy appointments on time, complete daily memory log entries, solve problems related to safety situations, review education related to hospitalization, complete caregiver training     Speech Therapy Problems (Active)       Problem: Memory STGs       Dates: Start:  09/02/23         Goal: STG-Within one week, patient will demonstrate use of memory strategies in order to recall safety precautions related to hospitalization.        Dates: Start:  09/02/23               Problem: Problem Solving STGs       Dates: Start:  09/02/23         Goal: STG-Within one week, patient will complete medication management tasks with 80% accuracy min A       Dates: Start:  09/02/23            Goal: STG-Within one week, patient will complete alternating attention tasks with 75% accuracy, min a       Dates: Start:  09/02/23               Problem: Speech/Swallowing LTGs       Dates: Start:  09/02/23         Goal: LTG-By discharge, patient will solve complex problems relate to IADL's in order to d/c home with mod I        Dates: Start:  09/02/23

## 2023-09-04 NOTE — THERAPY
"Occupational Therapy  Daily Treatment     Patient Name: Moe Nickerson  Age:  78 y.o., Sex:  male  Medical Record #: [id number]  Today's Date: 9/4/2023     Precautions  Precautions: (P) Fall Risk, Posterior Hip Precautions, Weight Bearing As Tolerated Right Lower Extremity  Comments: (P) aphasia         Subjective    \"I get tired fast\"     Objective       09/04/23 1431   OT Charge Group   Charges Yes   OT Self Care / ADL (Units) 1   OT Therapeutic Exercise (Units) 1   OT Total Time Spent   OT Individual Total Time Spent (Mins) 30   Precautions   Precautions Fall Risk;Posterior Hip Precautions;Weight Bearing As Tolerated Right Lower Extremity   Comments aphasia   Functional Level of Assist   Toileting Contact Guard Assist   Toileting Description Assist for standing balance;Grab bar;Increased time;Supervision for safety;Verbal cueing;Set-up of equipment  (CGA to Min A for standing balance during urination in bathroom at grab bars)   Sitting Upper Body Exercises   Upper Extremity Bike Minutes / Rest Breaks (See Comments)  (Fluido UE arm bike on Min Res. 5x1 minute sets with min A to get rotation started.)   Interdisciplinary Plan of Care Collaboration   IDT Collaboration with  Physical Therapist   Patient Position at End of Therapy Seated;Chair Alarm On;Call Light within Reach;Tray Table within Reach;Self Releasing Lap Belt Applied   Collaboration Comments disposition   Strengths & Barriers   Strengths Able to follow instructions;Alert and oriented;Independent prior level of function;Making steady progress towards goals;Manages pain appropriately;Motivated for self care and independence;Pleasant and cooperative;Supportive family;Willingly participates in therapeutic activities   Barriers Aphasia expressive;Decreased endurance;Fatigue;Generalized weakness;Orthostatic hypotension;Impaired balance;Impaired carryover of learning;Impaired insight/denial of deficits;Limited mobility;Pain         Assessment    Pt with fair " to good tolerance of brief OT treatment on this date focused on ADLs and UE strength. Pt demos quick fatigue and needing intermittent rest breaks during ADLs and UE strength training. Pt pleasant and agreeable and motivated to continue.     Strengths: (P) Able to follow instructions, Alert and oriented, Independent prior level of function, Making steady progress towards goals, Manages pain appropriately, Motivated for self care and independence, Pleasant and cooperative, Supportive family, Willingly participates in therapeutic activities  Barriers: (P) Aphasia expressive, Decreased endurance, Fatigue, Generalized weakness, Orthostatic hypotension, Impaired balance, Impaired carryover of learning, Impaired insight/denial of deficits, Limited mobility, Pain    Plan    ADLs with posterior hip precautions, LBD with AE block practice, standing balance/tolerance, BUE strengthening    Occupational Therapy Goals (Active)       Problem: Bathing       Dates: Start:  08/31/23         Goal: STG-Within one week, patient will bathe at Min A level using AE/DME PRN.       Dates: Start:  08/31/23               Problem: Dressing       Dates: Start:  08/31/23         Goal: STG-Within one week, patient will dress LB at Min A level using AE/DME PRN.       Dates: Start:  08/31/23               Problem: Functional Transfers       Dates: Start:  08/31/23         Goal: STG-Within one week, patient will transfer to step in shower at Min A level using AE/DME PRN.       Dates: Start:  08/31/23               Problem: IADL's       Dates: Start:  08/31/23         Goal: STG-Within one week, patient will access kitchen area at SBA level using AE/DME PRN.       Dates: Start:  08/31/23               Problem: OT Long Term Goals       Dates: Start:  08/31/23         Goal: LTG-By discharge, patient will complete basic self care tasks at SPV-Mod I level using AE/DME PRN.       Dates: Start:  08/31/23            Goal: LTG-By discharge, patient will  perform bathroom transfers at SPV-Mod I level using AE/DME PRN.       Dates: Start:  08/31/23            Goal: LTG-By discharge, patient will complete basic home management at SPV-Mod I level using AE/DME PRN.       Dates: Start:  08/31/23               Problem: Toileting       Dates: Start:  08/31/23         Goal: STG-Within one week, patient will complete toileting tasks at Min A level using AE/DME PRN.       Dates: Start:  08/31/23

## 2023-09-04 NOTE — PROGRESS NOTES
NURSING DAILY NOTE    Name: Moe Nickerson   Date of Admission: 8/30/2023   Admitting Diagnosis: History of femur fracture  Attending Physician: Kamran Vicente M.d.  Allergies: Amlodipine, Cortisone, and Morphine    Safety  Patient Assist  Mod assist  Patient Precautions  Fall Risk, Posterior Hip Precautions, Weight Bearing As Tolerated Right Lower Extremity  Precaution Comments  aphasia  Bed Transfer Status  Standby Assist  Toilet Transfer Status   Standby Assist  Assistive Devices  Wheelchair  Oxygen  None - Room Air  Diet/Therapeutic Dining  Current Diet Order   Procedures    Diet Order Diet: Regular     Pill Administration  whole  Agitated Behavioral Scale     ABS Level of Severity       Fall Risk  Has the patient had a fall this admission?   No  Shayna Bunn Fall Risk Scoring  18, HIGH RISK  Fall Risk Safety Measures  bed alarm and chair alarm    Vitals  Temperature: 36.7 °C (98.1 °F)  Temp src: Oral  Pulse: 69  Respiration: 16  Blood Pressure : 118/74  Blood Pressure MAP (Calculated): 89 MM HG  BP Location: Left, Upper Arm  Patient BP Position: Supine     Oxygen  Pulse Oximetry: 99 %  O2 (LPM): 0  O2 Delivery Device: None - Room Air    Bowel and Bladder  Last Bowel Movement  09/03/23  Stool Type  Type 5: Soft blob with clear cut edges (passed easily)  Bowel Device     Continent  Bladder: Continent void   Bowel: Continent movement  Bladder Function  Urine Void (mL): 250 ml  Number of Times Voided: 1  Urine Color: Unable To Evaluate  Genitourinary Assessment   Bladder Assessment (WDL):  WDL Except  Garcia Catheter: Not Applicable  Urine Color: Unable To Evaluate  Bladder Device: Urinal  Bladder Scan: Post Void  $ Bladder Scan Results (mL): 88    Skin  Manas Score   18  Sensory Interventions   Bed Types: Standard/Trauma Mattress  Skin Preventative Measures: Waffle Overlay  Moisture Interventions         Pain  Pain Rating Scale  1 - Hardly Notice  Pain  Pain Location  Hip  Pain Location Orientation  Right  Pain Interventions   Rest    ADLs    Bathing      Linen Change      Personal Hygiene  Ruth Bottle, Moist Ruth Wipes  Chlorhexidine Bath      Oral Care  Refused  Teeth/Dentures     Shave     Nutrition Percentage Eaten  *  * Meal *  *, Lunch, Between % Consumed  Environmental Precautions  Bed in Low Position, Personal Belongings, Wastebasket, Call Bell etc. in Easy Reach, Treaded Slipper Socks on Patient  Patient Turns/Positioning  Patient Turns Self from Side to Side  Patient Turns Assistance/Tolerance     Bed Positions  Bed Controls On, Bed Locked  Head of Bed Elevated  Self regulated      Psychosocial/Neurologic Assessment  Psychosocial Assessment  Psychosocial (WDL):  Within Defined Limits  Neurologic Assessment  Neuro (WDL): Within Defined Limits  Level of Consciousness: Alert  EENT (WDL):  WDL Except    Cardio/Pulmonary Assessment  Edema      Respiratory Breath Sounds  RUL Breath Sounds: Clear  RML Breath Sounds: Clear  RLL Breath Sounds: Clear  IDRIS Breath Sounds: Clear  LLL Breath Sounds: Clear  Cardiac Assessment   Cardiac (WDL):  WDL Except

## 2023-09-05 ENCOUNTER — APPOINTMENT (OUTPATIENT)
Dept: PHYSICAL THERAPY | Facility: REHABILITATION | Age: 78
DRG: 559 | End: 2023-09-05
Attending: PHYSICAL MEDICINE & REHABILITATION
Payer: MEDICARE

## 2023-09-05 ENCOUNTER — APPOINTMENT (OUTPATIENT)
Dept: OCCUPATIONAL THERAPY | Facility: REHABILITATION | Age: 78
DRG: 559 | End: 2023-09-05
Attending: PHYSICAL MEDICINE & REHABILITATION
Payer: MEDICARE

## 2023-09-05 ENCOUNTER — APPOINTMENT (OUTPATIENT)
Dept: SPEECH THERAPY | Facility: REHABILITATION | Age: 78
DRG: 559 | End: 2023-09-05
Attending: PHYSICAL MEDICINE & REHABILITATION
Payer: MEDICARE

## 2023-09-05 PROCEDURE — 97116 GAIT TRAINING THERAPY: CPT

## 2023-09-05 PROCEDURE — 97130 THER IVNTJ EA ADDL 15 MIN: CPT

## 2023-09-05 PROCEDURE — A9270 NON-COVERED ITEM OR SERVICE: HCPCS | Performed by: PHYSICAL MEDICINE & REHABILITATION

## 2023-09-05 PROCEDURE — 97530 THERAPEUTIC ACTIVITIES: CPT

## 2023-09-05 PROCEDURE — 770010 HCHG ROOM/CARE - REHAB SEMI PRIVAT*

## 2023-09-05 PROCEDURE — 700101 HCHG RX REV CODE 250: Performed by: PHYSICAL MEDICINE & REHABILITATION

## 2023-09-05 PROCEDURE — 97129 THER IVNTJ 1ST 15 MIN: CPT

## 2023-09-05 PROCEDURE — 99233 SBSQ HOSP IP/OBS HIGH 50: CPT | Performed by: PHYSICAL MEDICINE & REHABILITATION

## 2023-09-05 PROCEDURE — 97110 THERAPEUTIC EXERCISES: CPT

## 2023-09-05 PROCEDURE — 700102 HCHG RX REV CODE 250 W/ 637 OVERRIDE(OP): Performed by: PHYSICAL MEDICINE & REHABILITATION

## 2023-09-05 RX ORDER — LIDOCAINE 50 MG/G
1 PATCH TOPICAL
Status: DISCONTINUED | OUTPATIENT
Start: 2023-09-05 | End: 2023-09-12 | Stop reason: HOSPADM

## 2023-09-05 RX ORDER — ACETAMINOPHEN 500 MG
1000 TABLET ORAL EVERY 6 HOURS
Status: DISCONTINUED | OUTPATIENT
Start: 2023-09-05 | End: 2023-09-05

## 2023-09-05 RX ORDER — HYDROCORTISONE ACETATE 25 MG/1
25 SUPPOSITORY RECTAL EVERY 12 HOURS
Status: DISCONTINUED | OUTPATIENT
Start: 2023-09-05 | End: 2023-09-06

## 2023-09-05 RX ORDER — ACETAMINOPHEN 500 MG
1000 TABLET ORAL EVERY 6 HOURS PRN
Status: DISCONTINUED | OUTPATIENT
Start: 2023-09-05 | End: 2023-09-12 | Stop reason: HOSPADM

## 2023-09-05 RX ADMIN — APIXABAN 5 MG: 5 TABLET, FILM COATED ORAL at 21:10

## 2023-09-05 RX ADMIN — OMEPRAZOLE 20 MG: 20 CAPSULE, DELAYED RELEASE ORAL at 08:26

## 2023-09-05 RX ADMIN — LISINOPRIL 20 MG: 20 TABLET ORAL at 05:20

## 2023-09-05 RX ADMIN — LIDOCAINE 1 PATCH: 50 PATCH TOPICAL at 13:39

## 2023-09-05 RX ADMIN — ACETAMINOPHEN 650 MG: 325 TABLET ORAL at 08:26

## 2023-09-05 RX ADMIN — CLOPIDOGREL BISULFATE 75 MG: 75 TABLET ORAL at 08:26

## 2023-09-05 RX ADMIN — APIXABAN 5 MG: 5 TABLET, FILM COATED ORAL at 08:26

## 2023-09-05 RX ADMIN — SIMVASTATIN 10 MG: 20 TABLET, FILM COATED ORAL at 21:10

## 2023-09-05 RX ADMIN — SENNOSIDES AND DOCUSATE SODIUM 2 TABLET: 50; 8.6 TABLET ORAL at 08:26

## 2023-09-05 RX ADMIN — ACETAMINOPHEN 1000 MG: 500 TABLET ORAL at 13:39

## 2023-09-05 RX ADMIN — SENNOSIDES AND DOCUSATE SODIUM 2 TABLET: 50; 8.6 TABLET ORAL at 21:10

## 2023-09-05 ASSESSMENT — ACTIVITIES OF DAILY LIVING (ADL)
BED_CHAIR_WHEELCHAIR_TRANSFER_DESCRIPTION: ADAPTIVE EQUIPMENT;SET-UP OF EQUIPMENT;SUPERVISION FOR SAFETY
TOILET_TRANSFER_DESCRIPTION: GRAB BAR;SUPERVISION FOR SAFETY
BED_CHAIR_WHEELCHAIR_TRANSFER_DESCRIPTION: ADAPTIVE EQUIPMENT;SET-UP OF EQUIPMENT;SUPERVISION FOR SAFETY

## 2023-09-05 ASSESSMENT — GAIT ASSESSMENTS
DEVIATION: BRADYKINETIC;ANTALGIC
DEVIATION: ANTALGIC;BRADYKINETIC
ASSISTIVE DEVICE: FRONT WHEEL WALKER
GAIT LEVEL OF ASSIST: STANDBY ASSIST
GAIT LEVEL OF ASSIST: STANDBY ASSIST
DISTANCE (FEET): 180
DISTANCE (FEET): 200
ASSISTIVE DEVICE: FRONT WHEEL WALKER

## 2023-09-05 NOTE — THERAPY
"Occupational Therapy  Daily Treatment     Patient Name: Moe Nickerson  Age:  78 y.o., Sex:  male  Medical Record #: [id number]  Today's Date: 9/5/2023     Precautions  Precautions: Fall Risk, Posterior Hip Precautions, Weight Bearing As Tolerated Right Lower Extremity  Comments: aphasia         Subjective    \"I need to stop, I'm so tired.\"     Objective       09/05/23 1231   OT Charge Group   OT Therapy Activity (Units) 3   OT Therapeutic Exercise (Units) 1   OT Total Time Spent   OT Individual Total Time Spent (Mins) 60   Pain   Intervention Nurse Notified;Repositioned;Rest   Pain 0 - 10 Group   Location Hip   Location Orientation Right   Pain Rating Scale (NPRS) 8   Description Aching;Constant;Sore   Comfort Goal Comfort with Movement;Perform Activity   Therapist Pain Assessment Post Activity Pain Same as Prior to Activity;Nurse Notified   Cognition    Level of Consciousness Alert   Safety Awareness Impaired   New Learning Impaired   Sequencing Impaired   Sitting Upper Body Exercises   Sitting Upper Body Exercises Yes   Chest Press 3 sets of 15;Bilateral;Weight (See Comments for lbs)  (4lb medicine ball)   Bicep Curls 3 sets of 15;Bilateral;Weight (See Comments for lbs)  (4lb medicine ball)   Comments 3 sets of 15;Bilateral;Weight (See Comments for lbs); for crossing biceps using 4lb medicine ball   IADL Treatments   IADL Treatments Meal preparation   Meal Preparation Initiated education and training with simple meal prep and scrambled eggs, however, pt did not complete due to increased fatigue. He required max v/c for sequencing, initiation, and problem solving throughout task. Significant extra time required due to delayed processing. Pt completed item retrieval at SBA-CGA level using FWW with 1x LOB to posterior while backing up for a turn, and needed assist with FWW management at times in tight spaces. Pt mixed oil with eggs rather than placing in pan.   Interdisciplinary Plan of Care Collaboration   IDT " Collaboration with  Nursing   Patient Position at End of Therapy In Bed;Bed Alarm On;Call Light within Reach;Tray Table within Reach   Collaboration Comments RN administered pain medication in session         Assessment    Pt demonstrating poor problem solving, initiation, sequencing, and continued delayed processing while attempting simple prep of scrambled eggs. Pt is not safe for meal prep at home, and is also concerned re:DC level of assist at [facility], as there is no outside services available in house. CM to follow up.  Strengths: Able to follow instructions, Alert and oriented, Independent prior level of function, Making steady progress towards goals, Manages pain appropriately, Motivated for self care and independence, Pleasant and cooperative, Supportive family, Willingly participates in therapeutic activities  Barriers: Aphasia expressive, Decreased endurance, Fatigue, Generalized weakness, Orthostatic hypotension, Impaired balance, Impaired carryover of learning, Impaired insight/denial of deficits, Limited mobility, Pain    Plan    ADLs with posterior hip precautions, LBD with AE block practice, standing balance/tolerance, BUE strengthening    Occupational Therapy Goals (Active)       Problem: Bathing       Dates: Start:  08/31/23         Goal: STG-Within one week, patient will bathe at Min A level using AE/DME PRN.       Dates: Start:  08/31/23               Problem: Dressing       Dates: Start:  09/05/23         Goal: STG-Within one week, patient will dress LB       Dates: Start:  09/05/23               Problem: Functional Transfers       Dates: Start:  08/31/23         Goal: STG-Within one week, patient will transfer to step in shower at Min A level using AE/DME PRN.       Dates: Start:  08/31/23               Problem: IADL's       Dates: Start:  08/31/23         Goal: STG-Within one week, patient will access kitchen area at SBA level using AE/DME PRN.       Dates: Start:  08/31/23               Problem:  OT Long Term Goals       Dates: Start:  08/31/23         Goal: LTG-By discharge, patient will complete basic self care tasks at [facility]-Mod I level using AE/DME PRN.       Dates: Start:  08/31/23            Goal: LTG-By discharge, patient will perform bathroom transfers at [facility]-Mod I level using AE/DME PRN.       Dates: Start:  08/31/23            Goal: LTG-By discharge, patient will complete basic home management at [facility]-Mod I level using AE/DME PRN.       Dates: Start:  08/31/23               Problem: Toileting       Dates: Start:  09/05/23         Goal: STG-Within one week, patient will complete toileting tasks       Dates: Start:  09/05/23

## 2023-09-05 NOTE — CARE PLAN
"  Problem: Fall Risk - Rehab  Goal: Patient will remain free from falls  Note: Shayna Bunn Fall risk Assessment Score: 18    High fall risk Interventions   - Alarming seatbelt  - Bed and strip alarm   - Yellow sign by the door   - Yellow wrist band \"Fall risk\"  - Room near to the nurse station  - Do not leave patient unattended in the bathroom  - Fall risk education provided    Use of call light reinforced to make needs known.Will continue to monitor and assess needs and safety.         Problem: Bowel Elimination  Goal: Patient will participate in bowel management program  Note: Pt refused scheduled senna at hs.LBM 9/4.Will continue to monitor.     Problem: Pain - Standard  Goal: Alleviation of pain or a reduction in pain to the patient’s comfort goal  Note: Pain is manageable with scheduled medication at this time.Will continue to monitor and assess pain level and medicate as needed.         "

## 2023-09-05 NOTE — PROGRESS NOTES
Physical Medicine & Rehabilitation Progress Note    _____________________________________  Interdisciplinary Team Conference   Most recent IDT on 9/5/2023    IKamran M.D., was present and led the interdisciplinary team conference on 9/5/2023.  I led the IDT conference and agree with the IDT conference documentation and plan of care as noted below.     Nursing:  Diet Current Diet Order   Procedures    Diet Order Diet: Regular       Eating ADL Independent      % of Last Meal  Oral Nutrition: Breakfast, Between % Consumed   Sleep    Bowel Last BM: 09/05/23   Bladder    Barriers to Discharge Home: Weakness, processing speed, safety awareness      Physical Therapy:  Bed Mobility    Transfers Standby Assist  Adaptive equipment, Set-up of equipment, Supervision for safety (Stand step with FWW)   Mobility Standby Assist   Stairs    Barriers to Discharge Home: Weakness, processing speed, safety awareness      Occupational Therapy:  Grooming Standing   Bathing Moderate Assist   UB Dressing Supervision   LB Dressing Standby Assist   Toileting Contact Guard Assist   Shower & Transfer    Barriers to Discharge Home: Weakness, processing speed, safety awareness      Speech-Language Pathology:  Comprehension:  Moderate Assist  Comprehension Description:  Verbal cues, Glasses  Expression:  Minimal Assist  Expression Description:  Verbal cueing  Social Interaction:  Supervision  Social Interaction Description:  Increased time  Problem Solving:  Moderate Assist  Problem Solving Description:  Increased time, Seat belt, Supervision, Therapy schedule, Verbal cueing  Memory:  Moderate Assist  Memory Description:  Seat belt, Supervision, Increased time, Bed/chair alarm, Therapy schedule, Verbal cueing  Barriers to Discharge Home: Weakness, processing speed, safety awareness    Respiratory Therapy:  O2 (LPM): 0  O2 Delivery Device: None - Room Air    Case Management:  Continues to work on disposition and DME needs.     "  Discharge Date/Disposition:  8/12/23  _____________________________________   Encounter Date: 9/5/2023    Chief Complaint: Weakness    Interval Events (Subjective):  Seen in chair today. Pain seems to be localized to left SI joint pain and hemmeroid pain. Processing speed down.     Objective:  VITAL SIGNS: /65   Pulse (!) 58   Temp 36.7 °C (98 °F) (Oral)   Resp 17   Ht 1.778 m (5' 10\")   Wt 55 kg (121 lb 4.1 oz)   SpO2 96%   BMI 17.40 kg/m²   Gen: No acute distress, well developed well nourished adult  HEENT: Normal Cephalic Atraumatic, Normal conjunctiva.   CV: warm extremities, well perfused, no edema  Resp: symmetric chest rise, breathing comfortably on room air  Abd: Soft, Non distended  Extremities: normal bulk, no atrophy  Skin: no visible rashes or lesions.   Neuro: alert, awake  Psych: Mood and affect appropriate and congruent    Laboratory Values:  No results found for this or any previous visit (from the past 72 hour(s)).    Medications:  Scheduled Medications   Medication Dose Frequency    lidocaine  1 Patch Q24HR    formulation r   QHS    hydrocortisone  25 mg Q12HRS    Pharmacy Consult Request  1 Each PHARMACY TO DOSE    senna-docusate  2 Tablet BID    clopidogrel  75 mg DAILY    lisinopril  20 mg Q DAY    simvastatin  10 mg Q EVENING    apixaban  5 mg BID    omeprazole  20 mg DAILY     PRN medications: acetaminophen, menthol, Respiratory Therapy Consult, hydrALAZINE, senna-docusate **AND** polyethylene glycol/lytes **AND** magnesium hydroxide **AND** bisacodyl, ondansetron **OR** ondansetron, sodium chloride, nitroglycerin    Diet:  Current Diet Order   Procedures    Diet Order Diet: Regular       Medical Decision Making and Plan:  Acute encephalopathy  Likely 2/2 Oxycodone use,  -15mg taken before noon due to severe pain which is now controlled  -9/5 continues to have slowed processing speed  - DC oxycodone. Use Tylenol PRN, Lidocaine patch, and Hemorid cream instead  [facility] Code / " Diagnosis to Support: 0002.1 - Brain Dysfunction: Non-Traumatic     Femoral neck fracture   - sustained during GLF   - images showed an impacted right subcapital femoral neck fracture  -s/p  open treatment of right femoral neck fracture with total hip arthoplasty by Dr. Mei  - posterior hip precautions, WBAT RLE   - continue with PT/OT inpatient    Painful Hemorrhoids  -Preparation H and steroid suppository    Left Lumbar back pain  Chronic with acute flair  -Lidocaine patch     Chronic mural thrombus   Heart failure with reduced EF   - updated ECHO obtained showed EF of 35%, and noted mural thrombus   - Eliquis 5mg BID started 8/30 with Dr Mei approval  -Hx of hematuria with Eliquis,   -no bleeding to date, neuro exam stable, continue eliquis     Prior CVA  - minimal deficits, no lateralizing weakness, no known dysphagia but patient has soft-spoken/hypophonic voice  - continue statin, plavix       CAD with history of PCI   - statin and plavix at home  -EKG done on admission  - continue     Neurogenic bladder:  - Timed voids with PVR q4H x3. If PVR > 400mL or if patient is unable to void, straight cath patient.     Neurogenic bowel:  -  Colace, Senna BID on admission  - Goal of 1BM/day.  -      Circadian Rhythm disorder:   Recommend lights on during the day/off at night, minimize nighttime interruptions as able.     Mood  - at risk of adjustment disorder, depression, and anxiety due to functional decline     ID:  - at risk for Urinary tract infection     Skin/Wounds:  - Pressure relief q2h while in bed. Close monitoring for signs of breakdown     Pain:  - Neuroceptic - On Tylenol prn, DC oxycodone 9/5. Continue medications, minimize narcotics     DVT prophylaxis:  Patient is on Eliquis.      GI prophylaxis:  On Prilosec 20mg daily      Medical Problem List:     HTN: Lisinopril 20mg daily continue medications        -Follow-up Dr Sigifredo farnsworth       ____________________________________    Kamran Vicente  MD  Physical Medicine & Rehabilitation   Brain Injury Medicine   ____________________________________    Total time:  60 minutes. Time spent included pre-rounding, review of vitals and tests, unit/floor time, face-to-face time with the patient including physical examination, care coordination, counseling of patient and/or family, ordering medications/procedures/tests, discussion with CM, PT, OT, SLP and/or other healthcare providers, and documentation in the electronic medical record. Topics discussed included acute encephalopathy, home discharge plan, therapy progress.

## 2023-09-05 NOTE — CARE PLAN
Problem: Bathing  Goal: STG-Within one week, patient will bathe at Min A level using AE/DME PRN.  Outcome: Not Met     Problem: Functional Transfers  Goal: STG-Within one week, patient will transfer to step in shower at Min A level using AE/DME PRN.  9/5/2023 0841 by Hegla Paredes, MS,OTR/L  Outcome: Not Met  9/5/2023 0840 by Helga Paredes MS,OTR/L  Reactivated  9/5/2023 0836 by Helga Paredes, MS,OTR/L  Outcome: Met     Problem: IADL's  Goal: STG-Within one week, patient will access kitchen area at SBA level using AE/DME PRN.  Outcome: Not Met     Problem: Dressing  Goal: STG-Within one week, patient will dress LB at Min A level using AE/DME PRN.  Outcome: Met     Problem: Toileting  Goal: STG-Within one week, patient will complete toileting tasks at Min A level using AE/DME PRN.  Outcome: Met

## 2023-09-05 NOTE — THERAPY
Physical Therapy   Daily Treatment     Patient Name: Moe Nickerson  Age:  78 y.o., Sex:  male  Medical Record #: [id number]  Today's Date: 9/5/2023     Precautions  Precautions: Fall Risk, Posterior Hip Precautions, Weight Bearing As Tolerated Right Lower Extremity  Comments: aphasia    Subjective    Patient pleasant and agreeable to participate. Requests to use the bathroom before going to therapy.      Objective       09/05/23 0901   PT Charge Group   PT Gait Training (Units) 1   PT Therapeutic Activities (Units) 1   PT Total Time Spent   PT Individual Total Time Spent (Mins) 30   Gait Functional Level of Assist    Gait Level Of Assist Standby Assist   Assistive Device Front Wheel Walker   Distance (Feet) 180   # of Times Distance was Traveled 1   Deviation Bradykinetic;Antalgic   Transfer Functional Level of Assist   Bed, Chair, Wheelchair Transfer Standby Assist   Bed Chair Wheelchair Transfer Description Adaptive equipment;Set-up of equipment;Supervision for safety  (Stand step with FWW)   Toilet Transfers Standby Assist   Toilet Transfer Description Grab bar;Supervision for safety   Sitting Lower Body Exercises   Hip Abduction 2 sets of 10;Bilateral;Medium Resistance Theraband   Hip Adduction 2 sets of 10;Bilateral  (isometric ball squeeze)   Long Arc Quad 2 sets of 10   Interdisciplinary Plan of Care Collaboration   Patient Position at End of Therapy Seated;Chair Alarm On;Self Releasing Lap Belt Applied;Tray Table within Reach;Phone within Reach;Call Light within Reach         Assessment    Patient with good tolerance to activity today, with no reports of nausea this morning. Continues to ambulate with slow cayla and mild antalgia, but overall improving with mobility. Patient remains well-motivated to participate.     Strengths: Able to follow instructions, Good insight into deficits/needs, Independent prior level of function, Making steady progress towards goals, Motivated for self care and  independence, Pleasant and cooperative, Willingly participates in therapeutic activities  Barriers: Aphasia expressive, Decreased endurance, Fatigue, Impaired activity tolerance, Impaired balance, Generalized weakness, Pain    Plan    Gait FWW with goal of improved R LE weight acceptance, R LE therex, NuStep for endurance, bed mobility independence     Passport items to be completed:  Get in/out of bed safely, in/out of a vehicle, safely use mobility device, walk or wheel around home/community, navigate up and down stairs, show how to get up/down from the ground, ensure home is accessible, demonstrate HEP, complete caregiver training    Physical Therapy Problems (Active)       Problem: Mobility       Dates: Start:  08/31/23         Goal: STG-Within one week, patient will ambulate up/down a curb with Yanet and LRAD       Dates: Start:  08/31/23               Problem: Mobility Transfers       Dates: Start:  08/31/23         Goal: STG-Within one week, patient will perform bed mobility Hector       Dates: Start:  08/31/23         Goal Note filed on 09/05/23 0811 by Kenneth Aguiar, PT       SPV with verbal cues for use of leg  and extra time                 Problem: PT-Long Term Goals       Dates: Start:  08/31/23         Goal: LTG-By discharge, patient will ambulate 200ft indoors and outdoors Hector with LRAD       Dates: Start:  08/31/23            Goal: LTG-By discharge, patient will transfer one surface to another Hector       Dates: Start:  08/31/23            Goal: LTG-By discharge, patient will perform home exercise program independently       Dates: Start:  08/31/23            Goal: LTG-By discharge, patient will transfer in/out of a car with set up assist       Dates: Start:  08/31/23

## 2023-09-05 NOTE — THERAPY
Speech Language Pathology  Daily Treatment     Patient Name: Moe Nickerson  Age:  78 y.o., Sex:  male  Medical Record #: [id number]  Today's Date: 9/5/2023     Precautions  Precautions: Fall Risk, Posterior Hip Precautions, Weight Bearing As Tolerated Right Lower Extremity  Comments: aphasia    Subjective    Patient agreeable to therapy.  Displays slow processing.     Objective       09/05/23 0931   Treatment Charges   SLP Cognitive Skill Development First 15 Minutes 1   SLP Cognitive Skill Development Additional 15 Minutes 3   SLP Total Time Spent   SLP Individual Total Time Spent (Mins) 60   Cognition   Simple Attention Minimal (4)   Prospective Memory Moderate (3)   Functional Memory Activities Moderate (3)         Assessment    Patient recalled 3/5 previously trained words,  Min cues to recall remainder.  Patient started to record information on his current scheduled medications. Patient needs an extra ordinary amount of time to transfer information.  His writing is micrographic, however he reports that he is able to read his own writing.  Patient needs considerable extra time to process information and complete tasks.    Strengths: Able to follow instructions, Alert and oriented, Independent prior level of function, Motivated for self care and independence, Pleasant and cooperative, Supportive family, Willingly participates in therapeutic activities  Barriers: Decreased endurance, Fatigue, Generalized weakness, Impaired balance, Impaired carryover of learning, Impaired functional cognition (slow to process.  decreased initiation.)    Plan    Target recall, medication management, alternating attention.    Passport items to be completed:  Express basic needs, understand food/liquid recommendations, consistently follow swallow precautions, manage finances, manage medications, arrive to therapy appointments on time, complete daily memory log entries, solve problems related to safety situations, review education  related to hospitalization, complete caregiver training     Speech Therapy Problems (Active)       Problem: Memory STGs       Dates: Start:  09/02/23         Goal: STG-Within one week, patient will demonstrate use of memory strategies in order to recall safety precautions related to hospitalization.        Dates: Start:  09/02/23         Goal Note filed on 09/05/23 0820 by Diana Reyes MS,CCC-SLP       Mod A needed.                 Problem: Problem Solving STGs       Dates: Start:  09/02/23         Goal: STG-Within one week, patient will complete medication management tasks with 80% accuracy min A       Dates: Start:  09/02/23         Goal Note filed on 09/05/23 0820 by Diana Reyes MS,CCC-SLP       To be addressed.              Goal: STG-Within one week, patient will complete alternating attention tasks with 75% accuracy, min a       Dates: Start:  09/02/23         Goal Note filed on 09/05/23 0820 by Diana Reyes MS,CCC-SLP       Mod A.                 Problem: Speech/Swallowing LTGs       Dates: Start:  09/02/23         Goal: LTG-By discharge, patient will solve complex problems relate to IADL's in order to d/c home with mod I        Dates: Start:  09/02/23

## 2023-09-05 NOTE — CARE PLAN
Problem: Bathing  Goal: STG-Within one week, patient will bathe at Min A level using AE/DME PRN.  Outcome: Not Met     Problem: IADL's  Goal: STG-Within one week, patient will access kitchen area at SBA level using AE/DME PRN.  Outcome: Not Met     Problem: Dressing  Goal: STG-Within one week, patient will dress LB at Min A level using AE/DME PRN.  Outcome: Met     Problem: Toileting  Goal: STG-Within one week, patient will complete toileting tasks at Min A level using AE/DME PRN.  Outcome: Met     Problem: Functional Transfers  Goal: STG-Within one week, patient will transfer to step in shower at Min A level using AE/DME PRN.  Outcome: Met      Patient Seen in: [facility] AND [facility]    History   Patient presents with:  Pregnancy Issues    Stated Complaint: nausea    HPI    Patient is G 5, P 4, approximately 7w5d by dates weeks pregnant complains of vaginal bleeding that began t EOMI,  THROAT: mm dry, no lesions  NECK: supple, no meningeal signs  LUNGS: no resp distress, cta bilateral  CARDIO: RRR without murmur  GI: Soft, nontender, nondistended  : Chaperone present, scant bleeding in the vaginal vault, no CMT, cervical os clos MDM     Monitor Interpretation:  Sinus rhythm    Radiology Interpretation:  US PREGNANCY LESS THAN 14 WEEKS (TRANSABDOMINAL/TRANSVAGINAL) ([id number])    Result Date: 5/23/2021  CONCLUSION:   No intrauterine gestational sac, yolk sac, or fetal p

## 2023-09-05 NOTE — CARE PLAN
Problem: Problem Solving STGs  Goal: STG-Within one week, patient will complete medication management tasks with 80% accuracy min A  Outcome: Not Met  Note: To be addressed.  Goal: STG-Within one week, patient will complete alternating attention tasks with 75% accuracy, min a  Outcome: Not Met  Note: Mod A.     Problem: Memory STGs  Goal: STG-Within one week, patient will demonstrate use of memory strategies in order to recall safety precautions related to hospitalization.   Outcome: Not Met  Note: Mod A needed.

## 2023-09-05 NOTE — THERAPY
"Physical Therapy   Daily Treatment     Patient Name: Moe Nickerson  Age:  78 y.o., Sex:  male  Medical Record #: [id number]  Today's Date: 9/5/2023     Precautions  Precautions: Fall Risk, Posterior Hip Precautions, Weight Bearing As Tolerated Right Lower Extremity  Comments: aphasia    Subjective    \"I really appreciate everything you all have done to help me\"  Pt agreeable and engaged throughout, though delayed responses     Objective       09/05/23 1530   PT Charge Group   PT Gait Training (Units) 1   PT Therapeutic Activities (Units) 1   PT Total Time Spent   PT Individual Total Time Spent (Mins) 30   Pain 0 - 10 Group   Location Hip   Location Orientation Right   Pain Rating Scale (NPRS) 7   Gait Functional Level of Assist    Gait Level Of Assist Standby Assist   Assistive Device Front Wheel Walker   Distance (Feet) 200   # of Times Distance was Traveled 2   Deviation Antalgic;Bradykinetic  (mild antalgia)   Stairs Functional Level of Assist   Level of Assist with Stairs Contact Guard Assist   # of Stairs Climbed 1   Stairs Description   (4inch curb step with FWW)   Transfer Functional Level of Assist   Bed, Chair, Wheelchair Transfer Standby Assist   Bed Chair Wheelchair Transfer Description Adaptive equipment;Set-up of equipment;Supervision for safety   Bed Mobility    Supine to Sit Supervised   Sit to Stand Standby Assist   Interdisciplinary Plan of Care Collaboration   IDT Collaboration with  Certified Nursing Assistant   Patient Position at End of Therapy In Bed;Bed Alarm On;Call Light within Reach;Tray Table within Reach;Phone within Reach   Collaboration Comments handoff of care     Discussed DC recommendation including initial CG assist from family or hired care due to decreased carryover of self care techniques, hip precaution knowledge, and concern regarding managing finances/meds.  Completed standing hand hygiene SBA post toileting with CNA    Assessment    Pt progressed to ambulating 200ft SBA " with FWW and was able to negotiate a 4inch curb step with FWW for the first time since admission with CGA. He is motivated to participate and optimize his independence with fxl mobility. He acknowledges the IDT concerns regarding memory and problem-solving impairments post-operatively.  Strengths: Able to follow instructions, Good insight into deficits/needs, Independent prior level of function, Making steady progress towards goals, Motivated for self care and independence, Pleasant and cooperative, Willingly participates in therapeutic activities  Barriers: Aphasia expressive, Decreased endurance, Fatigue, Impaired activity tolerance, Impaired balance, Generalized weakness, Pain    Plan    Gait FWW with goal of improved R LE weight acceptance, R LE therex, NuStep for endurance, bed mobility independence     Passport items to be completed:  Get in/out of bed safely, in/out of a vehicle, safely use mobility device, walk or wheel around home/community, navigate up and down stairs, show how to get up/down from the ground, ensure home is accessible, demonstrate HEP, complete caregiver training      Physical Therapy Problems (Active)       Problem: Mobility       Dates: Start:  08/31/23         Goal: STG-Within one week, patient will ambulate up/down a curb with Yanet and LRAD       Dates: Start:  08/31/23               Problem: Mobility Transfers       Dates: Start:  08/31/23         Goal: STG-Within one week, patient will perform bed mobility Hector       Dates: Start:  08/31/23         Goal Note filed on 09/05/23 0811 by Kenneth Aguiar, PT       SPV with verbal cues for use of leg  and extra time                 Problem: PT-Long Term Goals       Dates: Start:  08/31/23         Goal: LTG-By discharge, patient will ambulate 200ft indoors and outdoors Hector with LRAD       Dates: Start:  08/31/23            Goal: LTG-By discharge, patient will transfer one surface to another Hector       Dates: Start:  08/31/23             Goal: LTG-By discharge, patient will perform home exercise program independently       Dates: Start:  08/31/23            Goal: LTG-By discharge, patient will transfer in/out of a car with set up assist       Dates: Start:  08/31/23

## 2023-09-05 NOTE — PROGRESS NOTES
NURSING DAILY NOTE    Name: Moe Nickerson   Date of Admission: 8/30/2023   Admitting Diagnosis: History of femur fracture  Attending Physician: Kamran Vicente M.d.  Allergies: Amlodipine, Cortisone, and Morphine    Safety  Patient Assist  Mod assist  Patient Precautions  Fall Risk, Posterior Hip Precautions, Weight Bearing As Tolerated Right Lower Extremity  Precaution Comments  aphasia  Bed Transfer Status  Standby Assist  Toilet Transfer Status   Standby Assist  Assistive Devices  Walker - front wheel  Oxygen  None - Room Air  Diet/Therapeutic Dining  Current Diet Order   Procedures    Diet Order Diet: Regular     Pill Administration  whole  Agitated Behavioral Scale     ABS Level of Severity       Fall Risk  Has the patient had a fall this admission?   No  Shayna Bunn Fall Risk Scoring  18, HIGH RISK  Fall Risk Safety Measures  bed alarm and chair alarm    Vitals  Temperature: 36.7 °C (98.1 °F)  Temp src: Oral  Pulse: 78  Respiration: 17  Blood Pressure : 129/75  Blood Pressure MAP (Calculated): 93 MM HG  BP Location: Right, Upper Arm  Patient BP Position: Ely's Position     Oxygen  Pulse Oximetry: 99 %  O2 (LPM): 0  O2 Delivery Device: None - Room Air    Bowel and Bladder  Last Bowel Movement  09/04/23  Stool Type  Type 5: Soft blob with clear cut edges (passed easily)  Bowel Device     Continent  Bladder: Continent void   Bowel: Continent movement  Bladder Function  Urine Void (mL): 250 ml  Number of Times Voided: 1  Urine Color: Yoselin  Genitourinary Assessment   Bladder Assessment (WDL):  WDL Except  Garcia Catheter: Not Applicable  Urine Color: Yoselin  Bladder Device: Urinal  Bladder Scan: Post Void  $ Bladder Scan Results (mL): 88    Skin  Manas Score   18  Sensory Interventions   Bed Types: Standard/Trauma Mattress  Skin Preventative Measures: Pillows in Use for Support / Positioning, Waffle Overlay  Moisture Interventions         Pain  Pain  Rating Scale  1 - Hardly Notice Pain  Pain Location  Hip  Pain Location Orientation  Right  Pain Interventions   Repositioned, Rest    ADLs    Bathing      Linen Change      Personal Hygiene  Ruth Bottle, Moist Ruth Wipes  Chlorhexidine Bath      Oral Care  Refused  Teeth/Dentures     Shave     Nutrition Percentage Eaten  *  * Meal *  *, Lunch, Between % Consumed  Environmental Precautions  Treaded Slipper Socks on Patient, Bed in Low Position  Patient Turns/Positioning  Patient Turns Self from Side to Side  Patient Turns Assistance/Tolerance     Bed Positions  Bed Controls On, Bed Locked  Head of Bed Elevated  Less than 30 degrees      Psychosocial/Neurologic Assessment  Psychosocial Assessment  Psychosocial (WDL):  Within Defined Limits  Neurologic Assessment  Neuro (WDL): Within Defined Limits  Level of Consciousness: Alert  EENT (WDL):  WDL Except    Cardio/Pulmonary Assessment  Edema      Respiratory Breath Sounds  RUL Breath Sounds: Clear  RML Breath Sounds: Clear  RLL Breath Sounds: Clear  IDRIS Breath Sounds: Clear  LLL Breath Sounds: Clear  Cardiac Assessment   Cardiac (WDL):  WDL Except

## 2023-09-05 NOTE — CARE PLAN
Problem: Mobility  Goal: STG-Within one week, patient will ambulate up/down a curb with Yanet and LRAD  Outcome: Not Met     Problem: Mobility Transfers  Goal: STG-Within one week, patient will perform bed mobility Hector  Outcome: Progressing  Note: SPV with verbal cues for use of leg  and extra time     Problem: Mobility  Goal: STG-Within one week, patient will ambulate 150ft with SBA using LRAD  Outcome: Met     Problem: Mobility Transfers  Goal: STG-Within one week, patient will transfer bed to chair with SBA  Outcome: Met

## 2023-09-06 ENCOUNTER — APPOINTMENT (OUTPATIENT)
Dept: PHYSICAL THERAPY | Facility: REHABILITATION | Age: 78
DRG: 559 | End: 2023-09-06
Attending: PHYSICAL MEDICINE & REHABILITATION
Payer: MEDICARE

## 2023-09-06 ENCOUNTER — APPOINTMENT (OUTPATIENT)
Dept: SPEECH THERAPY | Facility: REHABILITATION | Age: 78
DRG: 559 | End: 2023-09-06
Attending: PHYSICAL MEDICINE & REHABILITATION
Payer: MEDICARE

## 2023-09-06 ENCOUNTER — APPOINTMENT (OUTPATIENT)
Dept: OCCUPATIONAL THERAPY | Facility: REHABILITATION | Age: 78
DRG: 559 | End: 2023-09-06
Attending: PHYSICAL MEDICINE & REHABILITATION
Payer: MEDICARE

## 2023-09-06 PROCEDURE — 97110 THERAPEUTIC EXERCISES: CPT

## 2023-09-06 PROCEDURE — 700102 HCHG RX REV CODE 250 W/ 637 OVERRIDE(OP): Performed by: PHYSICAL MEDICINE & REHABILITATION

## 2023-09-06 PROCEDURE — 700101 HCHG RX REV CODE 250: Performed by: PHYSICAL MEDICINE & REHABILITATION

## 2023-09-06 PROCEDURE — 97530 THERAPEUTIC ACTIVITIES: CPT

## 2023-09-06 PROCEDURE — A9270 NON-COVERED ITEM OR SERVICE: HCPCS | Performed by: PHYSICAL MEDICINE & REHABILITATION

## 2023-09-06 PROCEDURE — 770010 HCHG ROOM/CARE - REHAB SEMI PRIVAT*

## 2023-09-06 PROCEDURE — 97129 THER IVNTJ 1ST 15 MIN: CPT

## 2023-09-06 PROCEDURE — 99232 SBSQ HOSP IP/OBS MODERATE 35: CPT | Performed by: PHYSICAL MEDICINE & REHABILITATION

## 2023-09-06 PROCEDURE — 97116 GAIT TRAINING THERAPY: CPT

## 2023-09-06 PROCEDURE — 97130 THER IVNTJ EA ADDL 15 MIN: CPT

## 2023-09-06 RX ADMIN — MINERAL OIL, PETROLATUM, PHENYLEPHRINE HCI: .25; 14; 74.9 OINTMENT TOPICAL at 20:25

## 2023-09-06 RX ADMIN — APIXABAN 5 MG: 5 TABLET, FILM COATED ORAL at 20:24

## 2023-09-06 RX ADMIN — OMEPRAZOLE 20 MG: 20 CAPSULE, DELAYED RELEASE ORAL at 09:24

## 2023-09-06 RX ADMIN — CLOPIDOGREL BISULFATE 75 MG: 75 TABLET ORAL at 09:24

## 2023-09-06 RX ADMIN — LIDOCAINE 1 PATCH: 50 PATCH TOPICAL at 07:12

## 2023-09-06 RX ADMIN — APIXABAN 5 MG: 5 TABLET, FILM COATED ORAL at 09:24

## 2023-09-06 RX ADMIN — SIMVASTATIN 10 MG: 20 TABLET, FILM COATED ORAL at 20:24

## 2023-09-06 RX ADMIN — LISINOPRIL 20 MG: 20 TABLET ORAL at 05:12

## 2023-09-06 ASSESSMENT — GAIT ASSESSMENTS
GAIT LEVEL OF ASSIST: STANDBY ASSIST
DISTANCE (FEET): 300
ASSISTIVE DEVICE: FRONT WHEEL WALKER
DEVIATION: BRADYKINETIC

## 2023-09-06 ASSESSMENT — ACTIVITIES OF DAILY LIVING (ADL)
TOILET_TRANSFER_DESCRIPTION: SUPERVISION FOR SAFETY;VERBAL CUEING
BED_CHAIR_WHEELCHAIR_TRANSFER_DESCRIPTION: SUPERVISION FOR SAFETY;VERBAL CUEING;SET-UP OF EQUIPMENT
BED_CHAIR_WHEELCHAIR_TRANSFER_DESCRIPTION: INCREASED TIME;INITIAL PREPARATION FOR TASK;SET-UP OF EQUIPMENT;SUPERVISION FOR SAFETY

## 2023-09-06 NOTE — CARE PLAN
Problem: Bladder / Voiding  Goal: Patient will establish and maintain regular urinary output  Note: Pt is continent of bladder, voiding adequate amount of urine.Denies any discomfort or dysuria, afebrile.Will continue to monitor.     Problem: Bowel Elimination  Goal: Patient will participate in bowel management program  Note: Pt refused scheduled preparation H and anusol suppository at hs.Education given om the importance of medication.Will continue to monitor.

## 2023-09-06 NOTE — THERAPY
Speech Language Pathology  Daily Treatment     Patient Name: Moe Nickerson  Age:  78 y.o., Sex:  male  Medical Record #: [id number]  Today's Date: 9/6/2023     Precautions  Precautions: Fall Risk, Posterior Hip Precautions, Weight Bearing As Tolerated Right Lower Extremity  Comments: aphasia    Subjective    Patient was willing to participate.      Objective       09/06/23 0832   Treatment Charges   SLP Cognitive Skill Development First 15 Minutes 1   SLP Cognitive Skill Development Additional 15 Minutes 1   SLP Total Time Spent   SLP Individual Total Time Spent (Mins) 30   Cognition   Medication Management  Supervision (5)   Interdisciplinary Plan of Care Collaboration   Patient Position at End of Therapy Seated;Chair Alarm On;Call Light within Reach         Assessment    Created medication list. Patient was able to recall purpose of all medications on current list. Questions for MD written on back of med list.     Strengths: Able to follow instructions, Alert and oriented, Independent prior level of function, Motivated for self care and independence, Pleasant and cooperative, Supportive family, Willingly participates in therapeutic activities  Barriers: Decreased endurance, Fatigue, Generalized weakness, Impaired balance, Impaired carryover of learning, Impaired functional cognition (slow to process.  decreased initiation.)    Plan    Functional med sort.      Speech Therapy Problems (Active)       Problem: Memory STGs       Dates: Start:  09/02/23         Goal: STG-Within one week, patient will demonstrate use of memory strategies in order to recall safety precautions related to hospitalization.        Dates: Start:  09/02/23         Goal Note filed on 09/05/23 0820 by Diana Reyes MS,CCC-SLP       Mod A needed.                 Problem: Problem Solving STGs       Dates: Start:  09/02/23         Goal: STG-Within one week, patient will complete medication management tasks with 80% accuracy min A       Dates:  Start:  09/02/23         Goal Note filed on 09/05/23 0820 by Diana Reyes MS,CCC-SLP       To be addressed.              Goal: STG-Within one week, patient will complete alternating attention tasks with 75% accuracy, min a       Dates: Start:  09/02/23         Goal Note filed on 09/05/23 0820 by Diana Reyes MS,CCC-SLP       Mod A.                 Problem: Speech/Swallowing LTGs       Dates: Start:  09/02/23         Goal: LTG-By discharge, patient will solve complex problems relate to IADL's in order to d/c home with mod I        Dates: Start:  09/02/23

## 2023-09-06 NOTE — PROGRESS NOTES
NURSING DAILY NOTE    Name: Moe Nickerson   Date of Admission: 8/30/2023   Admitting Diagnosis: History of femur fracture  Attending Physician: Kamran Vicente M.d.  Allergies: Amlodipine, Cortisone, and Morphine    Safety  Patient Assist  cga  Patient Precautions  Fall Risk, Posterior Hip Precautions, Weight Bearing As Tolerated Right Lower Extremity  Precaution Comments  aphasia  Bed Transfer Status  Standby Assist  Toilet Transfer Status   Standby Assist  Assistive Devices  Wheelchair  Oxygen  None - Room Air  Diet/Therapeutic Dining  Current Diet Order   Procedures    Diet Order Diet: Regular     Pill Administration  whole  Agitated Behavioral Scale     ABS Level of Severity       Fall Risk  Has the patient had a fall this admission?   No  Shayna Bunn Fall Risk Scoring  18, HIGH RISK  Fall Risk Safety Measures  bed alarm and chair alarm    Vitals  Temperature: 36.6 °C (97.8 °F)  Temp src: Oral  Pulse: 62  Respiration: 17  Blood Pressure : 122/74  Blood Pressure MAP (Calculated): 90 MM HG  BP Location: Left, Upper Arm  Patient BP Position: Sitting     Oxygen  Pulse Oximetry: 92 %  O2 (LPM): 0  O2 Delivery Device: None - Room Air    Bowel and Bladder  Last Bowel Movement  09/05/23  Stool Type  Type 1: Separate hard lumps (hard to pass)  Bowel Device     Continent  Bladder: Continent void   Bowel: Continent movement  Bladder Function  Urine Void (mL): 250 ml  Number of Times Voided: 1  Urine Color: Unable To Evaluate  Genitourinary Assessment   Bladder Assessment (WDL):  WDL Except  Garcia Catheter: Not Applicable  Urine Color: Unable To Evaluate  Bladder Device: Urinal  Time Void: No  Bladder Scan: Post Void  $ Bladder Scan Results (mL): 68    Skin  Manas Score   17  Sensory Interventions   Bed Types: Standard/Trauma Mattress  Skin Preventative Measures: Pillows in Use for Support / Positioning  Moisture Interventions         Pain  Pain Rating  Scale  7 - Focus of attention, prevents doing daily activities  Pain Location  Hip  Pain Location Orientation  Right  Pain Interventions   Nurse Notified, Repositioned, Rest    ADLs    Bathing      Linen Change      Personal Hygiene  Ruth Bottle, Moist Ruth Wipes  Chlorhexidine Bath      Oral Care  Refused  Teeth/Dentures     Shave     Nutrition Percentage Eaten  Lunch, Between 50-75% Consumed  Environmental Precautions  Treaded Slipper Socks on Patient  Patient Turns/Positioning  Patient Turns Self from Side to Side  Patient Turns Assistance/Tolerance     Bed Positions  Bed Controls On, Bed Locked  Head of Bed Elevated  Less than 30 degrees      Psychosocial/Neurologic Assessment  Psychosocial Assessment  Psychosocial (WDL):  Within Defined Limits  Neurologic Assessment  Neuro (WDL): Within Defined Limits  Level of Consciousness: Alert  EENT (WDL):  WDL Except    Cardio/Pulmonary Assessment  Edema      Respiratory Breath Sounds  RUL Breath Sounds: Clear  RML Breath Sounds: Clear  RLL Breath Sounds: Clear  IDRIS Breath Sounds: Clear  LLL Breath Sounds: Clear  Cardiac Assessment   Cardiac (WDL):  WDL Except

## 2023-09-06 NOTE — THERAPY
Occupational Therapy  Daily Treatment     Patient Name: Moe Nickerson  Age:  78 y.o., Sex:  male  Medical Record #: [id number]  Today's Date: 9/6/2023     Precautions  Precautions: Fall Risk, Posterior Hip Precautions, Weight Bearing As Tolerated Right Lower Extremity  Comments: aphasia         Subjective    Pt seated in w/c upon arrival. Pt pleasant and cooperative, agreeable to therapy.     Objective       09/06/23 1201   OT Total Time Spent   OT Individual Total Time Spent (Mins) 30   Vitals   O2 (LPM) 0   O2 Delivery Device None - Room Air   Pain   Intervention Declines   Cognition    Level of Consciousness Alert   Sleep/Wake Cycle   Sleep & Rest Awake;Out of bed   Functional Level of Assist   Bed, Chair, Wheelchair Transfer Standby Assist   Bed Chair Wheelchair Transfer Description Increased time;Initial preparation for task;Set-up of equipment;Supervision for safety  (stand pivot from w/c to bed.)   Sitting Upper Body Exercises   Sitting Upper Body Exercises Yes   Bilateral Row 2 sets of 10;Bilateral;Weight (See Comments for lbs)  (pink theraband)   Bicep Curls 2 sets of 10;Right ;Left;Other Resistance (See Comments)  (pink theraband)   Bed Mobility    Sit to Supine Minimal Assist   Interdisciplinary Plan of Care Collaboration   Patient Position at End of Therapy In Bed;Bed Alarm On;Call Light within Reach;Tray Table within Reach;Phone within Reach         Assessment    Pt tolerated session well with no reports of pain. Pt required multiple verbal cues for technique during UE exercise and required single step directions. Following verbal cues, pt was successful with completing UE exercise.     Strengths: Able to follow instructions, Alert and oriented, Independent prior level of function, Making steady progress towards goals, Manages pain appropriately, Motivated for self care and independence, Pleasant and cooperative, Supportive family, Willingly participates in therapeutic activities  Barriers: Aphasia  expressive, Decreased endurance, Fatigue, Generalized weakness, Orthostatic hypotension, Impaired balance, Impaired carryover of learning, Impaired insight/denial of deficits, Limited mobility, Pain    Plan    ADL/IADL training. Increase overall strength and endurance.     Passport items to be completed:  Perform bathroom transfers, complete dressing, complete feeding, get ready for the day, prepare a simple meal, participate in household tasks, adapt home for safety needs, demonstrate home exercise program, complete caregiver training     Occupational Therapy Goals (Active)       Problem: Bathing       Dates: Start:  08/31/23         Goal: STG-Within one week, patient will bathe at Min A level using AE/DME PRN.       Dates: Start:  08/31/23               Problem: Dressing       Dates: Start:  09/05/23         Goal: STG-Within one week, patient will dress LB       Dates: Start:  09/05/23               Problem: Functional Transfers       Dates: Start:  08/31/23         Goal: STG-Within one week, patient will transfer to step in shower at Min A level using AE/DME PRN.       Dates: Start:  08/31/23               Problem: IADL's       Dates: Start:  08/31/23         Goal: STG-Within one week, patient will access kitchen area at SBA level using AE/DME PRN.       Dates: Start:  08/31/23               Problem: OT Long Term Goals       Dates: Start:  08/31/23         Goal: LTG-By discharge, patient will complete basic self care tasks at SPV-Mod I level using AE/DME PRN.       Dates: Start:  08/31/23            Goal: LTG-By discharge, patient will perform bathroom transfers at SPV-Mod I level using AE/DME PRN.       Dates: Start:  08/31/23            Goal: LTG-By discharge, patient will complete basic home management at SPV-Mod I level using AE/DME PRN.       Dates: Start:  08/31/23               Problem: Toileting       Dates: Start:  09/05/23         Goal: STG-Within one week, patient will complete toileting tasks        Dates: Start:  09/05/23

## 2023-09-06 NOTE — DIETARY
Nutrition Update:    Day 7 of admit.  Moe Nickerson is a 78 y.o. male with admitting DX of History of femur fracture [Z87.81].  Patient being followed to optimize nutrition.    Current Diet: Regular; Boost Plus TID with meals (QD chocolate, TID vanilla). PO intake per ADLs has been % x 4 meals documented over the past 2 days; this is an improvement from prior PO intake of 0% up to 50-75% of meals. RD visited pt at bedside, noted stock of x5 unopened chocolate Boost, x1 unopened vanilla Boost. Pt reports prefers vanilla Boost supplements, but would only like once daily. He is agreeable to added cottage cheese to lunch tray and requests cheerios w/ whole milk for breakfast and ice cream with dinners. Pt describes receiving what sounds like a magic cup supplement; he prefers regular ice cream. RD will make adjustments to pt's daily snacks/meals/supplements as reviewed above.    Problem: Nutritional:  Goal: Achieve adequate nutritional intake  Description: Patient will consume >50% of meals and snacks/supplements  Outcome: Progressing    RD continues to follow.

## 2023-09-06 NOTE — PROGRESS NOTES
"  Physical Medicine & Rehabilitation Progress Note    Encounter Date: 9/6/2023    Chief Complaint: Weakness    Interval Events (Subjective):  Pain controlled on tylenol. Hemorid cream effective.    Objective:  VITAL SIGNS: /65   Pulse 61   Temp 36.3 °C (97.3 °F) (Oral)   Resp 18   Ht 1.778 m (5' 10\")   Wt 55 kg (121 lb 4.1 oz)   SpO2 97%   BMI 17.40 kg/m²   Gen: No acute distress, well developed well nourished adult  HEENT: Normal Cephalic Atraumatic, Normal conjunctiva.   CV: warm extremities, well perfused, no edema  Resp: symmetric chest rise, breathing comfortably on room air  Abd: Soft, Non distended  Extremities: normal bulk, no atrophy  Skin: no visible rashes or lesions.   Neuro: alert, awake  Psych: Mood and affect appropriate and congruent    Laboratory Values:  No results found for this or any previous visit (from the past 72 hour(s)).    Medications:  Scheduled Medications   Medication Dose Frequency    lidocaine  1 Patch Daily-0800    formulation r   QHS    hydrocortisone  25 mg Q12HRS    Pharmacy Consult Request  1 Each PHARMACY TO DOSE    senna-docusate  2 Tablet BID    clopidogrel  75 mg DAILY    lisinopril  20 mg Q DAY    simvastatin  10 mg Q EVENING    apixaban  5 mg BID    omeprazole  20 mg DAILY     PRN medications: acetaminophen, menthol, Respiratory Therapy Consult, hydrALAZINE, senna-docusate **AND** polyethylene glycol/lytes **AND** magnesium hydroxide **AND** bisacodyl, ondansetron **OR** ondansetron, sodium chloride, nitroglycerin    Diet:  Current Diet Order   Procedures    Diet Order Diet: Regular       Medical Decision Making and Plan:  Acute encephalopathy  Likely 2/2 Oxycodone use,  -15mg taken before noon due to severe pain which is now controlled  -9/5 continues to have slowed processing speed  - DC oxycodone. Use Tylenol PRN, Lidocaine patch, and Hemorid cream instead  -SLP onboard  [facility] Code / Diagnosis to Support: 0002.1 - Brain Dysfunction: Non-Traumatic      Femoral " neck fracture   - sustained during GLF   - images showed an impacted right subcapital femoral neck fracture  -s/p  open treatment of right femoral neck fracture with total hip arthoplasty by Dr. Mei  - posterior hip precautions, WBAT RLE   - continue with PT/OT inpatient     Painful Hemorrhoids  -Preparation H and     Left Lumbar back pain  Chronic with acute flair  -Lidocaine patch     Chronic mural thrombus   Heart failure with reduced EF   - updated ECHO obtained showed EF of 35%, and noted mural thrombus   - Eliquis 5mg BID started 8/30 with Dr Mei approval  -Hx of hematuria with Eliquis,   -no bleeding to date, neuro exam stable, continue eliquis     Prior CVA  - minimal deficits, no lateralizing weakness, no known dysphagia but patient has soft-spoken/hypophonic voice  - continue statin, plavix       CAD with history of PCI   - statin and plavix at home  -EKG done on admission  - continue     Neurogenic bladder:  - Timed voids with PVR q4H x3. If PVR > 400mL or if patient is unable to void, straight cath patient.     Neurogenic bowel:  -  Colace, Senna BID on admission  - Goal of 1BM/day.  -      Circadian Rhythm disorder:   Recommend lights on during the day/off at night, minimize nighttime interruptions as able.     Mood  - at risk of adjustment disorder, depression, and anxiety due to functional decline     ID:  - at risk for Urinary tract infection     Skin/Wounds:  - Pressure relief q2h while in bed. Close monitoring for signs of breakdown     Pain:  - Neuroceptic - On Tylenol prn, DC oxycodone 9/5. Continue medications, minimize narcotics     DVT prophylaxis:  Patient is on Eliquis.      GI prophylaxis:  On Prilosec 20mg daily      Medical Problem List:     HTN: Lisinopril 20mg daily continue medications        -Follow-up Dr Sigifredo farnsworth       ____________________________________    Kamran Vicente MD  Physical Medicine & Rehabilitation   Brain Injury Medicine    ____________________________________

## 2023-09-06 NOTE — THERAPY
Physical Therapy   Daily Treatment     Patient Name: Moe Nickerson  Age:  78 y.o., Sex:  male  Medical Record #: [id number]  Today's Date: 9/6/2023     Precautions  Precautions: Fall Risk, Posterior Hip Precautions, Weight Bearing As Tolerated Right Lower Extremity  Comments: aphasia    Subjective    Pt reporting improvement in hip pain today     Objective       09/06/23 1030   PT Charge Group   PT Gait Training (Units) 1   PT Therapeutic Exercise (Units) 2   PT Therapeutic Activities (Units) 1   PT Total Time Spent   PT Individual Total Time Spent (Mins) 60   Gait Functional Level of Assist    Gait Level Of Assist Standby Assist   Assistive Device Front Wheel Walker   Distance (Feet) 300   # of Times Distance was Traveled 1  (as well as 150x1)   Deviation Bradykinetic  (overshirt L with turns, no LOB)   Transfer Functional Level of Assist   Bed, Chair, Wheelchair Transfer Standby Assist   Bed Chair Wheelchair Transfer Description Supervision for safety;Verbal cueing;Set-up of equipment   Toilet Transfers Standby Assist   Toilet Transfer Description Supervision for safety;Verbal cueing   Supine Lower Body Exercise   Bridges Two Legged;3 sets of 10  (last 2 sets with pink TB around knees, visual cue to avoid excessive adduction)   Hip Abduction Hook Lying;3 sets of 10;Light Resistance Theraband;Bilateral   Short Arc Quad 2 sets of 10;Bilateral  (2lb ankle weight)   Sitting Lower Body Exercises   Sit to Stand 2 sets of 10  (pink TB around knees, hands on therapist shoulders vs HHA to facilitate anterior weight shift)   Bed Mobility    Supine to Sit Supervised   Sit to Supine Supervised   Sit to Stand Standby Assist  (SBA with FWW, CGA without)   Neuro-Muscular Treatments   Neuro-Muscular Treatments Anterior weight shift;Postural Facilitation;Tactile Cuing;Verbal Cuing   Comments STS sequencing with even LE WBing and facilitation for normal glute/quad timing   Interdisciplinary Plan of Care Collaboration   IDT  Collaboration with  Nursing   Patient Position at End of Therapy Seated;Chair Alarm On;Self Releasing Lap Belt Applied;Call Light within Reach;Tray Table within Reach   Collaboration Comments handoff of care     Assisted pt to bathroom per request at end of session and completed hand hygiene in standing both SBA. Continent small (approx 50mL) void of bladder. Encouraged hydration due to low volume and gold-colored urine    Assessment    Pt progressed his ambulation distance to 300ft SBA indoors using FWW. He continues to benefit from extra timing and moderate verbal cueing for sequencing and safety.  Strengths: Able to follow instructions, Good insight into deficits/needs, Independent prior level of function, Making steady progress towards goals, Motivated for self care and independence, Pleasant and cooperative, Willingly participates in therapeutic activities  Barriers: Aphasia expressive, Decreased endurance, Fatigue, Impaired activity tolerance, Impaired balance, Generalized weakness, Pain    Plan    Gait FWW with goal of improved R LE weight acceptance, R LE therex, NuStep for endurance, bed mobility independence     Passport items to be completed:  Get in/out of bed safely, in/out of a vehicle, safely use mobility device, walk or wheel around home/community, navigate up and down stairs, show how to get up/down from the ground, ensure home is accessible, demonstrate HEP, complete caregiver training           Physical Therapy Problems (Active)       Problem: Mobility       Dates: Start:  08/31/23         Goal: STG-Within one week, patient will ambulate up/down a curb with Yanet and LRAD       Dates: Start:  08/31/23               Problem: Mobility Transfers       Dates: Start:  08/31/23         Goal: STG-Within one week, patient will perform bed mobility Hector       Dates: Start:  08/31/23         Goal Note filed on 09/05/23 0811 by Kenneth Aguiar, PT       SPV with verbal cues for use of leg  and extra  time                 Problem: PT-Long Term Goals       Dates: Start:  08/31/23         Goal: LTG-By discharge, patient will ambulate 200ft indoors and outdoors Hector with LRAD       Dates: Start:  08/31/23            Goal: LTG-By discharge, patient will transfer one surface to another Hector       Dates: Start:  08/31/23            Goal: LTG-By discharge, patient will perform home exercise program independently       Dates: Start:  08/31/23            Goal: LTG-By discharge, patient will transfer in/out of a car with set up assist       Dates: Start:  08/31/23

## 2023-09-06 NOTE — PROGRESS NOTES
NURSING DAILY NOTE    Name: Moe Nickerson   Date of Admission: 8/30/2023   Admitting Diagnosis: History of femur fracture  Attending Physician: Kamran Vicente M.d.  Allergies: Amlodipine, Cortisone, and Morphine    Safety  Patient Assist  cga  Patient Precautions  Fall Risk, Posterior Hip Precautions, Weight Bearing As Tolerated Right Lower Extremity  Precaution Comments  aphasia  Bed Transfer Status  Standby Assist  Toilet Transfer Status   Standby Assist  Assistive Devices  Walker - front wheel  Oxygen  None - Room Air  Diet/Therapeutic Dining  Current Diet Order   Procedures    Diet Order Diet: Regular     Pill Administration  whole  Agitated Behavioral Scale     ABS Level of Severity       Fall Risk  Has the patient had a fall this admission?   No  Shayna Bunn Fall Risk Scoring  18, HIGH RISK  Fall Risk Safety Measures  bed alarm and chair alarm    Vitals  Temperature: 36.7 °C (98.1 °F)  Temp src: Oral  Pulse: 60  Respiration: 17  Blood Pressure : 110/60  Blood Pressure MAP (Calculated): 77 MM HG  BP Location: Left, Upper Arm  Patient BP Position: Sitting     Oxygen  Pulse Oximetry: 94 %  O2 (LPM): 0  O2 Delivery Device: None - Room Air    Bowel and Bladder  Last Bowel Movement  09/05/23  Stool Type  Type 3: Like a sausage, but with cracks on its surface  Bowel Device  Bathroom  Continent  Bladder: Continent void   Bowel: Continent movement  Bladder Function  Urine Void (mL): 250 ml  Number of Times Voided: 1  Urine Color: Yellow  Genitourinary Assessment   Bladder Assessment (WDL):  WDL Except  Garcia Catheter: Not Applicable  Urine Color: Yellow  Bladder Device: Urinal  Time Void: No  Bladder Scan: Post Void  $ Bladder Scan Results (mL): 68    Skin  Manas Score   17  Sensory Interventions   Bed Types: Standard/Trauma Mattress  Skin Preventative Measures: Pillows in Use for Support / Positioning  Moisture Interventions         Pain  Pain Rating  Scale  7 - Focus of attention, prevents doing daily activities  Pain Location  Hip  Pain Location Orientation  Right  Pain Interventions   Repositioned, Rest    ADLs    Bathing      Linen Change      Personal Hygiene  Ruth Bottle, Moist Ruth Wipes  Chlorhexidine Bath      Oral Care  Refused  Teeth/Dentures     Shave     Nutrition Percentage Eaten  Lunch, Between 50-75% Consumed  Environmental Precautions  Treaded Slipper Socks on Patient, Bed in Low Position  Patient Turns/Positioning  Patient Turns Self from Side to Side  Patient Turns Assistance/Tolerance     Bed Positions  Bed Controls On, Bed Locked  Head of Bed Elevated  Self regulated      Psychosocial/Neurologic Assessment  Psychosocial Assessment  Psychosocial (WDL):  Within Defined Limits  Neurologic Assessment  Neuro (WDL): Within Defined Limits  Level of Consciousness: Alert  EENT (WDL):  WDL Except    Cardio/Pulmonary Assessment  Edema      Respiratory Breath Sounds  RUL Breath Sounds: Clear  RML Breath Sounds: Clear  RLL Breath Sounds: Clear  IDRIS Breath Sounds: Clear  LLL Breath Sounds: Clear  Cardiac Assessment   Cardiac (WDL):  WDL Except

## 2023-09-06 NOTE — CARE PLAN
Problem: Skin Integrity  Goal: Skin integrity is maintained or improved  Outcome: Progressing     Problem: Fall Risk - Rehab  Goal: Patient will remain free from falls  Outcome: Progressing       The patient is Stable - Low risk of patient condition declining or worsening    Shift Goals  Clinical Goals: safety  Patient Goals: safety

## 2023-09-07 ENCOUNTER — APPOINTMENT (OUTPATIENT)
Dept: SPEECH THERAPY | Facility: REHABILITATION | Age: 78
DRG: 559 | End: 2023-09-07
Attending: PHYSICAL MEDICINE & REHABILITATION
Payer: MEDICARE

## 2023-09-07 ENCOUNTER — APPOINTMENT (OUTPATIENT)
Dept: OCCUPATIONAL THERAPY | Facility: REHABILITATION | Age: 78
DRG: 559 | End: 2023-09-07
Attending: PHYSICAL MEDICINE & REHABILITATION
Payer: MEDICARE

## 2023-09-07 ENCOUNTER — APPOINTMENT (OUTPATIENT)
Dept: PHYSICAL THERAPY | Facility: REHABILITATION | Age: 78
DRG: 559 | End: 2023-09-07
Attending: PHYSICAL MEDICINE & REHABILITATION
Payer: MEDICARE

## 2023-09-07 LAB
ALBUMIN SERPL BCP-MCNC: 3.2 G/DL (ref 3.2–4.9)
ALBUMIN/GLOB SERPL: 1.2 G/DL
ALP SERPL-CCNC: 86 U/L (ref 30–99)
ALT SERPL-CCNC: 35 U/L (ref 2–50)
ANION GAP SERPL CALC-SCNC: 9 MMOL/L (ref 7–16)
AST SERPL-CCNC: 43 U/L (ref 12–45)
BILIRUB SERPL-MCNC: 0.8 MG/DL (ref 0.1–1.5)
BUN SERPL-MCNC: 20 MG/DL (ref 8–22)
CALCIUM ALBUM COR SERPL-MCNC: 9 MG/DL (ref 8.5–10.5)
CALCIUM SERPL-MCNC: 8.4 MG/DL (ref 8.5–10.5)
CHLORIDE SERPL-SCNC: 105 MMOL/L (ref 96–112)
CO2 SERPL-SCNC: 24 MMOL/L (ref 20–33)
CREAT SERPL-MCNC: 0.83 MG/DL (ref 0.5–1.4)
ERYTHROCYTE [DISTWIDTH] IN BLOOD BY AUTOMATED COUNT: 44.7 FL (ref 35.9–50)
GFR SERPLBLD CREATININE-BSD FMLA CKD-EPI: 88 ML/MIN/1.73 M 2
GLOBULIN SER CALC-MCNC: 2.6 G/DL (ref 1.9–3.5)
GLUCOSE SERPL-MCNC: 94 MG/DL (ref 65–99)
HCT VFR BLD AUTO: 37 % (ref 42–52)
HGB BLD-MCNC: 12.1 G/DL (ref 14–18)
MCH RBC QN AUTO: 31.2 PG (ref 27–33)
MCHC RBC AUTO-ENTMCNC: 32.7 G/DL (ref 32.3–36.5)
MCV RBC AUTO: 95.4 FL (ref 81.4–97.8)
PLATELET # BLD AUTO: 321 K/UL (ref 164–446)
PMV BLD AUTO: 11.6 FL (ref 9–12.9)
POTASSIUM SERPL-SCNC: 4.3 MMOL/L (ref 3.6–5.5)
PROT SERPL-MCNC: 5.8 G/DL (ref 6–8.2)
RBC # BLD AUTO: 3.88 M/UL (ref 4.7–6.1)
SODIUM SERPL-SCNC: 138 MMOL/L (ref 135–145)
WBC # BLD AUTO: 5 K/UL (ref 4.8–10.8)

## 2023-09-07 PROCEDURE — 97130 THER IVNTJ EA ADDL 15 MIN: CPT

## 2023-09-07 PROCEDURE — 99232 SBSQ HOSP IP/OBS MODERATE 35: CPT | Performed by: PHYSICAL MEDICINE & REHABILITATION

## 2023-09-07 PROCEDURE — 36415 COLL VENOUS BLD VENIPUNCTURE: CPT

## 2023-09-07 PROCEDURE — A9270 NON-COVERED ITEM OR SERVICE: HCPCS | Performed by: PHYSICAL MEDICINE & REHABILITATION

## 2023-09-07 PROCEDURE — 770010 HCHG ROOM/CARE - REHAB SEMI PRIVAT*

## 2023-09-07 PROCEDURE — 97110 THERAPEUTIC EXERCISES: CPT

## 2023-09-07 PROCEDURE — 97116 GAIT TRAINING THERAPY: CPT

## 2023-09-07 PROCEDURE — 97129 THER IVNTJ 1ST 15 MIN: CPT

## 2023-09-07 PROCEDURE — 700101 HCHG RX REV CODE 250: Performed by: PHYSICAL MEDICINE & REHABILITATION

## 2023-09-07 PROCEDURE — 700102 HCHG RX REV CODE 250 W/ 637 OVERRIDE(OP): Performed by: PHYSICAL MEDICINE & REHABILITATION

## 2023-09-07 PROCEDURE — 80053 COMPREHEN METABOLIC PANEL: CPT

## 2023-09-07 PROCEDURE — 97530 THERAPEUTIC ACTIVITIES: CPT

## 2023-09-07 PROCEDURE — 85027 COMPLETE CBC AUTOMATED: CPT

## 2023-09-07 RX ADMIN — OMEPRAZOLE 20 MG: 20 CAPSULE, DELAYED RELEASE ORAL at 08:12

## 2023-09-07 RX ADMIN — MINERAL OIL, PETROLATUM, PHENYLEPHRINE HCI: .25; 14; 74.9 OINTMENT TOPICAL at 20:24

## 2023-09-07 RX ADMIN — LIDOCAINE 1 PATCH: 50 PATCH TOPICAL at 07:38

## 2023-09-07 RX ADMIN — APIXABAN 5 MG: 5 TABLET, FILM COATED ORAL at 20:24

## 2023-09-07 RX ADMIN — CLOPIDOGREL BISULFATE 75 MG: 75 TABLET ORAL at 08:12

## 2023-09-07 RX ADMIN — LISINOPRIL 20 MG: 20 TABLET ORAL at 05:24

## 2023-09-07 RX ADMIN — SENNOSIDES AND DOCUSATE SODIUM 2 TABLET: 50; 8.6 TABLET ORAL at 20:23

## 2023-09-07 RX ADMIN — APIXABAN 5 MG: 5 TABLET, FILM COATED ORAL at 08:12

## 2023-09-07 RX ADMIN — SIMVASTATIN 10 MG: 20 TABLET, FILM COATED ORAL at 20:24

## 2023-09-07 ASSESSMENT — GAIT ASSESSMENTS
GAIT LEVEL OF ASSIST: STANDBY ASSIST
DEVIATION: ANTALGIC;BRADYKINETIC
ASSISTIVE DEVICE: 4 WHEEL WALKER;FRONT WHEEL WALKER
DISTANCE (FEET): 270

## 2023-09-07 ASSESSMENT — PATIENT HEALTH QUESTIONNAIRE - PHQ9
2. FEELING DOWN, DEPRESSED, IRRITABLE, OR HOPELESS: NOT AT ALL
SUM OF ALL RESPONSES TO PHQ9 QUESTIONS 1 AND 2: 0
1. LITTLE INTEREST OR PLEASURE IN DOING THINGS: NOT AT ALL

## 2023-09-07 ASSESSMENT — ACTIVITIES OF DAILY LIVING (ADL): BED_CHAIR_WHEELCHAIR_TRANSFER_DESCRIPTION: ADAPTIVE EQUIPMENT;INCREASED TIME;SUPERVISION FOR SAFETY;VERBAL CUEING

## 2023-09-07 ASSESSMENT — PAIN DESCRIPTION - PAIN TYPE: TYPE: ACUTE PAIN

## 2023-09-07 NOTE — PROGRESS NOTES
NURSING DAILY NOTE    Name: Moe Nickerson   Date of Admission: 8/30/2023   Admitting Diagnosis: History of femur fracture  Attending Physician: Kamran Vicente M.d.  Allergies: Amlodipine, Cortisone, and Morphine    Safety  Patient Assist  cga  Patient Precautions  Fall Risk, Posterior Hip Precautions, Weight Bearing As Tolerated Right Lower Extremity  Precaution Comments  aphasia  Bed Transfer Status  Standby Assist  Toilet Transfer Status   Standby Assist  Assistive Devices  Walker - front wheel  Oxygen  None - Room Air  Diet/Therapeutic Dining  Current Diet Order   Procedures    Diet Order Diet: Regular     Pill Administration  whole  Agitated Behavioral Scale     ABS Level of Severity       Fall Risk  Has the patient had a fall this admission?   No  Shayna Bunn Fall Risk Scoring  18, HIGH RISK  Fall Risk Safety Measures  bed alarm and chair alarm    Vitals  Temperature: 36.7 °C (98.1 °F)  Temp src: Oral  Pulse: 68  Respiration: 17  Blood Pressure : 122/66  Blood Pressure MAP (Calculated): 85 MM HG  BP Location: Left, Upper Arm  Patient BP Position: Sitting     Oxygen  Pulse Oximetry: 96 %  O2 (LPM): 0  O2 Delivery Device: None - Room Air    Bowel and Bladder  Last Bowel Movement  09/06/23  Stool Type  Type 3: Like a sausage, but with cracks on its surface  Bowel Device  Bathroom  Continent  Bladder: Continent void   Bowel: Continent movement  Bladder Function  Urine Void (mL): 200 ml  Number of Times Voided: 1  Urine Color: Yellow  Genitourinary Assessment   Bladder Assessment (WDL):  WDL Except  Garcai Catheter: Not Applicable  Urine Color: Yellow  Bladder Device: Urinal  Time Void: No  Bladder Scan: Post Void  $ Bladder Scan Results (mL): 68    Skin  Manas Score   17  Sensory Interventions   Bed Types: Standard/Trauma Mattress  Skin Preventative Measures: Pillows in Use for Support / Positioning  Moisture Interventions         Pain  Pain Rating  Scale  7 - Focus of attention, prevents doing daily activities  Pain Location  Hip  Pain Location Orientation  Right  Pain Interventions   Repositioned, Rest    ADLs    Bathing      Linen Change      Personal Hygiene  Ruth Bottle, Moist Ruth Wipes  Chlorhexidine Bath      Oral Care  Refused  Teeth/Dentures     Shave     Nutrition Percentage Eaten  Breakfast, Between % Consumed  Environmental Precautions  Treaded Slipper Socks on Patient, Bed in Low Position  Patient Turns/Positioning  Patient Turns Self from Side to Side  Patient Turns Assistance/Tolerance     Bed Positions  Bed Controls On, Bed Locked  Head of Bed Elevated  Less than 30 degrees      Psychosocial/Neurologic Assessment  Psychosocial Assessment  Psychosocial (WDL):  Within Defined Limits  Neurologic Assessment  Neuro (WDL): Within Defined Limits  Level of Consciousness: Alert  EENT (WDL):  WDL Except    Cardio/Pulmonary Assessment  Edema      Respiratory Breath Sounds  RUL Breath Sounds: Clear  RML Breath Sounds: Clear  RLL Breath Sounds: Clear  IDRIS Breath Sounds: Clear  LLL Breath Sounds: Clear  Cardiac Assessment   Cardiac (WDL):  WDL Except

## 2023-09-07 NOTE — CARE PLAN
Problem: Bladder / Voiding  Goal: Patient will establish and maintain regular urinary output  Note: Pt is continent of bladder using urinal, voiding adequate amount of urine.Denies any discomfort or dysuria, afebrile.Will continue to monitor.     Problem: Bowel Elimination  Goal: Patient will participate in bowel management program  Note: Pt refused  scheduled senna at hs.Continent of bowel.LBM 9/6.Will continue to monitor.

## 2023-09-07 NOTE — THERAPY
Speech Language Pathology  Daily Treatment     Patient Name: Moe Nickerson  Age:  78 y.o., Sex:  male  Medical Record #: [id number]  Today's Date: 9/7/2023     Precautions  Precautions: Fall Risk, Posterior Hip Precautions, Weight Bearing As Tolerated Right Lower Extremity  Comments: aphasia    Subjective    Patient initially agreeable to therapy but with in therapy, patient stated that he doesn't want to do ST and wants to focus on PT.     Objective       09/07/23 0831   Treatment Charges   SLP Cognitive Skill Development First 15 Minutes 1   SLP Cognitive Skill Development Additional 15 Minutes 3   SLP Total Time Spent   SLP Individual Total Time Spent (Mins) 60   Cognition   Prospective Memory Moderate (3)   Functional Memory Activities Moderate (3)   Executive Functioning / Organization Severe (2)         Assessment    Patient recalled 4/5 unrelated words previously trained.  Patient worked on attention in the context of ranking 4 step sequences in common activities.  Patient required max A but eventually expressed that he didn't want to do this task.   Offered several alternatives but patient declined.  Educated patient on role of ST to help with recall of new training, and safety strategies.      Strengths: Able to follow instructions, Alert and oriented, Independent prior level of function, Motivated for self care and independence, Pleasant and cooperative, Supportive family, Willingly participates in therapeutic activities  Barriers: Decreased endurance, Fatigue, Generalized weakness, Impaired balance, Impaired carryover of learning, Impaired functional cognition (slow to process.  decreased initiation.)    Plan    Target  recall, safety sequencing,  medication management.    Passport items to be completed:  Express basic needs, understand food/liquid recommendations, consistently follow swallow precautions, manage finances, manage medications, arrive to therapy appointments on time, complete daily memory  log entries, solve problems related to safety situations, review education related to hospitalization, complete caregiver training     Speech Therapy Problems (Active)       Problem: Memory STGs       Dates: Start:  09/02/23         Goal: STG-Within one week, patient will demonstrate use of memory strategies in order to recall safety precautions related to hospitalization.        Dates: Start:  09/02/23         Goal Note filed on 09/05/23 0820 by Diana Reyes MS,CCC-SLP       Mod A needed.                 Problem: Problem Solving STGs       Dates: Start:  09/02/23         Goal: STG-Within one week, patient will complete medication management tasks with 80% accuracy min A       Dates: Start:  09/02/23         Goal Note filed on 09/05/23 0820 by Diana Reyes MS,CCC-SLP       To be addressed.              Goal: STG-Within one week, patient will complete alternating attention tasks with 75% accuracy, min a       Dates: Start:  09/02/23         Goal Note filed on 09/05/23 0820 by Diana Reyes MS,CCC-SLP       Mod A.                 Problem: Speech/Swallowing LTGs       Dates: Start:  09/02/23         Goal: LTG-By discharge, patient will solve complex problems relate to IADL's in order to d/c home with mod I        Dates: Start:  09/02/23

## 2023-09-07 NOTE — THERAPY
Speech Language Pathology  Daily Treatment     Patient Name: Moe Nickerson  Age:  78 y.o., Sex:  male  Medical Record #: [id number]  Today's Date: 9/7/2023     Precautions  Precautions: Fall Risk, Posterior Hip Precautions, Weight Bearing As Tolerated Right Lower Extremity  Comments: aphasia    Subjective    Patient reluctant to participate in therapy.     Objective       09/07/23 1301   Treatment Charges   SLP Cognitive Skill Development First 15 Minutes 1   SLP Cognitive Skill Development Additional 15 Minutes 1   SLP Total Time Spent   SLP Individual Total Time Spent (Mins) 30   Cognition   Simple Attention Minimal (4)   Prospective Memory Moderate (3)   Functional Memory Activities Moderate (3)   Executive Functioning / Organization Severe (2)   Functional Level of Assist   Comprehension Moderate Assist   Comprehension Description Glasses;Verbal cues;Other (comment)  (extra time.)   Expression Minimal Assist   Expression Description Other (comment)  (extra time.)   Problem Solving Moderate Assist   Memory Moderate Assist   Speech Language Pathologist Assigned   Assigned SLP / Treatment Time / Comments LM 60 cog         Assessment    Patient persists in his feeling that he doesn't need ST . Patient not receptive to structured ex. Continued patient education on role of ST to help with awareness of current physical function and its impact on his safety as well as strategies and techniques that he is learning in OT/PT that will help him protect his healing hip and prevent falls.  Patient reports that he feels he is safe to get around independently that I am the only one that bothers him about this and that everyone tells him he is doing great.  Patient was able to verbally recall one of his hip precautions.   He did recall 5/5 unrelated words previously trained.   Patient demonstrates low insight into deficits and their impact on safety.    Strengths: Able to follow instructions, Alert and oriented, Independent  prior level of function, Motivated for self care and independence, Pleasant and cooperative, Supportive family, Willingly participates in therapeutic activities  Barriers: Decreased endurance, Fatigue, Generalized weakness, Impaired balance, Impaired carryover of learning, Impaired functional cognition (slow to process.  decreased initiation.)    Plan    Target recall and medication management.    Passport items to be completed:  Express basic needs, understand food/liquid recommendations, consistently follow swallow precautions, manage finances, manage medications, arrive to therapy appointments on time, complete daily memory log entries, solve problems related to safety situations, review education related to hospitalization, complete caregiver training     Speech Therapy Problems (Active)       Problem: Memory STGs       Dates: Start:  09/02/23         Goal: STG-Within one week, patient will demonstrate use of memory strategies in order to recall safety precautions related to hospitalization.        Dates: Start:  09/02/23         Goal Note filed on 09/05/23 0820 by Diana Reyes MS,CCC-SLP       Mod A needed.                 Problem: Problem Solving STGs       Dates: Start:  09/02/23         Goal: STG-Within one week, patient will complete medication management tasks with 80% accuracy min A       Dates: Start:  09/02/23         Goal Note filed on 09/05/23 0820 by Diana Reyes MS,CCC-SLP       To be addressed.              Goal: STG-Within one week, patient will complete alternating attention tasks with 75% accuracy, min a       Dates: Start:  09/02/23         Goal Note filed on 09/05/23 0820 by Diana Reyes MS,CCC-SLP       Mod A.                 Problem: Speech/Swallowing LTGs       Dates: Start:  09/02/23         Goal: LTG-By discharge, patient will solve complex problems relate to IADL's in order to d/c home with mod I        Dates: Start:  09/02/23

## 2023-09-07 NOTE — THERAPY
Occupational Therapy  Daily Treatment     Patient Name: Moe Nickerson  Age:  78 y.o., Sex:  male  Medical Record #: [id number]  Today's Date: 9/7/2023     Precautions  Precautions: Fall Risk, Posterior Hip Precautions, Weight Bearing As Tolerated Right Lower Extremity  Comments: aphasia         Subjective    Pt seated in w/c in room and agreeable to OT tx.      Objective       09/07/23 1001   OT Charge Group   OT Therapy Activity (Units) 2   OT Total Time Spent   OT Individual Total Time Spent (Mins) 30   Pain   Intervention Declines   Cognition    Level of Consciousness Alert   Standing Lower Body Exercises   Mini Squat Partial  (1 set of 20 reps and 1 set of 10 reps standing with FWW for BUE support.)   Balance   Weight Shift Standing Fair   Skilled Intervention Tactile Cuing;Verbal Cuing;Postural Facilitation;Sequencing   Comments Pt participated engaged in standing weight shifting with reciprical toe taps to 3 inch high targets for 6 sets of 20 reps with seated breaks. Completed with and without visual attention to target, and with only LUE support on FWW, unable to complete with RUE only.   Interdisciplinary Plan of Care Collaboration   IDT Collaboration with  Physical Therapist   Patient Position at End of Therapy Seated;Chair Alarm On;Self Releasing Lap Belt Applied   Collaboration Comments Passoff to PT at end of session in [facility]         Assessment    Pt tolerated OT tx well with focus on standing balance/endurance. He requires significant increased time for all tasks due to bradykinetic movement patterns and slow processing. No LOB noted. Pt able to complete all tasks with between SBA-CGA.  Strengths: Able to follow instructions, Alert and oriented, Independent prior level of function, Making steady progress towards goals, Manages pain appropriately, Motivated for self care and independence, Pleasant and cooperative, Supportive family, Willingly participates in therapeutic activities  Barriers:  Aphasia expressive, Decreased endurance, Fatigue, Generalized weakness, Orthostatic hypotension, Impaired balance, Impaired carryover of learning, Impaired insight/denial of deficits, Limited mobility, Pain    Plan    Plan for DC Tuesday SNF, DC shower Monday. Standing balance, overall strengthening, AE training for LB with post hip prec.    Passport items to be completed:  Perform bathroom transfers, complete dressing, complete feeding, get ready for the day, prepare a simple meal, participate in household tasks, adapt home for safety needs, demonstrate home exercise program, complete caregiver training     Occupational Therapy Goals (Active)       Problem: Bathing       Dates: Start:  08/31/23         Goal: STG-Within one week, patient will bathe at Min A level using AE/DME PRN.       Dates: Start:  08/31/23               Problem: Dressing       Dates: Start:  09/05/23         Goal: STG-Within one week, patient will dress LB       Dates: Start:  09/05/23               Problem: Functional Transfers       Dates: Start:  08/31/23         Goal: STG-Within one week, patient will transfer to step in shower at Min A level using AE/DME PRN.       Dates: Start:  08/31/23               Problem: IADL's       Dates: Start:  08/31/23         Goal: STG-Within one week, patient will access kitchen area at SBA level using AE/DME PRN.       Dates: Start:  08/31/23               Problem: OT Long Term Goals       Dates: Start:  08/31/23         Goal: LTG-By discharge, patient will complete basic self care tasks at SPV-Mod I level using AE/DME PRN.       Dates: Start:  08/31/23            Goal: LTG-By discharge, patient will perform bathroom transfers at SPV-Mod I level using AE/DME PRN.       Dates: Start:  08/31/23            Goal: LTG-By discharge, patient will complete basic home management at SPV-Mod I level using AE/DME PRN.       Dates: Start:  08/31/23               Problem: Toileting       Dates: Start:  09/05/23          Goal: STG-Within one week, patient will complete toileting tasks       Dates: Start:  09/05/23

## 2023-09-07 NOTE — PROGRESS NOTES
"  Physical Medicine & Rehabilitation Progress Note    Encounter Date: 9/7/2023    Chief Complaint: Weakness    Interval Events (Subjective):  Seen in bed. Painful hip but controlled Cognition improved    Objective:  VITAL SIGNS: /74   Pulse 83   Temp 36.4 °C (97.5 °F) (Oral)   Resp 18   Ht 1.778 m (5' 10\")   Wt 55 kg (121 lb 4.1 oz)   SpO2 94%   BMI 17.40 kg/m²   Gen: No acute distress, well developed well nourished adult  HEENT: Normal Cephalic Atraumatic, Normal conjunctiva.   CV: warm extremities, well perfused, no edema  Resp: symmetric chest rise, breathing comfortably on room air  Abd: Soft, Non distended  Extremities: normal bulk, no atrophy  Skin: no visible rashes or lesions.   Neuro: alert, awake  Psych: Mood and affect appropriate and congruent    Laboratory Values:  Recent Results (from the past 72 hour(s))   CBC WITHOUT DIFFERENTIAL    Collection Time: 09/07/23  6:06 AM   Result Value Ref Range    WBC 5.0 4.8 - 10.8 K/uL    RBC 3.88 (L) 4.70 - 6.10 M/uL    Hemoglobin 12.1 (L) 14.0 - 18.0 g/dL    Hematocrit 37.0 (L) 42.0 - 52.0 %    MCV 95.4 81.4 - 97.8 fL    MCH 31.2 27.0 - 33.0 pg    MCHC 32.7 32.3 - 36.5 g/dL    RDW 44.7 35.9 - 50.0 fL    Platelet Count 321 164 - 446 K/uL    MPV 11.6 9.0 - 12.9 fL   Comp Metabolic Panel    Collection Time: 09/07/23  6:06 AM   Result Value Ref Range    Sodium 138 135 - 145 mmol/L    Potassium 4.3 3.6 - 5.5 mmol/L    Chloride 105 96 - 112 mmol/L    Co2 24 20 - 33 mmol/L    Anion Gap 9.0 7.0 - 16.0    Glucose 94 65 - 99 mg/dL    Bun 20 8 - 22 mg/dL    Creatinine 0.83 0.50 - 1.40 mg/dL    Calcium 8.4 (L) 8.5 - 10.5 mg/dL    Correct Calcium 9.0 8.5 - 10.5 mg/dL    AST(SGOT) 43 12 - 45 U/L    ALT(SGPT) 35 2 - 50 U/L    Alkaline Phosphatase 86 30 - 99 U/L    Total Bilirubin 0.8 0.1 - 1.5 mg/dL    Albumin 3.2 3.2 - 4.9 g/dL    Total Protein 5.8 (L) 6.0 - 8.2 g/dL    Globulin 2.6 1.9 - 3.5 g/dL    A-G Ratio 1.2 g/dL   ESTIMATED GFR    Collection Time: 09/07/23  " 6:06 AM   Result Value Ref Range    GFR (CKD-EPI) 88 >60 mL/min/1.73 m 2       Medications:  Scheduled Medications   Medication Dose Frequency    lidocaine  1 Patch Daily-0800    formulation r   QHS    Pharmacy Consult Request  1 Each PHARMACY TO DOSE    senna-docusate  2 Tablet BID    clopidogrel  75 mg DAILY    lisinopril  20 mg Q DAY    simvastatin  10 mg Q EVENING    apixaban  5 mg BID    omeprazole  20 mg DAILY     PRN medications: acetaminophen, menthol, Respiratory Therapy Consult, hydrALAZINE, senna-docusate **AND** polyethylene glycol/lytes **AND** magnesium hydroxide **AND** bisacodyl, ondansetron **OR** ondansetron, sodium chloride, nitroglycerin    Diet:  Current Diet Order   Procedures    Diet Order Diet: Regular       Medical Decision Making and Plan:  Acute encephalopathy  Likely 2/2 Oxycodone use,  -15mg taken before noon due to severe pain which is now controlled  -9/5 continues to have slowed processing speed  - DC oxycodone. Use Tylenol PRN, Lidocaine patch, and Hemorid cream instead  -SLP onboard  -9/7-resolving after oxycodone stopped  [facility] Code / Diagnosis to Support: 0002.1 - Brain Dysfunction: Non-Traumatic      Femoral neck fracture   - sustained during GLF   - images showed an impacted right subcapital femoral neck fracture  -s/p  open treatment of right femoral neck fracture with total hip arthoplasty by Dr. Mei  - posterior hip precautions, WBAT RLE   - continue with PT/OT inpatient     Painful Hemorrhoids  -Preparation H and     Left Lumbar back pain  Chronic with acute flair  -Lidocaine patch     Chronic mural thrombus   Heart failure with reduced EF   - updated ECHO obtained showed EF of 35%, and noted mural thrombus   - Eliquis 5mg BID started 8/30 with Dr Mei approval  -Hx of hematuria with Eliquis  -no bleeding to date, neuro exam stable, continue eliquis     Prior CVA  - minimal deficits, no lateralizing weakness, no known dysphagia but patient has soft-spoken/hypophonic  voice  - continue statin, plavix       CAD with history of PCI   - statin and plavix at home  -EKG done on admission  - continue     Neurogenic bladder:  - Timed voids with PVR q4H x3. If PVR > 400mL or if patient is unable to void, straight cath patient.     Neurogenic bowel:  -  Colace, Senna BID on admission  - Goal of 1BM/day.  -      Circadian Rhythm disorder:   Recommend lights on during the day/off at night, minimize nighttime interruptions as able.     Mood  - at risk of adjustment disorder, depression, and anxiety due to functional decline     ID:  - at risk for Urinary tract infection     Skin/Wounds:  - Pressure relief q2h while in bed. Close monitoring for signs of breakdown     Pain:  - Neuroceptic - On Tylenol prn, DC oxycodone 9/5. Continue current pain management with tylenol     DVT prophylaxis:  Patient is on Eliquis.      GI prophylaxis:  On Prilosec 20mg daily      Medical Problem List:     HTN: Lisinopril 20mg daily continue medications     -Follow-up Dr Sigifredo farnsworth       ____________________________________    Kamran Vicente MD  Physical Medicine & Rehabilitation   Brain Injury Medicine   ____________________________________

## 2023-09-07 NOTE — THERAPY
Speech Language Pathology  Daily Treatment     Patient Name: Moe Nickerson  Age:  78 y.o., Sex:  male  Medical Record #: [id number]  Today's Date: 9/6/2023     Precautions  Precautions: Fall Risk, Posterior Hip Precautions, Weight Bearing As Tolerated Right Lower Extremity  Comments: aphasia    Subjective    Patient seen at bedside.  He did not want to get OOB due to knee pain and fatigue.  He was agreeable to tx at bedside.     Objective       09/06/23 1401   Treatment Charges   SLP Cognitive Skill Development First 15 Minutes 1   SLP Cognitive Skill Development Additional 15 Minutes 3   SLP Total Time Spent   SLP Individual Total Time Spent (Mins) 60   Cognition   Simple Attention Minimal (4)   Prospective Memory Moderate (3)   Functional Memory Activities Moderate (3)   Medication Management  Minimal (4)   Speech Language Pathologist Assigned   Assigned SLP / Treatment Time / Comments LM 60 cog         Assessment    Patient needed mod A to recall recent events and recalled 3/5 unrelated words with min A.  Patient worked on attention in the context of locating medication errors.   Patient needed many reviews of directions to understand what was expected in the task.  However, after moderate overall support, he was able to demonstrate with 100% acc for a Fo2-3.   Patient continues to demonstrate need for an extra ordinary amount of time to  process information.    Strengths: Able to follow instructions, Alert and oriented, Independent prior level of function, Motivated for self care and independence, Pleasant and cooperative, Supportive family, Willingly participates in therapeutic activities  Barriers: Decreased endurance, Fatigue, Generalized weakness, Impaired balance, Impaired carryover of learning, Impaired functional cognition (slow to process.  decreased initiation.)    Plan    Target recall, medication management, attention.    Passport items to be completed:  Express basic needs, understand food/liquid  recommendations, consistently follow swallow precautions, manage finances, manage medications, arrive to therapy appointments on time, complete daily memory log entries, solve problems related to safety situations, review education related to hospitalization, complete caregiver training     Speech Therapy Problems (Active)       Problem: Memory STGs       Dates: Start:  09/02/23         Goal: STG-Within one week, patient will demonstrate use of memory strategies in order to recall safety precautions related to hospitalization.        Dates: Start:  09/02/23         Goal Note filed on 09/05/23 0820 by Diana Reyes MS,CCC-SLP       Mod A needed.                 Problem: Problem Solving STGs       Dates: Start:  09/02/23         Goal: STG-Within one week, patient will complete medication management tasks with 80% accuracy min A       Dates: Start:  09/02/23         Goal Note filed on 09/05/23 0820 by Diana Reyes MS,CCC-SLP       To be addressed.              Goal: STG-Within one week, patient will complete alternating attention tasks with 75% accuracy, min a       Dates: Start:  09/02/23         Goal Note filed on 09/05/23 0820 by Diana Reyes MS,CCC-SLP       Mod A.                 Problem: Speech/Swallowing LTGs       Dates: Start:  09/02/23         Goal: LTG-By discharge, patient will solve complex problems relate to IADL's in order to d/c home with mod I        Dates: Start:  09/02/23

## 2023-09-07 NOTE — PROGRESS NOTES
NURSING DAILY NOTE    Name: Moe Nickerson   Date of Admission: 8/30/2023   Admitting Diagnosis: History of femur fracture  Attending Physician: Kamran Viecnte M.d.  Allergies: Amlodipine, Cortisone, and Morphine    Safety  Patient Assist  cga  Patient Precautions  Fall Risk, Posterior Hip Precautions, Weight Bearing As Tolerated Right Lower Extremity  Precaution Comments  aphasia  Bed Transfer Status  Standby Assist  Toilet Transfer Status   Standby Assist  Assistive Devices  Wheelchair  Oxygen  None - Room Air  Diet/Therapeutic Dining  Current Diet Order   Procedures    Diet Order Diet: Regular     Pill Administration  whole  Agitated Behavioral Scale     ABS Level of Severity       Fall Risk  Has the patient had a fall this admission?   No  Shayna Bunn Fall Risk Scoring  18, HIGH RISK  Fall Risk Safety Measures  bed alarm and chair alarm    Vitals  Temperature: 36.3 °C (97.3 °F)  Temp src: Oral  Pulse: 61  Respiration: 18  Blood Pressure : 113/65  Blood Pressure MAP (Calculated): 81 MM HG  BP Location: Left, Upper Arm  Patient BP Position: Supine     Oxygen  Pulse Oximetry: 97 %  O2 (LPM): 0  O2 Delivery Device: None - Room Air    Bowel and Bladder  Last Bowel Movement  09/06/23  Stool Type  Type 6: Fluffy pieces with ragged edges, a mushy stool  Bowel Device  Bathroom  Continent  Bladder: Continent void   Bowel: Continent movement  Bladder Function  Urine Void (mL): 250 ml  Number of Times Voided: 1  Urine Color: Unable To Evaluate  Genitourinary Assessment   Bladder Assessment (WDL):  WDL Except  Garcia Catheter: Not Applicable  Urine Color: Unable To Evaluate  Bladder Device: Urinal  Time Void: No  Bladder Scan: Post Void  $ Bladder Scan Results (mL): 68    Skin  Manas Score   17  Sensory Interventions   Bed Types: Standard/Trauma Mattress  Skin Preventative Measures: Pillows in Use for Support / Positioning  Moisture Interventions          Pain  Pain Rating Scale  7 - Focus of attention, prevents doing daily activities  Pain Location  Hip  Pain Location Orientation  Right  Pain Interventions   Declines    ADLs    Bathing      Linen Change      Personal Hygiene  Ruth Bottle, Moist Ruth Wipes  Chlorhexidine Bath      Oral Care  Refused  Teeth/Dentures     Shave     Nutrition Percentage Eaten  Breakfast, Between % Consumed  Environmental Precautions  Treaded Slipper Socks on Patient  Patient Turns/Positioning  Patient Turns Self from Side to Side  Patient Turns Assistance/Tolerance     Bed Positions  Bed Controls On, Bed Locked  Head of Bed Elevated  Self regulated      Psychosocial/Neurologic Assessment  Psychosocial Assessment  Psychosocial (WDL):  Within Defined Limits  Neurologic Assessment  Neuro (WDL): Within Defined Limits  Level of Consciousness: Alert  EENT (WDL):  WDL Except    Cardio/Pulmonary Assessment  Edema      Respiratory Breath Sounds  RUL Breath Sounds: Clear  RML Breath Sounds: Clear  RLL Breath Sounds: Clear  IDRIS Breath Sounds: Clear  LLL Breath Sounds: Clear  Cardiac Assessment   Cardiac (WDL):  WDL Except

## 2023-09-07 NOTE — THERAPY
"Physical Therapy   Daily Treatment     Patient Name: Moe Nickerson  Age:  78 y.o., Sex:  male  Medical Record #: [id number]  Today's Date: 9/7/2023     Precautions  Precautions: Fall Risk, Posterior Hip Precautions, Weight Bearing As Tolerated Right Lower Extremity  Comments: aphasia    Subjective    Pt received up in wc from OT in [facility], agreeable to participate despite reports of feeling tired.     Objective       09/07/23 1031   PT Charge Group   PT Gait Training (Units) 2   PT Therapeutic Exercise (Units) 2   PT Total Time Spent   PT Individual Total Time Spent (Mins) 60   Gait Functional Level of Assist    Gait Level Of Assist Standby Assist   Assistive Device 4 Wheel Walker;Front Wheel Walker   Distance (Feet) 270   # of Times Distance was Traveled 2  (c/ 4WW plus 120 ft c/ FWW)   Deviation Antalgic;Bradykinetic  (mild antalgia c/ 4WW, required mod fading to min verbal cues for 4WW brake mgt)   Transfer Functional Level of Assist   Bed, Chair, Wheelchair Transfer Standby Assist   Bed Chair Wheelchair Transfer Description Adaptive equipment;Increased time;Supervision for safety;Verbal cueing  (stand step 4WW vs FWW, required mod fading to min verbal cues for 4WW brake mgt)   Standing Lower Body Exercises   Hamstring Curl 2 sets of 10;Bilateral    Hip Extension 2 sets of 10;Bilateral   (BLE 2.5# ankle weights)   Hip Abduction 2 sets of 10;Bilateral  (BLE 2.5# ankle weights)   Marching 1 set of 10  (limited by fatigue at end of session)   Comments c/ BUE support   Bed Mobility    Sit to Stand Standby Assist   Interdisciplinary Plan of Care Collaboration   IDT Collaboration with  Occupational Therapist   Patient Position at End of Therapy Seated;Chair Alarm On;Call Light within Reach;Tray Table within Reach;Phone within Reach   Collaboration Comments handoff from OT in [facility]     Pt politely declined outdoor amb d/t weather being \"too cold.\"    Assessment    Pt tolerated session well up until he became " "fatigued at end of session. Pt pleased c/ trial of 4WW (\"I like this one better\" than FWW) but required min-mod cues for brake mgt. Would benefit from further practice to reinforce safety prior to d/c. Removed ankle weights nursing home through standing LE exercises during second half of session d/t fatigue.    Strengths: Able to follow instructions, Good insight into deficits/needs, Independent prior level of function, Making steady progress towards goals, Motivated for self care and independence, Pleasant and cooperative, Willingly participates in therapeutic activities  Barriers: Aphasia expressive, Decreased endurance, Fatigue, Impaired activity tolerance, Impaired balance, Generalized weakness, Pain    Plan    Gait FWW vs 4WW with goal of improved R LE weight acceptance, R LE therex, NuStep for endurance, bed mobility independence     Passport items to be completed:  Get in/out of bed safely, in/out of a vehicle, safely use mobility device, walk or wheel around home/community, navigate up and down stairs, show how to get up/down from the ground, ensure home is accessible, demonstrate HEP, complete caregiver training    Physical Therapy Problems (Active)       Problem: Mobility       Dates: Start:  08/31/23         Goal: STG-Within one week, patient will ambulate up/down a curb with Yanet and LRAD       Dates: Start:  08/31/23               Problem: Mobility Transfers       Dates: Start:  08/31/23         Goal: STG-Within one week, patient will perform bed mobility Hector       Dates: Start:  08/31/23         Goal Note filed on 09/05/23 0811 by Kenneth Aguiar, PT       SPV with verbal cues for use of leg  and extra time                 Problem: PT-Long Term Goals       Dates: Start:  08/31/23         Goal: LTG-By discharge, patient will ambulate 200ft indoors and outdoors Hector with LRAD       Dates: Start:  08/31/23            Goal: LTG-By discharge, patient will transfer one surface to another Hector       Dates: " Start:  08/31/23            Goal: LTG-By discharge, patient will perform home exercise program independently       Dates: Start:  08/31/23            Goal: LTG-By discharge, patient will transfer in/out of a car with set up assist       Dates: Start:  08/31/23

## 2023-09-07 NOTE — CARE PLAN
"The patient is Watcher - Medium risk of patient condition declining or worsening    Shift Goals  Clinical Goals: safety    Problem: Fall Risk - Rehab  Goal: Patient will remain free from falls  Note: Shayna Bunn Fall risk Assessment Score: 13    Moderate fall risk Interventions  - Bed and strip alarm   - Yellow sign by the door   - Yellow wrist band \"Fall risk\"  - Room near to the nurse station  - Do not leave patient unattended in the bathroom  - Fall risk education provided     Problem: Pain - Standard  Goal: Alleviation of pain or a reduction in pain to the patient’s comfort goal  Note: Patient has no complaints of pain, is able to verbalize pain level and verbalize an acceptable level of pain.       "

## 2023-09-08 ENCOUNTER — APPOINTMENT (OUTPATIENT)
Dept: OCCUPATIONAL THERAPY | Facility: REHABILITATION | Age: 78
DRG: 559 | End: 2023-09-08
Attending: PHYSICAL MEDICINE & REHABILITATION
Payer: MEDICARE

## 2023-09-08 ENCOUNTER — APPOINTMENT (OUTPATIENT)
Dept: SPEECH THERAPY | Facility: REHABILITATION | Age: 78
DRG: 559 | End: 2023-09-08
Attending: PHYSICAL MEDICINE & REHABILITATION
Payer: MEDICARE

## 2023-09-08 ENCOUNTER — APPOINTMENT (OUTPATIENT)
Dept: PHYSICAL THERAPY | Facility: REHABILITATION | Age: 78
DRG: 559 | End: 2023-09-08
Attending: PHYSICAL MEDICINE & REHABILITATION
Payer: MEDICARE

## 2023-09-08 PROCEDURE — 700101 HCHG RX REV CODE 250: Performed by: PHYSICAL MEDICINE & REHABILITATION

## 2023-09-08 PROCEDURE — 97110 THERAPEUTIC EXERCISES: CPT

## 2023-09-08 PROCEDURE — 97129 THER IVNTJ 1ST 15 MIN: CPT

## 2023-09-08 PROCEDURE — A9270 NON-COVERED ITEM OR SERVICE: HCPCS | Performed by: PHYSICAL MEDICINE & REHABILITATION

## 2023-09-08 PROCEDURE — 700102 HCHG RX REV CODE 250 W/ 637 OVERRIDE(OP): Performed by: PHYSICAL MEDICINE & REHABILITATION

## 2023-09-08 PROCEDURE — 99232 SBSQ HOSP IP/OBS MODERATE 35: CPT | Performed by: PHYSICAL MEDICINE & REHABILITATION

## 2023-09-08 PROCEDURE — 770010 HCHG ROOM/CARE - REHAB SEMI PRIVAT*

## 2023-09-08 PROCEDURE — 97116 GAIT TRAINING THERAPY: CPT

## 2023-09-08 PROCEDURE — 97535 SELF CARE MNGMENT TRAINING: CPT

## 2023-09-08 PROCEDURE — 97130 THER IVNTJ EA ADDL 15 MIN: CPT

## 2023-09-08 RX ORDER — LANOLIN ALCOHOL/MO/W.PET/CERES
6 CREAM (GRAM) TOPICAL
Status: DISCONTINUED | OUTPATIENT
Start: 2023-09-08 | End: 2023-09-12 | Stop reason: HOSPADM

## 2023-09-08 RX ORDER — BISACODYL 10 MG
10 SUPPOSITORY, RECTAL RECTAL
Status: DISCONTINUED | OUTPATIENT
Start: 2023-09-08 | End: 2023-09-12 | Stop reason: HOSPADM

## 2023-09-08 RX ORDER — POLYETHYLENE GLYCOL 3350 17 G/17G
1 POWDER, FOR SOLUTION ORAL
Status: DISCONTINUED | OUTPATIENT
Start: 2023-09-08 | End: 2023-09-12 | Stop reason: HOSPADM

## 2023-09-08 RX ORDER — AMOXICILLIN 250 MG
2 CAPSULE ORAL PRN
Status: DISCONTINUED | OUTPATIENT
Start: 2023-09-08 | End: 2023-09-12 | Stop reason: HOSPADM

## 2023-09-08 RX ADMIN — ACETAMINOPHEN 1000 MG: 500 TABLET ORAL at 17:17

## 2023-09-08 RX ADMIN — APIXABAN 5 MG: 5 TABLET, FILM COATED ORAL at 20:55

## 2023-09-08 RX ADMIN — SIMVASTATIN 10 MG: 20 TABLET, FILM COATED ORAL at 20:55

## 2023-09-08 RX ADMIN — LISINOPRIL 20 MG: 20 TABLET ORAL at 05:13

## 2023-09-08 RX ADMIN — MINERAL OIL, PETROLATUM, PHENYLEPHRINE HCI: .25; 14; 74.9 OINTMENT TOPICAL at 20:57

## 2023-09-08 RX ADMIN — OMEPRAZOLE 20 MG: 20 CAPSULE, DELAYED RELEASE ORAL at 08:16

## 2023-09-08 RX ADMIN — CLOPIDOGREL BISULFATE 75 MG: 75 TABLET ORAL at 08:16

## 2023-09-08 RX ADMIN — APIXABAN 5 MG: 5 TABLET, FILM COATED ORAL at 08:16

## 2023-09-08 RX ADMIN — Medication 6 MG: at 23:16

## 2023-09-08 RX ADMIN — LIDOCAINE 1 PATCH: 50 PATCH TOPICAL at 07:28

## 2023-09-08 ASSESSMENT — GAIT ASSESSMENTS
DEVIATION: BRADYKINETIC;ANTALGIC
GAIT LEVEL OF ASSIST: STANDBY ASSIST
DISTANCE (FEET): 200
ASSISTIVE DEVICE: 4 WHEEL WALKER

## 2023-09-08 ASSESSMENT — PAIN DESCRIPTION - PAIN TYPE: TYPE: ACUTE PAIN

## 2023-09-08 ASSESSMENT — ACTIVITIES OF DAILY LIVING (ADL): BED_CHAIR_WHEELCHAIR_TRANSFER_DESCRIPTION: SUPERVISION FOR SAFETY;SET-UP OF EQUIPMENT

## 2023-09-08 NOTE — CARE PLAN
Problem: Dressing  Goal: STG-Within one week, patient will dress LB at CGA level.  Outcome: Discharged - Not Met  Note: Pt will have CG at home for dressing assist, due to limits with problem solving and sequencing skills while using AE for LBD.

## 2023-09-08 NOTE — CARE PLAN
"The patient is Watcher - Medium risk of patient condition declining or worsening    Shift Goals  Clinical Goals: safety    Problem: Bowel Elimination  Goal: Patient will participate in bowel management program  Note: Patient experiencing loose stool, per doctor to hold bowel med     Problem: Fall Risk - Rehab  Goal: Patient will remain free from falls  Note: Shayna Bunn Fall risk Assessment Score: 13    Moderate fall risk Interventions  - Bed and strip alarm   - Yellow sign by the door   - Yellow wrist band \"Fall risk\"  - Room near to the nurse station  - Do not leave patient unattended in the bathroom  - Fall risk education provided     "

## 2023-09-08 NOTE — DISCHARGE PLANNING
Referrals sent to [facility] and [facility] for FWW.    Call placed to Moy at [facility] for private caregivers.

## 2023-09-08 NOTE — PROGRESS NOTES
"  Physical Medicine & Rehabilitation Progress Note    Encounter Date: 9/8/2023    Chief Complaint: Weakness    Interval Events (Subjective):  Pain controlled, mentation returning to baseline    Objective:  VITAL SIGNS: /70   Pulse 80   Temp 36.7 °C (98 °F)   Resp 20   Ht 1.778 m (5' 10\")   Wt 55 kg (121 lb 4.1 oz)   SpO2 97%   BMI 17.40 kg/m²   Gen: No acute distress, well developed well nourished adult  HEENT: Normal Cephalic Atraumatic, Normal conjunctiva.   CV: warm extremities, well perfused, no edema  Resp: symmetric chest rise, breathing comfortably on room air  Abd: Soft, Non distended  Extremities: normal bulk, no atrophy  Skin: no visible rashes or lesions.   Neuro: alert, awake  Psych: Mood and affect appropriate and congruent    Laboratory Values:  Recent Results (from the past 72 hour(s))   CBC WITHOUT DIFFERENTIAL    Collection Time: 09/07/23  6:06 AM   Result Value Ref Range    WBC 5.0 4.8 - 10.8 K/uL    RBC 3.88 (L) 4.70 - 6.10 M/uL    Hemoglobin 12.1 (L) 14.0 - 18.0 g/dL    Hematocrit 37.0 (L) 42.0 - 52.0 %    MCV 95.4 81.4 - 97.8 fL    MCH 31.2 27.0 - 33.0 pg    MCHC 32.7 32.3 - 36.5 g/dL    RDW 44.7 35.9 - 50.0 fL    Platelet Count 321 164 - 446 K/uL    MPV 11.6 9.0 - 12.9 fL   Comp Metabolic Panel    Collection Time: 09/07/23  6:06 AM   Result Value Ref Range    Sodium 138 135 - 145 mmol/L    Potassium 4.3 3.6 - 5.5 mmol/L    Chloride 105 96 - 112 mmol/L    Co2 24 20 - 33 mmol/L    Anion Gap 9.0 7.0 - 16.0    Glucose 94 65 - 99 mg/dL    Bun 20 8 - 22 mg/dL    Creatinine 0.83 0.50 - 1.40 mg/dL    Calcium 8.4 (L) 8.5 - 10.5 mg/dL    Correct Calcium 9.0 8.5 - 10.5 mg/dL    AST(SGOT) 43 12 - 45 U/L    ALT(SGPT) 35 2 - 50 U/L    Alkaline Phosphatase 86 30 - 99 U/L    Total Bilirubin 0.8 0.1 - 1.5 mg/dL    Albumin 3.2 3.2 - 4.9 g/dL    Total Protein 5.8 (L) 6.0 - 8.2 g/dL    Globulin 2.6 1.9 - 3.5 g/dL    A-G Ratio 1.2 g/dL   ESTIMATED GFR    Collection Time: 09/07/23  6:06 AM   Result " Value Ref Range    GFR (CKD-EPI) 88 >60 mL/min/1.73 m 2       Medications:  Scheduled Medications   Medication Dose Frequency    lidocaine  1 Patch Daily-0800    formulation r   QHS    Pharmacy Consult Request  1 Each PHARMACY TO DOSE    senna-docusate  2 Tablet BID    clopidogrel  75 mg DAILY    lisinopril  20 mg Q DAY    simvastatin  10 mg Q EVENING    apixaban  5 mg BID    omeprazole  20 mg DAILY     PRN medications: acetaminophen, menthol, Respiratory Therapy Consult, hydrALAZINE, senna-docusate **AND** polyethylene glycol/lytes **AND** magnesium hydroxide **AND** bisacodyl, ondansetron **OR** ondansetron, sodium chloride, nitroglycerin    Diet:  Current Diet Order   Procedures    Diet Order Diet: Regular       Medical Decision Making and Plan:  Acute encephalopathy  Likely 2/2 Oxycodone use,  -15mg taken before noon due to severe pain which is now controlled  -9/5 continues to have slowed processing speed  - DC oxycodone. Use Tylenol PRN, Lidocaine patch, and Hemorid cream instead  -SLP onboard  -9/7-resolving after oxycodone stopped  [facility] Code / Diagnosis to Support: 0002.1 - Brain Dysfunction: Non-Traumatic      Femoral neck fracture   - sustained during GLF   - images showed an impacted right subcapital femoral neck fracture  -s/p  open treatment of right femoral neck fracture with total hip arthoplasty by Dr. Mei  - posterior hip precautions, WBAT RLE   - continue with PT/OT inpatient     Painful Hemorrhoids  -Preparation H     Diarrhea  Started 9/8. Hold stool softeners     Left Lumbar back pain  Chronic with acute flair  -Lidocaine patch     Chronic mural thrombus   Heart failure with reduced EF   - updated ECHO obtained showed EF of 35%, and noted mural thrombus   - Eliquis 5mg BID started 8/30 with Dr Mei approval  -Hx of hematuria with Eliquis  -no bleeding to date, neuro exam stable, continue eliquis     Prior CVA  - minimal deficits, no lateralizing weakness, no known dysphagia but patient has  soft-spoken/hypophonic voice  - continue statin, plavix       CAD with history of PCI   - statin and plavix at home  -EKG done on admission  - continue     Neurogenic bladder:  - Timed voids with PVR q4H x3. If PVR > 400mL or if patient is unable to void, straight cath patient.     Neurogenic bowel:  -  Colace, Senna BID on admission  - Goal of 1BM/day.  -     Circadian Rhythm disorder:   Recommend lights on during the day/off at night, minimize nighttime interruptions as able.     Mood  - at risk of adjustment disorder, depression, and anxiety due to functional decline     ID:  - at risk for Urinary tract infection     Skin/Wounds:  - Pressure relief q2h while in bed. Close monitoring for signs of breakdown     Pain:  - Neuroceptic - On Tylenol prn, DC oxycodone 9/5. continue current regiment     DVT prophylaxis:  Patient is on Eliquis.      GI prophylaxis:  On Prilosec 20mg daily      Medical Problem List:     HTN: Lisinopril 20mg daily  continue current regiment     -Follow-up Dr Sigifredo farnsworth    ____________________________________    Kamran Vicente MD  Physical Medicine & Rehabilitation   Brain Injury Medicine   ____________________________________

## 2023-09-08 NOTE — DISCHARGE PLANNING
Moy w/[facility] is here to visit with patient to coordinate setting up private caregivers.  Patient stated he could pay for this.    CM introduced Moy to patient and Moy will update this CM after he visits with patient.      9/11/23: FAUZIA LM on Moy's VM to get update on how meeting went with patient late Friday afternoon.  CM will continue to monitor for DC needs.      CM spoke with Moy at [facility].  Moy stated patient told him he would not be able to afford private care.  CM has confirmed with son and daughter that they are unable to provide assistance at DC and feel skilled is the best option at this time so patient can get continued therapy and gain strength to return home.  CM updated attending.  Will send skilled referrals.

## 2023-09-08 NOTE — THERAPY
Missed Therapy    Patient Name: Moe Nickerson  Age:  78 y.o., Sex:  male  Medical Record #: [id number]  Today's Date: 9/8/2023    Discussed missed therapy with Dr. Vicente, RN Marjan, PT, and         09/08/23 1331   Therapy Missed   Missed Therapy (Minutes) 30   Reason For Missed Therapy Medical - Patient has Bowel Issues;Medical - Other (Please Comment)  (Pt limited by pain, fatigue, and loose stools all afternoon.)

## 2023-09-08 NOTE — THERAPY
Physical Therapy   Daily Treatment     Patient Name: Moe Nickerson  Age:  78 y.o., Sex:  male  Medical Record #: [id number]  Today's Date: 9/8/2023     Precautions  Precautions: Fall Risk, Posterior Hip Precautions, Weight Bearing As Tolerated Right Lower Extremity  Comments: aphasia    Subjective    Pt prefers 4WW however agreeable to use FWW after therapist reviewed pt's lack of carryover/safety with brake use     Objective       09/08/23 0830   PT Charge Group   PT Gait Training (Units) 1   PT Therapeutic Exercise (Units) 3   PT Total Time Spent   PT Individual Total Time Spent (Mins) 60   Gait Functional Level of Assist    Gait Level Of Assist Standby Assist   Assistive Device 4 Wheel Walker   Distance (Feet) 200   # of Times Distance was Traveled 2   Deviation Bradykinetic;Antalgic  (moderate VC's for safe use of brakes, no carryover after initial instruction)   Transfer Functional Level of Assist   Bed, Chair, Wheelchair Transfer Standby Assist   Bed Chair Wheelchair Transfer Description Supervision for safety;Set-up of equipment   Sitting Lower Body Exercises   Nustep Resistance Level 3  (BUE/LE 10 min 520 steps)   Standing Lower Body Exercises   Hamstring Curl 1 set of 10;Right ;Left   Hip Extension 2 sets of 10;Bilateral    Hip Abduction 2 sets of 10;Bilateral   Marching 2 sets of 10   Heel Rise 2 sets of 10   Mini Squat Partial;1 set of 10   Comments B UE support // bars   Bed Mobility    Sit to Stand Standby Assist   Interdisciplinary Plan of Care Collaboration   IDT Collaboration with  Certified Nursing Assistant   Patient Position at End of Therapy Seated;Chair Alarm On;Self Releasing Lap Belt Applied;Call Light within Reach   Collaboration Comments pt needing to toilet at end of session and roommate in bathroom     Trialed sitting to/from 4WW to assess carryover of safe use of brakes however pt continued to required reminders for proper brake functioning  Demonstrated where to purchase tray for FWW  to transport items within the home    Assessment    Pt continues to require verbal cues for safe use of 4WW brakes even directly after demo/education. He may continue to practice with therapy however this device is not recommended for pt to use upon DC.  Strengths: Able to follow instructions, Good insight into deficits/needs, Independent prior level of function, Making steady progress towards goals, Motivated for self care and independence, Pleasant and cooperative, Willingly participates in therapeutic activities  Barriers: Aphasia expressive, Decreased endurance, Fatigue, Impaired activity tolerance, Impaired balance, Generalized weakness, Pain    Plan    Confirm hired Cg for AM/PM ADLs  Gait FWW with goal of improved R LE weight acceptance, R LE therex, NuStep for endurance, bed mobility independence     Passport items to be completed:  Get in/out of bed safely, in/out of a vehicle, safely use mobility device, walk or wheel around home/community, navigate up and down stairs, show how to get up/down from the ground, ensure home is accessible, demonstrate HEP, complete caregiver training        Physical Therapy Problems (Active)       Problem: Mobility       Dates: Start:  08/31/23         Goal: STG-Within one week, patient will ambulate up/down a curb with Yanet and LRAD       Dates: Start:  08/31/23               Problem: Mobility Transfers       Dates: Start:  08/31/23         Goal: STG-Within one week, patient will perform bed mobility Hector       Dates: Start:  08/31/23         Goal Note filed on 09/05/23 0811 by Kenneth Aguiar, PT       SPV with verbal cues for use of leg  and extra time                 Problem: PT-Long Term Goals       Dates: Start:  08/31/23         Goal: LTG-By discharge, patient will ambulate 200ft indoors and outdoors Hector with LRAD       Dates: Start:  08/31/23            Goal: LTG-By discharge, patient will transfer one surface to another Hector       Dates: Start:  08/31/23             Goal: LTG-By discharge, patient will perform home exercise program independently       Dates: Start:  08/31/23            Goal: LTG-By discharge, patient will transfer in/out of a car with set up assist       Dates: Start:  08/31/23

## 2023-09-08 NOTE — THERAPY
Speech Language Pathology  Daily Treatment     Patient Name: Moe Nickerson  Age:  78 y.o., Sex:  male  Medical Record #: [id number]  Today's Date: 9/8/2023     Precautions  Precautions: Fall Risk, Posterior Hip Precautions, Weight Bearing As Tolerated Right Lower Extremity  Comments: aphasia    Subjective    Patient agreeable to therapy.     Objective       09/08/23 0939   Treatment Charges   SLP Cognitive Skill Development First 15 Minutes 1   SLP Cognitive Skill Development Additional 15 Minutes 3   SLP Total Time Spent   SLP Individual Total Time Spent (Mins) 60   Cognition   Prospective Memory Supervision (5)   Functional Memory Activities Minimal (4)   Executive Functioning / Organization Moderate (3)   Medication Management  Minimal (4)         Assessment    Patient was able to recall 5/5 unrelated words trained a day ago.  Patient has not yet initiated recording in memory log. Did assist patient with this today.  Patient worked on medication replica sorting task with ( 4 of his current medications) patient lost his place in task when he had 2 open bottles.  Recommended that he fill each medication completely, before opening another and isolating those he has already completed.  After which he was able to complete the remainder with 100% acc with an extra ordinary amount of time needed to complete task.    Strengths: Able to follow instructions, Alert and oriented, Independent prior level of function, Motivated for self care and independence, Pleasant and cooperative, Supportive family, Willingly participates in therapeutic activities  Barriers: Decreased endurance, Fatigue, Generalized weakness, Impaired balance, Impaired carryover of learning, Impaired functional cognition (slow to process.  decreased initiation.)    Plan    Target recall of new information and training, medication management.    Passport items to be completed:  Express basic needs, understand food/liquid recommendations, consistently  follow swallow precautions, manage finances, manage medications, arrive to therapy appointments on time, complete daily memory log entries, solve problems related to safety situations, review education related to hospitalization, complete caregiver training     Speech Therapy Problems (Active)       Problem: Memory STGs       Dates: Start:  09/02/23         Goal: STG-Within one week, patient will demonstrate use of memory strategies in order to recall safety precautions related to hospitalization.        Dates: Start:  09/02/23         Goal Note filed on 09/05/23 0820 by Diana Reyes MS,CCC-SLP       Mod A needed.                 Problem: Problem Solving STGs       Dates: Start:  09/02/23         Goal: STG-Within one week, patient will complete medication management tasks with 80% accuracy min A       Dates: Start:  09/02/23         Goal Note filed on 09/05/23 0820 by Diana Reyes MS,CCC-SLP       To be addressed.              Goal: STG-Within one week, patient will complete alternating attention tasks with 75% accuracy, min a       Dates: Start:  09/02/23         Goal Note filed on 09/05/23 0820 by Diana Reyes MS,CCC-SLP       Mod A.                 Problem: Speech/Swallowing LTGs       Dates: Start:  09/02/23         Goal: LTG-By discharge, patient will solve complex problems relate to IADL's in order to d/c home with mod I        Dates: Start:  09/02/23

## 2023-09-08 NOTE — CARE PLAN
"The patient is Stable - Low risk of patient condition declining or worsening    Shift Goals  Clinical Goals: safety  Patient Goals: safety. sleep/rest      Problem: Fall Risk - Rehab  Goal: Patient will remain free from falls  Outcome: Progressing  Note: Shayna Bunn Fall risk Assessment Score: 13    Moderate fall risk Interventions  - Bed and strip alarm   - Yellow sign by the door   - Yellow wrist band \"Fall risk\"  - Room near to the nurse station  - Do not leave patient unattended in the bathroom  - Fall risk education provided  Patient uses call light consistently and appropriately this shift.  Waits for assistance when needed and does not attempt self transfer.  Able to verbalize needs.  Will continue to monitor.     Problem: Skin Integrity  Goal: Patient's skin integrity will be maintained or improve  Outcome: Progressing  Note: Patient's skin remains intact and free from new or accidental injury this shift.  Dressing change to right hip incision with staples, c/d/I with no s/s of infections noted. Will continue to monitor.      "

## 2023-09-08 NOTE — PROGRESS NOTES
NURSING DAILY NOTE    Name: Moe Nickerson   Date of Admission: 8/30/2023   Admitting Diagnosis: History of femur fracture  Attending Physician: Kamran Vicente M.d.  Allergies: Amlodipine, Cortisone, and Morphine    Safety  Patient Assist  cga  Patient Precautions  Fall Risk, Posterior Hip Precautions, Weight Bearing As Tolerated Right Lower Extremity  Precaution Comments  aphasia  Bed Transfer Status  Standby Assist  Toilet Transfer Status   Standby Assist  Assistive Devices  Rails, Walker - front wheel  Oxygen  None - Room Air  Diet/Therapeutic Dining  Current Diet Order   Procedures    Diet Order Diet: Regular     Pill Administration  whole  Agitated Behavioral Scale     ABS Level of Severity       Fall Risk  Has the patient had a fall this admission?   No  Shayna Bunn Fall Risk Scoring  13, MODERATE RISK  Fall Risk Safety Measures  bed alarm, chair alarm, and poor balance    Vitals  Temperature: 37.1 °C (98.7 °F)  Temp src: Oral  Pulse: 60  Respiration: 17  Blood Pressure : 132/74  Blood Pressure MAP (Calculated): 93 MM HG  BP Location: Left, Upper Arm  Patient BP Position: Ely's Position     Oxygen  Pulse Oximetry: 96 %  O2 (LPM): 0  O2 Delivery Device: None - Room Air    Bowel and Bladder  Last Bowel Movement  09/08/23  Stool Type  Type 3: Like a sausage, but with cracks on its surface  Bowel Device  Bathroom  Continent  Bladder: Continent void   Bowel: Continent movement  Bladder Function  Urine Void (mL):  (Moderate)  Number of Times Voided: 1  Urine Color: Yellow  Genitourinary Assessment   Bladder Assessment (WDL):  WDL Except  Garcia Catheter: Not Applicable  Urine Color: Yellow  Bladder Device: Urinal  Time Void: No  Bladder Scan: Post Void  $ Bladder Scan Results (mL): 68  Bladder Medications: No    Skin  Manas Score   17  Sensory Interventions   Bed Types: Standard/Trauma Mattress  Skin Preventative Measures: Pillows in Use for Support  / Positioning  Moisture Interventions  Moisturizers/Barriers: Barrier Wipes      Pain  Pain Rating Scale  0 - No Pain  Pain Location  Hip  Pain Location Orientation  Right  Pain Interventions   Declines    ADLs    Bathing   Patient Refused Bathing  Linen Change      Personal Hygiene  Ruth Bottle, Moist Ruth Wipes  Chlorhexidine Bath      Oral Care  Refused  Teeth/Dentures     Shave     Nutrition Percentage Eaten  Breakfast, Between % Consumed  Environmental Precautions  Treaded Slipper Socks on Patient, Bed in Low Position  Patient Turns/Positioning  Sitting Up in Wheelchair  Patient Turns Assistance/Tolerance  Assistance of One  Bed Positions  Bed Controls On, Bed Locked  Head of Bed Elevated  Self regulated      Psychosocial/Neurologic Assessment  Psychosocial Assessment  Psychosocial (WDL):  Within Defined Limits  Neurologic Assessment  Neuro (WDL): Exceptions to WDL  Level of Consciousness: Alert  Orientation Level: Oriented X4  Cognition: Follows commands, Appropriate attention/concentration  Speech: Delayed responses (soft spoken)  EENT (WDL):  WDL Except    Cardio/Pulmonary Assessment  Edema      Respiratory Breath Sounds  RUL Breath Sounds: Clear  RML Breath Sounds: Clear  RLL Breath Sounds: Clear  IDRIS Breath Sounds: Clear  LLL Breath Sounds: Clear  Cardiac Assessment   Cardiac (WDL):  WDL Except

## 2023-09-08 NOTE — THERAPY
"Occupational Therapy  Daily Treatment     Patient Name: Moe Nickerson  Age:  78 y.o., Sex:  male  Medical Record #: [id number]  Today's Date: 9/8/2023     Precautions  Precautions: Fall Risk, Posterior Hip Precautions, Weight Bearing As Tolerated Right Lower Extremity  Comments: aphasia         Subjective    \"These pants are so tight at the bottom.\"     Objective       09/08/23 1031   OT Charge Group   OT Self Care / ADL (Units) 2   OT Total Time Spent   OT Individual Total Time Spent (Mins) 30   Pain   Intervention Declines   Cognition    Speech/ Communication Expressive Aphasia;Delayed Responses   Level of Consciousness Alert   New Learning Impaired   Sequencing Impaired   Functional Level of Assist   Grooming Standing;Standby Assist   Grooming Description Standing at sink;Supervision for safety  (Standing for 6 min for oral care without UB support.)   Lower Body Dressing Moderate Assist   Lower Body Dressing Description Sock aid;Reacher;Dressing stick;Assist with threading into pant leg;Increased time;Set-up of equipment;Supervision for safety;Verbal cueing  (Pt able to don/doff slip on shoes, and complete fastening/draw down/up pants without UB support. Mod A for threading/unthreading following attempts at reacher and dressing stick use on jeans. provided pt with hospital pants for increased comfort and ind.)   Interdisciplinary Plan of Care Collaboration   IDT Collaboration with  Other (See Comments);Speech Therapist  ( for schedule change)   Patient Position at End of Therapy Seated;Chair Alarm On;Self Releasing Lap Belt Applied;Call Light within Reach;Tray Table within Reach   Collaboration Comments recieved from SLP at beginning to session.         Assessment    Pt continues to be limited by poor problem solving and sequencing skills. LBD goal discharged at this time, as pt will have CG at home to assist. Pt did demonstrated improved static standing balance during hip hike and with oral " care.  Strengths: Able to follow instructions, Alert and oriented, Independent prior level of function, Making steady progress towards goals, Manages pain appropriately, Motivated for self care and independence, Pleasant and cooperative, Supportive family, Willingly participates in therapeutic activities  Barriers: Aphasia expressive, Decreased endurance, Fatigue, Generalized weakness, Orthostatic hypotension, Impaired balance, Impaired carryover of learning, Impaired insight/denial of deficits, Limited mobility, Pain    Plan    Plan for DC Tuesday SNF, DC shower Monday. Standing balance, overall strengthening.     Passport items to be completed:  Perform bathroom transfers, complete dressing, complete feeding, get ready for the day, prepare a simple meal, participate in household tasks, adapt home for safety needs, demonstrate home exercise program, complete caregiver training     Occupational Therapy Goals (Active)       Problem: Bathing       Dates: Start:  08/31/23         Goal: STG-Within one week, patient will bathe at Min A level using AE/DME PRN.       Dates: Start:  08/31/23               Problem: Dressing       Dates: Start:  09/05/23         Goal: STG-Within one week, patient will dress LB       Dates: Start:  09/05/23               Problem: Functional Transfers       Dates: Start:  08/31/23         Goal: STG-Within one week, patient will transfer to step in shower at Min A level using AE/DME PRN.       Dates: Start:  08/31/23               Problem: IADL's       Dates: Start:  08/31/23         Goal: STG-Within one week, patient will access kitchen area at SBA level using AE/DME PRN.       Dates: Start:  08/31/23               Problem: OT Long Term Goals       Dates: Start:  08/31/23         Goal: LTG-By discharge, patient will complete basic self care tasks at SPV-Mod I level using AE/DME PRN.       Dates: Start:  08/31/23            Goal: LTG-By discharge, patient will perform bathroom transfers at  SPV-Mod I level using AE/DME PRN.       Dates: Start:  08/31/23            Goal: LTG-By discharge, patient will complete basic home management at SPV-Mod I level using AE/DME PRN.       Dates: Start:  08/31/23               Problem: Toileting       Dates: Start:  09/05/23         Goal: STG-Within one week, patient will complete toileting tasks       Dates: Start:  09/05/23

## 2023-09-09 ENCOUNTER — APPOINTMENT (OUTPATIENT)
Dept: SPEECH THERAPY | Facility: REHABILITATION | Age: 78
DRG: 559 | End: 2023-09-09
Attending: PHYSICAL MEDICINE & REHABILITATION
Payer: MEDICARE

## 2023-09-09 PROCEDURE — 97129 THER IVNTJ 1ST 15 MIN: CPT

## 2023-09-09 PROCEDURE — A9270 NON-COVERED ITEM OR SERVICE: HCPCS | Performed by: PHYSICAL MEDICINE & REHABILITATION

## 2023-09-09 PROCEDURE — 97130 THER IVNTJ EA ADDL 15 MIN: CPT

## 2023-09-09 PROCEDURE — 700102 HCHG RX REV CODE 250 W/ 637 OVERRIDE(OP): Performed by: PHYSICAL MEDICINE & REHABILITATION

## 2023-09-09 PROCEDURE — 770010 HCHG ROOM/CARE - REHAB SEMI PRIVAT*

## 2023-09-09 PROCEDURE — 700101 HCHG RX REV CODE 250: Performed by: PHYSICAL MEDICINE & REHABILITATION

## 2023-09-09 RX ADMIN — MINERAL OIL, PETROLATUM, PHENYLEPHRINE HCI: .25; 14; 74.9 OINTMENT TOPICAL at 21:00

## 2023-09-09 RX ADMIN — Medication 6 MG: at 20:43

## 2023-09-09 RX ADMIN — APIXABAN 5 MG: 5 TABLET, FILM COATED ORAL at 07:41

## 2023-09-09 RX ADMIN — LISINOPRIL 20 MG: 20 TABLET ORAL at 05:08

## 2023-09-09 RX ADMIN — LIDOCAINE 1 PATCH: 50 PATCH TOPICAL at 07:41

## 2023-09-09 RX ADMIN — OMEPRAZOLE 20 MG: 20 CAPSULE, DELAYED RELEASE ORAL at 07:41

## 2023-09-09 RX ADMIN — CLOPIDOGREL BISULFATE 75 MG: 75 TABLET ORAL at 07:41

## 2023-09-09 RX ADMIN — SIMVASTATIN 10 MG: 20 TABLET, FILM COATED ORAL at 20:44

## 2023-09-09 RX ADMIN — APIXABAN 5 MG: 5 TABLET, FILM COATED ORAL at 20:44

## 2023-09-09 ASSESSMENT — PAIN DESCRIPTION - PAIN TYPE
TYPE: ACUTE PAIN
TYPE: ACUTE PAIN

## 2023-09-09 NOTE — PROGRESS NOTES
NURSING DAILY NOTE    Name: Moe Nickerson   Date of Admission: 8/30/2023   Admitting Diagnosis: History of femur fracture  Attending Physician: Kamran Vicente M.d.  Allergies: Amlodipine, Cortisone, and Morphine    Safety  Patient Assist  cga  Patient Precautions  Fall Risk, Posterior Hip Precautions, Weight Bearing As Tolerated Right Lower Extremity  Precaution Comments  aphasia  Bed Transfer Status  Standby Assist  Toilet Transfer Status   Standby Assist  Assistive Devices  Rails, Walker - front wheel, Wheelchair  Oxygen  None - Room Air  Diet/Therapeutic Dining  Current Diet Order   Procedures    Diet Order Diet: Regular     Pill Administration  whole and one at a time   Agitated Behavioral Scale     ABS Level of Severity       Fall Risk  Has the patient had a fall this admission?   No  Shayna Bunn Fall Risk Scoring  13, MODERATE RISK  Fall Risk Safety Measures  bed alarm, chair alarm, and poor balance    Vitals  Temperature: 36.3 °C (97.3 °F)  Temp src: Oral  Pulse: 63  Respiration: 18  Blood Pressure : 127/72  Blood Pressure MAP (Calculated): 90 MM HG  BP Location: Left, Upper Arm  Patient BP Position: Supine     Oxygen  Pulse Oximetry: 95 %  O2 (LPM): 0  O2 Delivery Device: None - Room Air    Bowel and Bladder  Last Bowel Movement  09/08/23  Stool Type  Type 3: Like a sausage, but with cracks on its surface  Bowel Device  Bathroom  Continent  Bladder: Continent void   Bowel: Continent movement  Bladder Function  Urine Void (mL): 200 ml  Number of Times Voided: 1  Urine Color: Unable To Evaluate  Genitourinary Assessment   Bladder Assessment (WDL):  WDL Except  Garcia Catheter: Not Applicable  Urine Color: Unable To Evaluate  Bladder Device: Urinal  Time Void: No  Bladder Scan: Post Void  $ Bladder Scan Results (mL): 68  Bladder Medications: No    Skin  Manas Score   17  Sensory Interventions   Bed Types: Standard/Trauma Mattress  Skin  Preventative Measures: Pillows in Use for Support / Positioning  Moisture Interventions  Moisturizers/Barriers: Barrier Wipes      Pain  Pain Rating Scale  0 - No Pain  Pain Location  Back  Pain Location Orientation  Right  Pain Interventions   Medication (see MAR)    ADLs    Bathing   Patient Refused Bathing  Linen Change      Personal Hygiene  Ruth Bottle, Moist Ruth Wipes  Chlorhexidine Bath      Oral Care  Refused  Teeth/Dentures     Shave     Nutrition Percentage Eaten  Between 50-75% Consumed, Lunch  Environmental Precautions  Treaded Slipper Socks on Patient, Bed in Low Position  Patient Turns/Positioning  Sitting Up in Wheelchair  Patient Turns Assistance/Tolerance  Assistance of One  Bed Positions  Bed Controls On, Bed Locked  Head of Bed Elevated  Self regulated      Psychosocial/Neurologic Assessment  Psychosocial Assessment  Psychosocial (WDL):  Within Defined Limits  Neurologic Assessment  Neuro (WDL): Exceptions to WDL  Level of Consciousness: Alert  Orientation Level: Oriented X4  Cognition: Follows commands, Appropriate attention/concentration  Speech: Delayed responses (soft spoken)  Motor Function/Sensation Assessment: Motor strength  Muscle Strength Right Arm: Good Strength Against Gravity and Moderate Resistance  Muscle Strength Left Arm: Good Strength Against Gravity and Moderate Resistance  Muscle Strength Right Leg: Good Strength Against Gravity and Moderate Resistance  Muscle Strength Left Leg: Good Strength Against Gravity and Moderate Resistance  EENT (WDL):  WDL Except    Cardio/Pulmonary Assessment  Edema      Respiratory Breath Sounds  RUL Breath Sounds: Clear  RML Breath Sounds: Clear  RLL Breath Sounds: Clear  IDRIS Breath Sounds: Clear  LLL Breath Sounds: Clear  Cardiac Assessment   Cardiac (WDL):  WDL Except

## 2023-09-09 NOTE — THERAPY
"Speech Language Pathology  Daily Treatment     Patient Name: Moe Nickerson  Age:  78 y.o., Sex:  male  Medical Record #: [id number]  Today's Date: 9/9/2023     Precautions  Precautions: Fall Risk, Posterior Hip Precautions, Weight Bearing As Tolerated Right Lower Extremity  Comments: aphasia    Subjective    Pt seen at bedside. Pt reports he is \"very tired\"'; however, agreeable to participate in ST     Objective       09/09/23 1331   Treatment Charges   SLP Cognitive Skill Development First 15 Minutes 1   SLP Cognitive Skill Development Additional 15 Minutes 3   SLP Total Time Spent   SLP Individual Total Time Spent (Mins) 60   Interdisciplinary Plan of Care Collaboration   Patient Position at End of Therapy In Bed;Bed Alarm On;Call Light within Reach;Tray Table within Reach;Phone within Reach         Assessment    Pt had not completed daily memory log prior to ST. Pt attempted to complete for this day; however, discontinued due to reports of difficulty \"seeing and writing\". Offered assistance; however, pt reported no interest in the activity. Therefore, memory log not completed this session.   Pt completed functional medication sort task with current medications. Pt required increased time and assistance to open pill bottles. Pt correctly sorted 3/4 medications. Pt not able to id error for 1/4 (double pill sort) indep. Assistance and education provided.  Pt required max A to id and correct error.     Strengths: Able to follow instructions, Alert and oriented, Independent prior level of function, Motivated for self care and independence, Pleasant and cooperative, Supportive family, Willingly participates in therapeutic activities  Barriers: Decreased endurance, Fatigue, Generalized weakness, Impaired balance, Impaired carryover of learning, Impaired functional cognition (slow to process.  decreased initiation.)    Plan    Continue functional medication sort to ensure 100% accuracy, target functional recall, and " reinforce daily memory log as appropriate     Passport items to be completed:  Express basic needs, understand food/liquid recommendations, consistently follow swallow precautions, manage finances, manage medications, arrive to therapy appointments on time, complete daily memory log entries, solve problems related to safety situations, review education related to hospitalization, complete caregiver training     Speech Therapy Problems (Active)       Problem: Memory STGs       Dates: Start:  09/02/23         Goal: STG-Within one week, patient will demonstrate use of memory strategies in order to recall safety precautions related to hospitalization.        Dates: Start:  09/02/23         Goal Note filed on 09/05/23 0820 by Diana Reyes MS,CCC-SLP       Mod A needed.                 Problem: Problem Solving STGs       Dates: Start:  09/02/23         Goal: STG-Within one week, patient will complete medication management tasks with 80% accuracy min A       Dates: Start:  09/02/23         Goal Note filed on 09/05/23 0820 by Diana Reyes MS,CCC-SLP       To be addressed.              Goal: STG-Within one week, patient will complete alternating attention tasks with 75% accuracy, min a       Dates: Start:  09/02/23         Goal Note filed on 09/05/23 0820 by Diana Reyes MS,CCC-SLP       Mod A.                 Problem: Speech/Swallowing LTGs       Dates: Start:  09/02/23         Goal: LTG-By discharge, patient will solve complex problems relate to IADL's in order to d/c home with mod I        Dates: Start:  09/02/23

## 2023-09-09 NOTE — CARE PLAN
Problem: Skin Integrity  Goal: Skin integrity is maintained or improved  Outcome: Progressing - patient able to turn on own; sx incision intact     Problem: Fall Risk - Rehab  Goal: Patient will remain free from falls  Outcome: Progressing - patient verbalized understanding and call appropriately at this time

## 2023-09-09 NOTE — PROGRESS NOTES
NURSING DAILY NOTE    Name: Moe Nickerson   Date of Admission: 8/30/2023   Admitting Diagnosis: History of femur fracture  Attending Physician: Kamran Vicente M.d.  Allergies: Amlodipine, Cortisone, and Morphine    Safety  Patient Assist  cga  Patient Precautions  Fall Risk, Posterior Hip Precautions, Weight Bearing As Tolerated Right Lower Extremity  Precaution Comments  aphasia  Bed Transfer Status  Standby Assist  Toilet Transfer Status   Standby Assist  Assistive Devices  Rails, Walker - front wheel, Wheelchair  Oxygen  None - Room Air  Diet/Therapeutic Dining  Current Diet Order   Procedures    Diet Order Diet: Regular     Pill Administration  whole and one at a time   Agitated Behavioral Scale     ABS Level of Severity       Fall Risk  Has the patient had a fall this admission?   No  Shayna Bunn Fall Risk Scoring  13, MODERATE RISK  Fall Risk Safety Measures  bed alarm, chair alarm, poor balance, and low vision/ hearing    Vitals  Temperature: 37.1 °C (98.7 °F)  Temp src: Oral  Pulse: 78  Respiration: 20  Blood Pressure : 110/58  Blood Pressure MAP (Calculated): 75 MM HG  BP Location: Left, Upper Arm  Patient BP Position: Ely's Position     Oxygen  Pulse Oximetry: 95 %  O2 (LPM): 0  O2 Delivery Device: None - Room Air    Bowel and Bladder  Last Bowel Movement  09/08/23  Stool Type  Type 3: Like a sausage, but with cracks on its surface  Bowel Device  Bathroom  Continent  Bladder: Continent void   Bowel: Continent movement  Bladder Function  Urine Void (mL):  (Moderate)  Number of Times Voided: 1  Urine Color: Unable To Evaluate  Genitourinary Assessment   Bladder Assessment (WDL):  WDL Except  Garcia Catheter: Not Applicable  Urine Color: Unable To Evaluate  Bladder Device: Bathroom  Time Void: No  Bladder Scan: Post Void  $ Bladder Scan Results (mL): 68  Bladder Medications: No    Skin  Manas Score   17  Sensory Interventions   Bed Types:  Standard/Trauma Mattress  Skin Preventative Measures: Pillows in Use for Support / Positioning, Pillows in Use to Float Heels  Moisture Interventions  Moisturizers/Barriers: Barrier Wipes      Pain  Pain Rating Scale  4 - Distracts me, can do usual activities  Pain Location  Back  Pain Location Orientation  Right  Pain Interventions   Medication (see MAR)    ADLs    Bathing   Patient Refused Bathing  Linen Change      Personal Hygiene  Ruth Bottle, Moist Ruth Wipes  Chlorhexidine Bath      Oral Care  Refused  Teeth/Dentures     Shave     Nutrition Percentage Eaten  Between 50-75% Consumed, Lunch  Environmental Precautions  Treaded Slipper Socks on Patient, Bed in Low Position  Patient Turns/Positioning  Sitting Up in Wheelchair  Patient Turns Assistance/Tolerance  Assistance of One  Bed Positions  Bed Controls On, Bed Locked  Head of Bed Elevated  Self regulated      Psychosocial/Neurologic Assessment  Psychosocial Assessment  Psychosocial (WDL):  Within Defined Limits  Neurologic Assessment  Neuro (WDL): Exceptions to WDL  Level of Consciousness: Alert  Orientation Level: Oriented X4  Cognition: Follows commands, Appropriate attention/concentration  Speech: Delayed responses (soft spoken)  Motor Function/Sensation Assessment: Motor strength  Muscle Strength Right Arm: Good Strength Against Gravity and Moderate Resistance  Muscle Strength Left Arm: Good Strength Against Gravity and Moderate Resistance  Muscle Strength Right Leg: Good Strength Against Gravity and Moderate Resistance  Muscle Strength Left Leg: Good Strength Against Gravity and Moderate Resistance  EENT (WDL):  WDL Except    Cardio/Pulmonary Assessment  Edema      Respiratory Breath Sounds  RUL Breath Sounds: Clear  RML Breath Sounds: Clear  RLL Breath Sounds: Clear  IDRIS Breath Sounds: Clear  LLL Breath Sounds: Clear  Cardiac Assessment   Cardiac (WDL):  WDL Except

## 2023-09-09 NOTE — CARE PLAN
"The patient is Stable - Low risk of patient condition declining or worsening    Shift Goals  Clinical Goals: safety  Patient Goals: safety, sleep/rest    Problem: Fall Risk - Rehab  Goal: Patient will remain free from falls  Outcome: Progressing  Note: Shayna Bunn Fall risk Assessment Score: 13    Moderate fall risk Interventions  - Bed and strip alarm   - Yellow sign by the door   - Yellow wrist band \"Fall risk\"  - Room near to the nurse station  - Do not leave patient unattended in the bathroom  - Fall risk education provided  Patient uses call light consistently and appropriately this shift.  Waits for assistance when needed and does not attempt self transfer.  Able to verbalize needs.  Will continue to monitor.     Problem: Hemodynamics  Goal: Patient's hemodynamics, fluid balance and neurologic status will be stable or improve  Outcome: Progressing  Note: Unable to sleep tonight d/t insomnia and new order of Melatonin given with help. No episode of dizziness or adverse reactions noted. Continue on close monitoring.      "

## 2023-09-10 ENCOUNTER — APPOINTMENT (OUTPATIENT)
Dept: SPEECH THERAPY | Facility: REHABILITATION | Age: 78
DRG: 559 | End: 2023-09-10
Attending: PHYSICAL MEDICINE & REHABILITATION
Payer: MEDICARE

## 2023-09-10 PROCEDURE — 700102 HCHG RX REV CODE 250 W/ 637 OVERRIDE(OP): Performed by: PHYSICAL MEDICINE & REHABILITATION

## 2023-09-10 PROCEDURE — 97129 THER IVNTJ 1ST 15 MIN: CPT

## 2023-09-10 PROCEDURE — A9270 NON-COVERED ITEM OR SERVICE: HCPCS | Performed by: PHYSICAL MEDICINE & REHABILITATION

## 2023-09-10 PROCEDURE — 770010 HCHG ROOM/CARE - REHAB SEMI PRIVAT*

## 2023-09-10 PROCEDURE — 700101 HCHG RX REV CODE 250: Performed by: PHYSICAL MEDICINE & REHABILITATION

## 2023-09-10 PROCEDURE — 97130 THER IVNTJ EA ADDL 15 MIN: CPT

## 2023-09-10 RX ADMIN — Medication 6 MG: at 20:42

## 2023-09-10 RX ADMIN — OMEPRAZOLE 20 MG: 20 CAPSULE, DELAYED RELEASE ORAL at 08:41

## 2023-09-10 RX ADMIN — LIDOCAINE 1 PATCH: 50 PATCH TOPICAL at 08:41

## 2023-09-10 RX ADMIN — APIXABAN 5 MG: 5 TABLET, FILM COATED ORAL at 08:41

## 2023-09-10 RX ADMIN — CLOPIDOGREL BISULFATE 75 MG: 75 TABLET ORAL at 08:41

## 2023-09-10 RX ADMIN — SIMVASTATIN 10 MG: 20 TABLET, FILM COATED ORAL at 20:42

## 2023-09-10 RX ADMIN — APIXABAN 5 MG: 5 TABLET, FILM COATED ORAL at 20:42

## 2023-09-10 RX ADMIN — LISINOPRIL 20 MG: 20 TABLET ORAL at 05:37

## 2023-09-10 ASSESSMENT — PATIENT HEALTH QUESTIONNAIRE - PHQ9
2. FEELING DOWN, DEPRESSED, IRRITABLE, OR HOPELESS: NOT AT ALL
1. LITTLE INTEREST OR PLEASURE IN DOING THINGS: NOT AT ALL
SUM OF ALL RESPONSES TO PHQ9 QUESTIONS 1 AND 2: 0

## 2023-09-10 ASSESSMENT — PAIN DESCRIPTION - PAIN TYPE: TYPE: ACUTE PAIN

## 2023-09-10 NOTE — PROGRESS NOTES
NURSING DAILY NOTE    Name: Moe Nickerson   Date of Admission: 8/30/2023   Admitting Diagnosis: History of femur fracture  Attending Physician: Kamran Vicente M.d.  Allergies: Amlodipine, Cortisone, and Morphine    Safety  Patient Assist  cga  Patient Precautions  Fall Risk, Posterior Hip Precautions, Weight Bearing As Tolerated Right Lower Extremity  Precaution Comments  aphasia  Bed Transfer Status  Standby Assist  Toilet Transfer Status   Standby Assist  Assistive Devices  Walker - front wheel  Oxygen  None - Room Air  Diet/Therapeutic Dining  Current Diet Order   Procedures    Diet Order Diet: Regular     Pill Administration  whole  Agitated Behavioral Scale     ABS Level of Severity       Fall Risk  Has the patient had a fall this admission?   No  Shayna Bunn Fall Risk Scoring  16, HIGH RISK  Fall Risk Safety Measures  bed alarm, chair alarm, poor balance, and low vision/ hearing    Vitals  Temperature: 36.7 °C (98 °F)  Temp src: Oral  Pulse: 60  Respiration: 18  Blood Pressure : 123/68  Blood Pressure MAP (Calculated): 86 MM HG  BP Location: Left, Upper Arm  Patient BP Position: Supine     Oxygen  Pulse Oximetry: 97 %  O2 (LPM): 0  O2 Delivery Device: None - Room Air    Bowel and Bladder  Last Bowel Movement  09/08/23  Stool Type  Type 3: Like a sausage, but with cracks on its surface  Bowel Device  Bathroom  Continent  Bladder: Continent void   Bowel: Continent movement  Bladder Function  Urine Void (mL): 100 ml (urinals)  Number of Times Voided: 1  Urine Color: Yoselin  Genitourinary Assessment   Bladder Assessment (WDL):  WDL Except (neurogenic bladder per pt)  Garcia Catheter: Not Applicable  Urine Color: Yoselin  Bladder Device: Urinal  Time Void: No  Bladder Scan: Post Void  $ Bladder Scan Results (mL): 68  Bladder Medications: No    Skin  Manas Score   17  Sensory Interventions   Bed Types: Standard/Trauma Mattress  Skin Preventative Measures:  Pillows in Use for Support / Positioning  Moisture Interventions  Moisturizers/Barriers: Barrier Wipes      Pain  Pain Rating Scale  0 - No Pain  Pain Location  Back  Pain Location Orientation  Right  Pain Interventions   Declines    ADLs    Bathing   Patient Refused Bathing  Linen Change      Personal Hygiene  Ruth Bottle, Moist Ruth Wipes  Chlorhexidine Bath      Oral Care  Refused  Teeth/Dentures     Shave     Nutrition Percentage Eaten  Lunch, Between 50-75% Consumed  Environmental Precautions  Bed in Low Position, Treaded Slipper Socks on Patient  Patient Turns/Positioning  Patient Turns Self from Side to Side  Patient Turns Assistance/Tolerance  Assistance of One  Bed Positions  Bed Controls On  Head of Bed Elevated  Self regulated      Psychosocial/Neurologic Assessment  Psychosocial Assessment  Psychosocial (WDL):  Within Defined Limits  Neurologic Assessment  Neuro (WDL): Exceptions to WDL  Level of Consciousness: Alert  Orientation Level: Oriented X4  Cognition: Follows commands, Appropriate attention/concentration  Speech: Delayed responses (soft spoken)  Motor Function/Sensation Assessment: Motor strength  Muscle Strength Right Arm: Good Strength Against Gravity and Moderate Resistance  Muscle Strength Left Arm: Good Strength Against Gravity and Moderate Resistance  Muscle Strength Right Leg: Good Strength Against Gravity and Moderate Resistance  Muscle Strength Left Leg: Good Strength Against Gravity and Moderate Resistance  EENT (WDL):  WDL Except    Cardio/Pulmonary Assessment  Edema      Respiratory Breath Sounds  RUL Breath Sounds: Clear  RML Breath Sounds: Clear  RLL Breath Sounds: Clear  IDRIS Breath Sounds: Clear  LLL Breath Sounds: Clear  Cardiac Assessment   Cardiac (WDL):  WDL Except (hx chf, cad, cvd)

## 2023-09-10 NOTE — CARE PLAN
Problem: Skin Integrity  Goal: Skin integrity is maintained or improved  Note: Right hip incision clean, dry and intact. Staples in place. POD # 13.        The patient is Stable - Low risk of patient condition declining or worsening    Shift Goals  Clinical Goals: safety  Patient Goals: safety, sleep/rest

## 2023-09-10 NOTE — CARE PLAN
"The patient is Stable - Low risk of patient condition declining or worsening    Shift Goals  Clinical Goals: safety  Patient Goals: safety, sleep/rest    Problem: Fall Risk - Rehab  Goal: Patient will remain free from falls  Outcome: Progressing  Note: Shayna Bunn Fall risk Assessment Score: 16    High fall risk Interventions   - Alarming seatbelt  - Wander guard  - Bed and strip alarm   - Yellow sign by the door   - Yellow wrist band \"Fall risk\"  - Room near to the nurse station  - Do not leave patient unattended in the bathroom  - Fall risk education provided  Patient uses call light consistently and appropriately this shift.  Waits for assistance when needed and does not attempt self transfer.  Able to verbalize needs.  Will continue to monitor.          Problem: Hemodynamics  Goal: Patient's hemodynamics, fluid balance and neurologic status will be stable or improve  Outcome: Progressing  Note: Patient able to sleep soundly with no episode of insomnia. Continue on close monitoring.      "

## 2023-09-10 NOTE — THERAPY
Speech Language Pathology  Daily Treatment     Patient Name: Moe Nickerson  Age:  78 y.o., Sex:  male  Medical Record #: [id number]  Today's Date: 9/10/2023     Precautions  Precautions: Fall Risk, Posterior Hip Precautions, Weight Bearing As Tolerated Right Lower Extremity  Comments: aphasia    Subjective    Pt seen at bedside; pt reporting nausea throughout session      Objective       09/10/23 1331   Treatment Charges   SLP Cognitive Skill Development First 15 Minutes 1   SLP Cognitive Skill Development Additional 15 Minutes 3   SLP Total Time Spent   SLP Individual Total Time Spent (Mins) 60   Interdisciplinary Plan of Care Collaboration   Patient Position at End of Therapy In Bed;Bed Alarm On;Call Light within Reach;Phone within Reach;Tray Table within Reach         Assessment    Pt completed medication sort with current medications. Pt required set up and assistance opening/closing pill boxes and bottles. Pt completed with 100% accuracy provided with increased time.   Pt participated in functional verbal problem solving related to safety awareness in the home environment. Pt required min cues for reasoning. Pt recalled meals and answered orientation questions with 100% accuracy. Reviewed external memory aids in his current environment and to be set up at home to reinforce orientation and STM. Pt agreeable.     Strengths: Able to follow instructions, Alert and oriented, Independent prior level of function, Motivated for self care and independence, Pleasant and cooperative, Supportive family, Willingly participates in therapeutic activities  Barriers: Decreased endurance, Fatigue, Generalized weakness, Impaired balance, Impaired carryover of learning, Impaired functional cognition (slow to process.  decreased initiation.)    Plan    Continue to target recall, attention, and problem solving     Passport items to be completed:  Express basic needs, understand food/liquid recommendations, consistently follow  swallow precautions, manage finances, manage medications, arrive to therapy appointments on time, complete daily memory log entries, solve problems related to safety situations, review education related to hospitalization, complete caregiver training     Speech Therapy Problems (Active)       Problem: Memory STGs       Dates: Start:  09/02/23         Goal: STG-Within one week, patient will demonstrate use of memory strategies in order to recall safety precautions related to hospitalization.        Dates: Start:  09/02/23         Goal Note filed on 09/05/23 0820 by Diana Reyes MS,CCC-SLP       Mod A needed.                 Problem: Problem Solving STGs       Dates: Start:  09/02/23         Goal: STG-Within one week, patient will complete medication management tasks with 80% accuracy min A       Dates: Start:  09/02/23         Goal Note filed on 09/05/23 0820 by Diana Reyes MS,CCC-SLP       To be addressed.              Goal: STG-Within one week, patient will complete alternating attention tasks with 75% accuracy, min a       Dates: Start:  09/02/23         Goal Note filed on 09/05/23 0820 by Diana Reyes MS,CCC-SLP       Mod A.                 Problem: Speech/Swallowing LTGs       Dates: Start:  09/02/23         Goal: LTG-By discharge, patient will solve complex problems relate to IADL's in order to d/c home with mod I        Dates: Start:  09/02/23

## 2023-09-10 NOTE — PROGRESS NOTES
NURSING DAILY NOTE    Name: Moe Nickerson   Date of Admission: 8/30/2023   Admitting Diagnosis: History of femur fracture  Attending Physician: Kamran Vicente M.d.  Allergies: Amlodipine, Cortisone, and Morphine    Safety  Patient Assist  cga  Patient Precautions  Fall Risk, Posterior Hip Precautions, Weight Bearing As Tolerated Right Lower Extremity  Precaution Comments  aphasia  Bed Transfer Status  Standby Assist  Toilet Transfer Status   Standby Assist  Assistive Devices  Rails, Walker - front wheel, Wheelchair  Oxygen  None - Room Air  Diet/Therapeutic Dining  Current Diet Order   Procedures    Diet Order Diet: Regular     Pill Administration  whole  Agitated Behavioral Scale     ABS Level of Severity       Fall Risk  Has the patient had a fall this admission?   No  Shayna Bunn Fall Risk Scoring  16, HIGH RISK  Fall Risk Safety Measures  bed alarm    Vitals  Temperature: 37.2 °C (98.9 °F)  Temp src: Oral  Pulse: 60  Respiration: 18  Blood Pressure : 129/62  Blood Pressure MAP (Calculated): 84 MM HG  BP Location: Left, Upper Arm  Patient BP Position: Supine     Oxygen  Pulse Oximetry: 98 %  O2 (LPM): 0  O2 Delivery Device: None - Room Air    Bowel and Bladder  Last Bowel Movement  09/08/23  Stool Type  Type 3: Like a sausage, but with cracks on its surface  Bowel Device  Bathroom  Continent  Bladder: Continent void   Bowel: Continent movement  Bladder Function  Urine Void (mL): 200 ml  Number of Times Voided: 1  Urine Color: Unable To Evaluate  Genitourinary Assessment   Bladder Assessment (WDL):  WDL Except (neurogenic bladder per pt)  Garcia Catheter: Not Applicable  Urine Color: Unable To Evaluate  Bladder Device: Urinal  Time Void: No  Bladder Scan: Post Void  $ Bladder Scan Results (mL): 68  Bladder Medications: No    Skin  Manas Score   17  Sensory Interventions   Bed Types: Standard/Trauma Mattress  Skin Preventative Measures: Pillows in Use  for Support / Positioning  Moisture Interventions  Moisturizers/Barriers: Barrier Wipes      Pain  Pain Rating Scale  0 - No Pain  Pain Location  Back  Pain Location Orientation  Right  Pain Interventions   Declines    ADLs    Bathing   Patient Refused Bathing  Linen Change      Personal Hygiene  Ruth Bottle, Moist Ruth Wipes  Chlorhexidine Bath      Oral Care  Refused  Teeth/Dentures     Shave     Nutrition Percentage Eaten  Lunch, Between 50-75% Consumed  Environmental Precautions  Treaded Slipper Socks on Patient, Bed in Low Position  Patient Turns/Positioning  Sitting Up in Wheelchair  Patient Turns Assistance/Tolerance  Assistance of One  Bed Positions  Bed Controls On, Bed Locked  Head of Bed Elevated  Self regulated      Psychosocial/Neurologic Assessment  Psychosocial Assessment  Psychosocial (WDL):  Within Defined Limits  Neurologic Assessment  Neuro (WDL): Exceptions to WDL  Level of Consciousness: Alert  Orientation Level: Oriented X4  Cognition: Follows commands, Appropriate attention/concentration  Speech: Delayed responses (soft spoken)  Motor Function/Sensation Assessment: Motor strength  Muscle Strength Right Arm: Good Strength Against Gravity and Moderate Resistance  Muscle Strength Left Arm: Good Strength Against Gravity and Moderate Resistance  Muscle Strength Right Leg: Good Strength Against Gravity and Moderate Resistance  Muscle Strength Left Leg: Good Strength Against Gravity and Moderate Resistance  EENT (WDL):  WDL Except    Cardio/Pulmonary Assessment  Edema      Respiratory Breath Sounds  RUL Breath Sounds: Clear  RML Breath Sounds: Clear  RLL Breath Sounds: Clear  IDRIS Breath Sounds: Clear  LLL Breath Sounds: Clear  Cardiac Assessment   Cardiac (WDL):  WDL Except (hx chf, cad, cvd)

## 2023-09-11 ENCOUNTER — APPOINTMENT (OUTPATIENT)
Dept: PHYSICAL THERAPY | Facility: REHABILITATION | Age: 78
DRG: 559 | End: 2023-09-11
Attending: PHYSICAL MEDICINE & REHABILITATION
Payer: MEDICARE

## 2023-09-11 ENCOUNTER — APPOINTMENT (OUTPATIENT)
Dept: OCCUPATIONAL THERAPY | Facility: REHABILITATION | Age: 78
DRG: 559 | End: 2023-09-11
Attending: PHYSICAL MEDICINE & REHABILITATION
Payer: MEDICARE

## 2023-09-11 ENCOUNTER — APPOINTMENT (OUTPATIENT)
Dept: SPEECH THERAPY | Facility: REHABILITATION | Age: 78
DRG: 559 | End: 2023-09-11
Attending: PHYSICAL MEDICINE & REHABILITATION
Payer: MEDICARE

## 2023-09-11 PROCEDURE — 97129 THER IVNTJ 1ST 15 MIN: CPT

## 2023-09-11 PROCEDURE — 97530 THERAPEUTIC ACTIVITIES: CPT

## 2023-09-11 PROCEDURE — 99232 SBSQ HOSP IP/OBS MODERATE 35: CPT | Performed by: PHYSICAL MEDICINE & REHABILITATION

## 2023-09-11 PROCEDURE — A9270 NON-COVERED ITEM OR SERVICE: HCPCS | Performed by: PHYSICAL MEDICINE & REHABILITATION

## 2023-09-11 PROCEDURE — 97116 GAIT TRAINING THERAPY: CPT

## 2023-09-11 PROCEDURE — 97110 THERAPEUTIC EXERCISES: CPT

## 2023-09-11 PROCEDURE — 700102 HCHG RX REV CODE 250 W/ 637 OVERRIDE(OP): Performed by: PHYSICAL MEDICINE & REHABILITATION

## 2023-09-11 PROCEDURE — 770010 HCHG ROOM/CARE - REHAB SEMI PRIVAT*

## 2023-09-11 PROCEDURE — 97130 THER IVNTJ EA ADDL 15 MIN: CPT

## 2023-09-11 PROCEDURE — 700101 HCHG RX REV CODE 250: Performed by: PHYSICAL MEDICINE & REHABILITATION

## 2023-09-11 RX ADMIN — MINERAL OIL, PETROLATUM, PHENYLEPHRINE HCI: .25; 14; 74.9 OINTMENT TOPICAL at 19:58

## 2023-09-11 RX ADMIN — CLOPIDOGREL BISULFATE 75 MG: 75 TABLET ORAL at 09:17

## 2023-09-11 RX ADMIN — LISINOPRIL 20 MG: 20 TABLET ORAL at 05:21

## 2023-09-11 RX ADMIN — OMEPRAZOLE 20 MG: 20 CAPSULE, DELAYED RELEASE ORAL at 09:17

## 2023-09-11 RX ADMIN — APIXABAN 5 MG: 5 TABLET, FILM COATED ORAL at 09:17

## 2023-09-11 RX ADMIN — LIDOCAINE 1 PATCH: 50 PATCH TOPICAL at 07:41

## 2023-09-11 RX ADMIN — APIXABAN 5 MG: 5 TABLET, FILM COATED ORAL at 19:54

## 2023-09-11 RX ADMIN — SIMVASTATIN 10 MG: 20 TABLET, FILM COATED ORAL at 19:54

## 2023-09-11 ASSESSMENT — BRIEF INTERVIEW FOR MENTAL STATUS (BIMS)
ASKED TO RECALL BLUE: YES, NO CUE REQUIRED
ASKED TO RECALL SOCK: YES, NO CUE REQUIRED
ASKED TO RECALL BED: NO, COULD NOT RECALL
BIMS SUMMARY SCORE: 13
INITIAL REPETITION OF BED BLUE SOCK - FIRST ATTEMPT: 3
COGNITIVE PATTERN ASSESSMENT USED: BIMS
WHAT DAY OF THE WEEK IS IT: CORRECT
WHAT YEAR IS IT: CORRECT
WHAT MONTH IS IT: ACCURATE WITHIN 5 DAYS

## 2023-09-11 ASSESSMENT — ACTIVITIES OF DAILY LIVING (ADL)
TOILET_TRANSFER_LEVEL_OF_ASSIST: REQUIRES SUPERVISION WITH TOILET TRANSFER
SHOWER_TRANSFER_LEVEL_OF_ASSIST: REQUIRES SUPERVISION WITH SHOWER TRANSFER
TOILETING_LEVEL_OF_ASSIST: REQUIRES SUPERVISION WITH TOILETING

## 2023-09-11 ASSESSMENT — GAIT ASSESSMENTS
ASSISTIVE DEVICE: 4 WHEEL WALKER
GAIT LEVEL OF ASSIST: CONTACT GUARD ASSIST
DEVIATION: BRADYKINETIC;ANTALGIC
DISTANCE (FEET): 100

## 2023-09-11 NOTE — PROGRESS NOTES
NURSING DAILY NOTE    Name: Moe Nickerson   Date of Admission: 8/30/2023   Admitting Diagnosis: History of femur fracture  Attending Physician: Kamran Vicente M.d.  Allergies: Amlodipine, Cortisone, and Morphine    Safety  Patient Assist  cg a  Patient Precautions  Fall Risk, Posterior Hip Precautions, Weight Bearing As Tolerated Right Lower Extremity  Precaution Comments  aphasia  Bed Transfer Status  Standby Assist  Toilet Transfer Status   Standby Assist  Assistive Devices  Walker - front wheel, Wheelchair, Rails  Oxygen  None - Room Air  Diet/Therapeutic Dining  Current Diet Order   Procedures    Diet Order Diet: Regular     Pill Administration  whole  Agitated Behavioral Scale     ABS Level of Severity       Fall Risk  Has the patient had a fall this admission?   No  Shayna Bunn Fall Risk Scoring  16, HIGH RISK  Fall Risk Safety Measures  bed alarm and chair alarm    Vitals  Temperature: 36.8 °C (98.2 °F)  Temp src: Oral  Pulse: 62  Respiration: 16  Blood Pressure : 100/57  Blood Pressure MAP (Calculated): 71 MM HG  BP Location: Left, Upper Arm  Patient BP Position: Supine     Oxygen  Pulse Oximetry: 96 %  O2 (LPM): 0  O2 Delivery Device: None - Room Air    Bowel and Bladder  Last Bowel Movement  09/10/23  Stool Type  Type 5: Soft blob with clear cut edges (passed easily)  Bowel Device  Bathroom  Continent  Bladder: Continent void   Bowel: Continent movement  Bladder Function  Urine Void (mL): 100 ml  Number of Times Voided: 1  Urine Color: Yoselin  Genitourinary Assessment   Bladder Assessment (WDL):  WDL Except (neurogenic bladder per pt)  Garcia Catheter: Not Applicable  Urine Color: Yoselin  Bladder Device: Urinal  Time Void: No  Bladder Scan: Post Void  $ Bladder Scan Results (mL): 68  Bladder Medications: No    Skin  Manas Score   17  Sensory Interventions   Bed Types: Standard/Trauma Mattress  Skin Preventative Measures: Pillows in Use for  Support / Positioning  Moisture Interventions  Moisturizers/Barriers: Barrier Wipes      Pain  Pain Rating Scale  0 - No Pain  Pain Location  Back  Pain Location Orientation  Right  Pain Interventions   Declines    ADLs    Bathing   Patient Refused Bathing  Linen Change      Personal Hygiene  Ruth Bottle, Moist Ruth Wipes  Chlorhexidine Bath      Oral Care  Refused  Teeth/Dentures     Shave     Nutrition Percentage Eaten  Lunch, Between 50-75% Consumed  Environmental Precautions  Bed in Low Position, Treaded Slipper Socks on Patient  Patient Turns/Positioning  Patient Turns Self from Side to Side  Patient Turns Assistance/Tolerance  Assistance of One  Bed Positions  Bed Controls On  Head of Bed Elevated  Self regulated      Psychosocial/Neurologic Assessment  Psychosocial Assessment  Psychosocial (WDL):  Within Defined Limits  Neurologic Assessment  Neuro (WDL): Exceptions to WDL  Level of Consciousness: Alert  Orientation Level: Oriented X4  Cognition: Follows commands, Appropriate attention/concentration  Speech: Delayed responses (soft spoken)  Motor Function/Sensation Assessment: Motor strength  Muscle Strength Right Arm: Good Strength Against Gravity and Moderate Resistance  Muscle Strength Left Arm: Good Strength Against Gravity and Moderate Resistance  Muscle Strength Right Leg: Good Strength Against Gravity and Moderate Resistance  Muscle Strength Left Leg: Good Strength Against Gravity and Moderate Resistance  EENT (WDL):  WDL Except    Cardio/Pulmonary Assessment  Edema      Respiratory Breath Sounds  RUL Breath Sounds: Clear  RML Breath Sounds: Clear  RLL Breath Sounds: Clear  IDRIS Breath Sounds: Clear  LLL Breath Sounds: Clear  Cardiac Assessment   Cardiac (WDL):  WDL Except (hx chf, cad, cvd)

## 2023-09-11 NOTE — THERAPY
"Speech Language Pathology  Daily Treatment     Patient Name: Moe Nickerson  Age:  78 y.o., Sex:  male  Medical Record #: [id number]  Today's Date: 9/11/2023     Precautions  Precautions: Fall Risk, Posterior Hip Precautions, Weight Bearing As Tolerated Right Lower Extremity  Comments: aphasia    Subjective    Pt pleasant and agreeable to ST session. Patient complained of not being able to hear well; described hearing that sounded like he was \"under water\". Patient appeared quite uncomfortable by this; covered his ears multiple times throughout the session and expressed he was very uncomfortable. MD and Nursing notified.     Objective       09/11/23 0931   Treatment Charges   SLP Cognitive Skill Development First 15 Minutes 1   SLP Cognitive Skill Development Additional 15 Minutes 3   SLP Total Time Spent   SLP Individual Total Time Spent (Mins) 60   Precautions   Precautions Fall Risk;Posterior Hip Precautions;No Weight Bearing Restrictions Right Lower Extremity   SCCAN (Scales of Cognitive and Communicative Ability for Neurorehabilitation)   Oral Expression - Raw Score 15   Oral Expression - Scale Performance Score 79   Orientation - Raw Score 12   Orientation - Scale Performance Score 100   Memory - Raw Score 10   Memory - Scale Performance Score 53   Speech Comprehension - Raw Score 9   Speech Comprehension - Scale Performance Score 69   Reading Comprehension - Raw Score 10   Reading Comprehension - Scale Performance Score 83   Writing - Raw Score 4   Writing - Scale Performance Score 57   Attention - Raw Score 8   Attention - Scale Performance Score 50   Problem Solving - Raw Score 11   Problem Solving - Scale Performance Score 48   SCCAN Total Raw Score 65   SCCAN Degree of Severity Moderate Impairment   Interdisciplinary Plan of Care Collaboration   IDT Collaboration with  Nursing;Physician   Patient Position at End of Therapy Seated;Chair Alarm On;Self Releasing Lap Belt Applied;Call Light within " "Reach;Tray Table within Reach;Phone within Reach   Collaboration Comments Notified MD and Nursing of Pt's complaints of feeling like his hearing sounding like he's \"under water\"       Assessment    The Scales of Cognitive and Communicative Ability for Neurorehabilitation (SCCAN) was completed as an outcome assessment measure this session. The SCCAN assesses cognitive-communicative deficits and functional ability in patients in rehabilitation hospitals, clinics, and skilled nursing facilities. The SCCAN is appropriate for a broad range of neurological patients, provides a measure of both impairment and functional ability. The patients scores are as follows:    SCCAN (Scales of Cognitive and Communicative Ability for Neurorehabilitation)  Oral Expression - Raw Score: 15  Oral Expression - Scale Performance Score: 79  Orientation - Raw Score: 12  Orientation - Scale Performance Score: 100  Memory - Raw Score: 10  Memory - Scale Performance Score: 53  Speech Comprehension - Raw Score: 9  Speech Comprehension - Scale Performance Score: 69  Reading Comprehension - Raw Score: 10  Reading Comprehension - Scale Performance Score: 83  Writing - Raw Score: 4  Writing - Scale Performance Score: 57  Attention - Raw Score: 8  Attention - Scale Performance Score: 50  Problem Solving - Raw Score: 11  Problem Solving - Scale Performance Score: 48  SCCAN Total Raw Score: 65  SCCAN Degree of Severity: Moderate Impairment    Overall, the patient's total raw score of 65 is indicative of a moderate cognitive-linguistic impairment. The patients initial evaluation score was 56, which is also within defined ranges for moderate cognitive-linguistic impairment. Compared to initial assessment scores, the patient demonstrated improved scores in oral expression, memory, speech comprehension, and reading comprehension. The patient's scores decreased in the areas of attention and problem solving. His score in orientation remained the same as " initial assessment.      Strengths: Able to follow instructions, Alert and oriented, Independent prior level of function, Motivated for self care and independence, Pleasant and cooperative, Supportive family, Willingly participates in therapeutic activities  Barriers: Decreased endurance, Fatigue, Generalized weakness, Impaired balance, Impaired carryover of learning, Impaired functional cognition (slow to process.  decreased initiation.)    Plan    D/c      Speech Therapy Problems (Active)       Problem: Memory STGs       Dates: Start:  09/02/23         Goal: STG-Within one week, patient will demonstrate use of memory strategies in order to recall safety precautions related to hospitalization.        Dates: Start:  09/02/23         Goal Note filed on 09/05/23 0820 by Diana Reyes MS,CCC-SLP       Mod A needed.                 Problem: Problem Solving STGs       Dates: Start:  09/02/23         Goal: STG-Within one week, patient will complete medication management tasks with 80% accuracy min A       Dates: Start:  09/02/23         Goal Note filed on 09/05/23 0820 by Diana Reyes MS,CCC-SLP       To be addressed.              Goal: STG-Within one week, patient will complete alternating attention tasks with 75% accuracy, min a       Dates: Start:  09/02/23         Goal Note filed on 09/05/23 0820 by Diana Reyes MS,CCC-SLP       Mod A.                 Problem: Speech/Swallowing LTGs       Dates: Start:  09/02/23         Goal: LTG-By discharge, patient will solve complex problems relate to IADL's in order to d/c home with mod I        Dates: Start:  09/02/23

## 2023-09-11 NOTE — THERAPY
Physical Therapy   Daily Treatment     Patient Name: Moe Nickerson  Age:  78 y.o., Sex:  male  Medical Record #: [id number]  Today's Date: 9/11/2023     Precautions  Precautions: Fall Risk, Posterior Hip Precautions, Weight Bearing As Tolerated Right Lower Extremity  Comments: aphasia    Subjective    Patient asking to try 4WW after FWW has been delivered for home use; reports he is going to [facility] temporarily with HHPT, encouraged him to ask home PT if advancing AD is appropriate (notes indicate he needs increased assist with 4WW brake sequencing for safety)     Objective       09/11/23 1201   PT Charge Group   PT Gait Training (Units) 1   PT Therapeutic Exercise (Units) 2   PT Therapeutic Activities (Units) 1   PT Total Time Spent   PT Individual Total Time Spent (Mins) 60   Precautions   Precautions Fall Risk;Posterior Hip Precautions;Weight Bearing As Tolerated Right Lower Extremity   Comments aphasia   Gait Functional Level of Assist    Gait Level Of Assist Contact Guard Assist   Assistive Device 4 Wheel Walker   Distance (Feet) 100   # of Times Distance was Traveled 1   Deviation Bradykinetic;Antalgic   Transfer Functional Level of Assist   Bed, Chair, Wheelchair Transfer Standby Assist   Bed Chair Wheelchair Transfer Description Set-up of equipment;Assist with two limbs   Sitting Lower Body Exercises   Sitting Lower Body Exercises   (hand-out given)   Ankle Pumps 1 set of 10;Bilateral   Hip Abduction 1 set of 10;Bilateral   Hip Adduction 1 set of 10;Bilateral   Long Arc Quad 1 set of 10;Bilateral   Hamstring Curl 1 set of 10;Bilateral   Nustep Resistance Level 3  (10min, 495 steps)       Assessment    Patient self-limits distance to 100ft with FWW due to general fatigue    Strengths: Able to follow instructions, Good insight into deficits/needs, Independent prior level of function, Making steady progress towards goals, Motivated for self care and independence, Pleasant and cooperative, Willingly participates  in therapeutic activities  Barriers: Aphasia expressive, Decreased endurance, Fatigue, Impaired activity tolerance, Impaired balance, Generalized weakness, Pain    Plan    Discharge patient     Passport items to be completed: all applicable skills completed    Physical Therapy Problems (Active)       Problem: Mobility       Dates: Start:  08/31/23         Goal: STG-Within one week, patient will ambulate up/down a curb with Yanet and LRAD       Dates: Start:  08/31/23               Problem: Mobility Transfers       Dates: Start:  08/31/23         Goal: STG-Within one week, patient will perform bed mobility Hector       Dates: Start:  08/31/23         Goal Note filed on 09/05/23 0811 by Kenneth Aguiar, PT       SPV with verbal cues for use of leg  and extra time                 Problem: PT-Long Term Goals       Dates: Start:  08/31/23         Goal: LTG-By discharge, patient will ambulate 200ft indoors and outdoors Hector with LRAD       Dates: Start:  08/31/23            Goal: LTG-By discharge, patient will transfer one surface to another Hector       Dates: Start:  08/31/23            Goal: LTG-By discharge, patient will perform home exercise program independently       Dates: Start:  08/31/23            Goal: LTG-By discharge, patient will transfer in/out of a car with set up assist       Dates: Start:  08/31/23

## 2023-09-11 NOTE — DIETARY
Nutrition Update:    Day 12 of admit.  Moe Nickerson is a 78 y.o. male with admitting DX of History of femur fracture [Z87.81].  Patient being followed to optimize nutrition.    Current Diet: regular w/ Boost Plus at lunch    Recorded PO intake is adequate at % of most meals.     Problem: Nutritional:  Goal: Achieve adequate nutritional intake  Description: Patient will consume >50% of meals  Outcome: met    RD will follow weekly or PRN.

## 2023-09-11 NOTE — PROGRESS NOTES
"  Physical Medicine & Rehabilitation Progress Note    Encounter Date: 9/11/2023    Chief Complaint: Weakness    Interval Events (Subjective):  Seen in therapy. No concerns. Working on discharge plan with son.    Objective:  VITAL SIGNS: BP (!) 151/74   Pulse 60   Temp 36.8 °C (98.3 °F) (Oral)   Resp 16   Ht 1.778 m (5' 10\")   Wt 55 kg (121 lb 4.1 oz)   SpO2 97%   BMI 17.40 kg/m²   Gen: No acute distress, well developed well nourished adult  HEENT: Normal Cephalic Atraumatic, Normal conjunctiva.   CV: warm extremities, well perfused, no edema  Resp: symmetric chest rise, breathing comfortably on room air  Abd: Soft, Non distended  Extremities: normal bulk, no atrophy  Skin: no visible rashes or lesions.   Neuro: alert, awake  Psych: Mood and affect appropriate and congruent    Laboratory Values:  No results found for this or any previous visit (from the past 72 hour(s)).    Medications:  Scheduled Medications   Medication Dose Frequency    melatonin  6 mg QHS    lidocaine  1 Patch Daily-0800    formulation r   QHS    Pharmacy Consult Request  1 Each PHARMACY TO DOSE    clopidogrel  75 mg DAILY    lisinopril  20 mg Q DAY    simvastatin  10 mg Q EVENING    apixaban  5 mg BID    omeprazole  20 mg DAILY     PRN medications: senna-docusate **AND** polyethylene glycol/lytes **AND** magnesium hydroxide **AND** bisacodyl, acetaminophen, menthol, Respiratory Therapy Consult, hydrALAZINE, ondansetron **OR** ondansetron, sodium chloride, nitroglycerin    Diet:  Current Diet Order   Procedures    Diet Order Diet: Regular       Medical Decision Making and Plan:  Femoral neck fracture   - sustained during GLF   - images showed an impacted right subcapital femoral neck fracture  -s/p  open treatment of right femoral neck fracture with total hip arthoplasty by Dr. Mei  - posterior hip precautions, WBAT RLE   - continue with PT/OT inpatient  [facility] Code / Diagnosis to Support: 0008.11 - Orthopaedic Disorders: Status Post " Unilateral Hip Fracture      Acute encephalopathy  Likely 2/2 Oxycodone use,  -15mg taken before noon due to severe pain which is now controlled  -9/5 continues to have slowed processing speed  - DC oxycodone. Use Tylenol PRN, Lidocaine patch, and Hemorid cream instead  -SLP onboard  -9/7-resolving after oxycodone stopped  -9/11 underlying cognitive issues present, not new    Painful Hemorrhoids  -Preparation H      Diarrhea  Started 9/8. Hold stool softeners     Left Lumbar back pain  Chronic with acute flair  -Lidocaine patch     Chronic mural thrombus   Heart failure with reduced EF   - updated ECHO obtained showed EF of 35%, and noted mural thrombus   - Eliquis 5mg BID started 8/30 with Dr Mei approval  -Hx of hematuria with Eliquis  -no bleeding to date, neuro exam stable, continue eliquis     Prior CVA  - minimal deficits, no lateralizing weakness, no known dysphagia but patient has soft-spoken/hypophonic voice  - continue statin, plavix       CAD with history of PCI   - statin and plavix at home  -EKG done on admission  - continue     Neurogenic bladder:  - Timed voids with PVR q4H x3. If PVR > 400mL or if patient is unable to void, straight cath patient.     Neurogenic bowel:  -  Colace, Senna BID on admission  - Goal of 1BM/day.  -      Circadian Rhythm disorder:   Recommend lights on during the day/off at night, minimize nighttime interruptions as able.     Mood  - at risk of adjustment disorder, depression, and anxiety due to functional decline     ID:  - at risk for Urinary tract infection     Skin/Wounds:  - Pressure relief q2h while in bed. Close monitoring for signs of breakdown     Pain:  - Neuroceptic - On Tylenol prn, DC oxycodone 9/5. continue current regiment     DVT prophylaxis:  Patient is on Eliquis.      GI prophylaxis:  On Prilosec 20mg daily      Medical Problem List:     HTN: Lisinopril 20mg daily  continue current regiment     -Follow-up ortho, Dr  Horry    ____________________________________    [facility] MD  Physical Medicine & Rehabilitation   Brain Injury Medicine   ____________________________________

## 2023-09-11 NOTE — CARE PLAN
Problem: Toileting  Goal: STG-Within one week, patient will complete toileting tasks at CGA level.  Outcome: Met     Problem: Bathing  Goal: STG-Within one week, patient will bathe at Min A level using AE/DME PRN.  Outcome: Discharged - Not Met  Note: Requires Mod A.     Problem: Functional Transfers  Goal: STG-Within one week, patient will transfer to step in shower at Min A level using AE/DME PRN.  Outcome: Discharged - Not Met  Note: Not tested.     Problem: IADL's  Goal: STG-Within one week, patient will access kitchen area at SBA level using AE/DME PRN.  Outcome: Discharged - Not Met  Note: Requires CGA.     Problem: OT Long Term Goals  Goal: LTG-By discharge, patient will complete basic self care tasks at SPV-Mod I level using AE/DME PRN.  Outcome: Discharged - Not Met  Note: Requires Mod A bathing and LBD.  Goal: LTG-By discharge, patient will perform bathroom transfers at SPV-Mod I level using AE/DME PRN.  Outcome: Discharged - Not Met  Note: Requires SBA.  Goal: LTG-By discharge, patient will complete basic home management at SPV-Mod I level using AE/DME PRN.  Outcome: Discharged - Not Met  Note: Requires CGA.

## 2023-09-11 NOTE — PROGRESS NOTES
NURSING DAILY NOTE    Name: Moe Nickerson   Date of Admission: 8/30/2023   Admitting Diagnosis: History of femur fracture  Attending Physician: Kamran Vicente M.d.  Allergies: Amlodipine, Cortisone, and Morphine    Safety  Patient Assist  cga  Patient Precautions  Fall Risk, Posterior Hip Precautions, Weight Bearing As Tolerated Right Lower Extremity  Precaution Comments  aphasia  Bed Transfer Status  Standby Assist  Toilet Transfer Status   Standby Assist  Assistive Devices  Walker - front wheel  Oxygen  None - Room Air  Diet/Therapeutic Dining  Current Diet Order   Procedures    Diet Order Diet: Regular     Pill Administration  whole and one at a time   Agitated Behavioral Scale     ABS Level of Severity       Fall Risk  Has the patient had a fall this admission?   No  Shayna Bunn Fall Risk Scoring  16, HIGH RISK  Fall Risk Safety Measures  bed alarm, chair alarm, poor balance, and low vision/ hearing    Vitals  Temperature: 36.8 °C (98.2 °F)  Temp src: Oral  Pulse: 69  Respiration: 18  Blood Pressure : 132/72  Blood Pressure MAP (Calculated): 92 MM HG  BP Location: Left, Upper Arm  Patient BP Position: Sitting     Oxygen  Pulse Oximetry: 97 %  O2 (LPM): 0  O2 Delivery Device: None - Room Air    Bowel and Bladder  Last Bowel Movement  09/10/23  Stool Type  Type 5: Soft blob with clear cut edges (passed easily)  Bowel Device  Bathroom  Continent  Bladder: Continent void   Bowel: Continent movement  Bladder Function  Urine Void (mL): 100 ml (urinals)  Number of Times Voided: 1  Urine Color: Yoselin  Genitourinary Assessment   Bladder Assessment (WDL):  WDL Except (neurogenic bladder per pt)  Garcia Catheter: Not Applicable  Urine Color: Yoselin  Bladder Device: Urinal  Time Void: No  Bladder Scan: Post Void  $ Bladder Scan Results (mL): 68  Bladder Medications: No    Skin  Manas Score   17  Sensory Interventions   Bed Types: Standard/Trauma Mattress  Skin  Preventative Measures: Pillows in Use for Support / Positioning  Moisture Interventions  Moisturizers/Barriers: Barrier Wipes      Pain  Pain Rating Scale  0 - No Pain  Pain Location  Back  Pain Location Orientation  Right  Pain Interventions   Declines    ADLs    Bathing   Patient Refused Bathing  Linen Change      Personal Hygiene  Moist Ruth Wipes  Chlorhexidine Bath      Oral Care  Refused  Teeth/Dentures     Shave     Nutrition Percentage Eaten  Dinner, Between 50-75% Consumed  Environmental Precautions  Bed in Low Position, Treaded Slipper Socks on Patient  Patient Turns/Positioning  Patient Turns Self from Side to Side  Patient Turns Assistance/Tolerance  Assistance of One  Bed Positions  Bed Controls On  Head of Bed Elevated  Self regulated      Psychosocial/Neurologic Assessment  Psychosocial Assessment  Psychosocial (WDL):  Within Defined Limits  Neurologic Assessment  Neuro (WDL): Exceptions to WDL  Level of Consciousness: Alert  Orientation Level: Oriented X4  Cognition: Follows commands, Appropriate attention/concentration  Speech: Delayed responses (soft spoken)  Motor Function/Sensation Assessment: Motor strength  Muscle Strength Right Arm: Good Strength Against Gravity and Moderate Resistance  Muscle Strength Left Arm: Good Strength Against Gravity and Moderate Resistance  Muscle Strength Right Leg: Good Strength Against Gravity and Moderate Resistance  Muscle Strength Left Leg: Good Strength Against Gravity and Moderate Resistance  EENT (WDL):  WDL Except    Cardio/Pulmonary Assessment  Edema      Respiratory Breath Sounds  RUL Breath Sounds: Clear  RML Breath Sounds: Clear  RLL Breath Sounds: Clear  IDRIS Breath Sounds: Clear  LLL Breath Sounds: Clear  Cardiac Assessment   Cardiac (WDL):  WDL Except (hx chf, cad, cvd)

## 2023-09-11 NOTE — CARE PLAN
Problem: Fall Risk - Rehab  Goal: Patient will remain free from falls  Outcome: Progressing     Problem: Mobility  Goal: Patient's capacity to carry out activities will improve  Outcome: Progressing     Problem: VTE Prevention  Goal: Patient will remain free from venous thromboembolism (VTE)  Outcome: Progressing       The patient is Stable - Low risk of patient condition declining or worsening    Shift Goals  Clinical Goals: safety  Patient Goals: safety, sleep/rest

## 2023-09-11 NOTE — THERAPY
Occupational Therapy   Discharge Summary     Patient Name: Moe Nickerson  Age:  78 y.o., Sex:  male  Medical Record #: [id number]  Today's Date: 9/11/2023     Precautions  Precautions: Fall Risk, Posterior Hip Precautions, Weight Bearing As Tolerated Right Lower Extremity  Comments: aphasia         Subjective    Pt seated in w/c upon arrival. Pt pleasant and cooperative, agreeable to therapy.     Objective       09/11/23 0831   OT Total Time Spent   OT Individual Total Time Spent (Mins) 30   Precautions   Precautions Fall Risk;Posterior Hip Precautions;Weight Bearing As Tolerated Right Lower Extremity   Comments aphasia   Vitals   O2 (LPM) 0   O2 Delivery Device None - Room Air   Pain   Intervention Declines   Pain 0 - 10 Group   Pain Rating Scale (NPRS) 0   Cognition    Level of Consciousness Alert   Sleep/Wake Cycle   Sleep & Rest Awake;Out of bed   Interdisciplinary Plan of Care Collaboration   Patient Position at End of Therapy Seated;Chair Alarm On;Call Light within Reach;Tray Table within Reach;Phone within Reach   Eating   Assistance Needed Independent   Physical Assistance Level No physical assistance   CARE Score - Eating 6   Oral Hygiene   Assistance Needed Independent   Physical Assistance Level No physical assistance   CARE Score - Oral Hygiene 6   Toileting Hygiene   Assistance Needed Supervision   Physical Assistance Level No physical assistance   CARE Score - Toileting Hygiene 4   Shower/Bathe Self   Assistance Needed Physical assistance   Physical Assistance Level 26%-50%   CARE Score - Shower/Bathe Self 3   Upper Body Dressing   Assistance Needed Supervision   Physical Assistance Level No physical assistance   CARE Score - Upper Body Dressing 4   Lower Body Dressing   Assistance Needed Physical assistance   Physical Assistance Level 26%-50%   CARE Score - Lower Body Dressing 3   Putting On/Taking Off Footwear   Assistance Needed Physical assistance   Physical Assistance Level 26%-50%   CARE Score  - Putting On/Taking Off Footwear 3   Toilet Transfer   Assistance Needed Supervision   Physical Assistance Level No physical assistance   CARE Score - Toilet Transfer 4   Confusion Assessment Method (CAM)   Is there evidence of an acute change in mental status from the patient's baseline? No   Inattention Behavior not present   Disorganized thinking Behavior not present   Altered level of consciousness Behavior not present   Discharge Summary    Discharge Location  [facility]   Patient Discharging with Assist of Caregiver   Level of Supervision Required 24 Hour Supervision   Recommended Equipment for Discharge Front-Wheeled Walker;Tub Transfer Bench;Grab Bars by Toilet;Grab Bars in Tub / Shower;Hand Held Shower Head;Sock Aid;Reacher;Long Handled Shoe Horn;Dressing Stick   Recommended Services Upon Discharge Home Health Occupational Therapy   Long Term Goals Met 0   Long Term Goals Not Met 3   Reason(s) for Goals Not Met Requires Mod A for LBD and bathing, requires CGA for home management, requires SBA for transfers.   Criteria for Termination of Services Maximum Function Achieved for Inpatient Rehabilitation   Discharge Instructions to Patient   Level of Assist Required for Eating Able to Complete Eating without Assist   Level of Assist Required for Grooming Requires Supervision with Grooming   Level of Assist Required for Dressing Requires Physical Assist with Dressing   Equipment for Dressing Sock Aid;Reacher;Long Handled Shoe Horn;Dressing Stick   Level of Assist Required for Toileting Requires Supervision with Toileting   Level of Assist Required for Toilet Transfer Requires Supervision with Toilet Transfer   Equipment for Toilet Transfer Grab Bars by Toilet   Level of Assist Required for Bathing Requires Physical Assist with Bathing   Equipment for Bathing Shower Chair;Grab Bars in Tub / Shower;Hand Held Shower Head   Level of Assist Required for Shower Transfer Requires Supervision with Shower  Transfer   Equipment for Shower Transfer Shower Chair   Level of Assist Required for Home Mgmt Requires Physical Assist with Home Management   Level of Assist Required for Meal Prep Requires Physical Assist with Meal Preparation   Driving May not Drive, Please Contact Physician for Further Information   Home Exercise Program Refer to Home Exercise Program Handout for Details     The following passport items were reviewed: Perform bathroom transfers, complete dressing, complete feeding, get ready for the day, prepare a simple meal, participate in household tasks, adapt home for safety needs, demonstrate home exercise program, complete caregiver training     Reviewed and briefly demonstrated UE HEP using pink theraband: IR/ER, shoulder press, front arm raise, shoulder abduction, wrist flexion/extension, and bicep curls. Pt instructed to complete 2x10 of each exercise.    Assessment    Pt tolerated session well. Pt required multiple verbal cues and visual demonstration for understanding of HEP. Pt open and receptive to all education given. Pt expressed that he has no further questions or concerns regarding d/c tomorrow.    Strengths: Able to follow instructions, Alert and oriented, Independent prior level of function, Making steady progress towards goals, Manages pain appropriately, Motivated for self care and independence, Pleasant and cooperative, Supportive family, Willingly participates in therapeutic activities  Barriers: Aphasia expressive, Decreased endurance, Fatigue, Generalized weakness, Orthostatic hypotension, Impaired balance, Impaired carryover of learning, Impaired insight/denial of deficits, Limited mobility, Pain    Plan    D/c tomorrow.    Passport items to be completed:  Perform bathroom transfers, complete dressing, complete feeding, get ready for the day, prepare a simple meal, participate in household tasks, adapt home for safety needs, demonstrate home exercise program, complete caregiver  training     Occupational Therapy Goals (Active)       There are no active problems.

## 2023-09-11 NOTE — CARE PLAN
The patient is Stable - Low risk of patient condition declining or worsening    Shift Goals  Clinical Goals: safety  Patient Goals: safety, sleep/rest    Problem: Skin Integrity  Goal: Skin integrity is maintained or improved  Outcome: Progressing  Note: Skin warm and dry to touch. Surgical incision c/d/I with no s/s of infections. Staples intact and dry. Patient's skin remains intact and free from new or accidental injury this shift.  Will continue to monitor.

## 2023-09-11 NOTE — THERAPY
Speech Language Pathology  Daily Treatment     Patient Name: Moe Nickerson  Age:  78 y.o., Sex:  male  Medical Record #: [id number]  Today's Date: 9/11/2023     Precautions  Precautions: Fall Risk, Posterior Hip Precautions, Weight Bearing As Tolerated Right Lower Extremity  Comments: aphasia    Subjective    Pt reports not being aware of scheduled therapy session. Therapy completed in dining manjarrez awaiting lunch.      Objective       09/11/23 1103   Treatment Charges   SLP Cognitive Skill Development First 15 Minutes 1   SLP Cognitive Skill Development Additional 15 Minutes 1   SLP Total Time Spent   SLP Individual Total Time Spent (Mins) 30         Assessment    Reviewed results of cognitive assessment both initial and outcome. Pt cont to present with moderate cognitive impairments overall. Pt demonstrating poor insight and awareness to deficits and impact on dc home alone. Pt aware of rec: private caregivers, reports unable to afford, unaware of plan to dc to SNF per pt report. SLP followed up with CM and asked CM to speak with pt to answer dc planning questions. BIMS and CAM completed.     Strengths: Able to follow instructions, Alert and oriented, Independent prior level of function, Motivated for self care and independence, Pleasant and cooperative, Supportive family, Willingly participates in therapeutic activities  Barriers: Decreased endurance, Fatigue, Generalized weakness, Impaired balance, Impaired carryover of learning, Impaired functional cognition (slow to process.  decreased initiation.)    Plan    Pt to dc to SNF per chart     Speech Therapy Problems (Active)       Problem: Memory STGs       Dates: Start:  09/02/23         Goal: STG-Within one week, patient will demonstrate use of memory strategies in order to recall safety precautions related to hospitalization.        Dates: Start:  09/02/23         Goal Note filed on 09/05/23 0820 by Diana Reyes MS,CCC-SLP       Mod A needed.                  Problem: Problem Solving STGs       Dates: Start:  09/02/23         Goal: STG-Within one week, patient will complete medication management tasks with 80% accuracy min A       Dates: Start:  09/02/23         Goal Note filed on 09/05/23 0820 by Diana Reyes MS,CCC-SLP       To be addressed.              Goal: STG-Within one week, patient will complete alternating attention tasks with 75% accuracy, min a       Dates: Start:  09/02/23         Goal Note filed on 09/05/23 0820 by Diana Reyes MS,CCC-SLP       Mod A.                 Problem: Speech/Swallowing LTGs       Dates: Start:  09/02/23         Goal: LTG-By discharge, patient will solve complex problems relate to IADL's in order to d/c home with mod I        Dates: Start:  09/02/23

## 2023-09-11 NOTE — DISCHARGE INSTRUCTIONS
[facility] NURSING DISCHARGE INSTRUCTIONS    Blood Pressure : 105/60  Weight: 55 kg (121 lb 4.1 oz)  Nursing recommendations for John Nickerson at time of discharge are as follows:  Client verbalized understanding of all discharge instructions and prescriptions.     Review all your home medications and newly ordered medications with your doctor and/or pharmacist. Follow medication instructions as directed by your doctor and/or pharmacist.    Pain Management:   Discharge Pain Medication Instructions:  Comfort Goal: 0, Comfort with Movement, Perform Activity, Play, Sleep Comfortably, Stay Alert  Notify your primary care provider if pain is unrelieved with these measures, if the pain is new, or increased in intensity.    Discharge Skin Characteristics: Warm, Dry  Discharge Skin Exam:      Skin / Wound Care Instructions: Please contact your primary care physician for any change in skin integrity.     If You Have Surgical Incisions / Wounds:  Monitor surgical site(s) for signs of increased swelling, redness or symptoms of drainage from the site or fever as this could indicate signs and symptoms of infection. If these symptoms are noted, notifiy your primary care provider.      Discharge Safety Instructions: Should Have ADULT SUPERVISION     Discharge Safety Concerns: Unsteady Gait, Weakness, History Of Falls  The interdisciplinary team has made recommendation that you should have adult supervision in the house due to history of falls, weakness, and unsteady gait  Anti-embolic stockings are not required to increase circulation to the lower extremities.    Discharge Diet: Regular Diet     Discharge Liquids: Thin Liquids  Discharge Bowel Function: Continent  Please contact your primary care physician for any changes in bowel habits.  Discharge Bowel Program:    Discharge Bladder Function: Continent  Discharge Urinary Devices: None      Nursing Discharge Plan:        Case Management Discharge  Instructions:   Discharge Location:    Agency Name/Address/Phone:    Home Health:    Outpatient Services:    DME Provider/Phone:    Medical Equipment Ordered:    Prescription Faxed to:        Discharge Medication Instructions:  Below are the medications your physician expects you to take upon discharge:  Incision Care, Adult  An incision is a cut that a doctor makes in your skin for surgery. Most times, these cuts are closed after surgery. Your cut from surgery may be closed with:  Stitches (sutures).  Staples.  Skin glue.  Skin tape (adhesive) strips.  You may need to go back to your doctor to have stitches or staples taken out. This may happen many days or many weeks after your surgery. You need to take good care of your cut so it does not get infected. Follow instructions from your doctor about how to care for your cut.  Supplies needed:  Soap and water.  A clean hand towel.  Wound cleanser.  A clean bandage (dressing), if needed.  Cream or ointment, if told by your doctor.  Clean gauze.  How to care for your cut from surgery  Cleaning your cut  Ask your doctor how to clean your cut. You may need to:  Wear medical gloves.  Use mild soap and water, or a wound cleanser.  Use a clean gauze to pat your cut dry after you clean it.  Changing your bandage  Wash your hands with soap and water for at least 20 seconds before and after you change your bandage. If you cannot use soap and water, use hand .  Do not usedisinfectants or antiseptics, such as rubbing alcohol, to clean your wound unless told by your doctor.  Change your bandage as told by your doctor.  Leavestitches or skin glue in place for at least 2 weeks.  Leave tape strips alone unless you are told to take them off. You may trim the edges of the tape strips if they curl up.  Put a cream or ointment on your cut. Do this only as told.  Cover your cut with a clean bandage.  Ask your doctor when you can leave your cut uncovered.  Checking for  infection  Check your cut area every day for signs of infection. Check for:  More redness, swelling, or pain.  More fluid or blood.  New warmth.  Hardness or a new rash around the incision.  Pus or a bad smell.    Follow these instructions at home  Medicines  Take over-the-counter and prescription medicines only as told by your doctor.  If you were prescribed an antibiotic medicine, cream, or ointment, use it as told by your doctor. Do not stop using the antibiotic even if you start to feel better.  Eating and drinking  Eat foods that have a lot of certain nutrients, such as protein, vitamin A, and vitamin C. These foods help your cut heal.  Foods rich in protein include meat, fish, eggs, dairy, beans, nuts, and protein drinks.  Foods rich in vitamin A include carrots and dark green, leafy vegetables.  Foods rich in vitamin C include citrus fruits, tomatoes, broccoli, and peppers.  Drink enough fluid to keep your pee (urine) pale yellow.  General instructions    Do not take baths, swim, or use a hot tub. Ask your doctor about taking showers or sponge baths.  Limit movement around your cut. This helps with healing.  Try not to strain, lift, or exercise for the first 2 weeks, or for as long as told by your doctor.  Return to your normal activities as told by your doctor. Ask your doctor what activities are safe for you.  Do not scratch, scrub, or pick at your cut. Keep it covered as told by your doctor.  Protect your cut from the sun when you are outside for the first 6 months, or for as long as told by your doctor. Cover up the scar area or put on sunscreen that has an SPF of at least 30.  Do not use any products that contain nicotine or tobacco, such as cigarettes, e-cigarettes, and chewing tobacco. These can delay cut healing. If you need help quitting, ask your doctor.  Keep all follow-up visits.  Contact a doctor if:  You have any of these signs of infection around your cut:  More redness, swelling, or  pain.  More fluid or blood.  New warmth or hardness.  Pus or a bad smell.  A new rash.  You have a fever.  You feel like you may vomit (nauseous).  You vomit.  You are dizzy.  Your stitches, staples, skin glue, or tape strips come undone.  Your cut gets bigger.  You have a fever.  Get help right away if:  Your cut bleeds through your bandage, and bleeding does not stop with gentle pressure.  Your cut opens up and comes apart.  These symptoms may be an emergency. Do not wait to see if the symptoms will go away. Get medical help right away. Call your local emergency services (911 in the [place] Do not drive yourself to the hospital.  Summary  Follow instructions from your doctor about how to care for your cut.  Wash your hands with soap and water for at least 20 seconds before and after you change your bandage. If you cannot use soap and water, use hand .  Check your cut area every day for signs of infection.  Keep all follow-up visits.  This information is not intended to replace advice given to you by your health care provider. Make sure you discuss any questions you have with your health care provider.  Document Revised: 03/21/2022 Document Reviewed: 03/21/2022  [employer] Patient Education © 2023 [employer]  Pressure Injury    A pressure injury is damage to the skin and underlying tissue that results from pressure being applied to an area of the body. It often affects people who must spend a long time in a bed or chair because of a medical condition.  Pressure injuries usually occur:  Over bony parts of the body, such as the tailbone, shoulders, elbows, hips, heels, spine, ankles, and back of the head.  Under medical devices that make contact with the body, such as respiratory equipment, stockings, tubes, and splints.  Pressure injuries start as reddened areas on the skin and can lead to pain and an open wound.  What are the causes?  This condition is caused by frequent or constant pressure to an area of  the body. Decreased blood flow to the skin can eventually cause the skin tissue to die and break down, causing a wound.  What increases the risk?  You are more likely to develop this condition if you:  Are in the hospital or an extended care facility.  Are bedridden or in a wheelchair.  Have an injury or disease that keeps you from:  Moving normally.  Feeling pain or pressure.  Have a condition that:  Makes you sleepy or less alert.  Causes poor blood flow.  Need to wear a medical device.  Have poor control of your bladder or bowel functions (incontinence).  Have poor nutrition (malnutrition).  If you are at risk for pressure injuries, your health care provider may recommend certain types of mattresses, mattress covers, pillows, cushions, or boots to help prevent them. These may include products filled with air, foam, gel, or sand.  What are the signs or symptoms?  Symptoms of this condition depend on the severity of the injury. Symptoms may include:  Red or dark areas of the skin.  Pain, warmth, or a change of skin texture.  Blisters.  An open wound.  How is this diagnosed?  This condition is diagnosed with a medical history and physical exam. You may also have tests, such as:  Blood tests.  Imaging tests.  Blood flow tests.  Your pressure injury will be staged based on its severity. Staging is based on:  The depth of the tissue injury, including whether there is exposure of muscle, bone, or tendon.  The cause of the pressure injury.  How is this treated?  This condition may be treated by:  Relieving or redistributing pressure on your skin. This includes:  Frequently changing your position.  Avoiding positions that caused the wound or that can make the wound worse.  Using specific bed mattresses, chair cushions, or protective boots.  Moving medical devices from an area of pressure, or placing padding between the skin and the device.  Using foams, creams, or powders to prevent rubbing (friction) on the  skin.  Keeping your skin clean and dry. This may include using a skin cleanser or skin barrier as told by your health care provider.  Cleaning your injury and removing any dead tissue from the wound (debridement).  Placing a bandage (dressing) over your injury.  Using medicines for pain or to prevent or treat infection.  Surgery may be needed if other treatments are not working or if your injury is very deep.  Follow these instructions at home:  Wound care  Follow instructions from your health care provider about how to take care of your wound. Make sure you:  Wash your hands with soap and water before and after you change your bandage (dressing). If soap and water are not available, use hand .  Change your dressing as told by your health care provider.   Check your wound every day for signs of infection. Have a caregiver do this for you if you are not able. Check for:  Redness, swelling, or increased pain.  More fluid or blood.  Warmth.  Pus or a bad smell.  Skin care  Keep your skin clean and dry. Gently pat your skin dry.  Do not rub or massage your skin.  You or a caregiver should check your skin every day for any changes in color or any new blisters or sores (ulcers).  Medicines  Take over-the-counter and prescription medicines only as told by your health care provider.  If you were prescribed an antibiotic medicine, take or apply it as told by your health care provider. Do not stop using the antibiotic even if your condition improves.  Reducing and redistributing pressure  Do not lie or sit in one position for a long time. Move or change position every 1-2 hours, or as told by your health care provider.  Use pillows or cushions to reduce pressure. Ask your health care provider to recommend cushions or pads for you.  General instructions    Eat a healthy diet that includes lots of protein.  Drink enough fluid to keep your urine pale yellow.  Be as active as you can every day. Ask your health care  provider to suggest safe exercises or activities.  Do not abuse drugs or alcohol.  Do not use any products that contain nicotine or tobacco, such as cigarettes, e-cigarettes, and chewing tobacco. If you need help quitting, ask your health care provider.  Keep all follow-up visits as told by your health care provider. This is important.  Contact a health care provider if:  You have:  A fever or chills.  Pain that is not helped by medicine.  Any changes in skin color.  New blisters or sores.  Pus or a bad smell coming from your wound.  Redness, swelling, or pain around your wound.  More fluid or blood coming from your wound.  Your wound does not improve after 1-2 weeks of treatment.  Summary  A pressure injury is damage to the skin and underlying tissue that results from pressure being applied to an area of the body.  Do not lie or sit in one position for a long time. Your health care provider may advise you to move or change position every 1-2 hours.  Follow instructions from your health care provider about how to take care of your wound.  Keep all follow-up visits as told by your health care provider. This is important.  This information is not intended to replace advice given to you by your health care provider. Make sure you discuss any questions you have with your health care provider.  Document Revised: 10/13/2022 Document Reviewed: 10/13/2022  [employer] Patient Education © 2023 [employer]  Depression / Suicide Risk    As you are discharged from this [facility] facility, it is important to learn how to keep safe from harming yourself.    Recognize the warning signs:  Abrupt changes in personality, positive or negative- including increase in energy   Giving away possessions  Change in eating patterns- significant weight changes-  positive or negative  Change in sleeping patterns- unable to sleep or sleeping all the time   Unwillingness or inability to communicate  Depression  Unusual sadness, discouragement  "and loneliness  Talk of wanting to die  Neglect of personal appearance   Rebelliousness- reckless behavior  Withdrawal from people/activities they love  Confusion- inability to concentrate     If you or a loved one observes any of these behaviors or has concerns about self-harm, here's what you can do:  Talk about it- your feelings and reasons for harming yourself  Remove any means that you might use to hurt yourself (examples: pills, rope, extension cords, firearm)  Get professional help from the community (Mental Health, Substance Abuse, psychological counseling)  Do not be alone:Call your Safe Contact- someone whom you trust who will be there for you.  Call your local CRISIS HOTLINE 622-5539 or 511-038-6765  Call your local Children's Mobile Crisis Response Team [place] (099) 331-1349 or www.[employer]  Call the toll free National Suicide Prevention Hotlines   National Suicide Prevention Lifeline 797-895-OJFT (4657)  [employer] Line Network 800-SUICIDE (485-1827)  Prevent Falls in Your Home    \"Falling once doubles your chance of falling again\"        -Center for Disease Control and Prevention    Falls in the home can lead to serious injury (fractures, brain injuries), hospitalizations, increased medical costs, and could even be fatal.  The good news is, there are many precautions you can take to avoid falls in your home and help keep you safe:     If prescribed an assistive device (walker, crutches), use as instructed by the healthcare provider\"   Remove any tripping hazards from your home, including loose cords, throw rugs and clutter  Keep a nightlight on in dark (hallways, bathrooms, etc)   Get up slowly, to make sure you feel okay before getting up  Be aware of any side effects of your medications: some medications may make you dizzy  Place a non-skid rubber mat in your shower or tub-consider a shower bench or chair if unsteady on your feet  Wear supportive shoes or non-skid socks when moving " around  Start an exercise program once approved by your provider.  If you are feeling weak following a hospital stay, talk to your doctor about home health or outpatient therapy programs designed to help rebuild your strength and endurance  Physical Therapy Discharge Instructions for John Nickerson    9/11/2023    Level of Assist Required for Ambulation: Supervision on Flat Surfaces, Should Not Attempt Curbs at This Time, Should Not Attempt Stairs at This Time  Distance Patient May Ambulate: 200  Device Recommended for Ambulation: Front-Wheeled Walker  Level of Assist Required for Transfers: Requires No Assist  Device Recommended for Transfers: Front-Wheeled Walker  Home Exercise Program: Refer to Home Exercise Program Handout for Details    Great job in physical therapy, John!! Good luck with your continued recovery.  Keep making smart mobility choices and you will go far!!  Kristine, PT    Speech Therapy Discharge Instructions for John Nickerson    9/12/2023    Supervision: Patient benefits from supervision with medication management, assistance with financial and health care management.    Cognition / Communication: Patient needs extra time to respond and organize his thoughts. Help patient improve independence for memory by giving him/her enough extra time to process.  Talk together about potential challenges to transfers and ambulation before performing them, and review strategies for safety.  Use RWS memory strategies to reinforce new learning.  R - repeat, repeat, repeat.   W - write it down or get it in writing.   A - associate the new information with something that you already know very well.   V - visualize it, practice in your mind's eye, when you aren't able to perform the action physically.  S - say it back, out loud.  This benefits you, as you repeat the information for practice. You also are seeking clarification from the educator, evaluating to see if all of the information was understood,  and prompting feedback if needed.  And it slows down the exchange, buying extra time to process the information.    It was a pleasure to work with you. --- Diana Reyes M.S. CCC-SLP

## 2023-09-12 ENCOUNTER — APPOINTMENT (OUTPATIENT)
Dept: SPEECH THERAPY | Facility: REHABILITATION | Age: 78
End: 2023-09-12
Attending: PHYSICAL MEDICINE & REHABILITATION
Payer: MEDICARE

## 2023-09-12 ENCOUNTER — APPOINTMENT (OUTPATIENT)
Dept: PHYSICAL THERAPY | Facility: REHABILITATION | Age: 78
End: 2023-09-12
Attending: PHYSICAL MEDICINE & REHABILITATION
Payer: MEDICARE

## 2023-09-12 ENCOUNTER — APPOINTMENT (OUTPATIENT)
Dept: OCCUPATIONAL THERAPY | Facility: REHABILITATION | Age: 78
End: 2023-09-12
Attending: PHYSICAL MEDICINE & REHABILITATION
Payer: MEDICARE

## 2023-09-12 VITALS
SYSTOLIC BLOOD PRESSURE: 136 MMHG | WEIGHT: 121.25 LBS | TEMPERATURE: 97.6 F | RESPIRATION RATE: 17 BRPM | BODY MASS INDEX: 17.36 KG/M2 | DIASTOLIC BLOOD PRESSURE: 67 MMHG | HEIGHT: 70 IN | OXYGEN SATURATION: 97 % | HEART RATE: 56 BPM

## 2023-09-12 PROBLEM — S09.90XS COGNITIVE DEFICIT DUE TO OLD HEAD INJURY: Status: ACTIVE | Noted: 2023-09-12

## 2023-09-12 PROBLEM — R41.89 COGNITIVE DEFICIT DUE TO OLD HEAD INJURY: Status: ACTIVE | Noted: 2023-09-12

## 2023-09-12 LAB
ALBUMIN SERPL BCP-MCNC: 3.6 G/DL (ref 3.2–4.9)
ALBUMIN/GLOB SERPL: 1.4 G/DL
ALP SERPL-CCNC: 108 U/L (ref 30–99)
ALT SERPL-CCNC: 29 U/L (ref 2–50)
ANION GAP SERPL CALC-SCNC: 9 MMOL/L (ref 7–16)
AST SERPL-CCNC: 27 U/L (ref 12–45)
BILIRUB SERPL-MCNC: 0.7 MG/DL (ref 0.1–1.5)
BUN SERPL-MCNC: 22 MG/DL (ref 8–22)
CALCIUM ALBUM COR SERPL-MCNC: 9.2 MG/DL (ref 8.5–10.5)
CALCIUM SERPL-MCNC: 8.9 MG/DL (ref 8.5–10.5)
CHLORIDE SERPL-SCNC: 108 MMOL/L (ref 96–112)
CO2 SERPL-SCNC: 24 MMOL/L (ref 20–33)
CREAT SERPL-MCNC: 0.86 MG/DL (ref 0.5–1.4)
ERYTHROCYTE [DISTWIDTH] IN BLOOD BY AUTOMATED COUNT: 45.1 FL (ref 35.9–50)
GFR SERPLBLD CREATININE-BSD FMLA CKD-EPI: 87 ML/MIN/1.73 M 2
GLOBULIN SER CALC-MCNC: 2.5 G/DL (ref 1.9–3.5)
GLUCOSE SERPL-MCNC: 92 MG/DL (ref 65–99)
HCT VFR BLD AUTO: 39.2 % (ref 42–52)
HGB BLD-MCNC: 12.8 G/DL (ref 14–18)
MCH RBC QN AUTO: 31.1 PG (ref 27–33)
MCHC RBC AUTO-ENTMCNC: 32.7 G/DL (ref 32.3–36.5)
MCV RBC AUTO: 95.4 FL (ref 81.4–97.8)
PLATELET # BLD AUTO: 570 K/UL (ref 164–446)
PMV BLD AUTO: 11.1 FL (ref 9–12.9)
POTASSIUM SERPL-SCNC: 4.4 MMOL/L (ref 3.6–5.5)
PROT SERPL-MCNC: 6.1 G/DL (ref 6–8.2)
RBC # BLD AUTO: 4.11 M/UL (ref 4.7–6.1)
SODIUM SERPL-SCNC: 141 MMOL/L (ref 135–145)
WBC # BLD AUTO: 4.9 K/UL (ref 4.8–10.8)

## 2023-09-12 PROCEDURE — 85027 COMPLETE CBC AUTOMATED: CPT

## 2023-09-12 PROCEDURE — 700101 HCHG RX REV CODE 250: Performed by: PHYSICAL MEDICINE & REHABILITATION

## 2023-09-12 PROCEDURE — A9270 NON-COVERED ITEM OR SERVICE: HCPCS | Performed by: PHYSICAL MEDICINE & REHABILITATION

## 2023-09-12 PROCEDURE — 97129 THER IVNTJ 1ST 15 MIN: CPT

## 2023-09-12 PROCEDURE — 80053 COMPREHEN METABOLIC PANEL: CPT

## 2023-09-12 PROCEDURE — 36415 COLL VENOUS BLD VENIPUNCTURE: CPT

## 2023-09-12 PROCEDURE — 99239 HOSP IP/OBS DSCHRG MGMT >30: CPT | Performed by: PHYSICAL MEDICINE & REHABILITATION

## 2023-09-12 PROCEDURE — 700102 HCHG RX REV CODE 250 W/ 637 OVERRIDE(OP): Performed by: PHYSICAL MEDICINE & REHABILITATION

## 2023-09-12 PROCEDURE — 97110 THERAPEUTIC EXERCISES: CPT

## 2023-09-12 PROCEDURE — 97130 THER IVNTJ EA ADDL 15 MIN: CPT

## 2023-09-12 RX ORDER — LIDOCAINE 50 MG/G
1 PATCH TOPICAL EVERY 24 HOURS
Qty: 10 PATCH | Refills: 0 | Status: SHIPPED | OUTPATIENT
Start: 2023-09-12 | End: 2023-10-24 | Stop reason: SDUPTHER

## 2023-09-12 RX ORDER — NITROGLYCERIN 0.4 MG/1
0.4 TABLET SUBLINGUAL PRN
Qty: 25 TABLET | Refills: 0 | Status: SHIPPED | OUTPATIENT
Start: 2023-09-12 | End: 2023-10-24 | Stop reason: SDUPTHER

## 2023-09-12 RX ORDER — LISINOPRIL 20 MG/1
20 TABLET ORAL DAILY
Qty: 30 TABLET | Refills: 0 | Status: SHIPPED | OUTPATIENT
Start: 2023-09-13 | End: 2023-10-24 | Stop reason: SDUPTHER

## 2023-09-12 RX ORDER — ACETAMINOPHEN 500 MG
1000 TABLET ORAL EVERY 6 HOURS PRN
Qty: 30 TABLET | Refills: 0 | Status: SHIPPED | OUTPATIENT
Start: 2023-09-12 | End: 2023-10-24

## 2023-09-12 RX ORDER — LANOLIN ALCOHOL/MO/W.PET/CERES
6 CREAM (GRAM) TOPICAL
Qty: 30 TABLET | Refills: 0 | Status: SHIPPED | OUTPATIENT
Start: 2023-09-12 | End: 2023-10-24 | Stop reason: SDUPTHER

## 2023-09-12 RX ADMIN — OMEPRAZOLE 20 MG: 20 CAPSULE, DELAYED RELEASE ORAL at 08:12

## 2023-09-12 RX ADMIN — APIXABAN 5 MG: 5 TABLET, FILM COATED ORAL at 08:12

## 2023-09-12 RX ADMIN — CLOPIDOGREL BISULFATE 75 MG: 75 TABLET ORAL at 08:12

## 2023-09-12 RX ADMIN — LISINOPRIL 20 MG: 20 TABLET ORAL at 05:23

## 2023-09-12 RX ADMIN — ACETAMINOPHEN 1000 MG: 500 TABLET ORAL at 13:36

## 2023-09-12 RX ADMIN — LIDOCAINE 1 PATCH: 50 PATCH TOPICAL at 07:30

## 2023-09-12 ASSESSMENT — BRIEF INTERVIEW FOR MENTAL STATUS (BIMS)
BIMS SUMMARY SCORE: 13
WHAT YEAR IS IT: CORRECT
WHAT MONTH IS IT: ACCURATE WITHIN 5 DAYS
ASKED TO RECALL SOCK: NO, COULD NOT RECALL
ASKED TO RECALL BED: YES, NO CUE REQUIRED
ASKED TO RECALL BLUE: YES, NO CUE REQUIRED
INITIAL REPETITION OF BED BLUE SOCK - FIRST ATTEMPT: 3
WHAT DAY OF THE WEEK IS IT: CORRECT

## 2023-09-12 ASSESSMENT — PATIENT HEALTH QUESTIONNAIRE - PHQ9
SUM OF ALL RESPONSES TO PHQ9 QUESTIONS 1 AND 2: 0
2. FEELING DOWN, DEPRESSED, IRRITABLE, OR HOPELESS: NOT AT ALL
1. LITTLE INTEREST OR PLEASURE IN DOING THINGS: NOT AT ALL

## 2023-09-12 NOTE — THERAPY
"Speech Language Pathology  Daily Treatment     Patient Name: Moe Nickerson  Age:  78 y.o., Sex:  male  Medical Record #: [id number]  Today's Date: 9/12/2023     Precautions  Precautions: Fall Risk, Posterior Hip Precautions, Weight Bearing As Tolerated Right Lower Extremity  Comments: aphasia    Subjective    Patient reluctant to participate but agreeable with encouragement and reinforcement.     Objective       09/12/23 0915   Treatment Charges   SLP Cognitive Skill Development First 15 Minutes 1   SLP Cognitive Skill Development Additional 15 Minutes 1   SLP Total Time Spent   SLP Individual Total Time Spent (Mins) 30   Cognition   Simple Attention Minimal (4)   Prospective Memory Minimal (4)   Functional Memory Activities Minimal (4)   Executive Functioning / Organization Moderate (3)   Medication Management  Supervision (5)   Functional Level of Assist   Comprehension Minimal Assist   Comprehension Description Glasses;Verbal cues;Magnifying glass   Expression Minimal Assist   Expression Description   (extra time)   Social Interaction Supervision   Social Interaction Description Increased time   Problem Solving Moderate Assist   Problem Solving Description Supervision;Increased time;Bed/chair alarm;Therapy schedule;Verbal cueing   Memory Moderate Assist   Memory Description Supervision;Increased time;Bed/chair alarm;Therapy schedule;Verbal cueing       Assessment    Patient is able to recall information repeatedly trained. But displays low insight into current deficits and new training for fall prevention.  \"Everyone tells me I am doing great\".  He verbalized that he is unaware of any changes he can make to reduce fall risk.  Patient is able to sort medications with set up.  Patient does recall information that he is directed to recall, with extra time and practice with 75%  but displays need for cuing and reinforcement to identify novel information that is important.  Patient depends highly on routines and " has a hard time adapting to changes.    Strengths: Able to follow instructions, Alert and oriented, Independent prior level of function, Motivated for self care and independence, Pleasant and cooperative, Supportive family, Willingly participates in therapeutic activities  Barriers: Decreased endurance, Fatigue, Generalized weakness, Impaired balance, Impaired carryover of learning, Impaired functional cognition (slow to process.  decreased initiation.)Rigid thinking and poor insight.    Plan    Target recall of new training, attention.    Passport items to be completed:  Express basic needs, understand food/liquid recommendations, consistently follow swallow precautions, manage finances, manage medications, arrive to therapy appointments on time, complete daily memory log entries, solve problems related to safety situations, review education related to hospitalization, complete caregiver training     Speech Therapy Problems (Active)       Problem: Memory STGs       Dates: Start:  09/02/23         Goal: STG-Within one week, patient will demonstrate use of memory strategies in order to recall safety precautions related to hospitalization.        Dates: Start:  09/02/23         Goal Note filed on 09/12/23 1040 by Diana Reyes MS,CCC-SLP       Mod A needed.                 Problem: Problem Solving STGs       Dates: Start:  09/02/23         Goal: STG-Within one week, patient will complete alternating attention tasks with 75% accuracy, min a       Dates: Start:  09/02/23         Goal Note filed on 09/12/23 1040 by Diana Reyes MS,CCC-SLP       Mod cues for 75% acc.                 Problem: Speech/Swallowing LTGs       Dates: Start:  09/02/23         Goal: LTG-By discharge, patient will solve complex problems relate to IADL's in order to d/c home with mod I        Dates: Start:  09/02/23

## 2023-09-12 NOTE — CARE PLAN
Problem: Memory STGs  Goal: STG-Within one week, patient will demonstrate use of memory strategies in order to recall safety precautions related to hospitalization.   Outcome: Not Met  Note: Mod A needed.     Problem: Problem Solving STGs  Goal: STG-Within one week, patient will complete medication management tasks with 80% accuracy min A  Outcome: Met  Note: 100% set up  Goal: STG-Within one week, patient will complete alternating attention tasks with 75% accuracy, min a  Outcome: Not Met  Note: Mod cues for 75% acc.

## 2023-09-12 NOTE — PROGRESS NOTES
Patient discharged to [facility] per order.  Discharge instructions reviewed with patient; they verbalize understanding and signed copies placed in chart.  Patient has all belongings; signed copy of form in chart.  Patient left facility at 1355 via w/c accompanied by rehab staff and EMT.  Walker sent with patient, staples removed per order.

## 2023-09-12 NOTE — DISCHARGE PLANNING
DPA    Patient will be going to [place] [facility] today at 3pm. [facility] will be picking up the patient.     Update our GMT will be taking patient to [place] [facility] around 1:30pm today.

## 2023-09-12 NOTE — PROGRESS NOTES
NURSING DAILY NOTE    Name: Moe Nickerson   Date of Admission: 8/30/2023   Admitting Diagnosis: History of femur fracture  Attending Physician: Kamran Vicente M.d.  Allergies: Amlodipine, Cortisone, and Morphine    Safety  Patient Assist  cg a  Patient Precautions  Fall Risk, Posterior Hip Precautions, Weight Bearing As Tolerated Right Lower Extremity  Precaution Comments  aphasia  Bed Transfer Status  Standby Assist  Toilet Transfer Status   Standby Assist  Assistive Devices  Walker - front wheel, Wheelchair, Rails  Oxygen  None - Room Air  Diet/Therapeutic Dining  Current Diet Order   Procedures    Diet Order Diet: Regular     Pill Administration  whole  Agitated Behavioral Scale     ABS Level of Severity       Fall Risk  Has the patient had a fall this admission?   No  Shayna Bunn Fall Risk Scoring  16, HIGH RISK  Fall Risk Safety Measures  bed alarm and chair alarm    Vitals  Temperature: 36.4 °C (97.5 °F)  Temp src: Oral  Pulse: (!) 57  Respiration: 16  Blood Pressure : 90/57  Blood Pressure MAP (Calculated): 68 MM HG  BP Location: Left, Upper Arm  Patient BP Position: Sitting     Oxygen  Pulse Oximetry: 100 %  O2 (LPM): 0  O2 Delivery Device: None - Room Air    Bowel and Bladder  Last Bowel Movement  09/10/23  Stool Type  Type 5: Soft blob with clear cut edges (passed easily)  Bowel Device  Bathroom  Continent  Bladder: Continent void   Bowel: Continent movement  Bladder Function  Urine Void (mL): 100 ml  Number of Times Voided: 1  Urine Color: Yoselin  Genitourinary Assessment   Bladder Assessment (WDL):  WDL Except (neurogenic bladder per pt)  Garcia Catheter: Not Applicable  Urine Color: Yoselin  Bladder Device: Urinal  Time Void: No  Bladder Scan: Post Void  $ Bladder Scan Results (mL): 68  Bladder Medications: No    Skin  Manas Score   17  Sensory Interventions   Bed Types: Standard/Trauma Mattress  Skin Preventative Measures: Pillows in Use for  Support / Positioning  Moisture Interventions  Moisturizers/Barriers: Barrier Wipes      Pain  Pain Rating Scale  0 - No Pain  Pain Location  Back  Pain Location Orientation  Right  Pain Interventions   Declines    ADLs    Bathing   Patient Refused Bathing  Linen Change      Personal Hygiene  Moist Ruth Wipes  Chlorhexidine Bath      Oral Care  Refused  Teeth/Dentures     Shave     Nutrition Percentage Eaten  *  * Meal *  *, Lunch, Between 25-50% Consumed  Environmental Precautions  Bed in Low Position, Treaded Slipper Socks on Patient  Patient Turns/Positioning  Patient Turns Self from Side to Side  Patient Turns Assistance/Tolerance  Assistance of One  Bed Positions  Bed Controls On  Head of Bed Elevated  Self regulated      Psychosocial/Neurologic Assessment  Psychosocial Assessment  Psychosocial (WDL):  Within Defined Limits  Neurologic Assessment  Neuro (WDL): Exceptions to WDL  Level of Consciousness: Alert  Orientation Level: Oriented X4  Cognition: Follows commands, Appropriate attention/concentration  Speech: Delayed responses (soft spoken)  Motor Function/Sensation Assessment: Motor strength  Muscle Strength Right Arm: Good Strength Against Gravity and Moderate Resistance  Muscle Strength Left Arm: Good Strength Against Gravity and Moderate Resistance  Muscle Strength Right Leg: Good Strength Against Gravity and Moderate Resistance  Muscle Strength Left Leg: Good Strength Against Gravity and Moderate Resistance  EENT (WDL):  WDL Except    Cardio/Pulmonary Assessment  Edema      Respiratory Breath Sounds  RUL Breath Sounds: Clear  RML Breath Sounds: Clear  RLL Breath Sounds: Clear  IDRIS Breath Sounds: Clear  LLL Breath Sounds: Clear  Cardiac Assessment   Cardiac (WDL):  WDL Except (hx chf, cad, cvd)

## 2023-09-12 NOTE — THERAPY
"Physical Therapy   Daily Treatment     Patient Name: Moe Nickerson  Age:  78 y.o., Sex:  male  Medical Record #: [id number]  Today's Date: 9/12/2023     Precautions  Precautions: Fall Risk, Posterior Hip Precautions, Weight Bearing As Tolerated Right Lower Extremity  Comments: aphasia    Subjective    Patient requesting to no work on walking \"Its all I do here\"     Objective       09/12/23 1001   PT Charge Group   PT Therapeutic Exercise (Units) 2   PT Total Time Spent   PT Individual Total Time Spent (Mins) 30   Standing Lower Body Exercises   Hip Extension 1 set of 15;Bilateral    Hip Abduction 1 set of 15;Bilateral   Marching 1 set of 15  (slow controlled into SLS)   Heel Rise 1 set of 15;Bilateral   Comments pink band around ankles: fwd/bwd gait, monster walk fwd/bwd, sidestepping left/right. all 5ft down/back x 2.   Bed Mobility    Sit to Stand Standby Assist   Interdisciplinary Plan of Care Collaboration   IDT Collaboration with  Speech Therapist   Patient Position at End of Therapy Seated;Chair Alarm On;Self Releasing Lap Belt Applied;Tray Table within Reach;Call Light within Reach   Collaboration Comments handoff from speech     Educated patient on yellow wrist band and remains fall risk needing SBA-CGA.     Assessment    Patient able to complete standing exercises in parallel bars well, without reports of significant pain. Is pleased with the session following.     Strengths: Able to follow instructions, Good insight into deficits/needs, Independent prior level of function, Making steady progress towards goals, Motivated for self care and independence, Pleasant and cooperative, Willingly participates in therapeutic activities  Barriers: Aphasia expressive, Decreased endurance, Fatigue, Impaired activity tolerance, Impaired balance, Generalized weakness, Pain    Plan    Pending d/c to sub acute facility.       Physical Therapy Problems (Active)       Problem: Mobility Transfers       Dates: Start:  " 08/31/23         Goal: STG-Within one week, patient will perform bed mobility Hector       Dates: Start:  08/31/23         Goal Note filed on 09/05/23 0811 by Kenneth Aguiar, PT       SPV with verbal cues for use of leg  and extra time                 Problem: PT-Long Term Goals       Dates: Start:  08/31/23         Goal: LTG-By discharge, patient will ambulate 200ft indoors and outdoors Hector with LRAD       Dates: Start:  08/31/23

## 2023-09-12 NOTE — DISCHARGE PLANNING
Case management  Reviewed signed copy of IMM and answered all questions.    Dc date /disposition: Today to [facility].

## 2023-09-12 NOTE — DISCHARGE SUMMARY
Physical Medicine & Rehabilitation Discharge Summary    Admission Date: 8/30/2023    Discharge Date: 9/12/2023    Attending Provider: Kamran Vicente MD    Admission Diagnosis:   Active Hospital Problems    Diagnosis     *History of femur fracture     Cognitive deficit due to old head injury     Hemorrhoid     Constipation     Neuropathy     Hypertension     Hearing impaired        Discharge Diagnosis:  Active Hospital Problems    Diagnosis     *History of femur fracture     Cognitive deficit due to old head injury     Hemorrhoid     Constipation     Neuropathy     Hypertension     Hearing impaired        HPI per Admission History & Physical:  The patient is a 78 y.o.  male with a past medical history of CHF, HTN, and prior stroke;  who presented on 8/28/2023 12:57 AM with right hip pain after GLF. Per documentation, patient was attempting to get out of bed when he tripped and fell. He hit his head on the floor but did not have LOC. Upon eval in the ED CT head and C spine were negative for acute findings. Xray right femur showed an impacted right subcapital femoral neck fracture. Ortho was consulted, and patient was taken to the OR on 8/28 for open treatment of the right femoral neck fracture with total hip arthoplasty performed by Dr. Mei. Post op patient is WBAT RLE, with posterior hip precautions. Post op, patient has ABLA and leukocytosis. Patient has been able to participate with therapies, patient has had some orthostatic hypotension with mobility with nursing.        Patient was admitted to [facility] on 8/30/2023.     Hospital Course by Problem List:  Femoral neck fracture   - sustained during GLF   - images showed an impacted right subcapital femoral neck fracture  -s/p  open treatment of right femoral neck fracture with total hip arthoplasty by Dr. Mei  - posterior hip precautions, WBAT RLE   - continue with PT/OT at [facility]     Acute encephalopathy in a setting of premorbid  cognitive decline  Likely 2/2 Oxycodone use  -15mg taken before noon due to severe pain which is now controlled  -9/5 continues to have slowed processing speed  - DC oxycodone. Use Tylenol PRN, Lidocaine patch, and Hemorid cream instead  -SLP onboard  -9/7-resolving after oxycodone stopped  -9/11 underlying cognitive issues present, returned to likely premorbid baseline  9/12- cognition deficits are impairing patient's ability to live alone, although he was managing at home before the femur fracture, any deviation from the routine would put the patient into harms way. Recommended 24/7 supervision for home. Family unable to provide. Transferred to Skilled Nursing.   -continue SLP at skilled     Painful Hemorrhoids  -Preparation H      Diarrhea-resolved  Started 9/8. Hold stool softeners     Left Lumbar back pain  Chronic with acute flair  -Lidocaine patch     Chronic mural thrombus   Heart failure with reduced EF   - updated ECHO obtained showed EF of 35%, and noted mural thrombus   - Eliquis 5mg BID started 8/30 with Dr Mei approval  -Hx of hematuria with Eliquis  -no bleeding to date, neuro exam stable, continue eliquis     Prior CVA  - minimal deficits per patient, no lateralizing weakness, no known dysphagia but patient has soft-spoken/hypophonic voice. Cognitive deficits present.  - continue statin, plavix       CAD with history of PCI   - statin and plavix at home  -Nitro PRN     Mood  - at risk of adjustment disorder, depression, and anxiety due to functional decline     ID:  - at risk for Urinary tract infection     Pain:  - Neuroceptic - On Tylenol prn, DC oxycodone 9/5. continue current regiment     DVT prophylaxis:  Patient is on Eliquis.      GI prophylaxis:  On Prilosec 20mg daily      Medical Problem List:     HTN: Lisinopril 20mg daily  continue current regiment     -Follow-up Dr Sigifredo farnsworth       Functional Status at Discharge  Eating:  Independent  Eating Description:     Grooming:  Standing,  Standby Assist  Grooming Description:  Standing at sink, Supervision for safety (Standing for 6 min for oral care without UB support.)  Bathing:  Moderate Assist  Bathing Description:  Hand held shower, Tub bench, Assit with back, Assit wtih lower extremities, Assit with perineal, Increased time, Initial preparation for task, Set-up of equipment, Supervision for safety, Verbal cueing (Assist with knee to toe and min A to stand for rear. )  Upper Body Dressing:  Supervision  Upper Body Dressing Description:  Set-up of equipment, Increased time  Lower Body Dressing:  Moderate Assist  Lower Body Dressing Description:  Sock aid, Reacher, Dressing stick, Assist with threading into pant leg, Increased time, Set-up of equipment, Supervision for safety, Verbal cueing (Pt able to don/doff slip on shoes, and complete fastening/draw down/up pants without UB support. Mod A for threading/unthreading following attempts at reacher and dressing stick use on jeans. provided pt with hospital pants for increased comfort and ind.)  Discharge Location : Skilled Nursing Facility  Patient Discharging with Assist of: Caregiver  Level of Supervision Required: 24 Hour Supervision  Recommended Equipment for Discharge: Front-Wheeled Walker;Tub Transfer Bench;Grab Bars by Toilet;Grab Bars in Tub / Shower;Hand Held Shower Head;Sock Aid;Reacher;Long Handled Shoe Horn;Dressing Stick  Recommended Services Upon Discharge: Home Health Occupational Therapy  Long Term Goals Met: 0  Long Term Goals Not Met: 3  Reason(s) for Goals Not Met: Requires Mod A for LBD and bathing, requires CGA for home management, requires SBA for transfers.  Criteria for Termination of Services: Maximum Function Achieved for Inpatient Rehabilitation  Walk:  Contact Guard Assist  Distance Walked:  100  Number of Times Distance Was Traveled:  1  Assistive Device:  4 Wheel Walker  Gait Deviation:  Bradykinetic, Antalgic  Wheelchair:  Supervised  Distance Propelled:  25    Wheelchair Description:     Stairs Contact Guard Assist  Stairs Description  (4inch curb step with FWW)  Discharge Location: Assisted Living  Patient Discharging with Assist of: Paid Caregiver  Level of Supervision Required Upon Discharge: Intermittent Supervision  Recommended Equipment for Discharge: Front-Wheeled Walker  Recommeded Services Upon Discharge: Home Health Physical Therapy  Long Term Goals Met: 3  Long Term Goals Not Met: 4  Reason(s) for Goals Not Met: requires supervision with ambulation for safety at this time  Criteria for Termination of Services: Maximum Function Achieved for Inpatient Rehabilitation  Comprehension:  Minimal Assist  Comprehension Description:  Glasses, Verbal cues, Magnifying glass  Expression:  Minimal Assist  Expression Description:   (extra time)  Social Interaction:  Supervision  Social Interaction Description:  Increased time  Problem Solving:  Moderate Assist  Problem Solving Description:  Supervision, Increased time, Bed/chair alarm, Therapy schedule, Verbal cueing  Memory:  Moderate Assist  Memory Description:  Supervision, Increased time, Bed/chair alarm, Therapy schedule, Verbal cueing       Kamran TAVERAS M.D., personally performed a complete drug regimen review and no potential clinically significant medication issues were identified.   Discharge Medication:     Medication List        START taking these medications        Instructions   formulation r 0.25-14-74.9 % Oint ointment  Commonly known as: Preparation H   Insert 1 Application into the rectum at bedtime.  Dose: 1 Application     lidocaine 5 % Ptch  Commonly known as: Lidoderm   Place 1 Patch on the skin every 24 hours.  Dose: 1 Patch     melatonin 3 MG Tabs   Take 2 Tablets by mouth at bedtime.  Dose: 6 mg            CHANGE how you take these medications        Instructions   acetaminophen 500 MG Tabs  What changed:   when to take this  reasons to take this  Commonly known as: Tylenol   Take 2 Tablets by  mouth every 6 hours as needed for Mild Pain.  Dose: 1,000 mg     lisinopril 20 MG Tabs  Start taking on: September 13, 2023  What changed:   medication strength  additional instructions  Commonly known as: Prinivil   Take 1 Tablet by mouth every day.  Dose: 20 mg     nitroglycerin 0.4 MG Subl  What changed:   how much to take  how to take this  when to take this  reasons to take this  additional instructions  Commonly known as: Nitrostat   Place 1 Tablet under the tongue as needed for Chest Pain.  Dose: 0.4 mg            CONTINUE taking these medications        Instructions   apixaban 5mg Tabs  Commonly known as: Eliquis   Take 1 Tablet by mouth 2 times a day.  Dose: 5 mg     clopidogrel 75 MG Tabs  Commonly known as: Plavix   Take 1 Tablet by mouth every day.  Dose: 75 mg     omeprazole 20 MG delayed-release capsule  Commonly known as: PriLOSEC   Take 1 Capsule by mouth every day. Take 1/2 hour prior to same meal.  Dose: 20 mg     simvastatin 10 MG Tabs  Commonly known as: Zocor   Take 1 Tablet by mouth every evening.  Dose: 10 mg            STOP taking these medications      ketoconazole 2 % shampoo  Commonly known as: Nizoral     oxyCODONE immediate-release 5 MG Tabs  Commonly known as: Roxicodone              Discharge Diet:  Current Diet Order   Procedures    Diet Order Diet: Regular       Discharge Activity:  AS tolerated, maintain hip precautions     Disposition:  Patient to discharge to SNF for ongoing rehabilitation services.    Follow-up & Discharge Instructions:  Follow up with your primary care provider (PCP) within 7-10 days of discharge to review your medications and take over your care.     If you develop chest pain, fever, chills, change in neurologic function (weakness, sensation changes, vision changes), or other concerning sxs, seek immediate medical attention or call 911.      Future Appointments   Date Time Provider Department Center   9/12/2023  2:30 PM Melodie Xiong, OT [facility] None        Condition on Discharge:  Good    More than 35 minutes was spent on discharging this patient, including face-to-face time, prescription management, and the dictation of this note.    ____________________________________     Kamran Vicente MD  Physical Medicine & Rehabilitation   Brain Injury Medicine   ____________________________________    Date of Service: 9/12/2023

## 2023-09-12 NOTE — PROGRESS NOTES
NURSING DAILY NOTE    Name: Moe Nickerson   Date of Admission: 8/30/2023   Admitting Diagnosis: History of femur fracture  Attending Physician: Kamran Vicente M.d.  Allergies: Amlodipine, Cortisone, and Morphine    Safety  Patient Assist  cg a  Patient Precautions  Fall Risk, Posterior Hip Precautions, Weight Bearing As Tolerated Right Lower Extremity  Precaution Comments  aphasia  Bed Transfer Status  Standby Assist  Toilet Transfer Status   Standby Assist  Assistive Devices  Walker - front wheel  Oxygen  None - Room Air  Diet/Therapeutic Dining  Current Diet Order   Procedures    Diet Order Diet: Regular     Pill Administration  whole  Agitated Behavioral Scale     ABS Level of Severity       Fall Risk  Has the patient had a fall this admission?   No  Shayna Bunn Fall Risk Scoring  16, HIGH RISK  Fall Risk Safety Measures  bed alarm and chair alarm    Vitals  Temperature: 37 °C (98.6 °F)  Temp src: Temporal  Pulse: 63  Respiration: 17  Blood Pressure : (!) 140/70  Blood Pressure MAP (Calculated): 93 MM HG  BP Location: Left, Upper Arm  Patient BP Position: Sitting (Recline sitting position in bed)     Oxygen  Pulse Oximetry: 97 %  O2 (LPM): 0  O2 Delivery Device: None - Room Air    Bowel and Bladder  Last Bowel Movement  09/10/23  Stool Type  Type 5: Soft blob with clear cut edges (passed easily)  Bowel Device  Bathroom  Continent  Bladder: Continent void   Bowel: Continent movement  Bladder Function  Urine Void (mL): 250 ml  Number of Times Voided: 1  Urine Color: Yellow  Genitourinary Assessment   Bladder Assessment (WDL):  WDL Except (neurogenic bladder per pt)  Garcia Catheter: Not Applicable  Urine Color: Yellow  Bladder Device: Urinal  Time Void: No  Bladder Scan: Post Void  $ Bladder Scan Results (mL): 68  Bladder Medications: No    Skin  Manas Score   17  Sensory Interventions   Bed Types: Standard/Trauma Mattress  Skin Preventative Measures:  Pillows in Use for Support / Positioning  Moisture Interventions  Moisturizers/Barriers: Barrier Wipes      Pain  Pain Rating Scale  0 - No Pain  Pain Location  Back  Pain Location Orientation  Right  Pain Interventions   Declines    ADLs    Bathing   Patient Refused Bathing  Linen Change      Personal Hygiene  Moist Ruth Wipes  Chlorhexidine Bath      Oral Care  Refused  Teeth/Dentures     Shave     Nutrition Percentage Eaten  *  * Meal *  *, Dinner, Between % Consumed  Environmental Precautions  Treaded Slipper Socks on Patient, Bed in Low Position  Patient Turns/Positioning  Patient Turns Self from Side to Side  Patient Turns Assistance/Tolerance  Assistance of One  Bed Positions  Bed Controls On, Bed Locked  Head of Bed Elevated  Self regulated      Psychosocial/Neurologic Assessment  Psychosocial Assessment  Psychosocial (WDL):  Within Defined Limits  Neurologic Assessment  Neuro (WDL): Exceptions to WDL  Level of Consciousness: Alert  Orientation Level: Oriented X4  Cognition: Follows commands, Appropriate attention/concentration  Speech: Delayed responses (soft spoken)  Motor Function/Sensation Assessment: Motor strength  Muscle Strength Right Arm: Good Strength Against Gravity and Moderate Resistance  Muscle Strength Left Arm: Good Strength Against Gravity and Moderate Resistance  Muscle Strength Right Leg: Good Strength Against Gravity and Moderate Resistance  Muscle Strength Left Leg: Good Strength Against Gravity and Moderate Resistance  EENT (WDL):  WDL Except    Cardio/Pulmonary Assessment  Edema      Respiratory Breath Sounds  RUL Breath Sounds: Clear  RML Breath Sounds: Clear  RLL Breath Sounds: Clear  IDRIS Breath Sounds: Clear  LLL Breath Sounds: Clear  Cardiac Assessment   Cardiac (WDL):  WDL Except (hx chf, cad, cvd)

## 2023-09-12 NOTE — CARE PLAN
Problem: Mobility Transfers  Goal: STG-Within one week, patient will perform bed mobility Hector  Outcome: Not Met     Problem: Mobility  Goal: STG-Within one week, patient will ambulate up/down a curb with Yanet and LRAD  Outcome: Met

## 2023-09-12 NOTE — CARE PLAN
"  Problem: Fall Risk - Rehab  Goal: Patient will remain free from falls  Note: Shayna Bunn Fall risk Assessment Score: 16  High fall risk Interventions   - Alarming seatbelt  - Bed and strip alarm   - Yellow sign by the door   - Yellow wrist band \"Fall risk\"  - Room near to the nurse station  - Do not leave patient unattended in the bathroom  - Fall risk education provided       Problem: Bladder / Voiding  Goal: Patient will establish and maintain regular urinary output  Note: Pt is continent of bladder, voiding adequate amount of urine.Denies any discomfort or dysuria, afebrile.Will continue to monitor.         "

## 2023-09-12 NOTE — DISCHARGE PLANNING
FAUZIA LM on son Troy's VM to notify him that patient will be transferring to [facility] today at 3pm.  Patient agreeable.  Attending notified as was charge RN.      Also CM asked son to call Yvonne at [facility] as patient needs to pay Sept. Rent.

## 2023-09-13 DIAGNOSIS — R41.89 COGNITIVE DEFICIT DUE TO OLD HEAD INJURY: ICD-10-CM

## 2023-09-13 DIAGNOSIS — K64.9 HEMORRHOIDS, UNSPECIFIED HEMORRHOID TYPE: ICD-10-CM

## 2023-09-13 DIAGNOSIS — S09.90XS COGNITIVE DEFICIT DUE TO OLD HEAD INJURY: ICD-10-CM

## 2023-09-13 DIAGNOSIS — Z87.81 HISTORY OF FEMUR FRACTURE: ICD-10-CM

## 2023-09-13 DIAGNOSIS — I50.20 HEART FAILURE WITH REDUCED EJECTION FRACTION (HCC): ICD-10-CM

## 2023-10-12 ENCOUNTER — TELEPHONE (OUTPATIENT)
Dept: SCHEDULING | Facility: IMAGING CENTER | Age: 78
End: 2023-10-12

## 2023-10-24 ENCOUNTER — OFFICE VISIT (OUTPATIENT)
Dept: MEDICAL GROUP | Facility: IMAGING CENTER | Age: 78
End: 2023-10-24
Payer: MEDICARE

## 2023-10-24 VITALS
WEIGHT: 116 LBS | SYSTOLIC BLOOD PRESSURE: 130 MMHG | HEART RATE: 61 BPM | HEIGHT: 73 IN | BODY MASS INDEX: 15.37 KG/M2 | OXYGEN SATURATION: 98 % | TEMPERATURE: 97.3 F | DIASTOLIC BLOOD PRESSURE: 59 MMHG

## 2023-10-24 DIAGNOSIS — I25.10 CORONARY ARTERY DISEASE INVOLVING NATIVE CORONARY ARTERY OF NATIVE HEART WITHOUT ANGINA PECTORIS: ICD-10-CM

## 2023-10-24 DIAGNOSIS — K64.9 HEMORRHOIDS, UNSPECIFIED HEMORRHOID TYPE: ICD-10-CM

## 2023-10-24 DIAGNOSIS — M54.50 CHRONIC LEFT-SIDED LOW BACK PAIN WITHOUT SCIATICA: ICD-10-CM

## 2023-10-24 DIAGNOSIS — Z86.73 HISTORY OF CARDIOEMBOLIC CEREBROVASCULAR ACCIDENT (CVA): ICD-10-CM

## 2023-10-24 DIAGNOSIS — I10 PRIMARY HYPERTENSION: ICD-10-CM

## 2023-10-24 DIAGNOSIS — Z87.81 HISTORY OF FEMUR FRACTURE: ICD-10-CM

## 2023-10-24 DIAGNOSIS — R21 FACIAL RASH: ICD-10-CM

## 2023-10-24 DIAGNOSIS — G47.00 INSOMNIA, UNSPECIFIED TYPE: ICD-10-CM

## 2023-10-24 DIAGNOSIS — S09.90XS COGNITIVE DEFICIT DUE TO OLD HEAD INJURY: ICD-10-CM

## 2023-10-24 DIAGNOSIS — K21.00 GASTROESOPHAGEAL REFLUX DISEASE WITH ESOPHAGITIS WITHOUT HEMORRHAGE: ICD-10-CM

## 2023-10-24 DIAGNOSIS — E78.00 PURE HYPERCHOLESTEROLEMIA: ICD-10-CM

## 2023-10-24 DIAGNOSIS — I51.3 MURAL THROMBUS OF CARDIAC APEX: ICD-10-CM

## 2023-10-24 DIAGNOSIS — F32.A DEPRESSION, UNSPECIFIED DEPRESSION TYPE: ICD-10-CM

## 2023-10-24 DIAGNOSIS — R41.89 COGNITIVE DEFICIT DUE TO OLD HEAD INJURY: ICD-10-CM

## 2023-10-24 DIAGNOSIS — I50.20 HEART FAILURE WITH REDUCED EJECTION FRACTION (HCC): ICD-10-CM

## 2023-10-24 DIAGNOSIS — S72.001D CLOSED FRACTURE OF NECK OF RIGHT FEMUR WITH ROUTINE HEALING: ICD-10-CM

## 2023-10-24 DIAGNOSIS — Z95.5 HISTORY OF CORONARY ARTERY STENT PLACEMENT: ICD-10-CM

## 2023-10-24 DIAGNOSIS — F33.40 RECURRENT MAJOR DEPRESSIVE DISORDER, IN REMISSION (HCC): ICD-10-CM

## 2023-10-24 DIAGNOSIS — G89.29 CHRONIC LEFT-SIDED LOW BACK PAIN WITHOUT SCIATICA: ICD-10-CM

## 2023-10-24 PROBLEM — S72.009D CLOSED FRACTURE OF NECK OF FEMUR WITH ROUTINE HEALING: Status: ACTIVE | Noted: 2023-08-28

## 2023-10-24 PROBLEM — F33.1 MAJOR DEPRESSIVE DISORDER, RECURRENT, MODERATE (HCC): Status: ACTIVE | Noted: 2023-10-24

## 2023-10-24 PROCEDURE — 99215 OFFICE O/P EST HI 40 MIN: CPT | Performed by: INTERNAL MEDICINE

## 2023-10-24 PROCEDURE — 3075F SYST BP GE 130 - 139MM HG: CPT | Performed by: INTERNAL MEDICINE

## 2023-10-24 PROCEDURE — 3078F DIAST BP <80 MM HG: CPT | Performed by: INTERNAL MEDICINE

## 2023-10-24 RX ORDER — LANOLIN ALCOHOL/MO/W.PET/CERES
6 CREAM (GRAM) TOPICAL
Qty: 180 TABLET | Refills: 0 | Status: SHIPPED
Start: 2023-10-24 | End: 2024-03-05

## 2023-10-24 RX ORDER — LISINOPRIL 20 MG/1
20 TABLET ORAL DAILY
Qty: 90 TABLET | Refills: 2 | Status: SHIPPED
Start: 2023-10-24

## 2023-10-24 RX ORDER — OMEPRAZOLE 20 MG/1
20 CAPSULE, DELAYED RELEASE ORAL DAILY
Qty: 90 CAPSULE | Refills: 2 | Status: SHIPPED
Start: 2023-10-24

## 2023-10-24 RX ORDER — ACETAMINOPHEN 500 MG
500-1000 TABLET ORAL EVERY 8 HOURS PRN
Qty: 540 TABLET | Refills: 1 | Status: SHIPPED | OUTPATIENT
Start: 2023-10-24 | End: 2024-03-05

## 2023-10-24 RX ORDER — NITROGLYCERIN 0.4 MG/1
0.4 TABLET SUBLINGUAL PRN
Qty: 25 TABLET | Refills: 0 | Status: SHIPPED
Start: 2023-10-24

## 2023-10-24 RX ORDER — SIMVASTATIN 10 MG
10 TABLET ORAL EVERY EVENING
Qty: 90 TABLET | Refills: 2 | Status: SHIPPED
Start: 2023-10-24

## 2023-10-24 RX ORDER — LIDOCAINE 50 MG/G
1 PATCH TOPICAL EVERY 24 HOURS
Qty: 15 PATCH | Refills: 3 | Status: SHIPPED
Start: 2023-10-24

## 2023-10-24 RX ORDER — CLOPIDOGREL BISULFATE 75 MG/1
75 TABLET ORAL DAILY
Qty: 90 TABLET | Refills: 2 | Status: SHIPPED
Start: 2023-10-24

## 2023-10-24 ASSESSMENT — FIBROSIS 4 INDEX: FIB4 SCORE: 0.7

## 2023-10-24 NOTE — ASSESSMENT & PLAN NOTE
Hx of multiple stents placement  On plavix, statin  Hx of GI bleed in the past. Monitor bleeding  Referral to cardiology

## 2023-10-24 NOTE — PROGRESS NOTES
Established Patient    Moe Nickerson is a 78 y.o. male who presents today with the following:    CC:   Chief Complaint   Patient presents with    \[facility] [place] [facility]\"" Care       HPI:     Patient was admitted from August 28 to August 30 due to ground-level fall found to have impacted right femoral neck fracture underwent total hip arthroplasty on August 28, 2023 his preop echo was done showed EF of 35% and left ventricular mural thrombus which was not noted on previous imaging.  Patient is on Plavix daily given CAD and CVA history.  Hospitalist discussed with patient regarding for anticoagulation given high stroke risk he was initiated on Eliquis before he was transferred to the rehab.  Patient was admitted to [facility] rehab from August 30 to September 12, 2023. He denies overt bleeding. He has home health.        Problem   Insomnia   Facial Rash    Sun damaged skin. Scattered red itching rash and lesions on scalp and face     Recurrent Major Depressive Disorder, in Remission (Hcc)    Depressed mood, denies SI/HI  Referral to psychology       Closed Fracture of Neck of Right Femur With Routine Healing    S/p total hip arthroplasty in 8.2023  Still has pain  Did rehab  Pending home health evaluation PT. He declines OT because he wants to focus on PT for now.   Fall precaution  Tylenol 500-1000 mg Q8H prn for pain control       Heart Failure With Reduced Ejection Fraction (Hcc)    Chronic, currently not in exacerbation, on lisinopril for BP, plavix, statin for CAD, eliquis for mural thrombus in apex.  Referral to cardiology    8/23/2023 TTE  Moderately reduced left ventricular systolic function.  The left ventricular ejection fraction is visually estimated to be 35-40%.  While not well-seen on contrast imaging, there appears to be an organized, chronic-appearing thrombus in the apex.  Normal right ventricular size and systolic function.  Estimated right ventricular systolic pressure is 47 mmHg.  Normal left atrial size.  Mild  tricuspid regurgitation.  Normal inferior vena cava size and inspiratory collapse.     Mural Thrombus of Cardiac [place]    8/28/2023 Chronic thrombus on TTE  on Eliquis since 8/23/2023  Referral to cardiology     Body Mass Index Less Than 19, Adult    Encourage nutrition. He states that he lives in a place where meals are provided.      Chronic Left-Sided Low Back Pain Without Sciatica    Chronic low back pain, he is stable, on lidocaine patch daily as needed.     History of Cardioembolic Cerebrovascular Accident (Cva)    With mild residual expressive aphasia  On plavix, statin       Constipation, Unspecified   Pure Hypercholesterolemia    Chronic, on simvastatin     History of Coronary Artery Stent Placement    Hx of multiple stents placement  On plavix, statin  Hx of GI bleed in the past. Monitor bleeding  Referral to cardiology       Depression (Resolved)    Depressed mood, denies SI/HI  Referral to psychology          Current Outpatient Medications   Medication Sig    simvastatin (ZOCOR) 10 MG Tab Take 1 Tablet by mouth every evening.    omeprazole (PRILOSEC) 20 MG delayed-release capsule Take 1 Capsule by mouth every day. Take 1/2 hour prior to same meal.    formulation r (PREPARATION H) 0.25-14-74.9 % Ointment ointment Insert 1 Application into the rectum at bedtime.    nitroglycerin (NITROSTAT) 0.4 MG SL Tab Place 1 Tablet under the tongue as needed for Chest Pain.    melatonin 3 MG Tab Take 2 Tablets by mouth at bedtime.    lisinopril (PRINIVIL) 20 MG Tab Take 1 Tablet by mouth every day.    lidocaine (LIDODERM) 5 % Patch Place 1 Patch on the skin every 24 hours.    clopidogrel (PLAVIX) 75 MG Tab Take 1 Tablet by mouth every day.    apixaban (ELIQUIS) 5mg Tab Take 1 Tablet by mouth 2 times a day.    acetaminophen (TYLENOL) 500 MG Tab Take 1-2 Tablets by mouth every 8 hours as needed for Mild Pain.     Allergies   Allergen Reactions    Amlodipine      Edema    Cortisone Palpitations     Patient thinks gave him  "chest pain 8/28/23    Morphine Anxiety and Unspecified     Hallucinations       Allergies, past medical history, past surgical history, medications, family history, social history reviewed and updated.    ROS Please see HPI    Physical Exam  Vitals: /59 (BP Location: Right arm, Patient Position: Sitting, BP Cuff Size: Adult long)   Pulse 61   Temp 36.3 °C (97.3 °F) (Temporal)   Ht 1.854 m (6' 1\")   Wt 52.6 kg (116 lb)   SpO2 98%   BMI 15.30 kg/m²   Physical Exam  Constitutional:       General: He is not in acute distress.     Appearance: Normal appearance.   HENT:      Head: Normocephalic and atraumatic.      Nose: Nose normal.      Mouth/Throat:      Pharynx: Oropharynx is clear.   Eyes:      Extraocular Movements: Extraocular movements intact.      Conjunctiva/sclera: Conjunctivae normal.   Cardiovascular:      Rate and Rhythm: Normal rate and regular rhythm.   Pulmonary:      Effort: Pulmonary effort is normal.      Breath sounds: Normal breath sounds.   Abdominal:      General: Bowel sounds are normal.      Palpations: Abdomen is soft.      Tenderness: There is no abdominal tenderness.   Musculoskeletal:      Right lower leg: No edema.      Left lower leg: No edema.   Neurological:      Mental Status: He is alert.      Comments: Slow, unsteady gait, using a cane   Psychiatric:         Attention and Perception: Attention normal.         Mood and Affect: Mood is depressed.         Thought Content: Thought content normal.         Judgment: Judgment normal.      Comments: Slow speech and behavior at baseline         Assessment and Plan    1. Coronary artery disease involving native coronary artery of native heart without angina pectoris  - simvastatin (ZOCOR) 10 MG Tab; Take 1 Tablet by mouth every evening.  Dispense: 90 Tablet; Refill: 2  - nitroglycerin (NITROSTAT) 0.4 MG SL Tab; Place 1 Tablet under the tongue as needed for Chest Pain.  Dispense: 25 Tablet; Refill: 0  - clopidogrel (PLAVIX) 75 MG Tab; " Take 1 Tablet by mouth every day.  Dispense: 90 Tablet; Refill: 2  - lisinopril (PRINIVIL) 20 MG Tab; Take 1 Tablet by mouth every day.  Dispense: 90 Tablet; Refill: 2  - REFERRAL TO CARDIOLOGY    2. Heart failure with reduced ejection fraction (HCC)  - REFERRAL TO CARDIOLOGY  Chronic, currently not in exacerbation  - lisinopril (PRINIVIL) 20 MG Tab; Take 1 Tablet by mouth every day.  Dispense: 90 Tablet; Refill: 2    3. Mural thrombus of cardiac apex  - REFERRAL TO CARDIOLOGY for further management  - apixaban (ELIQUIS) 5mg Tab; Take 1 Tablet by mouth 2 times a day.  Dispense: 180 Tablet; Refill: 0    4. History of cardioembolic cerebrovascular accident (CVA)  Mild residual expressive aphasia  On plavix, statin    5. Pure hypercholesterolemia  - simvastatin (ZOCOR) 10 MG Tab; Take 1 Tablet by mouth every evening.  Dispense: 90 Tablet; Refill: 2  - Lipid Profile; Future    6. Gastroesophageal reflux disease with esophagitis without hemorrhage  - omeprazole (PRILOSEC) 20 MG delayed-release capsule; Take 1 Capsule by mouth every day. Take 1/2 hour prior to same meal.  Dispense: 90 Capsule; Refill: 2    7. Body mass index less than 19, adult  Encourage nutrition support  Currently living at a place that has prepared meals    8. Closed fracture of neck of right femur with routine healing  S/p total hip arthroplasty in 8.2023  Still has pain  Did rehab  Pending home health evaluation PT. He declines OT because he wants to focus on PT for now.   Fall precaution  Tylenol 500-1000 mg Q8H prn for pain control      9. History of coronary artery stent placement  - clopidogrel (PLAVIX) 75 MG Tab; Take 1 Tablet by mouth every day.  Dispense: 90 Tablet; Refill: 2    10. Primary hypertension  - lisinopril (PRINIVIL) 20 MG Tab; Take 1 Tablet by mouth every day.  Dispense: 90 Tablet; Refill: 2  - CBC WITH DIFFERENTIAL; Future  - Comp Metabolic Panel; Future  - TSH WITH REFLEX TO FT4; Future    11. Facial rash  - Referral to  Dermatology    12.  Recurrent major depressive disorder, in remission (HCC)  - Referral to Psychology    13. Insomnia, unspecified type  - melatonin 3 MG Tab; Take 2 Tablets by mouth at bedtime.  Dispense: 180 Tablet; Refill: 0  - Referral to Psychology    14. Chronic left-sided low back pain without sciatica  - lidocaine (LIDODERM) 5 % Patch; Place 1 Patch on the skin every 24 hours.  Dispense: 15 Patch; Refill: 3    15. Hemorrhoids, unspecified hemorrhoid type  - formulation r (PREPARATION H) 0.25-14-74.9 % Ointment ointment; Insert 1 Application into the rectum at bedtime.  Dispense: 28 g; Refill: 0          Follow-up:Return in about 2 weeks (around 11/7/2023), or if symptoms worsen or fail to improve, for Long.    This note was created using voice recognition software. There may be unintended errors in spelling, grammar or content.

## 2023-10-25 NOTE — ASSESSMENT & PLAN NOTE
Chronic, currently not in exacerbation, on lisinopril for BP, plavix, statin for CAD, eliquis for mural thrombus in apex.  Referral to cardiology    8/23/2023 TTE  Moderately reduced left ventricular systolic function.  The left ventricular ejection fraction is visually estimated to be 35-40%.  While not well-seen on contrast imaging, there appears to be an organized, chronic-appearing thrombus in the apex.  Normal right ventricular size and systolic function.  Estimated right ventricular systolic pressure is 47 mmHg.  Normal left atrial size.  Mild tricuspid regurgitation.  Normal inferior vena cava size and inspiratory collapse.

## 2023-10-25 NOTE — ASSESSMENT & PLAN NOTE
S/p total hip arthroplasty in 8.2023  Still has pain  Did rehab  Pending home health evaluation PT. He declines OT because he wants to focus on PT for now.   Fall precaution  Tylenol 500-1000 mg Q8H prn for pain control

## 2023-10-31 ENCOUNTER — TELEPHONE (OUTPATIENT)
Dept: HEALTH INFORMATION MANAGEMENT | Facility: OTHER | Age: 78
End: 2023-10-31
Payer: MEDICARE

## 2023-11-29 ENCOUNTER — PATIENT MESSAGE (OUTPATIENT)
Dept: HEALTH INFORMATION MANAGEMENT | Facility: OTHER | Age: 78
End: 2023-11-29

## 2023-12-12 RX ORDER — NITROGLYCERIN 0.4 MG/1
TABLET SUBLINGUAL
Qty: 25 TABLET | Refills: 0 | OUTPATIENT
Start: 2023-12-12

## 2024-02-16 DIAGNOSIS — I51.3 MURAL THROMBUS OF CARDIAC APEX: ICD-10-CM

## 2024-02-16 RX ORDER — APIXABAN 5 MG/1
5 TABLET, FILM COATED ORAL 2 TIMES DAILY
Qty: 180 TABLET | Refills: 0 | Status: SHIPPED | OUTPATIENT
Start: 2024-02-16

## 2024-02-16 NOTE — TELEPHONE ENCOUNTER
Received request via: Pharmacy    Was the patient seen in the last year in this department? Yes    Does the patient have an active prescription (recently filled or refills available) for medication(s) requested? No    Pharmacy Name: [facility]  #[id number]     Does the patient have detention Plus and need 100 day supply (blood pressure, diabetes and cholesterol meds only)? Patient does not have SCP

## 2024-03-05 ENCOUNTER — OFFICE VISIT (OUTPATIENT)
Dept: MEDICAL GROUP | Facility: MEDICAL CENTER | Age: 79
End: 2024-03-05
Payer: MEDICARE

## 2024-03-05 VITALS
DIASTOLIC BLOOD PRESSURE: 60 MMHG | HEIGHT: 73 IN | SYSTOLIC BLOOD PRESSURE: 132 MMHG | HEART RATE: 59 BPM | BODY MASS INDEX: 16.57 KG/M2 | OXYGEN SATURATION: 100 % | WEIGHT: 125 LBS | TEMPERATURE: 97.5 F | RESPIRATION RATE: 16 BRPM

## 2024-03-05 DIAGNOSIS — Z91.81 RISK FOR FALLS: ICD-10-CM

## 2024-03-05 DIAGNOSIS — L30.9 ECZEMA, UNSPECIFIED TYPE: ICD-10-CM

## 2024-03-05 DIAGNOSIS — R21 FACIAL RASH: ICD-10-CM

## 2024-03-05 DIAGNOSIS — L21.9 SEBORRHEIC DERMATITIS OF SCALP: ICD-10-CM

## 2024-03-05 DIAGNOSIS — I50.22 CHRONIC SYSTOLIC CONGESTIVE HEART FAILURE (HCC): ICD-10-CM

## 2024-03-05 DIAGNOSIS — F33.1 MODERATE EPISODE OF RECURRENT MAJOR DEPRESSIVE DISORDER (HCC): ICD-10-CM

## 2024-03-05 PROBLEM — I50.20 HEART FAILURE WITH REDUCED EJECTION FRACTION (HCC): Status: RESOLVED | Noted: 2023-08-28 | Resolved: 2024-03-05

## 2024-03-05 PROBLEM — F33.9 EPISODE OF RECURRENT MAJOR DEPRESSIVE DISORDER (HCC): Status: ACTIVE | Noted: 2023-10-24

## 2024-03-05 PROCEDURE — 3078F DIAST BP <80 MM HG: CPT | Performed by: PHYSICIAN ASSISTANT

## 2024-03-05 PROCEDURE — 99214 OFFICE O/P EST MOD 30 MIN: CPT | Performed by: PHYSICIAN ASSISTANT

## 2024-03-05 PROCEDURE — 3075F SYST BP GE 130 - 139MM HG: CPT | Performed by: PHYSICIAN ASSISTANT

## 2024-03-05 RX ORDER — MOMETASONE FUROATE 1 MG/G
0.25 CREAM TOPICAL
Qty: 45 G | Refills: 3 | Status: SHIPPED | OUTPATIENT
Start: 2024-03-05 | End: 2024-04-04

## 2024-03-05 ASSESSMENT — PATIENT HEALTH QUESTIONNAIRE - PHQ9
1. LITTLE INTEREST OR PLEASURE IN DOING THINGS: SEVERAL DAYS
5. POOR APPETITE OR OVEREATING: NOT AT ALL
6. FEELING BAD ABOUT YOURSELF - OR THAT YOU ARE A FAILURE OR HAVE LET YOURSELF OR YOUR FAMILY DOWN: MORE THAN HALF THE DAYS
8. MOVING OR SPEAKING SO SLOWLY THAT OTHER PEOPLE COULD HAVE NOTICED. OR THE OPPOSITE, BEING SO FIGETY OR RESTLESS THAT YOU HAVE BEEN MOVING AROUND A LOT MORE THAN USUAL: MORE THAN HALF THE DAYS
3. TROUBLE FALLING OR STAYING ASLEEP OR SLEEPING TOO MUCH: NEARLY EVERY DAY
SUM OF ALL RESPONSES TO PHQ QUESTIONS 1-9: 15
SUM OF ALL RESPONSES TO PHQ9 QUESTIONS 1 AND 2: 2
2. FEELING DOWN, DEPRESSED, IRRITABLE, OR HOPELESS: SEVERAL DAYS
7. TROUBLE CONCENTRATING ON THINGS, SUCH AS READING THE NEWSPAPER OR WATCHING TELEVISION: NEARLY EVERY DAY
9. THOUGHTS THAT YOU WOULD BE BETTER OFF DEAD, OR OF HURTING YOURSELF: SEVERAL DAYS
4. FEELING TIRED OR HAVING LITTLE ENERGY: MORE THAN HALF THE DAYS

## 2024-03-05 ASSESSMENT — FIBROSIS 4 INDEX: FIB4 SCORE: 0.7

## 2024-03-05 NOTE — ASSESSMENT & PLAN NOTE
Currently using a cane.  No recent falls since last year.  States after the fall and the surgery that his right foot does invert at times.

## 2024-03-05 NOTE — PROGRESS NOTES
Subjective:   Moe Nickerson is a 78 y.o. male here today for depression and other chronic medical conditions.    Episode of recurrent major depressive disorder (HCC)  This is a pleasant 78-year-old male here today to reestablish with me.  Had a fall last year.  Follow-up with another PCP.  He is unsure why.  He is doing well after his fall.  His depression appears to be worsening.  He scored high on his PHQ-9.  In the past he was on Celexa and then Zoloft.  I placed him on Wellbutrin last year.  He does not remember starting the medication.  He is requesting lorazepam.  That is the medication that works well for sleep.  He was given melatonin but states the medication caused him to have side effects.    Eczema  Complains of worsening eczema.  Does have a history of severe genital itching.  Would like a renewal of his steroid cream.  Would like to reestablish with a dermatologist.    Risk for falls  Currently using a cane.  No recent falls since last year.  States after the fall and the surgery that his right foot does invert at times.       Current medicines (including changes today)  Current Outpatient Medications   Medication Sig Dispense Refill    mometasone (ELOCON) 0.1 % Cream Apply 0.25 g topically 2 times a day for 30 days. 45 g 3    ELIQUIS 5 MG Tab TAKE 1 TABLET BY MOUTH TWICE A  Tablet 0    simvastatin (ZOCOR) 10 MG Tab Take 1 Tablet by mouth every evening. 90 Tablet 2    omeprazole (PRILOSEC) 20 MG delayed-release capsule Take 1 Capsule by mouth every day. Take 1/2 hour prior to same meal. 90 Capsule 2    formulation r (PREPARATION H) 0.25-14-74.9 % Ointment ointment Insert 1 Application into the rectum at bedtime. 28 g 0    nitroglycerin (NITROSTAT) 0.4 MG SL Tab Place 1 Tablet under the tongue as needed for Chest Pain. 25 Tablet 0    lisinopril (PRINIVIL) 20 MG Tab Take 1 Tablet by mouth every day. 90 Tablet 2    lidocaine (LIDODERM) 5 % Patch Place 1 Patch on the skin every 24 hours. 15  "Patch 3    clopidogrel (PLAVIX) 75 MG Tab Take 1 Tablet by mouth every day. 90 Tablet 2     No current facility-administered medications for this visit.     He  has a past medical history of Anxiety, Congestive heart failure (HCC), Heart attack (HCC), Hypertension, and Stroke (cerebrum) (HCC).    Social History and Family History were reviewed and updated.    ROS   No chest pain, no shortness of breath, no abdominal pain and all other systems were reviewed and are negative.       Objective:     /60 (BP Location: Left arm, Patient Position: Sitting, BP Cuff Size: Adult)   Pulse (!) 59   Temp 36.4 °C (97.5 °F) (Temporal)   Resp 16   Ht 1.854 m (6' 1\")   Wt 56.7 kg (125 lb)   SpO2 100%  Body mass index is 16.49 kg/m².   Physical Exam:  Constitutional: Alert, no distress.  Skin: Warm, dry, good turgor, no rashes in visible areas.  Eye: Equal, round and reactive, conjunctiva clear, lids normal.  ENMT: Lips without lesions, good dentition, oropharynx clear.  Neck: Trachea midline, no masses.   Psych: Alert and oriented x3, normal affect and mood.        Assessment and Plan:   The following treatment plan was discussed    1. Moderate episode of recurrent major depressive disorder (HCC)  Chronic condition with recent exacerbation.  At this point we will hold off on starting any medications.  Discussed trying to's restart Wellbutrin.  I will see him in 1 month in follow-up.    2. Chronic systolic congestive heart failure (HCC)  Chronic condition.  Stable.  Will see him in 1 month for follow-up.    3. Risk for falls  Recent fall was about 6 months ago.  Continue with cane use.  Offered referral to PT but he declined.  - Patient identified as fall risk.  Appropriate orders and counseling given.    4. Seborrheic dermatitis of scalp  Chronic condition.  Stable.  Refer to dermatology to establish care.  - Referral to Dermatology    5. Facial rash  Chronic condition.  Stable.  Refer to dermatology to establish care.  - " Referral to Dermatology    6. Eczema, unspecified type  Chronic condition.  Recent exacerbation.  Renewed steroid cream as directed.  Advised to reestablish with dermatology close to his home.  - Referral to Dermatology  - mometasone (ELOCON) 0.1 % Cream; Apply 0.25 g topically 2 times a day for 30 days.  Dispense: 45 g; Refill: 3         Followup: Return in about 4 weeks (around 4/2/2024), or if symptoms worsen or fail to improve.    Please note that this dictation was created using voice recognition software. I have made every reasonable attempt to correct obvious errors, but I expect that there are errors of grammar and possibly content that I did not discover before finalizing the note.

## 2024-03-05 NOTE — ASSESSMENT & PLAN NOTE
Complains of worsening eczema.  Does have a history of severe genital itching.  Would like a renewal of his steroid cream.  Would like to reestablish with a dermatologist.

## 2024-03-05 NOTE — ASSESSMENT & PLAN NOTE
This is a pleasant 78-year-old male here today to reestablish with me.  Had a fall last year.  Follow-up with another PCP.  He is unsure why.  He is doing well after his fall.  His depression appears to be worsening.  He scored high on his PHQ-9.  In the past he was on Celexa and then Zoloft.  I placed him on Wellbutrin last year.  He does not remember starting the medication.  He is requesting lorazepam.  That is the medication that works well for sleep.  He was given melatonin but states the medication caused him to have side effects.

## 2024-03-24 ENCOUNTER — PATIENT MESSAGE (OUTPATIENT)
Dept: MEDICAL GROUP | Facility: MEDICAL CENTER | Age: 79
End: 2024-03-24
Payer: MEDICARE

## 2024-03-24 DIAGNOSIS — Z91.81 RISK FOR FALLS: ICD-10-CM

## 2024-03-24 DIAGNOSIS — M54.50 CHRONIC LEFT-SIDED LOW BACK PAIN WITHOUT SCIATICA: ICD-10-CM

## 2024-03-24 DIAGNOSIS — G89.29 CHRONIC LEFT-SIDED LOW BACK PAIN WITHOUT SCIATICA: ICD-10-CM

## 2024-03-24 DIAGNOSIS — R26.89 DECREASED MOBILITY: ICD-10-CM

## 2024-03-25 ENCOUNTER — HOSPITAL ENCOUNTER (OUTPATIENT)
Dept: LAB | Facility: MEDICAL CENTER | Age: 79
End: 2024-03-25
Attending: INTERNAL MEDICINE
Payer: MEDICARE

## 2024-03-25 ENCOUNTER — PATIENT MESSAGE (OUTPATIENT)
Dept: MEDICAL GROUP | Facility: MEDICAL CENTER | Age: 79
End: 2024-03-25
Payer: MEDICARE

## 2024-03-25 DIAGNOSIS — E78.00 PURE HYPERCHOLESTEROLEMIA: ICD-10-CM

## 2024-03-25 DIAGNOSIS — S72.009D CLOSED FRACTURE OF HIP WITH ROUTINE HEALING, UNSPECIFIED LATERALITY, SUBSEQUENT ENCOUNTER: ICD-10-CM

## 2024-03-25 DIAGNOSIS — M79.604 BILATERAL LEG PAIN: ICD-10-CM

## 2024-03-25 DIAGNOSIS — I10 PRIMARY HYPERTENSION: ICD-10-CM

## 2024-03-25 DIAGNOSIS — M79.605 BILATERAL LEG PAIN: ICD-10-CM

## 2024-03-25 LAB
ALBUMIN SERPL BCP-MCNC: 4.9 G/DL (ref 3.2–4.9)
ALBUMIN/GLOB SERPL: 1.9 G/DL
ALP SERPL-CCNC: 84 U/L (ref 30–99)
ALT SERPL-CCNC: 19 U/L (ref 2–50)
ANION GAP SERPL CALC-SCNC: 14 MMOL/L (ref 7–16)
AST SERPL-CCNC: 27 U/L (ref 12–45)
BASOPHILS # BLD AUTO: 0.6 % (ref 0–1.8)
BASOPHILS # BLD: 0.04 K/UL (ref 0–0.12)
BILIRUB SERPL-MCNC: 1.1 MG/DL (ref 0.1–1.5)
BUN SERPL-MCNC: 20 MG/DL (ref 8–22)
CALCIUM ALBUM COR SERPL-MCNC: 9.4 MG/DL (ref 8.5–10.5)
CALCIUM SERPL-MCNC: 10.1 MG/DL (ref 8.5–10.5)
CHLORIDE SERPL-SCNC: 101 MMOL/L (ref 96–112)
CHOLEST SERPL-MCNC: 174 MG/DL (ref 100–199)
CO2 SERPL-SCNC: 25 MMOL/L (ref 20–33)
CREAT SERPL-MCNC: 0.88 MG/DL (ref 0.5–1.4)
EOSINOPHIL # BLD AUTO: 0.08 K/UL (ref 0–0.51)
EOSINOPHIL NFR BLD: 1.1 % (ref 0–6.9)
ERYTHROCYTE [DISTWIDTH] IN BLOOD BY AUTOMATED COUNT: 46.5 FL (ref 35.9–50)
GFR SERPLBLD CREATININE-BSD FMLA CKD-EPI: 86 ML/MIN/1.73 M 2
GLOBULIN SER CALC-MCNC: 2.6 G/DL (ref 1.9–3.5)
GLUCOSE SERPL-MCNC: 99 MG/DL (ref 65–99)
HCT VFR BLD AUTO: 49.6 % (ref 42–52)
HDLC SERPL-MCNC: 65 MG/DL
HGB BLD-MCNC: 16.5 G/DL (ref 14–18)
IMM GRANULOCYTES # BLD AUTO: 0.02 K/UL (ref 0–0.11)
IMM GRANULOCYTES NFR BLD AUTO: 0.3 % (ref 0–0.9)
LDLC SERPL CALC-MCNC: 92 MG/DL
LYMPHOCYTES # BLD AUTO: 1.83 K/UL (ref 1–4.8)
LYMPHOCYTES NFR BLD: 26.1 % (ref 22–41)
MCH RBC QN AUTO: 31.9 PG (ref 27–33)
MCHC RBC AUTO-ENTMCNC: 33.3 G/DL (ref 32.3–36.5)
MCV RBC AUTO: 95.8 FL (ref 81.4–97.8)
MONOCYTES # BLD AUTO: 0.66 K/UL (ref 0–0.85)
MONOCYTES NFR BLD AUTO: 9.4 % (ref 0–13.4)
NEUTROPHILS # BLD AUTO: 4.38 K/UL (ref 1.82–7.42)
NEUTROPHILS NFR BLD: 62.5 % (ref 44–72)
NRBC # BLD AUTO: 0 K/UL
NRBC BLD-RTO: 0 /100 WBC (ref 0–0.2)
PLATELET # BLD AUTO: 210 K/UL (ref 164–446)
PMV BLD AUTO: 13 FL (ref 9–12.9)
POTASSIUM SERPL-SCNC: 4.2 MMOL/L (ref 3.6–5.5)
PROT SERPL-MCNC: 7.5 G/DL (ref 6–8.2)
RBC # BLD AUTO: 5.18 M/UL (ref 4.7–6.1)
SODIUM SERPL-SCNC: 140 MMOL/L (ref 135–145)
TRIGL SERPL-MCNC: 84 MG/DL (ref 0–149)
TSH SERPL DL<=0.005 MIU/L-ACNC: 2.02 UIU/ML (ref 0.38–5.33)
WBC # BLD AUTO: 7 K/UL (ref 4.8–10.8)

## 2024-03-25 PROCEDURE — 85025 COMPLETE CBC W/AUTO DIFF WBC: CPT

## 2024-03-25 PROCEDURE — 80061 LIPID PANEL: CPT

## 2024-03-25 PROCEDURE — 36415 COLL VENOUS BLD VENIPUNCTURE: CPT

## 2024-03-25 PROCEDURE — 80053 COMPREHEN METABOLIC PANEL: CPT

## 2024-03-25 PROCEDURE — 84443 ASSAY THYROID STIM HORMONE: CPT

## 2024-04-24 ENCOUNTER — APPOINTMENT (OUTPATIENT)
Dept: MEDICAL GROUP | Facility: MEDICAL CENTER | Age: 79
End: 2024-04-24
Payer: MEDICARE

## 2024-04-24 VITALS
OXYGEN SATURATION: 97 % | WEIGHT: 119 LBS | TEMPERATURE: 97 F | SYSTOLIC BLOOD PRESSURE: 158 MMHG | HEART RATE: 65 BPM | BODY MASS INDEX: 15.7 KG/M2 | DIASTOLIC BLOOD PRESSURE: 83 MMHG

## 2024-04-24 DIAGNOSIS — E78.00 PURE HYPERCHOLESTEROLEMIA: ICD-10-CM

## 2024-04-24 DIAGNOSIS — I50.20 HEART FAILURE WITH REDUCED EJECTION FRACTION (HCC): ICD-10-CM

## 2024-04-24 DIAGNOSIS — I25.10 CORONARY ARTERY DISEASE INVOLVING NATIVE CORONARY ARTERY OF NATIVE HEART WITHOUT ANGINA PECTORIS: ICD-10-CM

## 2024-04-24 DIAGNOSIS — L21.9 SEBORRHEIC DERMATITIS OF SCALP: ICD-10-CM

## 2024-04-24 DIAGNOSIS — I51.3 MURAL THROMBUS OF CARDIAC APEX: ICD-10-CM

## 2024-04-24 DIAGNOSIS — Z95.5 HISTORY OF CORONARY ARTERY STENT PLACEMENT: ICD-10-CM

## 2024-04-24 DIAGNOSIS — I10 PRIMARY HYPERTENSION: ICD-10-CM

## 2024-04-24 DIAGNOSIS — K21.00 GASTROESOPHAGEAL REFLUX DISEASE WITH ESOPHAGITIS WITHOUT HEMORRHAGE: ICD-10-CM

## 2024-04-24 DIAGNOSIS — Z91.81 RISK FOR FALLS: ICD-10-CM

## 2024-04-24 PROCEDURE — 3077F SYST BP >= 140 MM HG: CPT | Performed by: PHYSICIAN ASSISTANT

## 2024-04-24 PROCEDURE — 3079F DIAST BP 80-89 MM HG: CPT | Performed by: PHYSICIAN ASSISTANT

## 2024-04-24 PROCEDURE — 99214 OFFICE O/P EST MOD 30 MIN: CPT | Performed by: PHYSICIAN ASSISTANT

## 2024-04-24 RX ORDER — SIMVASTATIN 10 MG
10 TABLET ORAL EVERY EVENING
Qty: 90 TABLET | Refills: 2 | Status: SHIPPED | OUTPATIENT
Start: 2024-04-24

## 2024-04-24 RX ORDER — LISINOPRIL 20 MG/1
20 TABLET ORAL DAILY
Qty: 90 TABLET | Refills: 2 | Status: SHIPPED | OUTPATIENT
Start: 2024-04-24 | End: 2024-04-24 | Stop reason: SDUPTHER

## 2024-04-24 RX ORDER — LISINOPRIL 20 MG/1
20 TABLET ORAL DAILY
Qty: 90 TABLET | Refills: 2 | Status: SHIPPED | OUTPATIENT
Start: 2024-04-24 | End: 2025-01-19

## 2024-04-24 RX ORDER — OMEPRAZOLE 20 MG/1
20 CAPSULE, DELAYED RELEASE ORAL DAILY
Qty: 90 CAPSULE | Refills: 2 | Status: SHIPPED | OUTPATIENT
Start: 2024-04-24

## 2024-04-24 ASSESSMENT — FIBROSIS 4 INDEX: FIB4 SCORE: 2.39

## 2024-04-24 NOTE — PROGRESS NOTES
Subjective:     History of Present Illness  The patient presents to the office for follow-up. He is accompanied by an adult female.    The patient has been diagnosed with plaque psoriasis, as diagnosed by one of the physicians at the [facility]. He reports experiencing symptoms in his beard and scalp. Despite receiving a referral to a dermatologist during his last visit, he has been unable to secure an appointment. He has sought treatment from two different physicians, who prescribed various medications, which unfortunately, have not alleviated his symptoms. He has been prescribed a shampoo, which he reports as being effective.    The patient is currently on Eliquis, omeprazole, simvastatin, and clopidogrel. He expresses a preference for Eliquis over clopidogrel, citing an increased frequency of bleeding as a side effect. He has discontinued Plavix due to pharmacy policy. He has also been prescribed nitroglycerin and Preparation H.    The patient has yet to consult with a cardiologist.    The patient has been diagnosed with bilateral cataracts by an ophthalmologist. He reports satisfactory vision with his current glasses.    The patient has been using a cane for ambulation since 09/23/2023, following a hip fracture. He is able to walk approximately 25 yards without the aid of a cane.      Current medicines (including changes today)  Current Outpatient Medications   Medication Sig Dispense Refill    simvastatin (ZOCOR) 10 MG Tab Take 1 Tablet by mouth every evening. 90 Tablet 2    omeprazole (PRILOSEC) 20 MG delayed-release capsule Take 1 Capsule by mouth every day. Take 1/2 hour prior to same meal. 90 Capsule 2    apixaban (ELIQUIS) 2.5mg Tab Take 1 Tablet by mouth 2 times a day for 270 days. 180 Tablet 2    lisinopril (PRINIVIL) 20 MG Tab Take 1 Tablet by mouth every day for 270 days. 90 Tablet 2    nitroglycerin (NITROSTAT) 0.4 MG SL Tab Place 1 Tablet under the tongue as needed for  Chest Pain. 25 Tablet 0     No current facility-administered medications for this visit.     He  has a past medical history of Anxiety, Congestive heart failure (HCC), Heart attack (HCC), Hypertension, and Stroke (cerebrum) (HCC).    ROS   No chest pain, no shortness of breath, no abdominal pain  Positive ROS as per HPI.  All other systems reviewed and are negative.     Objective:     BP (!) 158/83 (BP Location: Left arm, Patient Position: Sitting, BP Cuff Size: Adult)   Pulse 65   Temp 36.1 °C (97 °F) (Temporal)   Wt 54 kg (119 lb)   SpO2 97%  Body mass index is 15.7 kg/m².   Physical Exam    Constitutional: Alert, no distress.  Skin: Warm, dry, good turgor, no rashes in visible areas.  Eye: Equal, round and reactive, conjunctiva clear, lids normal.  ENMT: Lips without lesions, good dentition, oropharynx clear.  Neck: Trachea midline, no masses, no thyromegaly.   Respiratory: Unlabored respiratory effort.  Psych: Alert and oriented x3, normal affect and mood.      Results          Assessment and Plan:   The following treatment plan was discussed    Assessment & Plan  1. Seborrheic dermatitis of the scalp and other areas of the body.  The chronic condition remains uncontrolled. A referral to dermatology has been made by another group other than [facility].    2. Primary hypertension.  The patient's blood pressure was elevated during this visit, a chronic condition that is not currently well managed. Considering the patient's frailty, the current treatment plan of lisinopril at 20 mg daily will be maintained. The medication has been renewed.    3. Coronary artery disease.  Given the patient's risk for bleeding associated with falls, Plavix has been discontinued. The patient will be placed on a lower dose of Eliquis at 2.5 mg twice daily. A referral to cardiology has been made again. The importance of following up with cardiology for medication management has been discussed with the  patient.    4. Pure hypercholesterolemia.  The chronic condition is currently controlled. Simvastatin has been renewed at 10 mg.    5. GERD.  The condition is currently stable. Omeprazole has been renewed.    6. Mural thrombus of cardiac apex.  The patient has been placed on Eliquis at 5 mg. Given the patient's bleeding risk, the dosage will be reduced to 2.5 mg. A referral to cardiology has been made. It is hoped that the patient can consult with cardiology shortly.    7. Risk for falls.  The patient is currently using a cane. The RTC form has been completed to allow the patient to have a better way to get around town.      ORDERS:  1. Seborrheic dermatitis of scalp    - Referral to Dermatology    2. Primary hypertension    - lisinopril (PRINIVIL) 20 MG Tab; Take 1 Tablet by mouth every day for 270 days.  Dispense: 90 Tablet; Refill: 2    3. Coronary artery disease involving native coronary artery of native heart without angina pectoris    - simvastatin (ZOCOR) 10 MG Tab; Take 1 Tablet by mouth every evening.  Dispense: 90 Tablet; Refill: 2  - apixaban (ELIQUIS) 2.5mg Tab; Take 1 Tablet by mouth 2 times a day for 270 days.  Dispense: 180 Tablet; Refill: 2  - lisinopril (PRINIVIL) 20 MG Tab; Take 1 Tablet by mouth every day for 270 days.  Dispense: 90 Tablet; Refill: 2  - REFERRAL TO CARDIOLOGY    4. Heart failure with reduced ejection fraction (HCC)    - lisinopril (PRINIVIL) 20 MG Tab; Take 1 Tablet by mouth every day for 270 days.  Dispense: 90 Tablet; Refill: 2    5. History of coronary artery stent placement      6. Pure hypercholesterolemia    - simvastatin (ZOCOR) 10 MG Tab; Take 1 Tablet by mouth every evening.  Dispense: 90 Tablet; Refill: 2    7. Gastroesophageal reflux disease with esophagitis without hemorrhage    - omeprazole (PRILOSEC) 20 MG delayed-release capsule; Take 1 Capsule by mouth every day. Take 1/2 hour prior to same meal.  Dispense: 90 Capsule; Refill: 2    8. Mural thrombus of cardiac  [place]    - apixaban (ELIQUIS) 2.5mg Tab; Take 1 Tablet by mouth 2 times a day for 270 days.  Dispense: 180 Tablet; Refill: 2  - REFERRAL TO CARDIOLOGY    9. Risk for falls          Please note that this dictation was created using voice recognition software. I have made every reasonable attempt to correct obvious errors, but I expect that there are errors of grammar and possibly content that I did not discover before finalizing the note.      Attestation      Verbal consent was acquired by the patient to use [employer] ambient listening note generation during this visit Yes

## 2024-06-19 ENCOUNTER — OFFICE VISIT (OUTPATIENT)
Dept: MEDICAL GROUP | Facility: MEDICAL CENTER | Age: 79
End: 2024-06-19
Payer: MEDICARE

## 2024-06-19 VITALS
DIASTOLIC BLOOD PRESSURE: 50 MMHG | TEMPERATURE: 97.5 F | OXYGEN SATURATION: 95 % | RESPIRATION RATE: 16 BRPM | HEART RATE: 30 BPM | BODY MASS INDEX: 15.85 KG/M2 | WEIGHT: 119.6 LBS | HEIGHT: 73 IN | SYSTOLIC BLOOD PRESSURE: 122 MMHG

## 2024-06-19 DIAGNOSIS — L21.9 SEBORRHEIC DERMATITIS OF SCALP: ICD-10-CM

## 2024-06-19 DIAGNOSIS — Z12.5 SCREENING PSA (PROSTATE SPECIFIC ANTIGEN): ICD-10-CM

## 2024-06-19 DIAGNOSIS — G89.29 CHRONIC RIGHT HIP PAIN: ICD-10-CM

## 2024-06-19 DIAGNOSIS — H61.22 IMPACTED CERUMEN OF LEFT EAR: ICD-10-CM

## 2024-06-19 DIAGNOSIS — M25.551 CHRONIC RIGHT HIP PAIN: ICD-10-CM

## 2024-06-19 DIAGNOSIS — H91.93 BILATERAL HEARING LOSS, UNSPECIFIED HEARING LOSS TYPE: ICD-10-CM

## 2024-06-19 DIAGNOSIS — E78.00 PURE HYPERCHOLESTEROLEMIA: ICD-10-CM

## 2024-06-19 PROBLEM — H91.90 HEARING IMPAIRED: Status: RESOLVED | Noted: 2017-07-29 | Resolved: 2024-06-19

## 2024-06-19 PROBLEM — E78.5 HYPERLIPIDEMIA: Status: RESOLVED | Noted: 2023-08-28 | Resolved: 2024-06-19

## 2024-06-19 PROBLEM — R13.10 SWALLOWING PROBLEM: Status: RESOLVED | Noted: 2020-02-28 | Resolved: 2024-06-19

## 2024-06-19 PROBLEM — K59.00 CONSTIPATION, UNSPECIFIED: Status: RESOLVED | Noted: 2019-08-20 | Resolved: 2024-06-19

## 2024-06-19 PROBLEM — R21 FACIAL RASH: Status: RESOLVED | Noted: 2023-10-24 | Resolved: 2024-06-19

## 2024-06-19 PROCEDURE — 3078F DIAST BP <80 MM HG: CPT | Performed by: PHYSICIAN ASSISTANT

## 2024-06-19 PROCEDURE — 99214 OFFICE O/P EST MOD 30 MIN: CPT | Performed by: PHYSICIAN ASSISTANT

## 2024-06-19 PROCEDURE — 3074F SYST BP LT 130 MM HG: CPT | Performed by: PHYSICIAN ASSISTANT

## 2024-06-19 RX ORDER — FLUOCINOLONE ACETONIDE 0.11 MG/ML
OIL TOPICAL
COMMUNITY
Start: 2024-06-04

## 2024-06-19 RX ORDER — ACETAMINOPHEN 500 MG
500 TABLET ORAL EVERY 6 HOURS PRN
COMMUNITY

## 2024-06-19 RX ORDER — CICLOPIROX 1 G/100ML
SHAMPOO TOPICAL
COMMUNITY
Start: 2024-06-04

## 2024-06-19 ASSESSMENT — FIBROSIS 4 INDEX: FIB4 SCORE: 2.39

## 2024-06-19 NOTE — ASSESSMENT & PLAN NOTE
This is a pleasant 79-year-old male here today to follow-up on his health.  I see no every 2 months to keep on top of his medical concerns.  He states that the right hip pain that he was dealing with last time has improved.  He does take Tylenol on a regular basis sometimes several times a day.  He did fracture his right femur late last year.  He is now not going through PT.  Does feel weak at times and deals with dizziness and today did not bring in his cane.  He is interested in having home health.  He performed labs in March.  Labs generally well-controlled.  Nothing concerning.  Cholesterol levels are well-controlled.  He is on simvastatin 10 mg.  Also continues to take omeprazole daily for reflux symptoms.  Would like to see an ear nose and throat specialist because he is unable to hear bilaterally.  He did have wax removed from his left ear several years ago in [place].  Since his last office visit he has lost 6 pounds.  Also recently seen by dermatology at [facility].  He is currently using 2 different applications and has had little change.  Does have an appointment early July with one of the PAs.  He is glad though that the location is closer to his home.

## 2024-06-19 NOTE — PROGRESS NOTES
Subjective:   Moe Nickerson is a 79 y.o. male here today for chronic right hip pain and hypercholesterolemia.    Chronic right hip pain  This is a pleasant 79-year-old male here today to follow-up on his health.  I see no every 2 months to keep on top of his medical concerns.  He states that the right hip pain that he was dealing with last time has improved.  He does take Tylenol on a regular basis sometimes several times a day.  He did fracture his right femur late last year.  He is now not going through PT.  Does feel weak at times and deals with dizziness and today did not bring in his cane.  He is interested in having home health.  He performed labs in March.  Labs generally well-controlled.  Nothing concerning.  Cholesterol levels are well-controlled.  He is on simvastatin 10 mg.  Also continues to take omeprazole daily for reflux symptoms.  Would like to see an ear nose and throat specialist because he is unable to hear bilaterally.  He did have wax removed from his left ear several years ago in [place].  Since his last office visit he has lost 6 pounds.  Also recently seen by dermatology at [facility].  He is currently using 2 different applications and has had little change.  Does have an appointment early July with one of the PAs.  He is glad though that the location is closer to his home.       Current medicines (including changes today)  Current Outpatient Medications   Medication Sig Dispense Refill    Fluocinolone Acetonide Scalp 0.01 % Oil APPLY TO RASH/ITCHING ON THE SCALP ONCE DAILY WITH A MAX USE OF 5 DAYS PER WEEK      Ciclopirox 1 % Shampoo WASH SCALP 2-3 X WEEKLY, ALLOW TO SIT 1-2 MINUTES PRIOR TO RINSING      acetaminophen (TYLENOL) 500 MG Tab Take 500 mg by mouth every 6 hours as needed for Moderate Pain.      simvastatin (ZOCOR) 10 MG Tab Take 1 Tablet by mouth every evening. 90 Tablet 2    omeprazole (PRILOSEC) 20 MG delayed-release capsule Take 1 Capsule by mouth every day. Take 1/2 hour  "prior to same meal. 90 Capsule 2    apixaban (ELIQUIS) 2.5mg Tab Take 1 Tablet by mouth 2 times a day for 270 days. 180 Tablet 2    lisinopril (PRINIVIL) 20 MG Tab Take 1 Tablet by mouth every day for 270 days. 90 Tablet 2    nitroglycerin (NITROSTAT) 0.4 MG SL Tab Place 1 Tablet under the tongue as needed for Chest Pain. 25 Tablet 0     No current facility-administered medications for this visit.     He  has a past medical history of Anxiety, Congestive heart failure (HCC), Heart attack (HCC), Hypertension, and Stroke (cerebrum) (HCC).    Social History and Family History were reviewed and updated.    ROS   No chest pain, no shortness of breath, no abdominal pain and all other systems were reviewed and are negative.       Objective:     /50 (BP Location: Right arm, Patient Position: Sitting, BP Cuff Size: Adult)   Pulse (!) 30   Temp 36.4 °C (97.5 °F) (Temporal)   Resp 16   Ht 1.854 m (6' 1\")   Wt 54.3 kg (119 lb 9.6 oz)   SpO2 95%  Body mass index is 15.78 kg/m².   Physical Exam:  Constitutional: Alert, no distress.  Skin: Warm, dry, good turgor, no rashes in visible areas.  Eye: Equal, round and reactive, conjunctiva clear, lids normal.  ENMT: Lips without lesions, good dentition, oropharynx clear.  Neck: Trachea midline, no masses.   Psych: Alert and oriented x3, normal affect and mood.        Assessment and Plan:   The following treatment plan was discussed    1. Chronic right hip pain  Chronic condition.  Stable.  Urged to continue to use his cane for balance.  Referred to home health.  - Referral to Home Health    2. Pure hypercholesterolemia  Chronic condition.  Stable.  Reviewed recent labs.  Continue statin.    3. Seborrheic dermatitis of scalp  Chronic condition.  Stable.  Continue to follow with [facility].  Continue medications as directed which I have updated.    4. Bilateral hearing loss, unspecified hearing loss type  New condition noted in chart but chronic.  Refer to ENT for evaluation does " need the wax removed in the left ear.  - Referral to ENT    5. Impacted cerumen of left ear  Chronic condition.  New condition noted in chart.  Refer to ENT given the significant wax impaction.  - Referral to ENT    6. Body mass index less than 19, adult  Chronic condition.  Continues to lose weight.  6 pounds over the past 2 months.  I will see him in 2 months.  Referred to home health.  - Referral to Home Health    7. Screening PSA (prostate specific antigen)  PSA not ordered today.  Will await until next office visit and then order more labs during that time.  Last PSA was performed about 4 years ago.         Followup: Return in about 2 months (around 8/19/2024), or if symptoms worsen or fail to improve.    Please note that this dictation was created using voice recognition software. I have made every reasonable attempt to correct obvious errors, but I expect that there are errors of grammar and possibly content that I did not discover before finalizing the note.

## 2024-06-20 ENCOUNTER — HOME HEALTH ADMISSION (OUTPATIENT)
Dept: HOME HEALTH SERVICES | Facility: HOME HEALTHCARE | Age: 79
End: 2024-06-20
Payer: MEDICARE

## 2024-08-01 ENCOUNTER — TELEPHONE (OUTPATIENT)
Dept: HEALTH INFORMATION MANAGEMENT | Facility: OTHER | Age: 79
End: 2024-08-01
Payer: MEDICARE

## 2024-08-14 NOTE — PROGRESS NOTES
Chief Complaint   Patient presents with    Follow-Up     Pure hypercholesterolemia    Hypertension    Coronary Artery Disease     Coronary artery disease involving native coronary artery of native heart without angina pectoris          Subjective:   Moe Nickerson is a 79 y.o. male who presents today for follow-up.     Previously seen by Dr. Butler in our clinic in 2022.  He has been lost to follow-up.  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  He had a repeat echocardiogram in August 2023 showing slightly improved ejection fraction of 35 to 40% with possible chronic appearing thrombus in the apex.  He has been placed on Eliquis for this.  He does have a history of coronary artery stenting, he estimates that this was done in 2017 or 2018 done in [place] at [place] in [facility]. 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 He has a difficult time answering questions today. He has occasional leg swelling that he treats with elevation, he has never required a diuretic or hospitalized for heart failure. Denies orthopnea, PND.     He gets chest pain when he gets nervous. Does not get exertional chest pressure.      He has occasional hematochezia.  He has a possible diagnosis of ulcerative colitis?  He had a lot of bleeding complications while on aspirin and also on Plavix but he is currently tolerating the Eliquis well.  Past Medical History:   Diagnosis Date    Anxiety     Congestive heart failure (HCC)     Heart attack (HCC)     Hypertension     Stroke (cerebrum) (HCC)          No family history on file.      Social History     Tobacco Use    Smoking status: Never    Smokeless tobacco: Never   Vaping Use    Vaping status: Never Used   Substance Use Topics    Alcohol use: No    Drug use: No         Allergies   Allergen Reactions    Amlodipine      Edema    Cortisone Palpitations     Patient thinks gave  "him chest pain 8/28/23    Morphine Anxiety and Unspecified     Hallucinations         Current Outpatient Medications   Medication Sig    rosuvastatin (CRESTOR) 10 MG Tab Take 1 Tablet by mouth every evening.    LORazepam (ATIVAN) 0.5 MG Tab Take 1 Tablet by mouth as needed for Anxiety (take 1 before cardiac teseting) for up to 1 dose.    Ciclopirox 1 % Shampoo WASH SCALP 2-3 X WEEKLY, ALLOW TO SIT 1-2 MINUTES PRIOR TO RINSING    acetaminophen (TYLENOL) 500 MG Tab Take 500 mg by mouth every 6 hours as needed for Moderate Pain.    omeprazole (PRILOSEC) 20 MG delayed-release capsule Take 1 Capsule by mouth every day. Take 1/2 hour prior to same meal.    apixaban (ELIQUIS) 2.5mg Tab Take 1 Tablet by mouth 2 times a day for 270 days.    lisinopril (PRINIVIL) 20 MG Tab Take 1 Tablet by mouth every day for 270 days.    nitroglycerin (NITROSTAT) 0.4 MG SL Tab Place 1 Tablet under the tongue as needed for Chest Pain.    Fluocinolone Acetonide Scalp 0.01 % Oil APPLY TO RASH/ITCHING ON THE SCALP ONCE DAILY WITH A MAX USE OF 5 DAYS PER WEEK (Patient not taking: Reported on 8/15/2024)         Review of Systems   Constitutional:  Negative for malaise/fatigue.   Respiratory:  Negative for cough and shortness of breath.    Cardiovascular:  Positive for chest pain. Negative for palpitations, claudication, leg swelling and PND.   Gastrointestinal:  Positive for blood in stool.   Neurological:  Negative for dizziness.          Objective:   /64 (BP Location: Left arm, Patient Position: Sitting, BP Cuff Size: Adult)   Pulse 66   Resp 16   Ht 1.854 m (6' 1\")   Wt 54.9 kg (121 lb)   SpO2 99%  Body mass index is 15.96 kg/m².         Physical Exam  Vitals reviewed.   Constitutional:       Comments: Thin frail male, highly anxious   HENT:      Head: Normocephalic and atraumatic.   Neck:      Comments: No JVD  Cardiovascular:      Rate and Rhythm: Normal rate and regular rhythm.      Heart sounds: No murmur heard.  Pulmonary:      " Effort: Pulmonary effort is normal. No respiratory distress.      Breath sounds: No rales.   Skin:     General: Skin is warm.   Neurological:      Mental Status: He is alert.   Psychiatric:         Judgment: Judgment normal.            Assessment:     1. Panic attack  LORazepam (ATIVAN) 0.5 MG Tab    Referral to Behavioral Health      2. History of coronary artery stent placement  rosuvastatin (CRESTOR) 10 MG Tab      3. LV (left ventricular) mural thrombus  EC-ECHOCARDIOGRAM COMPLETE W/ CONT      4. Pure hypercholesterolemia        5. Ischemic dilated cardiomyopathy (HCC)             Medical Decision Making:  Today's Assessment / Plan:   Chronic LV dysfunction without clinical signs of heart failure  LV thrombus  Coronary artery disease, PCI in 2018? Unknown details    -will request records of past [facility]  - clinically he is compensated. The exam today was difficult because of his severe anxiety in healthcare settings. We discussed this and he agrees to a behavioral health referral. - We need to re-image for the LV thrombus and he asks for a single dose of ativan to tolerate this test which I have prescribed him  - if echo confirms resolved LV thrombus and no wall motion abnormalities he could stop the DOAC and return to an antiplatelet though he had a lot of bleeding with these in the past. If the LV thrombus is still present continue the DOAC  - Previously prescribed metoprolol and spironolactone at last visit though his heart rates are quite low on past vitals and a diuretic would likely dehydrate him as I worry about his oral intake.   - stop simvastatin and use rosuvastatin to achieve LDL <70    Schedule echo then establish with a cardiologist  No follow-ups on file.  Sooner if problems.

## 2024-08-15 ENCOUNTER — OFFICE VISIT (OUTPATIENT)
Dept: CARDIOLOGY | Facility: MEDICAL CENTER | Age: 79
End: 2024-08-15
Attending: PHYSICIAN ASSISTANT
Payer: MEDICARE

## 2024-08-15 VITALS
SYSTOLIC BLOOD PRESSURE: 120 MMHG | RESPIRATION RATE: 16 BRPM | OXYGEN SATURATION: 99 % | DIASTOLIC BLOOD PRESSURE: 64 MMHG | WEIGHT: 121 LBS | HEART RATE: 66 BPM | HEIGHT: 73 IN | BODY MASS INDEX: 16.04 KG/M2

## 2024-08-15 DIAGNOSIS — E78.00 PURE HYPERCHOLESTEROLEMIA: ICD-10-CM

## 2024-08-15 DIAGNOSIS — F41.0 PANIC ATTACK: ICD-10-CM

## 2024-08-15 DIAGNOSIS — Z95.5 HISTORY OF CORONARY ARTERY STENT PLACEMENT: ICD-10-CM

## 2024-08-15 DIAGNOSIS — I42.0 ISCHEMIC DILATED CARDIOMYOPATHY (HCC): ICD-10-CM

## 2024-08-15 DIAGNOSIS — I25.5 ISCHEMIC DILATED CARDIOMYOPATHY (HCC): ICD-10-CM

## 2024-08-15 DIAGNOSIS — I51.3 LV (LEFT VENTRICULAR) MURAL THROMBUS: ICD-10-CM

## 2024-08-15 PROCEDURE — 3078F DIAST BP <80 MM HG: CPT | Performed by: PHYSICIAN ASSISTANT

## 2024-08-15 PROCEDURE — 3074F SYST BP LT 130 MM HG: CPT | Performed by: PHYSICIAN ASSISTANT

## 2024-08-15 PROCEDURE — 99214 OFFICE O/P EST MOD 30 MIN: CPT | Performed by: PHYSICIAN ASSISTANT

## 2024-08-15 PROCEDURE — 99212 OFFICE O/P EST SF 10 MIN: CPT | Performed by: PHYSICIAN ASSISTANT

## 2024-08-15 RX ORDER — LORAZEPAM 0.5 MG/1
0.5 TABLET ORAL PRN
Qty: 2 TABLET | Refills: 0 | Status: SHIPPED | OUTPATIENT
Start: 2024-08-15 | End: 2024-11-01

## 2024-08-15 RX ORDER — ROSUVASTATIN CALCIUM 10 MG/1
10 TABLET, COATED ORAL EVERY EVENING
Qty: 90 TABLET | Refills: 3 | Status: SHIPPED | OUTPATIENT
Start: 2024-08-15

## 2024-08-15 ASSESSMENT — ENCOUNTER SYMPTOMS
PALPITATIONS: 0
CLAUDICATION: 0
PND: 0
COUGH: 0
DIZZINESS: 0
SHORTNESS OF BREATH: 0
BLOOD IN STOOL: 1

## 2024-08-15 ASSESSMENT — FIBROSIS 4 INDEX: FIB4 SCORE: 2.39

## 2024-08-15 NOTE — PATIENT INSTRUCTIONS
Stop Simvastatin, start rosuvastatin for the cholesterol and lower risk of heart attack and stroke.       Schedule echocardiogram. This will have an IV and medication to look at the blood clot. You may use lorazepam before the test. You must have a .     Follow up with our cardiologist afterwards

## 2024-08-20 ENCOUNTER — APPOINTMENT (OUTPATIENT)
Dept: MEDICAL GROUP | Facility: MEDICAL CENTER | Age: 79
End: 2024-08-20
Payer: MEDICARE

## 2024-09-10 ENCOUNTER — APPOINTMENT (OUTPATIENT)
Dept: CARDIOLOGY | Facility: MEDICAL CENTER | Age: 79
End: 2024-09-10
Attending: PHYSICIAN ASSISTANT
Payer: MEDICARE

## 2024-10-03 ENCOUNTER — TELEPHONE (OUTPATIENT)
Dept: CARDIOLOGY | Facility: MEDICAL CENTER | Age: 79
End: 2024-10-03
Payer: MEDICARE

## 2024-10-08 ENCOUNTER — APPOINTMENT (OUTPATIENT)
Dept: CARDIOLOGY | Facility: MEDICAL CENTER | Age: 79
End: 2024-10-08
Attending: INTERNAL MEDICINE
Payer: MEDICARE

## 2025-04-10 ENCOUNTER — OFFICE VISIT (OUTPATIENT)
Dept: URGENT CARE | Facility: CLINIC | Age: 80
End: 2025-04-10
Payer: MEDICARE

## 2025-04-10 VITALS
DIASTOLIC BLOOD PRESSURE: 88 MMHG | SYSTOLIC BLOOD PRESSURE: 144 MMHG | TEMPERATURE: 98.3 F | OXYGEN SATURATION: 96 % | RESPIRATION RATE: 14 BRPM | HEART RATE: 72 BPM

## 2025-04-10 DIAGNOSIS — B35.6 JOCK ITCH: ICD-10-CM

## 2025-04-10 DIAGNOSIS — L40.9 PSORIASIS: ICD-10-CM

## 2025-04-10 PROCEDURE — 99214 OFFICE O/P EST MOD 30 MIN: CPT | Performed by: PHYSICIAN ASSISTANT

## 2025-04-10 PROCEDURE — 3079F DIAST BP 80-89 MM HG: CPT | Performed by: PHYSICIAN ASSISTANT

## 2025-04-10 PROCEDURE — 3077F SYST BP >= 140 MM HG: CPT | Performed by: PHYSICIAN ASSISTANT

## 2025-04-10 RX ORDER — LISINOPRIL 20 MG/1
TABLET ORAL
COMMUNITY
Start: 2025-01-27

## 2025-04-10 RX ORDER — CICLOPIROX 1 G/100ML
SHAMPOO TOPICAL
Qty: 120 ML | Refills: 0 | Status: SHIPPED | OUTPATIENT
Start: 2025-04-10

## 2025-04-10 RX ORDER — TRIAMCINOLONE ACETONIDE OINTMENT USP, 0.05% 0.5 MG/G
1 OINTMENT TOPICAL
Qty: 110 G | Refills: 0 | Status: SHIPPED | OUTPATIENT
Start: 2025-04-10

## 2025-04-10 RX ORDER — APIXABAN 2.5 MG/1
TABLET, FILM COATED ORAL
COMMUNITY
Start: 2025-03-10

## 2025-04-10 RX ORDER — FLUOCINOLONE ACETONIDE 0.11 MG/ML
OIL TOPICAL
Qty: 118 ML | Refills: 0 | Status: SHIPPED | OUTPATIENT
Start: 2025-04-10

## 2025-04-10 NOTE — PROGRESS NOTES
Subjective:   Moe Nickerson is a 80 y.o. male who presents for Rash (Spots over most of body, pt states it is on his anus as well, x 1 year )      Is a 80-year-old male who presents with a history of psoriasis, has had a flareup over the last several months and is also having some itchiness and discomfort around the rectum.  He has seen dermatology before this and they prescribed ciclopirox and flecainide scalp oil which did seem to help however he is out of this.  Has not yet reached out to his dermatologist.    Review of Systems   Skin:  Positive for itching and rash.       Medications, Allergies, and current problem list reviewed today in Epic.     Objective:     BP (!) 144/88   Pulse 72   Temp 36.8 °C (98.3 °F)   Resp 14   SpO2 96%     Physical Exam  Vitals reviewed.   Constitutional:       Appearance: Normal appearance.   HENT:      Head: Normocephalic and atraumatic.      Right Ear: External ear normal.      Left Ear: External ear normal.      Nose: Nose normal.      Mouth/Throat:      Mouth: Mucous membranes are moist.   Eyes:      Conjunctiva/sclera: Conjunctivae normal.   Cardiovascular:      Rate and Rhythm: Normal rate.   Pulmonary:      Effort: Pulmonary effort is normal.   Genitourinary:     Comments: Beefy perirectal erythema with satellite macules  Skin:     General: Skin is warm and dry.      Capillary Refill: Capillary refill takes less than 2 seconds.      Comments: Left forearm 2 psoriatic patches the largest of which is 3 cm x 1 cm   Neurological:      Mental Status: He is alert and oriented to person, place, and time.         Assessment/Plan:     Diagnosis and associated orders:     1. Psoriasis  Ciclopirox 1 % Shampoo    Fluocinolone Acetonide Scalp 0.01 % Oil    TRIAMCINOLONE ACETONIDE, TOP, 0.05 % Ointment      2. Jock itch           Comments/MDM:     Recommend topical clotrimazole OTC around the rectum for what appears to be a tinea infection  He seemed to tolerate have improvement  from the 2 prior medications prescribed for dermatology so I sent refills of this to the pharmacy.  He has a hydrocortisone allergy listed that he believes led to chest pain but he has tolerated other topical steroids in the past.  Will send triamcinolone ointment for him to use on the psoriatic patches that are bothering him on his arm  I looked up and wrote down for him his dermatologist number and strongly recommended that he call dermatology for follow-up         Differential diagnosis, natural history, supportive care, and indications for immediate follow-up discussed.    Advised the patient to follow-up with the primary care physician for recheck, reevaluation, and consideration of further management.    Please note that this dictation was created using voice recognition software. I have made a reasonable attempt to correct obvious errors, but I expect that there are errors of grammar and possibly content that I did not discover before finalizing the note.    This note was electronically signed by Reji Pope PA-C

## 2025-05-22 ENCOUNTER — TELEPHONE (OUTPATIENT)
Dept: HEALTH INFORMATION MANAGEMENT | Facility: OTHER | Age: 80
End: 2025-05-22
Payer: MEDICARE

## 2025-06-24 ENCOUNTER — OFFICE VISIT (OUTPATIENT)
Dept: MEDICAL GROUP | Facility: MEDICAL CENTER | Age: 80
End: 2025-06-24
Payer: MEDICARE

## 2025-06-24 VITALS
OXYGEN SATURATION: 99 % | SYSTOLIC BLOOD PRESSURE: 138 MMHG | DIASTOLIC BLOOD PRESSURE: 50 MMHG | TEMPERATURE: 98.5 F | WEIGHT: 119 LBS | HEART RATE: 37 BPM | BODY MASS INDEX: 17.63 KG/M2 | HEIGHT: 69 IN

## 2025-06-24 DIAGNOSIS — R00.1 BRADYCARDIA: ICD-10-CM

## 2025-06-24 DIAGNOSIS — I49.40: ICD-10-CM

## 2025-06-24 DIAGNOSIS — L30.9 ECZEMA, UNSPECIFIED TYPE: ICD-10-CM

## 2025-06-24 DIAGNOSIS — G62.9 NEUROPATHY: ICD-10-CM

## 2025-06-24 DIAGNOSIS — R06.02 SHORTNESS OF BREATH: ICD-10-CM

## 2025-06-24 PROBLEM — Z12.5 SCREENING PSA (PROSTATE SPECIFIC ANTIGEN): Status: RESOLVED | Noted: 2023-08-28 | Resolved: 2025-06-24

## 2025-06-24 PROCEDURE — 3078F DIAST BP <80 MM HG: CPT | Performed by: PHYSICIAN ASSISTANT

## 2025-06-24 PROCEDURE — 99214 OFFICE O/P EST MOD 30 MIN: CPT | Performed by: PHYSICIAN ASSISTANT

## 2025-06-24 PROCEDURE — 3075F SYST BP GE 130 - 139MM HG: CPT | Performed by: PHYSICIAN ASSISTANT

## 2025-06-24 PROCEDURE — 93000 ELECTROCARDIOGRAM COMPLETE: CPT | Performed by: PHYSICIAN ASSISTANT

## 2025-06-24 RX ORDER — KETOCONAZOLE 20 MG/G
2 CREAM TOPICAL 2 TIMES DAILY
COMMUNITY
Start: 2025-05-19

## 2025-06-24 RX ORDER — GABAPENTIN 100 MG/1
100 CAPSULE ORAL 3 TIMES DAILY
Qty: 270 CAPSULE | Refills: 3 | Status: SHIPPED | OUTPATIENT
Start: 2025-06-24 | End: 2026-06-19

## 2025-06-24 RX ORDER — CLOBETASOL PROPIONATE 0.5 MG/G
0.05 OINTMENT TOPICAL 2 TIMES DAILY
COMMUNITY
Start: 2025-05-19

## 2025-06-24 RX ORDER — KETOCONAZOLE 20 MG/ML
2 SHAMPOO, SUSPENSION TOPICAL
COMMUNITY
Start: 2025-05-19

## 2025-06-24 RX ORDER — ROFLUMILAST 3 MG/G
0.3 AEROSOL, FOAM TOPICAL
COMMUNITY
Start: 2025-05-19

## 2025-06-24 ASSESSMENT — PATIENT HEALTH QUESTIONNAIRE - PHQ9
3. TROUBLE FALLING OR STAYING ASLEEP OR SLEEPING TOO MUCH: NEARLY EVERY DAY
8. MOVING OR SPEAKING SO SLOWLY THAT OTHER PEOPLE COULD HAVE NOTICED. OR THE OPPOSITE, BEING SO FIGETY OR RESTLESS THAT YOU HAVE BEEN MOVING AROUND A LOT MORE THAN USUAL: NOT AT ALL
SUM OF ALL RESPONSES TO PHQ QUESTIONS 1-9: 4
5. POOR APPETITE OR OVEREATING: NOT AT ALL
SUM OF ALL RESPONSES TO PHQ9 QUESTIONS 1 AND 2: 0
2. FEELING DOWN, DEPRESSED, IRRITABLE, OR HOPELESS: NOT AT ALL
9. THOUGHTS THAT YOU WOULD BE BETTER OFF DEAD, OR OF HURTING YOURSELF: NOT AT ALL
7. TROUBLE CONCENTRATING ON THINGS, SUCH AS READING THE NEWSPAPER OR WATCHING TELEVISION: NOT AT ALL
4. FEELING TIRED OR HAVING LITTLE ENERGY: SEVERAL DAYS
1. LITTLE INTEREST OR PLEASURE IN DOING THINGS: NOT AT ALL
6. FEELING BAD ABOUT YOURSELF - OR THAT YOU ARE A FAILURE OR HAVE LET YOURSELF OR YOUR FAMILY DOWN: NOT AL ALL

## 2025-06-24 ASSESSMENT — FIBROSIS 4 INDEX: FIB4 SCORE: 2.42

## 2025-06-24 NOTE — LETTER
[facility]  José Robert P.A.-C.  [address]  [place] [address]  Fax: [phone]   Authorization for Release/Disclosure of   Protected Health Information   Name: DARBY RODRIGUEZ : 1945 SSN: [id number]   Address: [address] [place] [address]  [address] [facility] [address]  [place] [address] Phone:    There are no phone numbers on file.   I authorize the entity listed below to release/disclose the PHI below to:   [facility]José Robert P.A.-C. and José Rboert P.A.-C.   Provider or Entity Name:  Sarai Rucker PA-C at [facility]   City, State, [facility]   Phone:      Fax:     Reason for request: continuity of care   Information to be released: Record after April note reflected bx of lower leg.   [  ] LAST COLONOSCOPY,  including any PATH REPORT and follow-up  [  ] LAST FIT/COLOGUARD RESULT [  ] LAST DEXA  [  ] LAST MAMMOGRAM  [  ] LAST PAP  [  ] LAST LABS [  ] RETINA EXAM REPORT  [  ] IMMUNIZATION RECORDS  [ X ] Release all info      [  ] Check here and initial the line next to each item to release ALL health information INCLUDING  _____ Care and treatment for drug and / or alcohol abuse  _____ HIV testing, infection status, or AIDS  _____ Genetic Testing    DATES OF SERVICE OR TIME PERIOD TO BE DISCLOSED: _____________  I understand and acknowledge that:  * This Authorization may be revoked at any time by you in writing, except if your health information has already been used or disclosed.  * Your health information that will be used or disclosed as a result of you signing this authorization could be re-disclosed by the recipient. If this occurs, your re-disclosed health information may no longer be protected by State or Federal laws.  * You may refuse to sign this Authorization. Your refusal will not affect your ability to obtain treatment.  * This Authorization becomes effective upon signing and will  on (date) __________.      If no date is indicated, this Authorization will  one (1) year from  the signature date.    Name: Moe Nickerson  Signature: Date:   6/24/2025     PLEASE FAX REQUESTED RECORDS BACK TO: [phone]

## 2025-06-24 NOTE — PROGRESS NOTES
"Subjective:     History of Present Illness  The patient presents for a follow-up visit.    He has been under the care of two dermatologists by two physician assistants, each providing different diagnoses including eczema, plaque psoriasis, seborrheic keratosis, and atopic dermatitis. Despite various treatments, his condition remains unchanged. A punch biopsy was performed on his leg a few months ago, but he has not yet received the results. He has an upcoming appointment with [facility] in 09/2025.    He reports experiencing nerve pain and is currently taking Neurontin 100 mg three times daily, a regimen that was initiated in 2019.    He mentions a recent onset of shortness of breath, which occurs even at rest. He is not experiencing any chest pain.    He has been experiencing stomach issues and believes he has lost all his body fat. He is currently taking omeprazole for his stomach issues.    He is currently taking lisinopril and rosuvastatin for his cholesterol. He is still taking Eliquis.      Current medicines (including changes today)  Current Medications[1]  He  has a past medical history of Anxiety, Congestive heart failure (HCC), Heart attack (HCC), Hypertension, and Stroke (cerebrum) ([facility]).    ROS   No chest pain, no shortness of breath, no abdominal pain  Positive ROS as per HPI.  All other systems reviewed and are negative.     Objective:     /50 (BP Location: Left arm, Patient Position: Sitting, BP Cuff Size: Adult)   Pulse (!) 37   Temp 36.9 °C (98.5 °F) (Temporal)   Ht 1.743 m (5' 8.62\")   Wt 54 kg (119 lb)   SpO2 99%  Body mass index is 17.77 kg/m².   Physical Exam  Cardiovascular: Pulse rate is in the 30s to 50s.  Skin: Scabbed over wound on the leg.  Constitutional: Alert, no distress.  Skin: Warm, dry, good turgor, no rashes in visible areas.  Eye: Equal, round and reactive, conjunctiva clear, lids normal.  ENMT: Lips without lesions, good dentition, oropharynx clear.  Neck: Trachea " midline, no masses, no thyromegaly. No cervical or supraclavicular lymphadenopathy  Respiratory: Unlabored respiratory effort.  Cardiovascular: Irregular rate and rhythm.  No murmurs.  Abdomen: Soft, non-tender, no masses, no hepatosplenomegaly.  Psych: Alert and oriented x3, normal affect and mood.      Results  Diagnostic Testing   - [id number], sinus rhythm, left bundle branch block and premature ventricular and supraventricular complexes.        Assessment and Plan:   The following treatment plan was discussed    Assessment & Plan  1. Dermatosis.  Chronic condition.  - Diagnosed with dermatosis, characterized by patchy crusty lesions.  - Punch biopsy results are currently unavailable; diagnosed with seborrheic keratosis and atopic dermatitis (eczema).  - Chronic changes observed are likely due to poor vascular flow; advised to avoid using Band-Aids as they may trap moisture and hinder healing.  - Medical records release will be sent to [facility], to obtain further information about the punch biopsy of his lower leg.  - Continue to follow at [facility].    2.  Neuropathy.  Chronic condition.  - Experiencing nerve pain; previously prescribed Neurontin.  - Prescription for Neurontin 100 mg, to be taken three times daily, provided with a 90-day supply and three refills.  - Advised to continue taking gabapentin.  - Prescription sent to pharmacy.    3. Bradycardia.  - Pulse rate low today, ranging from 30s to 50s.  - EKG revealed left bundle branch block and premature ventricular and supraventricular contractions, potentially contributing to bradycardia and shortness of breath.  - Advised to consult with cardiology for further evaluation and management.  - Pulse did elevate once he was supine.    4. Shortness of breath.  Unknown cause.  - Reported new onset shortness of breath.  - EKG performed; advised to follow up with cardiology for further evaluation.  - Shortness of breath noted without exertion.    5.  Medication management.  - Currently taking omeprazole, lisinopril, rosuvastatin, gabapentin, and Eliquis.  - Advised to continue these medications as prescribed.  - Reviewed current medication list.    Follow-up  The patient will follow up in 2 months.      ORDERS:  1. Neuropathy    - gabapentin (NEURONTIN) 100 MG Cap; Take 1 Capsule by mouth 3 times a day for 360 days.  Dispense: 270 Capsule; Refill: 3    2. Eczema, unspecified type      3. Bradycardia    - EKG - Clinic Performed  - REFERRAL TO CARDIOLOGY    4. Shortness of breath      5. Premature cardiac complexes    - REFERRAL TO CARDIOLOGY    () Today's E/M visit is associated with medical care services that serve as the continuing focal point for all needed health care services and/or with medical care services that are part of ongoing care related to a patient's single, serious condition or a complex condition: This includes furnishing services to patients on an ongoing basis that result in care that is personalized to the patient. The services result in a comprehensive, longitudinal, and continuous relationship with the patient and involve delivery of team-based care that is accessible, coordinated with other practitioners and providers, and integrated with the broader health care landscape.    Patient was seen for 45 minutes face to face of which > 50% of appointment time was spent on counseling and coordination of care regarding the above.    Please note that this dictation was created using voice recognition software. I have made every reasonable attempt to correct obvious errors, but I expect that there are errors of grammar and possibly content that I did not discover before finalizing the note.      Attestation      Verbal consent was acquired by the patient to use [employer] ambient listening note generation during this visit Yes                  [1]   Current Outpatient Medications   Medication Sig Dispense Refill    clobetasol (TEMOVATE) 0.05  % Ointment Apply 0.05 % topically 2 times a day.      ketoconazole (NIZORAL) 2 % Cream Apply 2 % topically 2 times a day.      ketoconazole (NIZORAL) 2 % shampoo Apply 2 % topically 1 time a day as needed for Itching.      ZORYVE 0.3 % Foam Apply 0.3 % topically.      gabapentin (NEURONTIN) 100 MG Cap Take 1 Capsule by mouth 3 times a day for 360 days. 270 Capsule 3    lisinopril (PRINIVIL) 20 MG Tab       ELIQUIS 2.5 MG Tab       Ciclopirox 1 % Shampoo WASH SCALP 2-3 X WEEKLY, ALLOW TO SIT 1-2 MINUTES PRIOR TO RINSING 120 mL 0    Fluocinolone Acetonide Scalp 0.01 % Oil APPLY TO RASH/ITCHING ON THE SCALP ONCE DAILY WITH A MAX USE OF 5 DAYS PER WEEK 118 mL 0    rosuvastatin (CRESTOR) 10 MG Tab Take 1 Tablet by mouth every evening. 90 Tablet 3    acetaminophen (TYLENOL) 500 MG Tab Take 500 mg by mouth every 6 hours as needed for Moderate Pain.      omeprazole (PRILOSEC) 20 MG delayed-release capsule Take 1 Capsule by mouth every day. Take 1/2 hour prior to same meal. 90 Capsule 2    nitroglycerin (NITROSTAT) 0.4 MG SL Tab Place 1 Tablet under the tongue as needed for Chest Pain. 25 Tablet 0     No current facility-administered medications for this visit.

## 2025-06-30 ENCOUNTER — TELEPHONE (OUTPATIENT)
Dept: HEALTH INFORMATION MANAGEMENT | Facility: OTHER | Age: 80
End: 2025-06-30
Payer: MEDICARE

## 2025-07-02 ENCOUNTER — PATIENT MESSAGE (OUTPATIENT)
Dept: SCHEDULING | Facility: IMAGING CENTER | Age: 80
End: 2025-07-02
Payer: MEDICARE

## 2025-07-21 DIAGNOSIS — K21.00 GASTROESOPHAGEAL REFLUX DISEASE WITH ESOPHAGITIS WITHOUT HEMORRHAGE: ICD-10-CM

## 2025-07-22 RX ORDER — APIXABAN 2.5 MG/1
2.5 TABLET, FILM COATED ORAL 2 TIMES DAILY
Qty: 360 TABLET | Refills: 0 | Status: SHIPPED | OUTPATIENT
Start: 2025-07-22

## 2025-07-22 RX ORDER — LISINOPRIL 20 MG/1
20 TABLET ORAL
Qty: 180 TABLET | Refills: 0 | Status: SHIPPED | OUTPATIENT
Start: 2025-07-22

## 2025-07-22 RX ORDER — OMEPRAZOLE 20 MG/1
CAPSULE, DELAYED RELEASE ORAL
Qty: 180 CAPSULE | Refills: 0 | Status: SHIPPED | OUTPATIENT
Start: 2025-07-22

## 2025-07-31 ENCOUNTER — TELEPHONE (OUTPATIENT)
Dept: MEDICAL GROUP | Facility: MEDICAL CENTER | Age: 80
End: 2025-07-31
Payer: MEDICARE

## 2025-08-01 DIAGNOSIS — R00.1 BRADYCARDIA: ICD-10-CM

## 2025-08-01 DIAGNOSIS — E78.00 PURE HYPERCHOLESTEROLEMIA: Primary | ICD-10-CM

## 2025-08-01 DIAGNOSIS — Z12.5 ENCOUNTER FOR SCREENING PROSTATE SPECIFIC ANTIGEN (PSA) MEASUREMENT: ICD-10-CM

## 2025-08-19 ENCOUNTER — OFFICE VISIT (OUTPATIENT)
Dept: URGENT CARE | Facility: CLINIC | Age: 80
End: 2025-08-19
Payer: MEDICARE

## 2025-08-19 VITALS
DIASTOLIC BLOOD PRESSURE: 76 MMHG | RESPIRATION RATE: 20 BRPM | HEART RATE: 75 BPM | WEIGHT: 121.8 LBS | SYSTOLIC BLOOD PRESSURE: 152 MMHG | BODY MASS INDEX: 18.46 KG/M2 | OXYGEN SATURATION: 98 % | TEMPERATURE: 97.6 F | HEIGHT: 68 IN

## 2025-08-19 DIAGNOSIS — H02.9 LESION OF LEFT EYELID: Primary | ICD-10-CM

## 2025-08-19 PROCEDURE — 3077F SYST BP >= 140 MM HG: CPT | Performed by: PHYSICIAN ASSISTANT

## 2025-08-19 PROCEDURE — 99213 OFFICE O/P EST LOW 20 MIN: CPT | Performed by: PHYSICIAN ASSISTANT

## 2025-08-19 PROCEDURE — 3078F DIAST BP <80 MM HG: CPT | Performed by: PHYSICIAN ASSISTANT

## 2025-08-19 ASSESSMENT — FIBROSIS 4 INDEX: FIB4 SCORE: 2.42

## 2025-08-19 ASSESSMENT — ENCOUNTER SYMPTOMS
EYE REDNESS: 0
EYE DISCHARGE: 0
EYE PAIN: 0

## (undated) DEVICE — COVER LIGHT HANDLE ALC PLUS DISP (18EA/BX)

## (undated) DEVICE — GLOVE SZ 6.5 BIOGEL PI MICRO - PF LF (50PR/BX)

## (undated) DEVICE — SODIUM CHL IRRIGATION 0.9% 1000ML (12EA/CA)

## (undated) DEVICE — SUCTION INSTRUMENT YANKAUER BULBOUS TIP W/O VENT (50EA/CA)

## (undated) DEVICE — GLOVE SZ 7 BIOGEL PI MICRO - PF LF (50PR/BX 4BX/CA)

## (undated) DEVICE — GLOVE BIOGEL PI INDICATOR SZ 7.0 SURGICAL PF LF - (50/BX 4BX/CA)

## (undated) DEVICE — DRAPE SURG STERI-DRAPE 7X11OD - (40EA/CA)

## (undated) DEVICE — SET EXTENSION WITH 2 PORTS (48EA/CA) ***PART #2C8610 IS A SUBSTITUTE*****

## (undated) DEVICE — DRAPE IOBAN II INCISE 23X17 - (10EA/BX 4BX/CA)

## (undated) DEVICE — SODIUM CHL. IRRIGATION 0.9% 3000ML (4EA/CA 65CA/PF)

## (undated) DEVICE — LENS/HOOD FOR SPACESUIT - (32/PK) PEEL AWAY FACE

## (undated) DEVICE — SUTURE GENERAL

## (undated) DEVICE — DRAPE IOBAN II 23 IN X 33 IN - (10/BX)

## (undated) DEVICE — DRAPE LARGE 3 QUARTER - (20/CA)

## (undated) DEVICE — SET LEADWIRE 5 LEAD BEDSIDE DISPOSABLE ECG (1SET OF 5/EA)

## (undated) DEVICE — SUTURE 5 TI-CRON HOS-14 - (36/BX)

## (undated) DEVICE — SUTURE 0 VICRYL PLUS CT-1 - 36 INCH (36/BX)

## (undated) DEVICE — WATER IRRIGATION STERILE 1000ML (12EA/CA)

## (undated) DEVICE — SLEEVE, VASO, THIGH, MED

## (undated) DEVICE — SENSOR OXIMETER ADULT SPO2 RD SET (20EA/BX)

## (undated) DEVICE — DRILL BIT 3.3 X 40MM

## (undated) DEVICE — ELECTRODE DUAL RETURN W/ CORD - (50/PK)

## (undated) DEVICE — GOWN WARMING STANDARD FLEX - (30/CA)

## (undated) DEVICE — TIP INTPLS HFLO ML ORFC BTRY - (12/CS)  FOR SURGILAV

## (undated) DEVICE — GLOVE BIOGEL PI ORTHO SZ 7.5 PF LF (40PR/BX)

## (undated) DEVICE — PACK TOTAL HIP - (1/CA)

## (undated) DEVICE — GLOVE BIOGEL INDICATOR SZ 7.5 SURGICAL PF LTX - (50PR/BX 4BX/CA)

## (undated) DEVICE — TUBING CLEARLINK DUO-VENT - C-FLO (48EA/CA)

## (undated) DEVICE — BOVIE BLADE COATED - (50/PK)

## (undated) DEVICE — DRESSING POST OP BORDER 4 X 10 (5EA/BX)

## (undated) DEVICE — Device

## (undated) DEVICE — TOWEL STOP TIMEOUT SAFETY FLAG (40EA/CA)

## (undated) DEVICE — DRAPE STRLE REG TOWEL 18X24 - (10/BX 4BX/CA)"

## (undated) DEVICE — CANISTER SUCTION 3000ML MECHANICAL FILTER AUTO SHUTOFF MEDI-VAC NONSTERILE LF DISP  (40EA/CA)

## (undated) DEVICE — HOOD, SURGICAL

## (undated) DEVICE — BLADE SAGITTAL SAW DUAL CUT 25.0 X 90.0 X 1.27MM (1/EA)

## (undated) DEVICE — STAPLER SKIN DISP - (6/BX 10BX/CA) VISISTAT

## (undated) DEVICE — GLOVE BIOGEL PI INDICATOR SZ 6.5 SURGICAL PF LF - (50/BX 4BX/CA)

## (undated) DEVICE — SUTURE 2-0 VICRYL PLUS CT-1 36 (36PK/BX)"

## (undated) DEVICE — STOCKINETTE IMPERVIOUS 12X48 - STERILELF (10/CA)"

## (undated) DEVICE — LACTATED RINGERS INJ 1000 ML - (14EA/CA 60CA/PF)

## (undated) DEVICE — HANDPIECE 10FT INTPLS SCT PLS IRRIGATION HAND CONTROL SET (6/PK)